# Patient Record
Sex: FEMALE | Race: WHITE | NOT HISPANIC OR LATINO | Employment: OTHER | URBAN - METROPOLITAN AREA
[De-identification: names, ages, dates, MRNs, and addresses within clinical notes are randomized per-mention and may not be internally consistent; named-entity substitution may affect disease eponyms.]

---

## 2018-07-12 ENCOUNTER — OFFICE VISIT (OUTPATIENT)
Dept: INTERNAL MEDICINE | Facility: CLINIC | Age: 78
End: 2018-07-12

## 2018-07-12 ENCOUNTER — RESULTS ENCOUNTER (OUTPATIENT)
Dept: INTERNAL MEDICINE | Facility: CLINIC | Age: 78
End: 2018-07-12

## 2018-07-12 VITALS
WEIGHT: 219 LBS | SYSTOLIC BLOOD PRESSURE: 124 MMHG | HEIGHT: 61 IN | BODY MASS INDEX: 41.35 KG/M2 | HEART RATE: 64 BPM | DIASTOLIC BLOOD PRESSURE: 72 MMHG

## 2018-07-12 DIAGNOSIS — F32.89 OTHER DEPRESSION: ICD-10-CM

## 2018-07-12 DIAGNOSIS — M25.50 ARTHRALGIA OF MULTIPLE JOINTS: ICD-10-CM

## 2018-07-12 DIAGNOSIS — M19.90 ARTHRITIS: ICD-10-CM

## 2018-07-12 DIAGNOSIS — R73.03 PRE-DIABETES: ICD-10-CM

## 2018-07-12 DIAGNOSIS — E78.2 MIXED HYPERLIPIDEMIA: ICD-10-CM

## 2018-07-12 DIAGNOSIS — F41.9 ANXIETY: ICD-10-CM

## 2018-07-12 DIAGNOSIS — F51.04 PSYCHOPHYSIOLOGIC INSOMNIA: ICD-10-CM

## 2018-07-12 DIAGNOSIS — Z12.11 SCREEN FOR COLON CANCER: ICD-10-CM

## 2018-07-12 DIAGNOSIS — I10 BENIGN ESSENTIAL HYPERTENSION: Primary | ICD-10-CM

## 2018-07-12 DIAGNOSIS — M54.40 CHRONIC LOW BACK PAIN WITH SCIATICA, SCIATICA LATERALITY UNSPECIFIED, UNSPECIFIED BACK PAIN LATERALITY: ICD-10-CM

## 2018-07-12 DIAGNOSIS — K21.00 ESOPHAGITIS, REFLUX: ICD-10-CM

## 2018-07-12 DIAGNOSIS — M17.0 PRIMARY OSTEOARTHRITIS OF BOTH KNEES: ICD-10-CM

## 2018-07-12 DIAGNOSIS — E53.8 VITAMIN B 12 DEFICIENCY: ICD-10-CM

## 2018-07-12 DIAGNOSIS — E55.9 VITAMIN D DEFICIENCY: ICD-10-CM

## 2018-07-12 DIAGNOSIS — M81.8 OTHER OSTEOPOROSIS WITHOUT CURRENT PATHOLOGICAL FRACTURE: ICD-10-CM

## 2018-07-12 DIAGNOSIS — G89.29 CHRONIC LOW BACK PAIN WITH SCIATICA, SCIATICA LATERALITY UNSPECIFIED, UNSPECIFIED BACK PAIN LATERALITY: ICD-10-CM

## 2018-07-12 LAB
25(OH)D3 SERPL-MCNC: 37.1 NG/ML
ALBUMIN SERPL-MCNC: 4.31 G/DL (ref 3.2–4.8)
ALBUMIN/GLOB SERPL: 1.9 G/DL (ref 1.5–2.5)
ALP SERPL-CCNC: 150 U/L (ref 25–100)
ALT SERPL W P-5'-P-CCNC: 13 U/L (ref 7–40)
ANION GAP SERPL CALCULATED.3IONS-SCNC: 8 MMOL/L (ref 3–11)
ARTICHOKE IGE QN: 66 MG/DL (ref 0–130)
AST SERPL-CCNC: 18 U/L (ref 0–33)
BILIRUB BLD-MCNC: NEGATIVE MG/DL
BILIRUB SERPL-MCNC: 0.6 MG/DL (ref 0.3–1.2)
BUN BLD-MCNC: 19 MG/DL (ref 9–23)
BUN/CREAT SERPL: 22.1 (ref 7–25)
CALCIUM SPEC-SCNC: 9.6 MG/DL (ref 8.7–10.4)
CHLORIDE SERPL-SCNC: 103 MMOL/L (ref 99–109)
CHOLEST SERPL-MCNC: 133 MG/DL (ref 0–200)
CLARITY, POC: CLEAR
CO2 SERPL-SCNC: 31 MMOL/L (ref 20–31)
COLOR UR: YELLOW
CREAT BLD-MCNC: 0.86 MG/DL (ref 0.6–1.3)
DEPRECATED RDW RBC AUTO: 47.8 FL (ref 37–54)
ERYTHROCYTE [DISTWIDTH] IN BLOOD BY AUTOMATED COUNT: 13.7 % (ref 11.3–14.5)
GFR SERPL CREATININE-BSD FRML MDRD: 64 ML/MIN/1.73
GLOBULIN UR ELPH-MCNC: 2.3 GM/DL
GLUCOSE BLD-MCNC: 115 MG/DL (ref 70–100)
GLUCOSE UR STRIP-MCNC: NEGATIVE MG/DL
HBA1C MFR BLD: 5.7 % (ref 4.8–5.6)
HCT VFR BLD AUTO: 41.6 % (ref 34.5–44)
HDLC SERPL-MCNC: 56 MG/DL (ref 40–60)
HGB BLD-MCNC: 13.1 G/DL (ref 11.5–15.5)
KETONES UR QL: NEGATIVE
LEUKOCYTE EST, POC: NEGATIVE
MCH RBC QN AUTO: 29.8 PG (ref 27–31)
MCHC RBC AUTO-ENTMCNC: 31.5 G/DL (ref 32–36)
MCV RBC AUTO: 94.5 FL (ref 80–99)
NITRITE UR-MCNC: NEGATIVE MG/ML
PH UR: 8 [PH] (ref 5–8)
PLATELET # BLD AUTO: 290 10*3/MM3 (ref 150–450)
PMV BLD AUTO: 11.3 FL (ref 6–12)
POTASSIUM BLD-SCNC: 4.6 MMOL/L (ref 3.5–5.5)
PROT SERPL-MCNC: 6.6 G/DL (ref 5.7–8.2)
PROT UR STRIP-MCNC: NEGATIVE MG/DL
RBC # BLD AUTO: 4.4 10*6/MM3 (ref 3.89–5.14)
RBC # UR STRIP: NEGATIVE /UL
SODIUM BLD-SCNC: 142 MMOL/L (ref 132–146)
SP GR UR: 1.01 (ref 1–1.03)
TRIGL SERPL-MCNC: 85 MG/DL (ref 0–150)
UROBILINOGEN UR QL: NORMAL
VIT B12 BLD-MCNC: 731 PG/ML (ref 211–911)
WBC NRBC COR # BLD: 10.97 10*3/MM3 (ref 3.5–10.8)

## 2018-07-12 PROCEDURE — 83036 HEMOGLOBIN GLYCOSYLATED A1C: CPT | Performed by: INTERNAL MEDICINE

## 2018-07-12 PROCEDURE — 99214 OFFICE O/P EST MOD 30 MIN: CPT | Performed by: INTERNAL MEDICINE

## 2018-07-12 PROCEDURE — 82306 VITAMIN D 25 HYDROXY: CPT | Performed by: INTERNAL MEDICINE

## 2018-07-12 PROCEDURE — 82607 VITAMIN B-12: CPT | Performed by: INTERNAL MEDICINE

## 2018-07-12 PROCEDURE — 80053 COMPREHEN METABOLIC PANEL: CPT | Performed by: INTERNAL MEDICINE

## 2018-07-12 PROCEDURE — 85027 COMPLETE CBC AUTOMATED: CPT | Performed by: INTERNAL MEDICINE

## 2018-07-12 PROCEDURE — 81003 URINALYSIS AUTO W/O SCOPE: CPT | Performed by: INTERNAL MEDICINE

## 2018-07-12 PROCEDURE — 80061 LIPID PANEL: CPT | Performed by: INTERNAL MEDICINE

## 2018-07-12 RX ORDER — QUINIDINE SULFATE 200 MG
TABLET ORAL
COMMUNITY

## 2018-07-12 RX ORDER — RANITIDINE 150 MG/1
TABLET ORAL 2 TIMES DAILY
COMMUNITY
Start: 2015-01-05 | End: 2018-07-23 | Stop reason: SDUPTHER

## 2018-07-12 RX ORDER — LISINOPRIL 20 MG/1
20 TABLET ORAL 2 TIMES DAILY
COMMUNITY
End: 2018-07-23 | Stop reason: SDUPTHER

## 2018-07-12 RX ORDER — ARIPIPRAZOLE 2 MG/1
2 TABLET ORAL EVERY MORNING
Qty: 30 TABLET | Refills: 5 | Status: SHIPPED | OUTPATIENT
Start: 2018-07-12 | End: 2018-07-13

## 2018-07-12 RX ORDER — ATORVASTATIN CALCIUM 20 MG/1
20 TABLET, FILM COATED ORAL NIGHTLY
COMMUNITY
End: 2018-10-22 | Stop reason: SDUPTHER

## 2018-07-12 RX ORDER — AMITRIPTYLINE HYDROCHLORIDE 25 MG/1
25 TABLET, FILM COATED ORAL NIGHTLY
Qty: 30 TABLET | Refills: 5 | Status: SHIPPED | OUTPATIENT
Start: 2018-07-12 | End: 2019-01-05 | Stop reason: SDUPTHER

## 2018-07-12 RX ORDER — CARVEDILOL 25 MG/1
TABLET ORAL 2 TIMES DAILY
COMMUNITY
Start: 2015-01-05 | End: 2018-08-15 | Stop reason: SDUPTHER

## 2018-07-12 NOTE — PROGRESS NOTES
Patient is a 78 y.o. female who is here to establish care for chronic conditions.  Chief Complaint   Patient presents with   • Establish Care     htn,hyperlipidemia         HPI:    Here to establish care and management of chronic medical issues.  Has been diagnosed with fibromyalgia.  Onset about 15 years.  Seen by rheumatology.  Worst in her mid back.  Has had 2 outpatient lumbar surgeries.  Poor sleep.    Also has HTN.  Onset years.  Occasional dizziness.  Occasional LE edema.  No CP.  Some DESAI.   History:    Patient Active Problem List   Diagnosis   • Anxiety   • Arthralgia of multiple joints   • Arthritis   • Benign essential hypertension   • Chronic back pain   • Depression   • Esophagitis, reflux   • Gait disturbance   • Mixed hyperlipidemia   • Muscle spasm   • Neuralgia   • Pre-diabetes   • Psychophysiologic insomnia   • Major depression, recurrent (CMS/HCC)   • Vitamin B 12 deficiency   • Vitamin D deficiency   • Osteoporosis   • Primary osteoarthritis of both knees       Past Medical History:   Diagnosis Date   • Colon polyp    • Osteoporosis    • Pain in thoracic spine    • Pneumonia    • UTI (urinary tract infection)        Past Surgical History:   Procedure Laterality Date   • BLADDER SURGERY  2000    bladder suspension   • CERVICAL LAMINECTOMY     • COLONOSCOPY     • COLOSTOMY  1979    temporary sigmoid   • HYSTERECTOMY         Current Outpatient Prescriptions on File Prior to Visit   Medication Sig   • amLODIPine (NORVASC) 5 MG tablet TAKE ONE TABLET BY MOUTH AT BEDTIME   • DULoxetine (CYMBALTA) 60 MG capsule Take 1 capsule by mouth daily.     No current facility-administered medications on file prior to visit.        Family History   Problem Relation Age of Onset   • Blindness Mother    • Other Mother         arteriosclerotic cardiovascular disease    • Diabetes Mother    • Osteoporosis Mother    • Stroke Mother    • Cancer Brother    • Lung cancer Sister    • Osteoporosis Sister    • Stroke Sister   "  • Stroke Other        Social History     Social History   • Marital status:      Spouse name: N/A   • Number of children: N/A   • Years of education: N/A     Occupational History   • Not on file.     Social History Main Topics   • Smoking status: Never Smoker   • Smokeless tobacco: Never Used   • Alcohol use No   • Drug use: No   • Sexual activity: Not on file     Other Topics Concern   • Not on file     Social History Narrative   • No narrative on file         Review of Systems   Constitutional: Positive for fatigue. Negative for chills, fever and unexpected weight change.   HENT: Negative for congestion, ear pain, hearing loss, rhinorrhea, sinus pressure, sore throat and trouble swallowing.    Eyes: Negative for discharge and itching.   Respiratory: Positive for shortness of breath. Negative for cough and chest tightness.    Cardiovascular: Negative for chest pain, palpitations and leg swelling.   Gastrointestinal: Negative for abdominal pain, blood in stool, constipation, diarrhea and vomiting.   Endocrine: Negative for polydipsia and polyuria.   Genitourinary: Positive for urgency. Negative for difficulty urinating, dysuria, enuresis, frequency and hematuria.   Musculoskeletal: Positive for arthralgias, back pain, gait problem and neck pain. Negative for joint swelling.   Skin: Negative for rash and wound.   Allergic/Immunologic: Negative for immunocompromised state.   Neurological: Positive for weakness and light-headedness. Negative for dizziness, syncope, numbness and headaches.   Hematological: Does not bruise/bleed easily.   Psychiatric/Behavioral: Positive for dysphoric mood and sleep disturbance. Negative for behavioral problems. The patient is nervous/anxious.        /72 (BP Location: Right arm, Patient Position: Sitting)   Pulse 64   Ht 154.9 cm (61\")   Wt 99.3 kg (219 lb)   BMI 41.38 kg/m²       Physical Exam   Constitutional: She is oriented to person, place, and time. She appears " well-developed and well-nourished.   HENT:   Head: Normocephalic and atraumatic.   Right Ear: External ear normal.   Left Ear: External ear normal.   Mouth/Throat: Oropharynx is clear and moist.   Eyes: Conjunctivae and EOM are normal.   Neck: Normal range of motion. Neck supple.   Cardiovascular: Normal rate, regular rhythm and normal heart sounds.    Pulmonary/Chest: Effort normal and breath sounds normal.   Abdominal: Soft. Bowel sounds are normal.   Musculoskeletal: She exhibits edema.   Using  cane   Lymphadenopathy:     She has no cervical adenopathy.   Neurological: She is alert and oriented to person, place, and time.   Skin: Skin is warm and dry.   Psychiatric: She has a normal mood and affect. Her behavior is normal. Thought content normal.       Procedure:      Discussion/Summary:    HTN-stable , consider change to lotrel  Hyperlipidemia/?DM-counseled on diet, labs today  Insomnia-add elavil  Djd/fibromyalgia-cont current tx and PT  GERD-cont zantac  Knee djd-check xray and ortho to see  High risk meds-labs today  Other-check cologuard, advised mammogram    Prior labs reviewed, along with notes  abilify is to costly and will rx remeron    Current Outpatient Prescriptions:   •  amLODIPine (NORVASC) 5 MG tablet, TAKE ONE TABLET BY MOUTH AT BEDTIME, Disp: 90 tablet, Rfl: 0  •  atorvastatin (LIPITOR) 20 MG tablet, Take 20 mg by mouth Every Night., Disp: , Rfl:   •  carvedilol (COREG) 25 MG tablet, Take  by mouth 2 (Two) Times a Day., Disp: , Rfl:   •  DULoxetine (CYMBALTA) 60 MG capsule, Take 1 capsule by mouth daily., Disp: 90 capsule, Rfl: 3  •  lisinopril (PRINIVIL,ZESTRIL) 20 MG tablet, Take 20 mg by mouth 2 (Two) Times a Day., Disp: , Rfl:   •  Multiple Vitamins-Minerals (VITAMIN D3 COMPLETE) tablet, Take  by mouth., Disp: , Rfl:   •  raNITIdine (ZANTAC) 150 MG tablet, Take  by mouth 2 (Two) Times a Day., Disp: , Rfl:   •  amitriptyline (ELAVIL) 25 MG tablet, Take 1 tablet by mouth Every Night., Disp: 30  tablet, Rfl: 5  •  mirtazapine (REMERON) 15 MG tablet, Take 1 tablet by mouth Every Night., Disp: 30 tablet, Rfl: 5        Danae was seen today for establish care.    Diagnoses and all orders for this visit:    Benign essential hypertension    Mixed hyperlipidemia  -     Comprehensive Metabolic Panel  -     Lipid Panel    Esophagitis, reflux    Vitamin B 12 deficiency  -     CBC (No Diff)  -     Vitamin B12    Vitamin D deficiency  -     Vitamin D 25 Hydroxy    Arthralgia of multiple joints    Chronic low back pain with sciatica, sciatica laterality unspecified, unspecified back pain laterality    Arthritis    Other osteoporosis without current pathological fracture    Anxiety    Other depression  -     amitriptyline (ELAVIL) 25 MG tablet; Take 1 tablet by mouth Every Night.  -     Discontinue: ARIPiprazole (ABILIFY) 2 MG tablet; Take 1 tablet by mouth Every Morning.  -     mirtazapine (REMERON) 15 MG tablet; Take 1 tablet by mouth Every Night.    Pre-diabetes  -     Hemoglobin A1c  -     POC Urinalysis Dipstick, Automated    Psychophysiologic insomnia  -     amitriptyline (ELAVIL) 25 MG tablet; Take 1 tablet by mouth Every Night.    Screen for colon cancer  -     Cologuard - Stool, Per Rectum; Future    Primary osteoarthritis of both knees  -     XR Knee 1 or 2 View Bilateral  -     Ambulatory Referral to Orthopedic Surgery

## 2018-07-13 RX ORDER — MIRTAZAPINE 15 MG/1
15 TABLET, FILM COATED ORAL NIGHTLY
Qty: 30 TABLET | Refills: 5 | Status: SHIPPED | OUTPATIENT
Start: 2018-07-13 | End: 2018-10-16

## 2018-07-23 RX ORDER — RANITIDINE 150 MG/1
TABLET ORAL
Qty: 180 TABLET | Refills: 0 | Status: SHIPPED | OUTPATIENT
Start: 2018-07-23 | End: 2018-12-07 | Stop reason: SDUPTHER

## 2018-07-23 RX ORDER — LISINOPRIL 20 MG/1
TABLET ORAL
Qty: 180 TABLET | Refills: 0 | Status: SHIPPED | OUTPATIENT
Start: 2018-07-23 | End: 2018-10-17 | Stop reason: SDUPTHER

## 2018-08-06 ENCOUNTER — OFFICE VISIT (OUTPATIENT)
Dept: ORTHOPEDIC SURGERY | Facility: CLINIC | Age: 78
End: 2018-08-06

## 2018-08-06 VITALS — BODY MASS INDEX: 41.35 KG/M2 | HEART RATE: 72 BPM | OXYGEN SATURATION: 98 % | HEIGHT: 61 IN | WEIGHT: 219 LBS

## 2018-08-06 DIAGNOSIS — M17.0 PRIMARY OSTEOARTHRITIS OF BOTH KNEES: Primary | ICD-10-CM

## 2018-08-06 PROCEDURE — 99203 OFFICE O/P NEW LOW 30 MIN: CPT | Performed by: ORTHOPAEDIC SURGERY

## 2018-08-06 PROCEDURE — 20610 DRAIN/INJ JOINT/BURSA W/O US: CPT | Performed by: ORTHOPAEDIC SURGERY

## 2018-08-06 RX ORDER — ROPIVACAINE HYDROCHLORIDE 5 MG/ML
4 INJECTION, SOLUTION EPIDURAL; INFILTRATION; PERINEURAL
Status: COMPLETED | OUTPATIENT
Start: 2018-08-06 | End: 2018-08-06

## 2018-08-06 RX ORDER — TRIAMCINOLONE ACETONIDE 40 MG/ML
40 INJECTION, SUSPENSION INTRA-ARTICULAR; INTRAMUSCULAR
Status: COMPLETED | OUTPATIENT
Start: 2018-08-06 | End: 2018-08-06

## 2018-08-06 RX ADMIN — TRIAMCINOLONE ACETONIDE 40 MG: 40 INJECTION, SUSPENSION INTRA-ARTICULAR; INTRAMUSCULAR at 16:09

## 2018-08-06 RX ADMIN — ROPIVACAINE HYDROCHLORIDE 4 ML: 5 INJECTION, SOLUTION EPIDURAL; INFILTRATION; PERINEURAL at 16:09

## 2018-08-06 NOTE — PROGRESS NOTES
[facility] Note    Subjective     Chief Complaint   Patient presents with   • Left Knee - Pain   • Right Knee - Pain        HPI    Danae Harmon is a 78 y.o. female.  She presents today for evaluation of bilateral knee pain.  Pain is severe in both knees, sharp in quality, associated with popping, grinding, stiffness and giving way.  Over time it is worsening.  Pain is been present for several years.  She did have Visco supplementation injections about 5-6 years ago, with no relief.  She is not interested in surgical intervention.      Patient Active Problem List   Diagnosis   • Anxiety   • Arthralgia of multiple joints   • Arthritis   • Benign essential hypertension   • Chronic back pain   • Depression   • Esophagitis, reflux   • Gait disturbance   • Mixed hyperlipidemia   • Muscle spasm   • Neuralgia   • Pre-diabetes   • Psychophysiologic insomnia   • Major depression, recurrent (CMS/HCC)   • Vitamin B 12 deficiency   • Vitamin D deficiency   • Osteoporosis   • Primary osteoarthritis of both knees     Past Medical History:   Diagnosis Date   • Colon polyp    • Osteoporosis    • Pain in thoracic spine    • Pneumonia    • UTI (urinary tract infection)       Past Surgical History:   Procedure Laterality Date   • BLADDER SURGERY  2000    bladder suspension   • CERVICAL LAMINECTOMY     • COLONOSCOPY     • COLOSTOMY  1979    temporary sigmoid   • HYSTERECTOMY        Family History   Problem Relation Age of Onset   • Blindness Mother    • Other Mother         arteriosclerotic cardiovascular disease    • Diabetes Mother    • Osteoporosis Mother    • Stroke Mother    • Cancer Brother    • Lung cancer Sister    • Osteoporosis Sister    • Stroke Sister    • Stroke Other      Social History     Social History   • Marital status:      Spouse name: N/A   • Number of children: N/A   • Years of education: N/A     Occupational History   • Not on file.     Social History Main Topics   •  Smoking status: Never Smoker   • Smokeless tobacco: Never Used   • Alcohol use No   • Drug use: No   • Sexual activity: Not on file     Other Topics Concern   • Not on file     Social History Narrative   • No narrative on file      Current Outpatient Prescriptions on File Prior to Visit   Medication Sig Dispense Refill   • amitriptyline (ELAVIL) 25 MG tablet Take 1 tablet by mouth Every Night. 30 tablet 5   • amLODIPine (NORVASC) 5 MG tablet TAKE ONE TABLET BY MOUTH AT BEDTIME 90 tablet 0   • atorvastatin (LIPITOR) 20 MG tablet Take 20 mg by mouth Every Night.     • carvedilol (COREG) 25 MG tablet Take  by mouth 2 (Two) Times a Day.     • DULoxetine (CYMBALTA) 60 MG capsule Take 1 capsule by mouth daily. 90 capsule 3   • lisinopril (PRINIVIL,ZESTRIL) 20 MG tablet TAKE 1 TABLET BY MOUTH TWICE DAILY 180 tablet 0   • Multiple Vitamins-Minerals (VITAMIN D3 COMPLETE) tablet Take  by mouth.     • raNITIdine (ZANTAC) 150 MG tablet TAKE 1 TABLET BY MOUTH TWICE DAILY 180 tablet 0   • mirtazapine (REMERON) 15 MG tablet Take 1 tablet by mouth Every Night. 30 tablet 5     No current facility-administered medications on file prior to visit.       Allergies   Allergen Reactions   • Lexapro [Escitalopram Oxalate] Nausea Only        Review of Systems   Constitutional: Negative.  Negative for activity change, appetite change, chills, diaphoresis, fatigue, fever and unexpected weight change.   HENT: Negative.  Negative for congestion, dental problem, drooling, ear discharge, ear pain, facial swelling, hearing loss, mouth sores, nosebleeds, postnasal drip, rhinorrhea, sinus pressure, sneezing, sore throat, tinnitus, trouble swallowing and voice change.    Eyes: Negative.  Negative for photophobia, pain, discharge, redness, itching and visual disturbance.   Respiratory: Negative.  Negative for apnea, cough, choking, chest tightness, shortness of breath, wheezing and stridor.    Cardiovascular: Negative.  Negative for chest pain,  "palpitations and leg swelling.   Gastrointestinal: Negative.  Negative for abdominal distention, abdominal pain, anal bleeding, blood in stool, constipation, diarrhea, nausea, rectal pain and vomiting.   Endocrine: Negative.  Negative for cold intolerance, heat intolerance, polydipsia, polyphagia and polyuria.   Genitourinary: Negative.  Negative for decreased urine volume, difficulty urinating, dysuria, enuresis, flank pain, frequency, genital sores, hematuria and urgency.   Musculoskeletal: Positive for arthralgias and gait problem. Negative for back pain, joint swelling, myalgias, neck pain and neck stiffness.   Skin: Negative.  Negative for color change, pallor, rash and wound.   Allergic/Immunologic: Negative.  Negative for environmental allergies, food allergies and immunocompromised state.   Neurological: Negative for dizziness, tremors, seizures, syncope, facial asymmetry, speech difficulty, weakness, light-headedness, numbness and headaches.   Hematological: Negative.  Negative for adenopathy. Does not bruise/bleed easily.   Psychiatric/Behavioral: Positive for sleep disturbance. Negative for agitation, behavioral problems, confusion, decreased concentration, dysphoric mood, hallucinations, self-injury and suicidal ideas. The patient is not nervous/anxious and is not hyperactive.         Objective      Physical Exam  Pulse 72   Ht 154.9 cm (60.98\")   Wt 99.3 kg (219 lb)   SpO2 98%   BMI 41.40 kg/m²     Body mass index is 41.4 kg/m².    General:   Mental Status:  Alert   Appearance: Cooperative, in no acute distress   Build and Nutrition: Obese female   Orientation: Alert and oriented to person, place and time   Posture: Normal   Gait: Limping with a cane    Integument:   Right knee: no skin lesions, no rash, no ecchymosis   Left knee: no skin lesions, no rash, no ecchymosis    Neurologic:   Sensation:    Right foot: intact to light touch on the dorsal and plantar aspect    Left foot: intact to light " touch on the dorsal and plantar aspect   Motor:  Right lower extremity: 5/5 quadriceps, hamstrings, ankle dorsiflexors, and ankle plantar flexors  Left lower extremity: 5/5 quadriceps, hamstrings, ankle dorsiflexors, and ankle plantar flexors  Vascular:   Right lower extremity: 2+ dorsalis pedis pulse, prompt capillary refill   Left lower extremity: 2+ dorsalis pedis pulse, prompt capillary refill    Lower Extremities:   Right Knee:    Tenderness:  Lateral joint line tenderness    Effusion:  None    Swelling:  None    Crepitus:  Positive    Atrophy:  None    Range of motion:  Extension: 0°       Flexion: 100°  Instability:  No varus laxity, no valgus laxity, negative anterior drawer  Deformities:  Valgus   Left Knee:    Tenderness:  Medial joint line tenderness    Effusion:  None    Swelling:  None    Crepitus: Positive    Atrophy:  None    Range of motion:  Extension: 0°       Flexion: 100°  Instability:  No varus laxity, no valgus laxity, negative anterior drawer  Deformities:  Varus      Imaging/Studies      Imaging Results (last 24 hours)     Procedure Component Value Units Date/Time    XR Knee 4+ View Bilateral [[id number]] Resulted:  08/06/18 1536     Updated:  08/06/18 1537    Narrative:       Right Knee Radiographs  Indication: right knee pain  Views: Standing AP's and skiers of both knees, with lateral and sunrise   views of the right knee    Comparison: no prior studies available    Findings:   Arthritic changes are seen, with bone-on-bone contact lateral compartment,   with patellofemoral degeneration.    Left Knee Radiographs  Indication: left knee pain  Views: Standing AP's and skiers of both knees, with lateral and sunrise   views of the left knee    Comparison: no prior studies available    Findings:   Advanced arthritic changes are seen, with bone-on-bone contact medial   compartment, with slight opening of the lateral compartment, with   patellofemoral degeneration and tricompartmental osteophytes.           Assessment and Plan     Danae was seen today for pain and pain.    Diagnoses and all orders for this visit:    Primary osteoarthritis of both knees  -     XR Knee 4+ View Bilateral  -     Large Joint Arthrocentesis  -     Large Joint Arthrocentesis        I reviewed my findings with patient today.  Both her knees demonstrate advanced arthritic changes, and she is a candidate for knee replacement surgery should she be able to get her BMI less than 40.  However, she is not interested in surgical intervention.  She would like to have injections for her knees today, and these were provided.  I will see her back in 3 months to ensure improvement, but sooner for any problems.    Of note, she had 100% relief just a few minutes following the injections.    Return in about 3 months (around 11/6/2018).      Medical Decision Making  Management Options : prescription/IM medicine  Data/Risk: radiology tests and independent visualization of imaging, lab tests, or EMG/NCV      Neel Gresham MD  08/06/18  4:59 PM

## 2018-08-06 NOTE — PROGRESS NOTES
Procedure   Large Joint Arthrocentesis  Date/Time: 8/6/2018 4:09 PM  Consent given by: patient  Site marked: site marked  Timeout: Immediately prior to procedure a time out was called to verify the correct patient, procedure, equipment, support staff and site/side marked as required   Supporting Documentation  Indications: pain   Procedure Details  Location: knee - R knee  Preparation: Patient was prepped and draped in the usual sterile fashion  Needle size: 22 G  Approach: anterolateral  Medications administered: 40 mg triamcinolone acetonide 40 MG/ML; 4 mL ropivacaine 0.5 %  Patient tolerance: patient tolerated the procedure well with no immediate complications    Large Joint Arthrocentesis  Date/Time: 8/6/2018 4:09 PM  Consent given by: patient  Site marked: site marked  Timeout: Immediately prior to procedure a time out was called to verify the correct patient, procedure, equipment, support staff and site/side marked as required   Supporting Documentation  Indications: pain   Procedure Details  Location: knee - L knee  Preparation: Patient was prepped and draped in the usual sterile fashion  Needle size: 22 G  Approach: anterolateral  Medications administered: 40 mg triamcinolone acetonide 40 MG/ML; 4 mL ropivacaine 0.5 %  Patient tolerance: patient tolerated the procedure well with no immediate complications

## 2018-08-15 RX ORDER — DULOXETIN HYDROCHLORIDE 60 MG/1
60 CAPSULE, DELAYED RELEASE ORAL DAILY
Qty: 30 CAPSULE | Refills: 0 | Status: SHIPPED | OUTPATIENT
Start: 2018-08-15 | End: 2018-09-09 | Stop reason: SDUPTHER

## 2018-08-15 RX ORDER — CARVEDILOL 25 MG/1
TABLET ORAL
Qty: 60 TABLET | Refills: 5 | Status: SHIPPED | OUTPATIENT
Start: 2018-08-15 | End: 2019-02-05 | Stop reason: SDUPTHER

## 2018-09-10 RX ORDER — DULOXETIN HYDROCHLORIDE 60 MG/1
60 CAPSULE, DELAYED RELEASE ORAL DAILY
Qty: 30 CAPSULE | Refills: 3 | Status: SHIPPED | OUTPATIENT
Start: 2018-09-10 | End: 2019-02-05 | Stop reason: SDUPTHER

## 2018-09-24 RX ORDER — AMLODIPINE BESYLATE 5 MG/1
TABLET ORAL
Qty: 90 TABLET | Refills: 0 | Status: SHIPPED | OUTPATIENT
Start: 2018-09-24 | End: 2018-12-26 | Stop reason: SDUPTHER

## 2018-10-16 ENCOUNTER — OFFICE VISIT (OUTPATIENT)
Dept: INTERNAL MEDICINE | Facility: CLINIC | Age: 78
End: 2018-10-16

## 2018-10-16 VITALS — SYSTOLIC BLOOD PRESSURE: 126 MMHG | HEIGHT: 61 IN | DIASTOLIC BLOOD PRESSURE: 78 MMHG | HEART RATE: 68 BPM

## 2018-10-16 DIAGNOSIS — N39.0 URINARY TRACT INFECTION WITH HEMATURIA, SITE UNSPECIFIED: ICD-10-CM

## 2018-10-16 DIAGNOSIS — F51.04 PSYCHOPHYSIOLOGIC INSOMNIA: ICD-10-CM

## 2018-10-16 DIAGNOSIS — M25.50 ARTHRALGIA OF MULTIPLE JOINTS: ICD-10-CM

## 2018-10-16 DIAGNOSIS — G89.29 CHRONIC LOW BACK PAIN WITH SCIATICA, SCIATICA LATERALITY UNSPECIFIED, UNSPECIFIED BACK PAIN LATERALITY: ICD-10-CM

## 2018-10-16 DIAGNOSIS — M81.8 OTHER OSTEOPOROSIS WITHOUT CURRENT PATHOLOGICAL FRACTURE: ICD-10-CM

## 2018-10-16 DIAGNOSIS — M62.838 MUSCLE SPASM: ICD-10-CM

## 2018-10-16 DIAGNOSIS — F41.9 ANXIETY: ICD-10-CM

## 2018-10-16 DIAGNOSIS — E53.8 VITAMIN B 12 DEFICIENCY: ICD-10-CM

## 2018-10-16 DIAGNOSIS — M19.90 ARTHRITIS: ICD-10-CM

## 2018-10-16 DIAGNOSIS — M54.40 CHRONIC LOW BACK PAIN WITH SCIATICA, SCIATICA LATERALITY UNSPECIFIED, UNSPECIFIED BACK PAIN LATERALITY: ICD-10-CM

## 2018-10-16 DIAGNOSIS — I10 BENIGN ESSENTIAL HYPERTENSION: Primary | ICD-10-CM

## 2018-10-16 DIAGNOSIS — F33.1 MODERATE EPISODE OF RECURRENT MAJOR DEPRESSIVE DISORDER (HCC): ICD-10-CM

## 2018-10-16 DIAGNOSIS — R31.9 URINARY TRACT INFECTION WITH HEMATURIA, SITE UNSPECIFIED: ICD-10-CM

## 2018-10-16 DIAGNOSIS — M17.0 PRIMARY OSTEOARTHRITIS OF BOTH KNEES: ICD-10-CM

## 2018-10-16 DIAGNOSIS — E78.2 MIXED HYPERLIPIDEMIA: ICD-10-CM

## 2018-10-16 DIAGNOSIS — F32.89 OTHER DEPRESSION: ICD-10-CM

## 2018-10-16 DIAGNOSIS — E55.9 VITAMIN D DEFICIENCY: ICD-10-CM

## 2018-10-16 DIAGNOSIS — R73.03 PRE-DIABETES: ICD-10-CM

## 2018-10-16 DIAGNOSIS — K21.00 ESOPHAGITIS, REFLUX: ICD-10-CM

## 2018-10-16 LAB
ALBUMIN SERPL-MCNC: 4.43 G/DL (ref 3.2–4.8)
ALBUMIN/GLOB SERPL: 2.1 G/DL (ref 1.5–2.5)
ALP SERPL-CCNC: 188 U/L (ref 25–100)
ALT SERPL W P-5'-P-CCNC: 21 U/L (ref 7–40)
ANION GAP SERPL CALCULATED.3IONS-SCNC: 9 MMOL/L (ref 3–11)
AST SERPL-CCNC: 23 U/L (ref 0–33)
BASOPHILS # BLD AUTO: 0.04 10*3/MM3 (ref 0–0.2)
BASOPHILS NFR BLD AUTO: 0.3 % (ref 0–1)
BILIRUB BLD-MCNC: ABNORMAL MG/DL
BILIRUB SERPL-MCNC: 0.6 MG/DL (ref 0.3–1.2)
BUN BLD-MCNC: 15 MG/DL (ref 9–23)
BUN/CREAT SERPL: 18.8 (ref 7–25)
CALCIUM SPEC-SCNC: 9.8 MG/DL (ref 8.7–10.4)
CHLORIDE SERPL-SCNC: 103 MMOL/L (ref 99–109)
CLARITY, POC: CLEAR
CO2 SERPL-SCNC: 28 MMOL/L (ref 20–31)
COLOR UR: YELLOW
CREAT BLD-MCNC: 0.8 MG/DL (ref 0.6–1.3)
DEPRECATED RDW RBC AUTO: 48.2 FL (ref 37–54)
EOSINOPHIL # BLD AUTO: 0.19 10*3/MM3 (ref 0–0.3)
EOSINOPHIL NFR BLD AUTO: 1.4 % (ref 0–3)
ERYTHROCYTE [DISTWIDTH] IN BLOOD BY AUTOMATED COUNT: 14.2 % (ref 11.3–14.5)
GFR SERPL CREATININE-BSD FRML MDRD: 70 ML/MIN/1.73
GLOBULIN UR ELPH-MCNC: 2.1 GM/DL
GLUCOSE BLD-MCNC: 89 MG/DL (ref 70–100)
GLUCOSE UR STRIP-MCNC: NEGATIVE MG/DL
HBA1C MFR BLD: 5.8 % (ref 4.8–5.6)
HCT VFR BLD AUTO: 43.4 % (ref 34.5–44)
HGB BLD-MCNC: 13.8 G/DL (ref 11.5–15.5)
IMM GRANULOCYTES # BLD: 0.04 10*3/MM3 (ref 0–0.03)
IMM GRANULOCYTES NFR BLD: 0.3 % (ref 0–0.6)
KETONES UR QL: ABNORMAL
LEUKOCYTE EST, POC: ABNORMAL
LYMPHOCYTES # BLD AUTO: 3.73 10*3/MM3 (ref 0.6–4.8)
LYMPHOCYTES NFR BLD AUTO: 28.4 % (ref 24–44)
MCH RBC QN AUTO: 29.2 PG (ref 27–31)
MCHC RBC AUTO-ENTMCNC: 31.8 G/DL (ref 32–36)
MCV RBC AUTO: 91.8 FL (ref 80–99)
MONOCYTES # BLD AUTO: 1.05 10*3/MM3 (ref 0–1)
MONOCYTES NFR BLD AUTO: 8 % (ref 0–12)
NEUTROPHILS # BLD AUTO: 8.12 10*3/MM3 (ref 1.5–8.3)
NEUTROPHILS NFR BLD AUTO: 61.9 % (ref 41–71)
NITRITE UR-MCNC: NEGATIVE MG/ML
PH UR: 7 [PH] (ref 5–8)
PLATELET # BLD AUTO: 353 10*3/MM3 (ref 150–450)
PMV BLD AUTO: 11.2 FL (ref 6–12)
POTASSIUM BLD-SCNC: 4.9 MMOL/L (ref 3.5–5.5)
PROT SERPL-MCNC: 6.5 G/DL (ref 5.7–8.2)
PROT UR STRIP-MCNC: NEGATIVE MG/DL
RBC # BLD AUTO: 4.73 10*6/MM3 (ref 3.89–5.14)
RBC # UR STRIP: ABNORMAL /UL
SODIUM BLD-SCNC: 140 MMOL/L (ref 132–146)
SP GR UR: 1.01 (ref 1–1.03)
UROBILINOGEN UR QL: NORMAL
WBC NRBC COR # BLD: 13.13 10*3/MM3 (ref 3.5–10.8)

## 2018-10-16 PROCEDURE — 83036 HEMOGLOBIN GLYCOSYLATED A1C: CPT | Performed by: INTERNAL MEDICINE

## 2018-10-16 PROCEDURE — 81003 URINALYSIS AUTO W/O SCOPE: CPT | Performed by: INTERNAL MEDICINE

## 2018-10-16 PROCEDURE — 80053 COMPREHEN METABOLIC PANEL: CPT | Performed by: INTERNAL MEDICINE

## 2018-10-16 PROCEDURE — 87086 URINE CULTURE/COLONY COUNT: CPT | Performed by: INTERNAL MEDICINE

## 2018-10-16 PROCEDURE — 99214 OFFICE O/P EST MOD 30 MIN: CPT | Performed by: INTERNAL MEDICINE

## 2018-10-16 PROCEDURE — 85025 COMPLETE CBC W/AUTO DIFF WBC: CPT | Performed by: INTERNAL MEDICINE

## 2018-10-16 NOTE — PROGRESS NOTES
Patient is a 78 y.o. female who is here for a follow up of chronic conditions.  Chief Complaint   Patient presents with   • Hypertension   • Hyperlipidemia         HPI:    Here for f/u.  Still hurts all over.  Sleeping better with elavil.  Could not tolerate the remeron.  Does not feel good.  Breathing well.  No nausea or emesis.  Allergies are bothersome.  Does not check fsbs.  Occasional polyuria and dipsia.  Occasional incontinence.      History:    Patient Active Problem List   Diagnosis   • Anxiety   • Arthralgia of multiple joints   • Arthritis   • Benign essential hypertension   • Chronic back pain   • Depression   • Esophagitis, reflux   • Gait disturbance   • Mixed hyperlipidemia   • Muscle spasm   • Neuralgia   • Pre-diabetes   • Psychophysiologic insomnia   • Major depression, recurrent (CMS/HCC)   • Vitamin B 12 deficiency   • Vitamin D deficiency   • Osteoporosis   • Primary osteoarthritis of both knees       Past Medical History:   Diagnosis Date   • Colon polyp    • Osteoporosis    • Pain in thoracic spine    • Pneumonia    • UTI (urinary tract infection)        Past Surgical History:   Procedure Laterality Date   • BLADDER SURGERY  2000    bladder suspension   • CERVICAL LAMINECTOMY     • COLONOSCOPY     • COLOSTOMY  1979    temporary sigmoid   • HYSTERECTOMY         Current Outpatient Prescriptions on File Prior to Visit   Medication Sig   • amitriptyline (ELAVIL) 25 MG tablet Take 1 tablet by mouth Every Night.   • amLODIPine (NORVASC) 5 MG tablet TAKE 1 TABLET BY MOUTH DAILY   • atorvastatin (LIPITOR) 20 MG tablet Take 20 mg by mouth Every Night.   • carvedilol (COREG) 25 MG tablet TAKE 1 TABLET BY MOUTH TWICE DAILY   • DULoxetine (CYMBALTA) 60 MG capsule TAKE 1 CAPSULE BY MOUTH DAILY   • lisinopril (PRINIVIL,ZESTRIL) 20 MG tablet TAKE 1 TABLET BY MOUTH TWICE DAILY   • mirtazapine (REMERON) 15 MG tablet Take 1 tablet by mouth Every Night.   • Multiple Vitamins-Minerals (VITAMIN D3 COMPLETE) tablet  Take  by mouth.   • raNITIdine (ZANTAC) 150 MG tablet TAKE 1 TABLET BY MOUTH TWICE DAILY     No current facility-administered medications on file prior to visit.        Family History   Problem Relation Age of Onset   • Blindness Mother    • Other Mother         arteriosclerotic cardiovascular disease    • Diabetes Mother    • Osteoporosis Mother    • Stroke Mother    • Cancer Brother    • Lung cancer Sister    • Osteoporosis Sister    • Stroke Sister    • Stroke Other        Social History     Social History   • Marital status:      Spouse name: N/A   • Number of children: N/A   • Years of education: N/A     Occupational History   • Not on file.     Social History Main Topics   • Smoking status: Never Smoker   • Smokeless tobacco: Never Used   • Alcohol use No   • Drug use: No   • Sexual activity: Not on file     Other Topics Concern   • Not on file     Social History Narrative   • No narrative on file         Review of Systems   Constitutional: Positive for fatigue. Negative for chills, fever and unexpected weight change.   HENT: Negative for congestion, ear pain, hearing loss, rhinorrhea, sinus pressure, sore throat and trouble swallowing.    Eyes: Negative for discharge and itching.   Respiratory: Positive for shortness of breath. Negative for cough and chest tightness.    Cardiovascular: Negative for chest pain, palpitations and leg swelling.   Gastrointestinal: Negative for abdominal pain, blood in stool, constipation, diarrhea and vomiting.   Endocrine: Negative for polydipsia and polyuria.   Genitourinary: Positive for urgency. Negative for difficulty urinating, dysuria, enuresis, frequency and hematuria.   Musculoskeletal: Positive for arthralgias, back pain, gait problem and neck pain. Negative for joint swelling.   Skin: Negative for rash and wound.   Allergic/Immunologic: Negative for immunocompromised state.   Neurological: Negative for dizziness, syncope, numbness and headaches.   Hematological:  "Does not bruise/bleed easily.   Psychiatric/Behavioral: Positive for dysphoric mood. Negative for behavioral problems.       Ht 154.9 cm (60.98\")       Physical Exam   Constitutional: She is oriented to person, place, and time. She appears well-developed and well-nourished.   HENT:   Head: Normocephalic and atraumatic.   Right Ear: External ear normal.   Left Ear: External ear normal.   Mouth/Throat: Oropharynx is clear and moist.   Eyes: Conjunctivae and EOM are normal.   Neck: Normal range of motion. Neck supple.   Cardiovascular: Normal rate, regular rhythm and normal heart sounds.    Pulmonary/Chest: Effort normal and breath sounds normal.   Abdominal: Soft. Bowel sounds are normal.   Musculoskeletal: She exhibits edema.   Using  cane   Lymphadenopathy:     She has no cervical adenopathy.   Neurological: She is alert and oriented to person, place, and time.   Skin: Skin is warm and dry.   Psychiatric: She has a normal mood and affect. Her behavior is normal. Thought content normal.       Procedure:      Discussion/Summary:    HTN-stable   Hyperlipidemia/?DM-counseled on diet, labs today  Insomnia-add elavil  Djd/fibromyalgia-cont current tx and PT  GERD-cont zantac  Knee djd-check xray and ortho to see  High risk meds-labs today  Other-extensively counseled on need for colonoscopy given recent cologuard positive testing but she is refusing and understands the risk of undetected colon neoplasm     Prior labs reviewed, along with notes  Labs noted and dw patient    Current Outpatient Prescriptions:   •  amitriptyline (ELAVIL) 25 MG tablet, Take 1 tablet by mouth Every Night., Disp: 30 tablet, Rfl: 5  •  amLODIPine (NORVASC) 5 MG tablet, TAKE 1 TABLET BY MOUTH DAILY, Disp: 90 tablet, Rfl: 0  •  atorvastatin (LIPITOR) 20 MG tablet, Take 20 mg by mouth Every Night., Disp: , Rfl:   •  carvedilol (COREG) 25 MG tablet, TAKE 1 TABLET BY MOUTH TWICE DAILY, Disp: 60 tablet, Rfl: 5  •  DULoxetine (CYMBALTA) 60 MG capsule, " TAKE 1 CAPSULE BY MOUTH DAILY, Disp: 30 capsule, Rfl: 3  •  lisinopril (PRINIVIL,ZESTRIL) 20 MG tablet, TAKE 1 TABLET BY MOUTH TWICE DAILY, Disp: 180 tablet, Rfl: 0  •  mirtazapine (REMERON) 15 MG tablet, Take 1 tablet by mouth Every Night., Disp: 30 tablet, Rfl: 5  •  Multiple Vitamins-Minerals (VITAMIN D3 COMPLETE) tablet, Take  by mouth., Disp: , Rfl:   •  raNITIdine (ZANTAC) 150 MG tablet, TAKE 1 TABLET BY MOUTH TWICE DAILY, Disp: 180 tablet, Rfl: 0        There are no diagnoses linked to this encounter.

## 2018-10-18 LAB — BACTERIA SPEC AEROBE CULT: NORMAL

## 2018-10-18 RX ORDER — LISINOPRIL 20 MG/1
TABLET ORAL
Qty: 180 TABLET | Refills: 0 | Status: SHIPPED | OUTPATIENT
Start: 2018-10-18 | End: 2018-12-07 | Stop reason: SDUPTHER

## 2018-10-23 RX ORDER — ATORVASTATIN CALCIUM 20 MG/1
TABLET, FILM COATED ORAL
Qty: 90 TABLET | Refills: 0 | Status: SHIPPED | OUTPATIENT
Start: 2018-10-23 | End: 2018-12-07 | Stop reason: SDUPTHER

## 2018-12-07 RX ORDER — ATORVASTATIN CALCIUM 20 MG/1
TABLET, FILM COATED ORAL
Qty: 90 TABLET | Refills: 0 | Status: SHIPPED | OUTPATIENT
Start: 2018-12-07 | End: 2019-05-04 | Stop reason: SDUPTHER

## 2018-12-07 RX ORDER — RANITIDINE 150 MG/1
TABLET ORAL
Qty: 180 TABLET | Refills: 0 | Status: SHIPPED | OUTPATIENT
Start: 2018-12-07 | End: 2019-04-20 | Stop reason: SDUPTHER

## 2018-12-07 RX ORDER — LISINOPRIL 20 MG/1
TABLET ORAL
Qty: 180 TABLET | Refills: 0 | Status: SHIPPED | OUTPATIENT
Start: 2018-12-07 | End: 2019-02-18

## 2018-12-26 RX ORDER — AMLODIPINE BESYLATE 5 MG/1
TABLET ORAL
Qty: 90 TABLET | Refills: 0 | Status: SHIPPED | OUTPATIENT
Start: 2018-12-26 | End: 2019-03-23 | Stop reason: SDUPTHER

## 2019-01-05 DIAGNOSIS — F51.04 PSYCHOPHYSIOLOGIC INSOMNIA: ICD-10-CM

## 2019-01-05 DIAGNOSIS — F32.89 OTHER DEPRESSION: ICD-10-CM

## 2019-01-07 RX ORDER — AMITRIPTYLINE HYDROCHLORIDE 25 MG/1
25 TABLET, FILM COATED ORAL NIGHTLY
Qty: 30 TABLET | Refills: 0 | Status: SHIPPED | OUTPATIENT
Start: 2019-01-07 | End: 2019-02-05 | Stop reason: SDUPTHER

## 2019-02-05 DIAGNOSIS — F32.89 OTHER DEPRESSION: ICD-10-CM

## 2019-02-05 DIAGNOSIS — F51.04 PSYCHOPHYSIOLOGIC INSOMNIA: ICD-10-CM

## 2019-02-05 RX ORDER — CARVEDILOL 25 MG/1
TABLET ORAL
Qty: 60 TABLET | Refills: 2 | Status: SHIPPED | OUTPATIENT
Start: 2019-02-05 | End: 2019-04-02 | Stop reason: SDUPTHER

## 2019-02-05 RX ORDER — DULOXETIN HYDROCHLORIDE 60 MG/1
60 CAPSULE, DELAYED RELEASE ORAL DAILY
Qty: 30 CAPSULE | Refills: 0 | Status: SHIPPED | OUTPATIENT
Start: 2019-02-05 | End: 2019-03-03 | Stop reason: SDUPTHER

## 2019-02-05 RX ORDER — AMITRIPTYLINE HYDROCHLORIDE 25 MG/1
25 TABLET, FILM COATED ORAL NIGHTLY
Qty: 30 TABLET | Refills: 0 | Status: SHIPPED | OUTPATIENT
Start: 2019-02-05 | End: 2019-03-03 | Stop reason: SDUPTHER

## 2019-02-18 ENCOUNTER — OFFICE VISIT (OUTPATIENT)
Dept: INTERNAL MEDICINE | Facility: CLINIC | Age: 79
End: 2019-02-18

## 2019-02-18 VITALS
DIASTOLIC BLOOD PRESSURE: 84 MMHG | HEART RATE: 72 BPM | WEIGHT: 230 LBS | BODY MASS INDEX: 43.43 KG/M2 | HEIGHT: 61 IN | SYSTOLIC BLOOD PRESSURE: 120 MMHG

## 2019-02-18 DIAGNOSIS — M25.50 ARTHRALGIA OF MULTIPLE JOINTS: ICD-10-CM

## 2019-02-18 DIAGNOSIS — M17.0 PRIMARY OSTEOARTHRITIS OF BOTH KNEES: ICD-10-CM

## 2019-02-18 DIAGNOSIS — F41.9 ANXIETY: ICD-10-CM

## 2019-02-18 DIAGNOSIS — M81.8 OTHER OSTEOPOROSIS WITHOUT CURRENT PATHOLOGICAL FRACTURE: ICD-10-CM

## 2019-02-18 DIAGNOSIS — E78.2 MIXED HYPERLIPIDEMIA: Primary | ICD-10-CM

## 2019-02-18 DIAGNOSIS — R26.9 GAIT DISTURBANCE: ICD-10-CM

## 2019-02-18 DIAGNOSIS — M54.40 CHRONIC LOW BACK PAIN WITH SCIATICA, SCIATICA LATERALITY UNSPECIFIED, UNSPECIFIED BACK PAIN LATERALITY: ICD-10-CM

## 2019-02-18 DIAGNOSIS — R73.03 PRE-DIABETES: ICD-10-CM

## 2019-02-18 DIAGNOSIS — K21.00 ESOPHAGITIS, REFLUX: ICD-10-CM

## 2019-02-18 DIAGNOSIS — M19.90 ARTHRITIS: ICD-10-CM

## 2019-02-18 DIAGNOSIS — F51.04 PSYCHOPHYSIOLOGIC INSOMNIA: ICD-10-CM

## 2019-02-18 DIAGNOSIS — F33.1 MODERATE EPISODE OF RECURRENT MAJOR DEPRESSIVE DISORDER (HCC): ICD-10-CM

## 2019-02-18 DIAGNOSIS — F32.89 OTHER DEPRESSION: ICD-10-CM

## 2019-02-18 DIAGNOSIS — I10 BENIGN ESSENTIAL HYPERTENSION: ICD-10-CM

## 2019-02-18 DIAGNOSIS — G89.29 CHRONIC LOW BACK PAIN WITH SCIATICA, SCIATICA LATERALITY UNSPECIFIED, UNSPECIFIED BACK PAIN LATERALITY: ICD-10-CM

## 2019-02-18 DIAGNOSIS — E53.8 VITAMIN B 12 DEFICIENCY: ICD-10-CM

## 2019-02-18 DIAGNOSIS — M79.2 NEURALGIA: ICD-10-CM

## 2019-02-18 DIAGNOSIS — E55.9 VITAMIN D DEFICIENCY: ICD-10-CM

## 2019-02-18 LAB
25(OH)D3 SERPL-MCNC: 33.1 NG/ML
ALBUMIN SERPL-MCNC: 4.12 G/DL (ref 3.2–4.8)
ALBUMIN/GLOB SERPL: 1.7 G/DL (ref 1.5–2.5)
ALP SERPL-CCNC: 167 U/L (ref 25–100)
ALT SERPL W P-5'-P-CCNC: 12 U/L (ref 7–40)
ANION GAP SERPL CALCULATED.3IONS-SCNC: 8 MMOL/L (ref 3–11)
AST SERPL-CCNC: 20 U/L (ref 0–33)
BILIRUB BLD-MCNC: NEGATIVE MG/DL
BILIRUB SERPL-MCNC: 0.7 MG/DL (ref 0.3–1.2)
BUN BLD-MCNC: 18 MG/DL (ref 9–23)
BUN/CREAT SERPL: 21.2 (ref 7–25)
CALCIUM SPEC-SCNC: 9.4 MG/DL (ref 8.7–10.4)
CHLORIDE SERPL-SCNC: 105 MMOL/L (ref 99–109)
CLARITY, POC: CLEAR
CO2 SERPL-SCNC: 28 MMOL/L (ref 20–31)
COLOR UR: YELLOW
CREAT BLD-MCNC: 0.85 MG/DL (ref 0.6–1.3)
DEPRECATED RDW RBC AUTO: 48.9 FL (ref 37–54)
ERYTHROCYTE [DISTWIDTH] IN BLOOD BY AUTOMATED COUNT: 14.3 % (ref 11.3–14.5)
GFR SERPL CREATININE-BSD FRML MDRD: 65 ML/MIN/1.73
GLOBULIN UR ELPH-MCNC: 2.4 GM/DL
GLUCOSE BLD-MCNC: 106 MG/DL (ref 70–100)
GLUCOSE UR STRIP-MCNC: NEGATIVE MG/DL
HBA1C MFR BLD: 5.4 % (ref 4.8–5.6)
HCT VFR BLD AUTO: 43.7 % (ref 34.5–44)
HGB BLD-MCNC: 13.7 G/DL (ref 11.5–15.5)
KETONES UR QL: NEGATIVE
LEUKOCYTE EST, POC: NEGATIVE
MCH RBC QN AUTO: 28.8 PG (ref 27–31)
MCHC RBC AUTO-ENTMCNC: 31.4 G/DL (ref 32–36)
MCV RBC AUTO: 92 FL (ref 80–99)
NITRITE UR-MCNC: NEGATIVE MG/ML
PH UR: 7 [PH] (ref 5–8)
PLATELET # BLD AUTO: 325 10*3/MM3 (ref 150–450)
PMV BLD AUTO: 11.5 FL (ref 6–12)
POTASSIUM BLD-SCNC: 4.4 MMOL/L (ref 3.5–5.5)
PROT SERPL-MCNC: 6.5 G/DL (ref 5.7–8.2)
PROT UR STRIP-MCNC: NEGATIVE MG/DL
RBC # BLD AUTO: 4.75 10*6/MM3 (ref 3.89–5.14)
RBC # UR STRIP: NEGATIVE /UL
SODIUM BLD-SCNC: 141 MMOL/L (ref 132–146)
SP GR UR: 1.01 (ref 1–1.03)
TSH SERPL DL<=0.05 MIU/L-ACNC: 1.85 MIU/ML (ref 0.35–5.35)
UROBILINOGEN UR QL: NORMAL
VIT B12 BLD-MCNC: 800 PG/ML (ref 211–911)
WBC NRBC COR # BLD: 10.22 10*3/MM3 (ref 3.5–10.8)

## 2019-02-18 PROCEDURE — 84443 ASSAY THYROID STIM HORMONE: CPT | Performed by: INTERNAL MEDICINE

## 2019-02-18 PROCEDURE — 85027 COMPLETE CBC AUTOMATED: CPT | Performed by: INTERNAL MEDICINE

## 2019-02-18 PROCEDURE — 80053 COMPREHEN METABOLIC PANEL: CPT | Performed by: INTERNAL MEDICINE

## 2019-02-18 PROCEDURE — 82607 VITAMIN B-12: CPT | Performed by: INTERNAL MEDICINE

## 2019-02-18 PROCEDURE — 99214 OFFICE O/P EST MOD 30 MIN: CPT | Performed by: INTERNAL MEDICINE

## 2019-02-18 PROCEDURE — 81003 URINALYSIS AUTO W/O SCOPE: CPT | Performed by: INTERNAL MEDICINE

## 2019-02-18 PROCEDURE — 83036 HEMOGLOBIN GLYCOSYLATED A1C: CPT | Performed by: INTERNAL MEDICINE

## 2019-02-18 PROCEDURE — 82306 VITAMIN D 25 HYDROXY: CPT | Performed by: INTERNAL MEDICINE

## 2019-02-18 RX ORDER — LISINOPRIL AND HYDROCHLOROTHIAZIDE 20; 12.5 MG/1; MG/1
1 TABLET ORAL EVERY MORNING
Qty: 90 TABLET | Refills: 3 | Status: SHIPPED | OUTPATIENT
Start: 2019-02-18

## 2019-02-18 NOTE — PROGRESS NOTES
Patient is a 78 y.o. female who is here for a follow up of chronic conditions.  Chief Complaint   Patient presents with   • Hyperlipidemia   • Hypertension         HPI:    Here for f/u.  Still refusing colonoscopy.  Feels weaker.  Walking is worst.  Some DESAI.  Has little energy.  Sleeping well.  Has some nightmares.  No cough.  Allergies are bothersome.  No abdominal pains.  Appetite is ok.    History:    Patient Active Problem List   Diagnosis   • Anxiety   • Arthralgia of multiple joints   • Arthritis   • Benign essential hypertension   • Chronic back pain   • Depression   • Esophagitis, reflux   • Gait instability   • Mixed hyperlipidemia   • Muscle spasm   • Neuralgia   • Pre-diabetes   • Psychophysiologic insomnia   • Major depression, recurrent (CMS/HCC)   • Vitamin B 12 deficiency   • Vitamin D deficiency   • Osteoporosis   • Primary osteoarthritis of both knees       Past Medical History:   Diagnosis Date   • Colon polyp    • Osteoporosis    • Pain in thoracic spine    • Pneumonia    • UTI (urinary tract infection)        Past Surgical History:   Procedure Laterality Date   • BLADDER SURGERY  2000    bladder suspension   • CERVICAL LAMINECTOMY     • COLONOSCOPY     • COLOSTOMY  1979    temporary sigmoid   • HYSTERECTOMY         Current Outpatient Medications on File Prior to Visit   Medication Sig   • amitriptyline (ELAVIL) 25 MG tablet TAKE 1 TABLET BY MOUTH EVERY NIGHT   • amLODIPine (NORVASC) 5 MG tablet TAKE 1 TABLET BY MOUTH DAILY   • atorvastatin (LIPITOR) 20 MG tablet TAKE 1 TABLET BY MOUTH EVERY NIGHT AT BEDTIME   • carvedilol (COREG) 25 MG tablet TAKE 1 TABLET BY MOUTH TWICE DAILY   • DULoxetine (CYMBALTA) 60 MG capsule TAKE 1 CAPSULE BY MOUTH DAILY   • Multiple Vitamins-Minerals (VITAMIN D3 COMPLETE) tablet Take  by mouth.   • raNITIdine (ZANTAC) 150 MG tablet TAKE 1 TABLET BY MOUTH TWICE DAILY   • [DISCONTINUED] lisinopril (PRINIVIL,ZESTRIL) 20 MG tablet TAKE 1 TABLET BY MOUTH TWICE DAILY     No  current facility-administered medications on file prior to visit.        Family History   Problem Relation Age of Onset   • Blindness Mother    • Other Mother         arteriosclerotic cardiovascular disease    • Diabetes Mother    • Osteoporosis Mother    • Stroke Mother    • Cancer Brother    • Lung cancer Sister    • Osteoporosis Sister    • Stroke Sister    • Stroke Other        Social History     Socioeconomic History   • Marital status:      Spouse name: Not on file   • Number of children: Not on file   • Years of education: Not on file   • Highest education level: Not on file   Social Needs   • Financial resource strain: Not on file   • Food insecurity - worry: Not on file   • Food insecurity - inability: Not on file   • Transportation needs - medical: Not on file   • Transportation needs - non-medical: Not on file   Occupational History   • Not on file   Tobacco Use   • Smoking status: Never Smoker   • Smokeless tobacco: Never Used   Substance and Sexual Activity   • Alcohol use: No   • Drug use: No   • Sexual activity: Not on file   Other Topics Concern   • Not on file   Social History Narrative   • Not on file         Review of Systems   Constitutional: Positive for fatigue. Negative for chills, fever and unexpected weight change.   HENT: Negative for congestion, ear pain, hearing loss, rhinorrhea, sinus pressure, sore throat and trouble swallowing.    Eyes: Negative for discharge and itching.   Respiratory: Positive for shortness of breath. Negative for cough and chest tightness.    Cardiovascular: Negative for chest pain, palpitations and leg swelling.   Gastrointestinal: Negative for abdominal pain, blood in stool, constipation, diarrhea and vomiting.   Endocrine: Negative for polydipsia and polyuria.   Genitourinary: Positive for urgency. Negative for difficulty urinating, dysuria, enuresis, frequency and hematuria.   Musculoskeletal: Positive for arthralgias, back pain, gait problem and neck  "pain. Negative for joint swelling.   Skin: Negative for rash and wound.   Allergic/Immunologic: Negative for immunocompromised state.   Neurological: Positive for weakness. Negative for dizziness, syncope, numbness and headaches.   Hematological: Does not bruise/bleed easily.   Psychiatric/Behavioral: Positive for dysphoric mood. Negative for behavioral problems.       /84   Pulse 72   Ht 154.9 cm (60.98\")   Wt 104 kg (230 lb)   BMI 43.48 kg/m²       Physical Exam   Constitutional: She is oriented to person, place, and time. She appears well-developed and well-nourished.   HENT:   Head: Normocephalic and atraumatic.   Right Ear: External ear normal.   Left Ear: External ear normal.   Mouth/Throat: Oropharynx is clear and moist.   Eyes: Conjunctivae and EOM are normal.   Neck: Normal range of motion. Neck supple.   Cardiovascular: Normal rate, regular rhythm and normal heart sounds.   Pulmonary/Chest: Effort normal and breath sounds normal.   Abdominal: Soft. Bowel sounds are normal.   Musculoskeletal: She exhibits edema.   Using  cane   Lymphadenopathy:     She has no cervical adenopathy.   Neurological: She is alert and oriented to person, place, and time.   Skin: Skin is warm and dry.   Psychiatric: She has a normal mood and affect. Her behavior is normal. Thought content normal.       Procedure:      Discussion/Summary:    HTN-change to lisinopril/hctz  Hyperlipidemia/?DM-counseled on diet, labs today  Insomnia-cont elavil  Djd/fibromyalgia-cont current tx and PT  GERD-cont zantac  Knee djd-check xray and ortho to see  High risk meds-labs today  Other-extensively counseled on need for colonoscopy given recent cologuard positive testing but she is refusing and understands the risk of undetected colon neoplasm     Prior labs reviewed, along with notes  Labs noted and dw patient        Current Outpatient Medications:   •  amitriptyline (ELAVIL) 25 MG tablet, TAKE 1 TABLET BY MOUTH EVERY NIGHT, Disp: 30 " tablet, Rfl: 0  •  amLODIPine (NORVASC) 5 MG tablet, TAKE 1 TABLET BY MOUTH DAILY, Disp: 90 tablet, Rfl: 0  •  atorvastatin (LIPITOR) 20 MG tablet, TAKE 1 TABLET BY MOUTH EVERY NIGHT AT BEDTIME, Disp: 90 tablet, Rfl: 0  •  carvedilol (COREG) 25 MG tablet, TAKE 1 TABLET BY MOUTH TWICE DAILY, Disp: 60 tablet, Rfl: 2  •  DULoxetine (CYMBALTA) 60 MG capsule, TAKE 1 CAPSULE BY MOUTH DAILY, Disp: 30 capsule, Rfl: 0  •  Multiple Vitamins-Minerals (VITAMIN D3 COMPLETE) tablet, Take  by mouth., Disp: , Rfl:   •  raNITIdine (ZANTAC) 150 MG tablet, TAKE 1 TABLET BY MOUTH TWICE DAILY, Disp: 180 tablet, Rfl: 0  •  lisinopril-hydrochlorothiazide (PRINZIDE,ZESTORETIC) 20-12.5 MG per tablet, Take 1 tablet by mouth Every Morning., Disp: 90 tablet, Rfl: 3        Danae was seen today for hyperlipidemia and hypertension.    Diagnoses and all orders for this visit:    Mixed hyperlipidemia  -     Comprehensive Metabolic Panel  -     TSH    Benign essential hypertension  -     CBC (No Diff)  -     lisinopril-hydrochlorothiazide (PRINZIDE,ZESTORETIC) 20-12.5 MG per tablet; Take 1 tablet by mouth Every Morning.  -     POC Urinalysis Dipstick, Automated    Vitamin B 12 deficiency  -     Vitamin B12    Vitamin D deficiency  -     Vitamin D 25 Hydroxy    Esophagitis, reflux    Neuralgia    Chronic low back pain with sciatica, sciatica laterality unspecified, unspecified back pain laterality    Arthralgia of multiple joints    Primary osteoarthritis of both knees    Other osteoporosis without current pathological fracture    Arthritis    Psychophysiologic insomnia    Pre-diabetes  -     Hemoglobin A1c    Moderate episode of recurrent major depressive disorder (CMS/HCC)    Gait disturbance    Other depression    Anxiety

## 2019-02-25 ENCOUNTER — TELEPHONE (OUTPATIENT)
Dept: INTERNAL MEDICINE | Facility: CLINIC | Age: 79
End: 2019-02-25

## 2019-02-25 NOTE — TELEPHONE ENCOUNTER
Pt left me a VM stating that Dr. Barkley left her a script for a motor scooter but it can't come from her, it has to come from the doctors office.    Please advise,      Thank you

## 2019-02-28 ENCOUNTER — TELEPHONE (OUTPATIENT)
Dept: INTERNAL MEDICINE | Facility: CLINIC | Age: 79
End: 2019-02-28

## 2019-03-03 DIAGNOSIS — F51.04 PSYCHOPHYSIOLOGIC INSOMNIA: ICD-10-CM

## 2019-03-03 DIAGNOSIS — F32.89 OTHER DEPRESSION: ICD-10-CM

## 2019-03-04 RX ORDER — DULOXETIN HYDROCHLORIDE 60 MG/1
60 CAPSULE, DELAYED RELEASE ORAL DAILY
Qty: 30 CAPSULE | Refills: 5 | Status: SHIPPED | OUTPATIENT
Start: 2019-03-04 | End: 2019-03-26

## 2019-03-04 RX ORDER — AMITRIPTYLINE HYDROCHLORIDE 25 MG/1
25 TABLET, FILM COATED ORAL NIGHTLY
Qty: 30 TABLET | Refills: 3 | Status: SHIPPED | OUTPATIENT
Start: 2019-03-04 | End: 2019-06-27

## 2019-03-25 RX ORDER — AMLODIPINE BESYLATE 5 MG/1
TABLET ORAL
Qty: 90 TABLET | Refills: 0 | Status: SHIPPED | OUTPATIENT
Start: 2019-03-25 | End: 2019-06-20 | Stop reason: SDUPTHER

## 2019-03-26 ENCOUNTER — OFFICE VISIT (OUTPATIENT)
Dept: INTERNAL MEDICINE | Facility: CLINIC | Age: 79
End: 2019-03-26

## 2019-03-26 VITALS
WEIGHT: 232 LBS | SYSTOLIC BLOOD PRESSURE: 134 MMHG | BODY MASS INDEX: 43.8 KG/M2 | HEART RATE: 72 BPM | HEIGHT: 61 IN | DIASTOLIC BLOOD PRESSURE: 84 MMHG

## 2019-03-26 DIAGNOSIS — M25.50 ARTHRALGIA OF MULTIPLE JOINTS: ICD-10-CM

## 2019-03-26 DIAGNOSIS — M79.2 NEURALGIA: ICD-10-CM

## 2019-03-26 DIAGNOSIS — R73.03 PRE-DIABETES: ICD-10-CM

## 2019-03-26 DIAGNOSIS — F33.1 MODERATE EPISODE OF RECURRENT MAJOR DEPRESSIVE DISORDER (HCC): ICD-10-CM

## 2019-03-26 DIAGNOSIS — R26.81 GAIT INSTABILITY: ICD-10-CM

## 2019-03-26 DIAGNOSIS — M62.838 MUSCLE SPASM: ICD-10-CM

## 2019-03-26 DIAGNOSIS — K21.00 ESOPHAGITIS, REFLUX: ICD-10-CM

## 2019-03-26 DIAGNOSIS — E53.8 VITAMIN B 12 DEFICIENCY: ICD-10-CM

## 2019-03-26 DIAGNOSIS — M19.90 ARTHRITIS: ICD-10-CM

## 2019-03-26 DIAGNOSIS — M17.0 PRIMARY OSTEOARTHRITIS OF BOTH KNEES: ICD-10-CM

## 2019-03-26 DIAGNOSIS — E78.2 MIXED HYPERLIPIDEMIA: Primary | ICD-10-CM

## 2019-03-26 DIAGNOSIS — F32.89 OTHER DEPRESSION: ICD-10-CM

## 2019-03-26 DIAGNOSIS — E55.9 VITAMIN D DEFICIENCY: ICD-10-CM

## 2019-03-26 DIAGNOSIS — F51.04 PSYCHOPHYSIOLOGIC INSOMNIA: ICD-10-CM

## 2019-03-26 DIAGNOSIS — F41.9 ANXIETY: ICD-10-CM

## 2019-03-26 DIAGNOSIS — M81.8 OTHER OSTEOPOROSIS WITHOUT CURRENT PATHOLOGICAL FRACTURE: ICD-10-CM

## 2019-03-26 DIAGNOSIS — M54.40 CHRONIC LOW BACK PAIN WITH SCIATICA, SCIATICA LATERALITY UNSPECIFIED, UNSPECIFIED BACK PAIN LATERALITY: ICD-10-CM

## 2019-03-26 DIAGNOSIS — G89.29 CHRONIC LOW BACK PAIN WITH SCIATICA, SCIATICA LATERALITY UNSPECIFIED, UNSPECIFIED BACK PAIN LATERALITY: ICD-10-CM

## 2019-03-26 DIAGNOSIS — I10 BENIGN ESSENTIAL HYPERTENSION: ICD-10-CM

## 2019-03-26 PROCEDURE — 99214 OFFICE O/P EST MOD 30 MIN: CPT | Performed by: INTERNAL MEDICINE

## 2019-03-26 RX ORDER — VENLAFAXINE HYDROCHLORIDE 75 MG/1
75 CAPSULE, EXTENDED RELEASE ORAL EVERY MORNING
Qty: 30 CAPSULE | Refills: 2 | Status: SHIPPED | OUTPATIENT
Start: 2019-03-26 | End: 2019-03-29

## 2019-03-26 NOTE — PROGRESS NOTES
Patient is a 78 y.o. female who is here for a follow up of chronic conditions.  Chief Complaint   Patient presents with   • Hyperlipidemia   • Hypertension         HPI:    Here for f/u.  BP has been better.  No increase in urinary frequency with change to zestoretic.  No dizziness or lightheadedness.  No HAs.  Sleep is still an issue.  Nightmares are an issue.  No abdominal pains.  Still refusing colonoscopy.     History:    Patient Active Problem List   Diagnosis   • Anxiety   • Arthralgia of multiple joints   • Arthritis   • Benign essential hypertension   • Chronic back pain   • Depression   • Esophagitis, reflux   • Gait instability   • Mixed hyperlipidemia   • Muscle spasm   • Neuralgia   • Pre-diabetes   • Psychophysiologic insomnia   • Major depression, recurrent (CMS/HCC)   • Vitamin B 12 deficiency   • Vitamin D deficiency   • Osteoporosis   • Primary osteoarthritis of both knees       Past Medical History:   Diagnosis Date   • Colon polyp    • Osteoporosis    • Pain in thoracic spine    • Pneumonia    • UTI (urinary tract infection)        Past Surgical History:   Procedure Laterality Date   • BLADDER SURGERY  2000    bladder suspension   • CERVICAL LAMINECTOMY     • COLONOSCOPY     • COLOSTOMY  1979    temporary sigmoid   • HYSTERECTOMY         Current Outpatient Medications on File Prior to Visit   Medication Sig   • amitriptyline (ELAVIL) 25 MG tablet TAKE 1 TABLET BY MOUTH EVERY NIGHT   • amLODIPine (NORVASC) 5 MG tablet TAKE 1 TABLET BY MOUTH DAILY   • atorvastatin (LIPITOR) 20 MG tablet TAKE 1 TABLET BY MOUTH EVERY NIGHT AT BEDTIME   • carvedilol (COREG) 25 MG tablet TAKE 1 TABLET BY MOUTH TWICE DAILY   • lisinopril-hydrochlorothiazide (PRINZIDE,ZESTORETIC) 20-12.5 MG per tablet Take 1 tablet by mouth Every Morning.   • Multiple Vitamins-Minerals (VITAMIN D3 COMPLETE) tablet Take  by mouth.   • raNITIdine (ZANTAC) 150 MG tablet TAKE 1 TABLET BY MOUTH TWICE DAILY   • [DISCONTINUED] DULoxetine  (CYMBALTA) 60 MG capsule TAKE 1 CAPSULE BY MOUTH DAILY     No current facility-administered medications on file prior to visit.        Family History   Problem Relation Age of Onset   • Blindness Mother    • Other Mother         arteriosclerotic cardiovascular disease    • Diabetes Mother    • Osteoporosis Mother    • Stroke Mother    • Cancer Brother    • Lung cancer Sister    • Osteoporosis Sister    • Stroke Sister    • Stroke Other        Social History     Socioeconomic History   • Marital status:      Spouse name: Not on file   • Number of children: Not on file   • Years of education: Not on file   • Highest education level: Not on file   Tobacco Use   • Smoking status: Never Smoker   • Smokeless tobacco: Never Used   Substance and Sexual Activity   • Alcohol use: No   • Drug use: No         Review of Systems   Constitutional: Positive for fatigue. Negative for chills, fever and unexpected weight change.   HENT: Negative for congestion, ear pain, hearing loss, rhinorrhea, sinus pressure, sore throat and trouble swallowing.    Eyes: Negative for discharge and itching.   Respiratory: Positive for shortness of breath. Negative for cough and chest tightness.    Cardiovascular: Negative for chest pain, palpitations and leg swelling.   Gastrointestinal: Negative for abdominal pain, blood in stool, constipation, diarrhea and vomiting.   Endocrine: Negative for polydipsia and polyuria.   Genitourinary: Positive for urgency. Negative for difficulty urinating, dysuria, enuresis, frequency and hematuria.   Musculoskeletal: Positive for arthralgias, back pain, gait problem and neck pain. Negative for joint swelling.   Skin: Negative for rash and wound.   Allergic/Immunologic: Negative for immunocompromised state.   Neurological: Positive for weakness. Negative for dizziness, syncope, numbness and headaches.   Hematological: Does not bruise/bleed easily.   Psychiatric/Behavioral: Positive for dysphoric mood and  "sleep disturbance. Negative for behavioral problems.       /84   Pulse 72   Ht 154.9 cm (60.98\")   Wt 105 kg (232 lb)   BMI 43.86 kg/m²       Physical Exam   Constitutional: She is oriented to person, place, and time. She appears well-developed and well-nourished.   HENT:   Head: Normocephalic and atraumatic.   Right Ear: External ear normal.   Left Ear: External ear normal.   Mouth/Throat: Oropharynx is clear and moist.   Eyes: Conjunctivae and EOM are normal.   Neck: Normal range of motion. Neck supple.   Cardiovascular: Normal rate, regular rhythm and normal heart sounds.   Pulmonary/Chest: Effort normal and breath sounds normal.   Abdominal: Soft. Bowel sounds are normal.   Musculoskeletal: She exhibits edema.   Using  cane   Lymphadenopathy:     She has no cervical adenopathy.   Neurological: She is alert and oriented to person, place, and time.   Skin: Skin is warm and dry.   Psychiatric: She has a normal mood and affect. Her behavior is normal. Thought content normal.       Procedure:      Discussion/Summary:    HTN-cont current tx  Hyperlipidemia/?DM-counseled on diet, labs noted  Insomnia-cont elavil  Depression-wean to Effexor XR  Djd/fibromyalgia-cont current tx and PT  GERD-cont zantac  Knee djd-check xray and ortho to see  High risk meds-labs noted  Other-extensively counseled on need for colonoscopy given recent cologuard positive testing but she is refusing and understands the risk of undetected colon neoplasm     Prior labs reviewed, along with notes  Labs noted and dw patient        Current Outpatient Medications:   •  amitriptyline (ELAVIL) 25 MG tablet, TAKE 1 TABLET BY MOUTH EVERY NIGHT, Disp: 30 tablet, Rfl: 3  •  amLODIPine (NORVASC) 5 MG tablet, TAKE 1 TABLET BY MOUTH DAILY, Disp: 90 tablet, Rfl: 0  •  atorvastatin (LIPITOR) 20 MG tablet, TAKE 1 TABLET BY MOUTH EVERY NIGHT AT BEDTIME, Disp: 90 tablet, Rfl: 0  •  carvedilol (COREG) 25 MG tablet, TAKE 1 TABLET BY MOUTH TWICE DAILY, " Disp: 60 tablet, Rfl: 2  •  lisinopril-hydrochlorothiazide (PRINZIDE,ZESTORETIC) 20-12.5 MG per tablet, Take 1 tablet by mouth Every Morning., Disp: 90 tablet, Rfl: 3  •  Multiple Vitamins-Minerals (VITAMIN D3 COMPLETE) tablet, Take  by mouth., Disp: , Rfl:   •  raNITIdine (ZANTAC) 150 MG tablet, TAKE 1 TABLET BY MOUTH TWICE DAILY, Disp: 180 tablet, Rfl: 0  •  venlafaxine XR (EFFEXOR-XR) 75 MG 24 hr capsule, Take 1 capsule by mouth Every Morning., Disp: 30 capsule, Rfl: 2        Danae was seen today for hyperlipidemia and hypertension.    Diagnoses and all orders for this visit:    Mixed hyperlipidemia    Benign essential hypertension    Vitamin D deficiency    Vitamin B 12 deficiency    Esophagitis, reflux    Neuralgia    Chronic low back pain with sciatica, sciatica laterality unspecified, unspecified back pain laterality    Arthralgia of multiple joints    Primary osteoarthritis of both knees    Other osteoporosis without current pathological fracture    Muscle spasm    Arthritis    Psychophysiologic insomnia    Pre-diabetes    Moderate episode of recurrent major depressive disorder (CMS/HCC)    Gait instability    Other depression  -     venlafaxine XR (EFFEXOR-XR) 75 MG 24 hr capsule; Take 1 capsule by mouth Every Morning.    Anxiety

## 2019-03-28 ENCOUNTER — TELEPHONE (OUTPATIENT)
Dept: INTERNAL MEDICINE | Facility: CLINIC | Age: 79
End: 2019-03-28

## 2019-03-29 ENCOUNTER — TELEPHONE (OUTPATIENT)
Dept: INTERNAL MEDICINE | Facility: CLINIC | Age: 79
End: 2019-03-29

## 2019-03-29 RX ORDER — BUPROPION HYDROCHLORIDE 150 MG/1
150 TABLET ORAL DAILY
Qty: 30 TABLET | Refills: 2 | Status: SHIPPED | OUTPATIENT
Start: 2019-03-29 | End: 2019-06-05 | Stop reason: SDUPTHER

## 2019-04-02 RX ORDER — CARVEDILOL 25 MG/1
TABLET ORAL
Qty: 180 TABLET | Refills: 1 | Status: SHIPPED | OUTPATIENT
Start: 2019-04-02 | End: 2019-06-27

## 2019-04-08 ENCOUNTER — TELEPHONE (OUTPATIENT)
Dept: INTERNAL MEDICINE | Facility: CLINIC | Age: 79
End: 2019-04-08

## 2019-04-09 ENCOUNTER — TELEPHONE (OUTPATIENT)
Dept: INTERNAL MEDICINE | Facility: CLINIC | Age: 79
End: 2019-04-09

## 2019-04-09 NOTE — TELEPHONE ENCOUNTER
Per dr grayson only 1 wellbutrin 150 and can restart the cymbalta    ----- Message from Giuseppe Grayson MD sent at 4/8/2019  5:43 PM EDT -----  Would only do 1 of the wellbutrin  ----- Message -----  From: Jaci Ramon MA  Sent: 4/8/2019   3:39 PM  To: Giuseppe Grayson MD    So she is wanting to do both wellbutrin and cymbalta? She didn't realize how much cymbalta was helping until she was weaned off to start the wellbutrin. She is also wanting to take 2 of 150 wellbutrin

## 2019-04-12 ENCOUNTER — TELEPHONE (OUTPATIENT)
Dept: INTERNAL MEDICINE | Facility: CLINIC | Age: 79
End: 2019-04-12

## 2019-04-12 NOTE — TELEPHONE ENCOUNTER
----- Message from Taylor Sanchez sent at 4/8/2019  3:39 PM EDT -----  Regarding: [facility]  Pt does not want to use [facility] for the scooter anymore. They weren't giving her any options.     Now wants to use [email or web address] [place].  (I): [phone]

## 2019-04-22 RX ORDER — RANITIDINE 150 MG/1
TABLET ORAL
Qty: 180 TABLET | Refills: 0 | Status: SHIPPED | OUTPATIENT
Start: 2019-04-22 | End: 2019-05-20 | Stop reason: SDUPTHER

## 2019-05-06 RX ORDER — ATORVASTATIN CALCIUM 20 MG/1
TABLET, FILM COATED ORAL
Qty: 90 TABLET | Refills: 0 | Status: SHIPPED | OUTPATIENT
Start: 2019-05-06 | End: 2019-07-17 | Stop reason: SDUPTHER

## 2019-05-20 RX ORDER — RANITIDINE 150 MG/1
TABLET ORAL
Qty: 180 TABLET | Refills: 0 | Status: SHIPPED | OUTPATIENT
Start: 2019-05-20 | End: 2019-06-18 | Stop reason: SDUPTHER

## 2019-06-05 RX ORDER — BUPROPION HYDROCHLORIDE 150 MG/1
TABLET ORAL
Qty: 30 TABLET | Refills: 0 | Status: SHIPPED | OUTPATIENT
Start: 2019-06-05 | End: 2019-07-10 | Stop reason: SDUPTHER

## 2019-06-18 RX ORDER — RANITIDINE 150 MG/1
TABLET ORAL
Qty: 180 TABLET | Refills: 0 | Status: SHIPPED | OUTPATIENT
Start: 2019-06-18 | End: 2019-07-18 | Stop reason: SDUPTHER

## 2019-06-20 RX ORDER — AMLODIPINE BESYLATE 5 MG/1
TABLET ORAL
Qty: 90 TABLET | Refills: 0 | Status: SHIPPED | OUTPATIENT
Start: 2019-06-20

## 2019-06-27 ENCOUNTER — OFFICE VISIT (OUTPATIENT)
Dept: INTERNAL MEDICINE | Facility: CLINIC | Age: 79
End: 2019-06-27

## 2019-06-27 VITALS
SYSTOLIC BLOOD PRESSURE: 130 MMHG | DIASTOLIC BLOOD PRESSURE: 80 MMHG | BODY MASS INDEX: 44.37 KG/M2 | WEIGHT: 235 LBS | HEIGHT: 61 IN | HEART RATE: 72 BPM

## 2019-06-27 DIAGNOSIS — M54.40 CHRONIC LOW BACK PAIN WITH SCIATICA, SCIATICA LATERALITY UNSPECIFIED, UNSPECIFIED BACK PAIN LATERALITY: ICD-10-CM

## 2019-06-27 DIAGNOSIS — F33.1 MODERATE EPISODE OF RECURRENT MAJOR DEPRESSIVE DISORDER (HCC): ICD-10-CM

## 2019-06-27 DIAGNOSIS — M81.8 OTHER OSTEOPOROSIS WITHOUT CURRENT PATHOLOGICAL FRACTURE: ICD-10-CM

## 2019-06-27 DIAGNOSIS — E55.9 VITAMIN D DEFICIENCY: ICD-10-CM

## 2019-06-27 DIAGNOSIS — F41.9 ANXIETY: ICD-10-CM

## 2019-06-27 DIAGNOSIS — M19.90 ARTHRITIS: ICD-10-CM

## 2019-06-27 DIAGNOSIS — F32.89 OTHER DEPRESSION: ICD-10-CM

## 2019-06-27 DIAGNOSIS — E53.8 VITAMIN B 12 DEFICIENCY: ICD-10-CM

## 2019-06-27 DIAGNOSIS — E78.2 MIXED HYPERLIPIDEMIA: Primary | ICD-10-CM

## 2019-06-27 DIAGNOSIS — R26.81 GAIT INSTABILITY: ICD-10-CM

## 2019-06-27 DIAGNOSIS — M17.0 PRIMARY OSTEOARTHRITIS OF BOTH KNEES: ICD-10-CM

## 2019-06-27 DIAGNOSIS — I10 BENIGN ESSENTIAL HYPERTENSION: ICD-10-CM

## 2019-06-27 DIAGNOSIS — K21.00 ESOPHAGITIS, REFLUX: ICD-10-CM

## 2019-06-27 DIAGNOSIS — G89.29 CHRONIC LOW BACK PAIN WITH SCIATICA, SCIATICA LATERALITY UNSPECIFIED, UNSPECIFIED BACK PAIN LATERALITY: ICD-10-CM

## 2019-06-27 PROCEDURE — 99214 OFFICE O/P EST MOD 30 MIN: CPT | Performed by: INTERNAL MEDICINE

## 2019-06-27 RX ORDER — NEBIVOLOL 5 MG/1
5 TABLET ORAL DAILY
Qty: 28 TABLET | Refills: 0 | COMMUNITY
Start: 2019-06-27 | End: 2019-06-27 | Stop reason: SDUPTHER

## 2019-06-27 RX ORDER — NEBIVOLOL 10 MG/1
10 TABLET ORAL DAILY
Qty: 28 TABLET | Refills: 0 | COMMUNITY
Start: 2019-06-27

## 2019-06-27 RX ORDER — DULOXETIN HYDROCHLORIDE 60 MG/1
CAPSULE, DELAYED RELEASE ORAL
Refills: 2 | COMMUNITY
Start: 2019-06-05

## 2019-06-27 NOTE — PROGRESS NOTES
Patient is a 79 y.o. female who is here for a follow up of chronic conditions.  Chief Complaint   Patient presents with   • Hyperlipidemia   • Hypertension         HPI:    Here for f/u.  Feels more sob than usual.  Has associated cough.  No fever or chills.  No hemoptysis.  Very tired.  No associated CP.  More DESAI.  Feels weak.  Takes 2-3 naps at night.  Sleeps well at night.  Some depression involved.     History:     Patient Active Problem List   Diagnosis   • Anxiety   • Arthralgia of multiple joints   • Arthritis   • Benign essential hypertension   • Chronic back pain   • Depression   • Esophagitis, reflux   • Gait instability   • Mixed hyperlipidemia   • Muscle spasm   • Neuralgia   • Pre-diabetes   • Psychophysiologic insomnia   • Major depression, recurrent (CMS/HCC)   • Vitamin B 12 deficiency   • Vitamin D deficiency   • Osteoporosis   • Primary osteoarthritis of both knees       Past Medical History:   Diagnosis Date   • Colon polyp    • Osteoporosis    • Pain in thoracic spine    • Pneumonia    • UTI (urinary tract infection)        Past Surgical History:   Procedure Laterality Date   • BLADDER SURGERY  2000    bladder suspension   • CERVICAL LAMINECTOMY     • COLONOSCOPY     • COLOSTOMY  1979    temporary sigmoid   • HYSTERECTOMY         Current Outpatient Medications on File Prior to Visit   Medication Sig   • amLODIPine (NORVASC) 5 MG tablet TAKE 1 TABLET BY MOUTH DAILY   • atorvastatin (LIPITOR) 20 MG tablet TAKE 1 TABLET BY MOUTH EVERY NIGHT AT BEDTIME   • buPROPion XL (WELLBUTRIN XL) 150 MG 24 hr tablet TAKE 1 TABLET BY MOUTH EVERY DAY   • DULoxetine (CYMBALTA) 60 MG capsule TK 1 C PO D   • lisinopril-hydrochlorothiazide (PRINZIDE,ZESTORETIC) 20-12.5 MG per tablet Take 1 tablet by mouth Every Morning.   • Multiple Vitamins-Minerals (VITAMIN D3 COMPLETE) tablet Take  by mouth.   • raNITIdine (ZANTAC) 150 MG tablet TAKE 1 TABLET BY MOUTH TWICE DAILY   • [DISCONTINUED] carvedilol (COREG) 25 MG tablet  TAKE 1 TABLET BY MOUTH TWICE DAILY   • [DISCONTINUED] amitriptyline (ELAVIL) 25 MG tablet TAKE 1 TABLET BY MOUTH EVERY NIGHT     No current facility-administered medications on file prior to visit.        Family History   Problem Relation Age of Onset   • Blindness Mother    • Other Mother         arteriosclerotic cardiovascular disease    • Diabetes Mother    • Osteoporosis Mother    • Stroke Mother    • Cancer Brother    • Lung cancer Sister    • Osteoporosis Sister    • Stroke Sister    • Stroke Other        Social History     Socioeconomic History   • Marital status:      Spouse name: Not on file   • Number of children: Not on file   • Years of education: Not on file   • Highest education level: Not on file   Tobacco Use   • Smoking status: Never Smoker   • Smokeless tobacco: Never Used   Substance and Sexual Activity   • Alcohol use: No   • Drug use: No         Review of Systems   Constitutional: Positive for fatigue. Negative for chills, fever and unexpected weight change.   HENT: Negative for congestion, ear pain, hearing loss, rhinorrhea, sinus pressure, sore throat and trouble swallowing.    Eyes: Negative for discharge and itching.   Respiratory: Positive for shortness of breath. Negative for cough and chest tightness.    Cardiovascular: Negative for chest pain, palpitations and leg swelling.   Gastrointestinal: Negative for abdominal pain, blood in stool, constipation, diarrhea and vomiting.   Endocrine: Negative for polydipsia and polyuria.   Genitourinary: Positive for urgency. Negative for difficulty urinating, dysuria, enuresis, frequency and hematuria.   Musculoskeletal: Positive for arthralgias, back pain, gait problem and neck pain. Negative for joint swelling.   Skin: Negative for rash and wound.   Allergic/Immunologic: Negative for immunocompromised state.   Neurological: Positive for weakness. Negative for dizziness, syncope, numbness and headaches.   Hematological: Does not bruise/bleed  "easily.   Psychiatric/Behavioral: Positive for dysphoric mood and sleep disturbance. Negative for behavioral problems.       /80   Pulse 72   Ht 154.9 cm (60.98\")   Wt 107 kg (235 lb)   BMI 44.43 kg/m²       Physical Exam   Constitutional: She is oriented to person, place, and time. She appears well-developed and well-nourished.   HENT:   Head: Normocephalic and atraumatic.   Right Ear: External ear normal.   Left Ear: External ear normal.   Mouth/Throat: Oropharynx is clear and moist.   Eyes: Conjunctivae and EOM are normal.   Neck: Normal range of motion. Neck supple.   Cardiovascular: Normal rate, regular rhythm and normal heart sounds.   Pulmonary/Chest: Effort normal and breath sounds normal.   Abdominal: Soft. Bowel sounds are normal.   Musculoskeletal: She exhibits edema.   Using  cane   Lymphadenopathy:     She has no cervical adenopathy.   Neurological: She is alert and oriented to person, place, and time.   Skin: Skin is warm and dry.   Psychiatric: She has a normal mood and affect. Her behavior is normal. Thought content normal.       Procedure:      Discussion/Summary:    HTN-change to bystolic to see if it will help with fatigue  Hyperlipidemia/?DM-counseled on diet, labs noted  Insomnia-cont melatonin  Depression-cont current tx  Djd/fibromyalgia-cont current tx and PT  GERD-cont zantac, stable  Knee djd-stable  High risk meds-labs noted  Fatigue-refusing cardiac w/u, could also be ANGELIC issues  Other-extensively counseled on need for colonoscopy given recent cologuard positive testing but she is refusing and understands the risk of undetected colon neoplasm     Prior labs reviewed, along with notes  Labs noted and dw patient        Current Outpatient Medications:   •  amLODIPine (NORVASC) 5 MG tablet, TAKE 1 TABLET BY MOUTH DAILY, Disp: 90 tablet, Rfl: 0  •  atorvastatin (LIPITOR) 20 MG tablet, TAKE 1 TABLET BY MOUTH EVERY NIGHT AT BEDTIME, Disp: 90 tablet, Rfl: 0  •  buPROPion XL (WELLBUTRIN " XL) 150 MG 24 hr tablet, TAKE 1 TABLET BY MOUTH EVERY DAY, Disp: 30 tablet, Rfl: 0  •  DULoxetine (CYMBALTA) 60 MG capsule, TK 1 C PO D, Disp: , Rfl: 2  •  lisinopril-hydrochlorothiazide (PRINZIDE,ZESTORETIC) 20-12.5 MG per tablet, Take 1 tablet by mouth Every Morning., Disp: 90 tablet, Rfl: 3  •  Multiple Vitamins-Minerals (VITAMIN D3 COMPLETE) tablet, Take  by mouth., Disp: , Rfl:   •  raNITIdine (ZANTAC) 150 MG tablet, TAKE 1 TABLET BY MOUTH TWICE DAILY, Disp: 180 tablet, Rfl: 0  •  nebivolol (BYSTOLIC) 10 MG tablet, Take 1 tablet by mouth Daily., Disp: 28 tablet, Rfl: 0        Danae was seen today for hyperlipidemia and hypertension.    Diagnoses and all orders for this visit:    Mixed hyperlipidemia    Benign essential hypertension  -     Discontinue: nebivolol (BYSTOLIC) 5 MG tablet; Take 1 tablet by mouth Daily.  -     nebivolol (BYSTOLIC) 10 MG tablet; Take 1 tablet by mouth Daily.    Vitamin D deficiency    Vitamin B 12 deficiency    Esophagitis, reflux    Chronic low back pain with sciatica, sciatica laterality unspecified, unspecified back pain laterality    Primary osteoarthritis of both knees    Other osteoporosis without current pathological fracture    Arthritis    Moderate episode of recurrent major depressive disorder (CMS/HCC)    Gait instability    Other depression    Anxiety

## 2019-07-03 RX ORDER — DULOXETIN HYDROCHLORIDE 60 MG/1
60 CAPSULE, DELAYED RELEASE ORAL DAILY
Qty: 90 CAPSULE | Refills: 2 | Status: SHIPPED | OUTPATIENT
Start: 2019-07-03 | End: 2020-03-11

## 2019-07-10 RX ORDER — BUPROPION HYDROCHLORIDE 150 MG/1
TABLET ORAL
Qty: 30 TABLET | Refills: 0 | Status: SHIPPED | OUTPATIENT
Start: 2019-07-10 | End: 2019-08-09 | Stop reason: SDUPTHER

## 2019-07-17 RX ORDER — ATORVASTATIN CALCIUM 20 MG/1
TABLET, FILM COATED ORAL
Qty: 90 TABLET | Refills: 0 | Status: SHIPPED | OUTPATIENT
Start: 2019-07-17 | End: 2019-10-20 | Stop reason: SDUPTHER

## 2019-07-18 RX ORDER — RANITIDINE 150 MG/1
TABLET ORAL
Qty: 180 TABLET | Refills: 0 | Status: SHIPPED | OUTPATIENT
Start: 2019-07-18 | End: 2019-12-05 | Stop reason: SDUPTHER

## 2019-08-09 RX ORDER — BUPROPION HYDROCHLORIDE 150 MG/1
TABLET ORAL
Qty: 30 TABLET | Refills: 0 | Status: SHIPPED | OUTPATIENT
Start: 2019-08-09 | End: 2019-09-04 | Stop reason: SDUPTHER

## 2019-09-04 RX ORDER — BUPROPION HYDROCHLORIDE 150 MG/1
TABLET ORAL
Qty: 30 TABLET | Refills: 5 | Status: SHIPPED | OUTPATIENT
Start: 2019-09-04 | End: 2020-03-11

## 2019-09-30 RX ORDER — CARVEDILOL 25 MG/1
TABLET ORAL
Qty: 180 TABLET | Refills: 0 | Status: SHIPPED | OUTPATIENT
Start: 2019-09-30 | End: 2019-12-08 | Stop reason: SDUPTHER

## 2019-10-17 ENCOUNTER — LAB (OUTPATIENT)
Dept: LAB | Facility: HOSPITAL | Age: 79
End: 2019-10-17

## 2019-10-17 ENCOUNTER — HOSPITAL ENCOUNTER (OUTPATIENT)
Dept: GENERAL RADIOLOGY | Facility: HOSPITAL | Age: 79
Discharge: HOME | End: 2019-10-17
Admitting: NURSE PRACTITIONER

## 2019-10-17 ENCOUNTER — TRANSCRIBE ORDERS (OUTPATIENT)
Dept: GENERAL RADIOLOGY | Facility: HOSPITAL | Age: 79
End: 2019-10-17

## 2019-10-17 ENCOUNTER — TRANSCRIBE ORDERS (OUTPATIENT)
Dept: LAB | Facility: HOSPITAL | Age: 79
End: 2019-10-17

## 2019-10-17 DIAGNOSIS — M54.9 BACK PAIN, UNSPECIFIED BACK LOCATION, UNSPECIFIED BACK PAIN LATERALITY, UNSPECIFIED CHRONICITY: Primary | ICD-10-CM

## 2019-10-17 DIAGNOSIS — M25.562 PAIN IN BOTH KNEES, UNSPECIFIED CHRONICITY: ICD-10-CM

## 2019-10-17 DIAGNOSIS — M54.9 BACK PAIN, UNSPECIFIED BACK LOCATION, UNSPECIFIED BACK PAIN LATERALITY, UNSPECIFIED CHRONICITY: ICD-10-CM

## 2019-10-17 DIAGNOSIS — E78.5 HYPERLIPIDEMIA, UNSPECIFIED HYPERLIPIDEMIA TYPE: ICD-10-CM

## 2019-10-17 DIAGNOSIS — M79.7 MUSCLE PAIN, FIBROMYALGIA: ICD-10-CM

## 2019-10-17 DIAGNOSIS — I10 HYPERTENSION, UNSPECIFIED TYPE: ICD-10-CM

## 2019-10-17 DIAGNOSIS — R53.83 FATIGUE, UNSPECIFIED TYPE: Primary | ICD-10-CM

## 2019-10-17 DIAGNOSIS — M25.561 PAIN IN BOTH KNEES, UNSPECIFIED CHRONICITY: ICD-10-CM

## 2019-10-17 DIAGNOSIS — R53.83 FATIGUE, UNSPECIFIED TYPE: ICD-10-CM

## 2019-10-17 LAB
ALBUMIN SERPL-MCNC: 3.9 G/DL (ref 3.5–5.2)
ALBUMIN/GLOB SERPL: 1.1 G/DL
ALP SERPL-CCNC: 157 U/L (ref 39–117)
ALT SERPL W P-5'-P-CCNC: 12 U/L (ref 1–33)
ANION GAP SERPL CALCULATED.3IONS-SCNC: 8.4 MMOL/L (ref 5–15)
AST SERPL-CCNC: 16 U/L (ref 1–32)
BACTERIA UR QL AUTO: ABNORMAL /HPF
BASOPHILS # BLD AUTO: 0.06 10*3/MM3 (ref 0–0.2)
BASOPHILS NFR BLD AUTO: 0.6 % (ref 0–1.5)
BILIRUB SERPL-MCNC: 0.5 MG/DL (ref 0.2–1.2)
BILIRUB UR QL STRIP: NEGATIVE
BUN BLD-MCNC: 16 MG/DL (ref 8–23)
BUN/CREAT SERPL: 18.8 (ref 7–25)
CALCIUM SPEC-SCNC: 9.6 MG/DL (ref 8.6–10.5)
CHLORIDE SERPL-SCNC: 100 MMOL/L (ref 98–107)
CHOLEST SERPL-MCNC: 140 MG/DL (ref 0–200)
CHROMATIN AB SERPL-ACNC: <10 IU/ML (ref 0–14)
CLARITY UR: ABNORMAL
CO2 SERPL-SCNC: 30.6 MMOL/L (ref 22–29)
COLOR UR: YELLOW
CREAT BLD-MCNC: 0.85 MG/DL (ref 0.57–1)
CRP SERPL-MCNC: 1.07 MG/DL (ref 0–0.5)
DEPRECATED RDW RBC AUTO: 41.8 FL (ref 37–54)
EOSINOPHIL # BLD AUTO: 0.08 10*3/MM3 (ref 0–0.4)
EOSINOPHIL NFR BLD AUTO: 0.8 % (ref 0.3–6.2)
ERYTHROCYTE [DISTWIDTH] IN BLOOD BY AUTOMATED COUNT: 12.9 % (ref 12.3–15.4)
ERYTHROCYTE [SEDIMENTATION RATE] IN BLOOD: 22 MM/HR (ref 0–30)
GFR SERPL CREATININE-BSD FRML MDRD: 65 ML/MIN/1.73
GLOBULIN UR ELPH-MCNC: 3.4 GM/DL
GLUCOSE BLD-MCNC: 98 MG/DL (ref 65–99)
GLUCOSE UR STRIP-MCNC: NEGATIVE MG/DL
HBA1C MFR BLD: 5.98 % (ref 4.8–5.6)
HCT VFR BLD AUTO: 41.5 % (ref 34–46.6)
HDLC SERPL-MCNC: 57 MG/DL (ref 40–60)
HGB BLD-MCNC: 14 G/DL (ref 12–15.9)
HGB UR QL STRIP.AUTO: NEGATIVE
HYALINE CASTS UR QL AUTO: ABNORMAL /LPF
IMM GRANULOCYTES # BLD AUTO: 0.05 10*3/MM3 (ref 0–0.05)
IMM GRANULOCYTES NFR BLD AUTO: 0.5 % (ref 0–0.5)
KETONES UR QL STRIP: NEGATIVE
LDLC SERPL CALC-MCNC: 59 MG/DL (ref 0–100)
LDLC/HDLC SERPL: 1.04 {RATIO}
LEUKOCYTE ESTERASE UR QL STRIP.AUTO: ABNORMAL
LYMPHOCYTES # BLD AUTO: 2.98 10*3/MM3 (ref 0.7–3.1)
LYMPHOCYTES NFR BLD AUTO: 28.3 % (ref 19.6–45.3)
MCH RBC QN AUTO: 30.1 PG (ref 26.6–33)
MCHC RBC AUTO-ENTMCNC: 33.7 G/DL (ref 31.5–35.7)
MCV RBC AUTO: 89.2 FL (ref 79–97)
MONOCYTES # BLD AUTO: 0.88 10*3/MM3 (ref 0.1–0.9)
MONOCYTES NFR BLD AUTO: 8.4 % (ref 5–12)
NEUTROPHILS # BLD AUTO: 6.48 10*3/MM3 (ref 1.7–7)
NEUTROPHILS NFR BLD AUTO: 61.4 % (ref 42.7–76)
NITRITE UR QL STRIP: NEGATIVE
NRBC BLD AUTO-RTO: 0 /100 WBC (ref 0–0.2)
PH UR STRIP.AUTO: 6.5 [PH] (ref 5–8)
PLATELET # BLD AUTO: 266 10*3/MM3 (ref 140–450)
PMV BLD AUTO: 11.7 FL (ref 6–12)
POTASSIUM BLD-SCNC: 4.2 MMOL/L (ref 3.5–5.2)
PROT SERPL-MCNC: 7.3 G/DL (ref 6–8.5)
PROT UR QL STRIP: NEGATIVE
RBC # BLD AUTO: 4.65 10*6/MM3 (ref 3.77–5.28)
RBC # UR: ABNORMAL /HPF
REF LAB TEST METHOD: ABNORMAL
SODIUM BLD-SCNC: 139 MMOL/L (ref 136–145)
SP GR UR STRIP: 1.02 (ref 1–1.03)
SQUAMOUS #/AREA URNS HPF: ABNORMAL /HPF
T3 SERPL-MCNC: 131 NG/DL (ref 80–200)
T4 SERPL-MCNC: 8.55 MCG/DL (ref 4.5–11.7)
TRIGL SERPL-MCNC: 120 MG/DL (ref 0–150)
TSH SERPL DL<=0.05 MIU/L-ACNC: 2.36 UIU/ML (ref 0.27–4.2)
UROBILINOGEN UR QL STRIP: ABNORMAL
VLDLC SERPL-MCNC: 24 MG/DL (ref 5–40)
WBC NRBC COR # BLD: 10.53 10*3/MM3 (ref 3.4–10.8)
WBC UR QL AUTO: ABNORMAL /HPF

## 2019-10-17 PROCEDURE — 36415 COLL VENOUS BLD VENIPUNCTURE: CPT

## 2019-10-17 PROCEDURE — 86038 ANTINUCLEAR ANTIBODIES: CPT

## 2019-10-17 PROCEDURE — 84443 ASSAY THYROID STIM HORMONE: CPT

## 2019-10-17 PROCEDURE — 80061 LIPID PANEL: CPT

## 2019-10-17 PROCEDURE — 83036 HEMOGLOBIN GLYCOSYLATED A1C: CPT

## 2019-10-17 PROCEDURE — 84480 ASSAY TRIIODOTHYRONINE (T3): CPT

## 2019-10-17 PROCEDURE — 85025 COMPLETE CBC W/AUTO DIFF WBC: CPT

## 2019-10-17 PROCEDURE — 73562 X-RAY EXAM OF KNEE 3: CPT

## 2019-10-17 PROCEDURE — 81001 URINALYSIS AUTO W/SCOPE: CPT

## 2019-10-17 PROCEDURE — 72100 X-RAY EXAM L-S SPINE 2/3 VWS: CPT

## 2019-10-17 PROCEDURE — 80053 COMPREHEN METABOLIC PANEL: CPT

## 2019-10-17 PROCEDURE — 85652 RBC SED RATE AUTOMATED: CPT

## 2019-10-17 PROCEDURE — 86431 RHEUMATOID FACTOR QUANT: CPT

## 2019-10-17 PROCEDURE — 84436 ASSAY OF TOTAL THYROXINE: CPT

## 2019-10-17 PROCEDURE — 86140 C-REACTIVE PROTEIN: CPT

## 2019-10-17 PROCEDURE — 82306 VITAMIN D 25 HYDROXY: CPT

## 2019-10-18 LAB
25(OH)D3 SERPL-MCNC: 28.8 NG/ML (ref 30–100)
ANA SER QL: NEGATIVE

## 2019-10-21 RX ORDER — ATORVASTATIN CALCIUM 20 MG/1
TABLET, FILM COATED ORAL
Qty: 90 TABLET | Refills: 0 | Status: SHIPPED | OUTPATIENT
Start: 2019-10-21 | End: 2020-01-08

## 2019-11-04 ENCOUNTER — APPOINTMENT (OUTPATIENT)
Dept: CT IMAGING | Facility: HOSPITAL | Age: 79
End: 2019-11-04

## 2019-11-04 ENCOUNTER — HOSPITAL ENCOUNTER (EMERGENCY)
Facility: HOSPITAL | Age: 79
Discharge: HOME | End: 2019-11-04
Attending: EMERGENCY MEDICINE | Admitting: EMERGENCY MEDICINE

## 2019-11-04 ENCOUNTER — APPOINTMENT (OUTPATIENT)
Dept: GENERAL RADIOLOGY | Facility: HOSPITAL | Age: 79
End: 2019-11-04

## 2019-11-04 VITALS
SYSTOLIC BLOOD PRESSURE: 169 MMHG | OXYGEN SATURATION: 96 % | TEMPERATURE: 99.2 F | HEIGHT: 61 IN | WEIGHT: 239 LBS | RESPIRATION RATE: 18 BRPM | HEART RATE: 69 BPM | DIASTOLIC BLOOD PRESSURE: 95 MMHG | BODY MASS INDEX: 45.12 KG/M2

## 2019-11-04 DIAGNOSIS — N30.00 ACUTE CYSTITIS WITHOUT HEMATURIA: ICD-10-CM

## 2019-11-04 DIAGNOSIS — R42 DIZZINESS: Primary | ICD-10-CM

## 2019-11-04 LAB
ALBUMIN SERPL-MCNC: 3.5 G/DL (ref 3.5–5.2)
ALBUMIN/GLOB SERPL: 1.2 G/DL
ALP SERPL-CCNC: 141 U/L (ref 39–117)
ALT SERPL W P-5'-P-CCNC: 20 U/L (ref 1–33)
ANION GAP SERPL CALCULATED.3IONS-SCNC: 10.8 MMOL/L (ref 5–15)
AST SERPL-CCNC: 21 U/L (ref 1–32)
BACTERIA UR QL AUTO: ABNORMAL /HPF
BASOPHILS # BLD AUTO: 0.04 10*3/MM3 (ref 0–0.2)
BASOPHILS NFR BLD AUTO: 0.5 % (ref 0–1.5)
BILIRUB SERPL-MCNC: 0.5 MG/DL (ref 0.2–1.2)
BILIRUB UR QL STRIP: ABNORMAL
BUN BLD-MCNC: 17 MG/DL (ref 8–23)
BUN/CREAT SERPL: 21.3 (ref 7–25)
CALCIUM SPEC-SCNC: 9.2 MG/DL (ref 8.6–10.5)
CHLORIDE SERPL-SCNC: 104 MMOL/L (ref 98–107)
CLARITY UR: CLEAR
CO2 SERPL-SCNC: 24.2 MMOL/L (ref 22–29)
COLOR UR: ABNORMAL
CREAT BLD-MCNC: 0.8 MG/DL (ref 0.57–1)
DEPRECATED RDW RBC AUTO: 47.9 FL (ref 37–54)
EOSINOPHIL # BLD AUTO: 0.1 10*3/MM3 (ref 0–0.4)
EOSINOPHIL NFR BLD AUTO: 1.4 % (ref 0.3–6.2)
ERYTHROCYTE [DISTWIDTH] IN BLOOD BY AUTOMATED COUNT: 14.1 % (ref 12.3–15.4)
GFR SERPL CREATININE-BSD FRML MDRD: 70 ML/MIN/1.73
GLOBULIN UR ELPH-MCNC: 2.9 GM/DL
GLUCOSE BLD-MCNC: 121 MG/DL (ref 65–99)
GLUCOSE UR STRIP-MCNC: NEGATIVE MG/DL
HCT VFR BLD AUTO: 40.8 % (ref 34–46.6)
HGB BLD-MCNC: 13 G/DL (ref 12–15.9)
HGB UR QL STRIP.AUTO: NEGATIVE
HOLD SPECIMEN: NORMAL
HOLD SPECIMEN: NORMAL
HYALINE CASTS UR QL AUTO: ABNORMAL /LPF
IMM GRANULOCYTES # BLD AUTO: 0.02 10*3/MM3 (ref 0–0.05)
IMM GRANULOCYTES NFR BLD AUTO: 0.3 % (ref 0–0.5)
KETONES UR QL STRIP: NEGATIVE
LEUKOCYTE ESTERASE UR QL STRIP.AUTO: ABNORMAL
LYMPHOCYTES # BLD AUTO: 2.69 10*3/MM3 (ref 0.7–3.1)
LYMPHOCYTES NFR BLD AUTO: 36.4 % (ref 19.6–45.3)
MAGNESIUM SERPL-MCNC: 2 MG/DL (ref 1.6–2.4)
MCH RBC QN AUTO: 29.3 PG (ref 26.6–33)
MCHC RBC AUTO-ENTMCNC: 31.9 G/DL (ref 31.5–35.7)
MCV RBC AUTO: 92.1 FL (ref 79–97)
MONOCYTES # BLD AUTO: 0.69 10*3/MM3 (ref 0.1–0.9)
MONOCYTES NFR BLD AUTO: 9.3 % (ref 5–12)
NEUTROPHILS # BLD AUTO: 3.85 10*3/MM3 (ref 1.7–7)
NEUTROPHILS NFR BLD AUTO: 52.1 % (ref 42.7–76)
NITRITE UR QL STRIP: POSITIVE
NRBC BLD AUTO-RTO: 0 /100 WBC (ref 0–0.2)
PH UR STRIP.AUTO: 6.5 [PH] (ref 5–8)
PLATELET # BLD AUTO: 244 10*3/MM3 (ref 140–450)
PMV BLD AUTO: 11.2 FL (ref 6–12)
POTASSIUM BLD-SCNC: 4 MMOL/L (ref 3.5–5.2)
PROT SERPL-MCNC: 6.4 G/DL (ref 6–8.5)
PROT UR QL STRIP: NEGATIVE
RBC # BLD AUTO: 4.43 10*6/MM3 (ref 3.77–5.28)
RBC # UR: ABNORMAL /HPF
REF LAB TEST METHOD: ABNORMAL
SODIUM BLD-SCNC: 139 MMOL/L (ref 136–145)
SP GR UR STRIP: 1.02 (ref 1–1.03)
SQUAMOUS #/AREA URNS HPF: ABNORMAL /HPF
TROPONIN T SERPL-MCNC: <0.01 NG/ML (ref 0–0.03)
UROBILINOGEN UR QL STRIP: ABNORMAL
WBC NRBC COR # BLD: 7.39 10*3/MM3 (ref 3.4–10.8)
WBC UR QL AUTO: ABNORMAL /HPF
WHOLE BLOOD HOLD SPECIMEN: NORMAL
WHOLE BLOOD HOLD SPECIMEN: NORMAL

## 2019-11-04 PROCEDURE — 85025 COMPLETE CBC W/AUTO DIFF WBC: CPT | Performed by: EMERGENCY MEDICINE

## 2019-11-04 PROCEDURE — 80053 COMPREHEN METABOLIC PANEL: CPT | Performed by: EMERGENCY MEDICINE

## 2019-11-04 PROCEDURE — 71045 X-RAY EXAM CHEST 1 VIEW: CPT

## 2019-11-04 PROCEDURE — 99284 EMERGENCY DEPT VISIT MOD MDM: CPT

## 2019-11-04 PROCEDURE — 83735 ASSAY OF MAGNESIUM: CPT | Performed by: EMERGENCY MEDICINE

## 2019-11-04 PROCEDURE — 93005 ELECTROCARDIOGRAM TRACING: CPT | Performed by: EMERGENCY MEDICINE

## 2019-11-04 PROCEDURE — 81001 URINALYSIS AUTO W/SCOPE: CPT | Performed by: PHYSICIAN ASSISTANT

## 2019-11-04 PROCEDURE — 84484 ASSAY OF TROPONIN QUANT: CPT | Performed by: EMERGENCY MEDICINE

## 2019-11-04 PROCEDURE — 70450 CT HEAD/BRAIN W/O DYE: CPT

## 2019-11-04 RX ORDER — CEFUROXIME AXETIL 500 MG/1
500 TABLET ORAL 2 TIMES DAILY
Qty: 14 TABLET | Refills: 0 | Status: SHIPPED | OUTPATIENT
Start: 2019-11-04 | End: 2019-11-11

## 2019-11-04 RX ORDER — SODIUM CHLORIDE 0.9 % (FLUSH) 0.9 %
10 SYRINGE (ML) INJECTION AS NEEDED
Status: DISCONTINUED | OUTPATIENT
Start: 2019-11-04 | End: 2019-11-04 | Stop reason: HOSPADM

## 2019-11-04 NOTE — ED PROVIDER NOTES
Subjective   79-year-old female presents as dizziness, she states that she has felt foggy lately, she said for the past several weeks she has had episodes of feeling like she cannot find her words.  She states that the fogginess comes periodically and only last a few minutes.  The last episode of feeling foggy was this morning, she states she had some difficulty with her thoughts and finding words, she feels normal at this time.  She has seen her primary care physician about this and he has a scheduled MRI of the head as an outpatient.        History provided by:  Patient   used: No        Review of Systems   Neurological: Positive for dizziness.   Psychiatric/Behavioral: Positive for confusion.   All other systems reviewed and are negative.      Past Medical History:   Diagnosis Date   • Colon polyp    • Osteoporosis    • Pain in thoracic spine    • Pneumonia    • UTI (urinary tract infection)        Allergies   Allergen Reactions   • Lexapro [Escitalopram Oxalate] Nausea Only       Past Surgical History:   Procedure Laterality Date   • BLADDER SURGERY  2000    bladder suspension   • CERVICAL LAMINECTOMY     • COLONOSCOPY     • COLOSTOMY  1979    temporary sigmoid   • HYSTERECTOMY         Family History   Problem Relation Age of Onset   • Blindness Mother    • Other Mother         arteriosclerotic cardiovascular disease    • Diabetes Mother    • Osteoporosis Mother    • Stroke Mother    • Cancer Brother    • Lung cancer Sister    • Osteoporosis Sister    • Stroke Sister    • Stroke Other        Social History     Socioeconomic History   • Marital status:      Spouse name: Not on file   • Number of children: Not on file   • Years of education: Not on file   • Highest education level: Not on file   Tobacco Use   • Smoking status: Never Smoker   • Smokeless tobacco: Never Used   Substance and Sexual Activity   • Alcohol use: No   • Drug use: No           Objective   Physical Exam    Constitutional: She is oriented to person, place, and time. She appears well-developed and well-nourished.   HENT:   Head: Normocephalic and atraumatic.   Eyes: EOM are normal.   Neck: Normal range of motion. Neck supple.   Cardiovascular: Normal rate and regular rhythm.   Pulmonary/Chest: Effort normal and breath sounds normal.   Abdominal: Soft. Bowel sounds are normal.   Musculoskeletal: Normal range of motion.   Neurological: She is alert and oriented to person, place, and time. She has normal reflexes. No cranial nerve deficit or sensory deficit. Coordination normal.   Skin: Skin is warm and dry.   Psychiatric: She has a normal mood and affect.   Nursing note and vitals reviewed.      Procedures           ED Course  ED Course as of Nov 04 1455   Mon Nov 04, 2019   1258 EKG interpreted by me.  Sinus rhythm.  Rate of 83.  Nonspecific Q wave.  No ST segment or T wave abnormalities.  Abnormal EKG  [CG]      ED Course User Index  [CG] Efra Arango B, DO                  MDM  Number of Diagnoses or Management Options  Acute cystitis without hematuria: new and requires workup  Dizziness: new and requires workup     Amount and/or Complexity of Data Reviewed  Clinical lab tests: reviewed  Tests in the radiology section of CPT®: reviewed    Risk of Complications, Morbidity, and/or Mortality  Presenting problems: low  Diagnostic procedures: low  Management options: low    Patient Progress  Patient progress: stable      Final diagnoses:   Dizziness   Acute cystitis without hematuria              Jose Garcia Jr., LITZY  11/04/19 1837

## 2019-11-04 NOTE — ED NOTES
Patient states she has been dealing with a UTI (no diagnosis) for 2-3 weeks now. Has not had antibiotics. Has been taking AZO over the counter with the last dose being yesterday.  Jose MCGHEE notified.     Ade Ortiz RN  11/04/19 3865       Ade Ortiz RN  11/04/19 7977

## 2019-11-18 ENCOUNTER — LAB (OUTPATIENT)
Dept: LAB | Facility: HOSPITAL | Age: 79
End: 2019-11-18

## 2019-11-18 ENCOUNTER — TRANSCRIBE ORDERS (OUTPATIENT)
Dept: LAB | Facility: HOSPITAL | Age: 79
End: 2019-11-18

## 2019-11-18 DIAGNOSIS — N30.90 BLADDER INFECTION: Primary | ICD-10-CM

## 2019-11-18 LAB
BACTERIA UR QL AUTO: ABNORMAL /HPF
BILIRUB UR QL STRIP: NEGATIVE
CLARITY UR: ABNORMAL
COD CRY URNS QL: ABNORMAL /HPF
COLOR UR: ABNORMAL
GLUCOSE UR STRIP-MCNC: NEGATIVE MG/DL
HGB UR QL STRIP.AUTO: NEGATIVE
HYALINE CASTS UR QL AUTO: ABNORMAL /LPF
KETONES UR QL STRIP: ABNORMAL
LEUKOCYTE ESTERASE UR QL STRIP.AUTO: NEGATIVE
NITRITE UR QL STRIP: POSITIVE
PH UR STRIP.AUTO: <=5 [PH] (ref 5–8)
PROT UR QL STRIP: ABNORMAL
RBC # UR: ABNORMAL /HPF
REF LAB TEST METHOD: ABNORMAL
SP GR UR STRIP: >=1.03 (ref 1–1.03)
SQUAMOUS #/AREA URNS HPF: ABNORMAL /HPF
UROBILINOGEN UR QL STRIP: ABNORMAL
WBC UR QL AUTO: ABNORMAL /HPF

## 2019-11-18 PROCEDURE — 81001 URINALYSIS AUTO W/SCOPE: CPT

## 2019-12-05 RX ORDER — RANITIDINE 150 MG/1
TABLET ORAL
Qty: 180 TABLET | Refills: 0 | Status: SHIPPED | OUTPATIENT
Start: 2019-12-05 | End: 2020-04-03

## 2019-12-09 RX ORDER — CARVEDILOL 25 MG/1
TABLET ORAL
Qty: 180 TABLET | Refills: 0 | Status: SHIPPED | OUTPATIENT
Start: 2019-12-09 | End: 2020-03-11

## 2019-12-25 ENCOUNTER — HOSPITAL ENCOUNTER (EMERGENCY)
Facility: HOSPITAL | Age: 79
Discharge: HOME | End: 2019-12-25
Attending: STUDENT IN AN ORGANIZED HEALTH CARE EDUCATION/TRAINING PROGRAM | Admitting: STUDENT IN AN ORGANIZED HEALTH CARE EDUCATION/TRAINING PROGRAM

## 2019-12-25 ENCOUNTER — APPOINTMENT (OUTPATIENT)
Dept: GENERAL RADIOLOGY | Facility: HOSPITAL | Age: 79
End: 2019-12-25

## 2019-12-25 VITALS
SYSTOLIC BLOOD PRESSURE: 147 MMHG | TEMPERATURE: 98.2 F | WEIGHT: 230 LBS | HEART RATE: 72 BPM | HEIGHT: 61 IN | DIASTOLIC BLOOD PRESSURE: 104 MMHG | RESPIRATION RATE: 20 BRPM | OXYGEN SATURATION: 96 % | BODY MASS INDEX: 43.43 KG/M2

## 2019-12-25 DIAGNOSIS — R30.0 DYSURIA: ICD-10-CM

## 2019-12-25 DIAGNOSIS — S40.012A CONTUSION OF LEFT SHOULDER, INITIAL ENCOUNTER: ICD-10-CM

## 2019-12-25 DIAGNOSIS — S39.012A LUMBOSACRAL STRAIN, INITIAL ENCOUNTER: ICD-10-CM
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
## 2019-12-25 DIAGNOSIS — S70.02XA CONTUSION OF LEFT HIP, INITIAL ENCOUNTER: ICD-10-CM

## 2019-12-25 LAB
BILIRUB UR QL STRIP: NEGATIVE
CLARITY UR: CLEAR
COLOR UR: YELLOW
GLUCOSE UR STRIP-MCNC: NEGATIVE MG/DL
HGB UR QL STRIP.AUTO: NEGATIVE
KETONES UR QL STRIP: NEGATIVE
LEUKOCYTE ESTERASE UR QL STRIP.AUTO: NEGATIVE
NITRITE UR QL STRIP: NEGATIVE
PH UR STRIP.AUTO: 6.5 [PH] (ref 5–8)
PROT UR QL STRIP: NEGATIVE
SP GR UR STRIP: 1.02 (ref 1–1.03)
UROBILINOGEN UR QL STRIP: NORMAL

## 2019-12-25 PROCEDURE — 73030 X-RAY EXAM OF SHOULDER: CPT

## 2019-12-25 PROCEDURE — 72100 X-RAY EXAM L-S SPINE 2/3 VWS: CPT

## 2019-12-25 PROCEDURE — 99284 EMERGENCY DEPT VISIT MOD MDM: CPT

## 2019-12-25 PROCEDURE — 73502 X-RAY EXAM HIP UNI 2-3 VIEWS: CPT

## 2019-12-25 PROCEDURE — 81003 URINALYSIS AUTO W/O SCOPE: CPT | Performed by: PHYSICIAN ASSISTANT

## 2019-12-25 PROCEDURE — P9612 CATHETERIZE FOR URINE SPEC: HCPCS

## 2019-12-25 RX ORDER — HYDROCODONE BITARTRATE AND ACETAMINOPHEN 5; 325 MG/1; MG/1
1 TABLET ORAL ONCE
Status: COMPLETED | OUTPATIENT
Start: 2019-12-25 | End: 2019-12-25

## 2019-12-25 RX ADMIN — HYDROCODONE BITARTRATE AND ACETAMINOPHEN 1 TABLET: 5; 325 TABLET ORAL at 13:26

## 2019-12-25 NOTE — ED PROVIDER NOTES
Subjective   Family states this patient does not ambulate at baseline and the only time she gets out of bed is to transfer to bedside commode or chair.  She has had a few urinary tract infections since November most recently was seen on 12/18 and urine was relatively normal with no isolated bacteria on culture although she was still treated with Omnicef.  She states she continues to have dysuria, frequency and urgency.  Today patient was reportedly trying to self transfer from bed to bedside commode and fell over a small footstool, that was upholstered, injuring her left hip, low back and left shoulder.  No LOC, no head injury.  No neck or upper back pain.  No abdominal pain.          Review of Systems   Genitourinary: Positive for dysuria, frequency and urgency.   Musculoskeletal:        Left shoulder pain, left hip pain, low back pain       Past Medical History:   Diagnosis Date   • Colon polyp    • Osteoporosis    • Pain in thoracic spine    • Pneumonia    • UTI (urinary tract infection)        Allergies   Allergen Reactions   • Lexapro [Escitalopram Oxalate] Nausea Only       Past Surgical History:   Procedure Laterality Date   • BLADDER SURGERY  2000    bladder suspension   • CERVICAL LAMINECTOMY     • COLONOSCOPY     • COLOSTOMY  1979    temporary sigmoid   • HYSTERECTOMY         Family History   Problem Relation Age of Onset   • Blindness Mother    • Other Mother         arteriosclerotic cardiovascular disease    • Diabetes Mother    • Osteoporosis Mother    • Stroke Mother    • Cancer Brother    • Lung cancer Sister    • Osteoporosis Sister    • Stroke Sister    • Stroke Other        Social History     Socioeconomic History   • Marital status:      Spouse name: Not on file   • Number of children: Not on file   • Years of education: Not on file   • Highest education level: Not on file   Tobacco Use   • Smoking status: Never Smoker   • Smokeless tobacco: Never Used   Substance and Sexual Activity   •  Alcohol use: No   • Drug use: No   • Sexual activity: Defer           Objective   Physical Exam   Constitutional: She appears well-developed and well-nourished.   HENT:   Head: Normocephalic and atraumatic.   Eyes: EOM are normal.   Neck: Normal range of motion. Neck supple.   No cervical vertebral tenderness   Cardiovascular: Normal rate and regular rhythm.   Pulmonary/Chest: Effort normal.   Abdominal: Soft. There is no tenderness.   Musculoskeletal: Normal range of motion.        Left shoulder: She exhibits tenderness. She exhibits normal range of motion.        Left hip: She exhibits tenderness.        Lumbar back: She exhibits tenderness and pain. She exhibits no deformity.   Neurological: She is alert.   Skin: Skin is warm and dry.   Psychiatric: She has a normal mood and affect. Her behavior is normal.   Nursing note and vitals reviewed.      Procedures           ED Course      3:03 PM  Imaging normal per radiologist.  Patient continues to complain of dysuria.  Request in and out cath and urinalysis.  Family catches me in the hallway to state that this patient has dementia and has been declining at home in terms of unable to ambulate and they are unable to move and care for her.  They have discussed possible nursing home.  Family is consulted social work and left a message given the holidays had not heard back yet.  They stated they understood she would need a 3-day stay in the hospital to qualify for placement and given findings here unlikely she would meet admission criteria.  Will check urinalysis and likely disposition to home with antibiotics and follow-up with social work and PCP.                                         MDM    Final diagnoses:   Fall, initial encounter   Contusion of left hip, initial encounter   Lumbosacral strain, initial encounter   Contusion of left shoulder, initial encounter   Dysuria            Mikel Montiel PA-C  12/25/19 6947

## 2020-01-08 DIAGNOSIS — F32.89 OTHER DEPRESSION: ICD-10-CM

## 2020-01-08 RX ORDER — MIRTAZAPINE 15 MG/1
15 TABLET, FILM COATED ORAL NIGHTLY
Qty: 30 TABLET | Refills: 5 | Status: SHIPPED | OUTPATIENT
Start: 2020-01-08 | End: 2021-02-04

## 2020-01-08 RX ORDER — ATORVASTATIN CALCIUM 20 MG/1
TABLET, FILM COATED ORAL
Qty: 90 TABLET | Refills: 0 | Status: SHIPPED | OUTPATIENT
Start: 2020-01-08 | End: 2020-04-10

## 2020-03-03 RX ORDER — HYDROCODONE BITARTRATE AND ACETAMINOPHEN 5; 325 MG/1; MG/1
1 TABLET ORAL EVERY 8 HOURS PRN
Refills: 0 | COMMUNITY
Start: 2019-11-25 | End: 2020-03-03 | Stop reason: SDUPTHER

## 2020-03-03 RX ORDER — HYDROCODONE BITARTRATE AND ACETAMINOPHEN 5; 325 MG/1; MG/1
1 TABLET ORAL EVERY 8 HOURS PRN
Qty: 90 TABLET | Refills: 0 | Status: SHIPPED | OUTPATIENT
Start: 2020-03-03 | End: 2020-05-12 | Stop reason: SDUPTHER

## 2020-03-09 ENCOUNTER — NURSING HOME (OUTPATIENT)
Dept: FAMILY MEDICINE CLINIC | Facility: CLINIC | Age: 80
End: 2020-03-09

## 2020-03-09 VITALS
WEIGHT: 214 LBS | HEART RATE: 80 BPM | SYSTOLIC BLOOD PRESSURE: 126 MMHG | BODY MASS INDEX: 40.43 KG/M2 | RESPIRATION RATE: 20 BRPM | TEMPERATURE: 97 F | DIASTOLIC BLOOD PRESSURE: 72 MMHG

## 2020-03-09 DIAGNOSIS — N89.8 VAGINAL ITCHING: ICD-10-CM

## 2020-03-09 DIAGNOSIS — Z78.9 IMPAIRED MOBILITY AND ADLS: ICD-10-CM

## 2020-03-09 DIAGNOSIS — M19.90 ARTHRITIS: ICD-10-CM

## 2020-03-09 DIAGNOSIS — M25.50 ARTHRALGIA OF MULTIPLE JOINTS: ICD-10-CM

## 2020-03-09 DIAGNOSIS — Z74.09 IMPAIRED MOBILITY AND ADLS: ICD-10-CM

## 2020-03-09 DIAGNOSIS — N39.0 URINARY TRACT INFECTION WITHOUT HEMATURIA, SITE UNSPECIFIED: ICD-10-CM

## 2020-03-09 DIAGNOSIS — R30.0 DYSURIA: ICD-10-CM

## 2020-03-09 DIAGNOSIS — M17.0 PRIMARY OSTEOARTHRITIS OF BOTH KNEES: ICD-10-CM

## 2020-03-09 PROCEDURE — 99309 SBSQ NF CARE MODERATE MDM 30: CPT | Performed by: NURSE PRACTITIONER

## 2020-03-09 NOTE — PROGRESS NOTES
Nursing Home Follow Up Note      Layo Arango DO []   WILLIAM Alfaro [x]  [address] [place] [address]  Phone: [phone]  Fax: [phone] Dorcas Duncan MD []    Dc Fernandez DO []   [address] [place] [address]  Phone: [phone]  Fax: [phone]     PATIENT NAME: Danae Harmon                                                                          YOB: 1940           DATE OF SERVICE: 03/09/2020  FACILITY:  [][facility]   [] [facility]   [] [facility]   [x] [facility]   [] Other ______________________________________________________________________      CHIEF COMPLAINT:  Follow up UTI. Patient continues to complain of dysuria    Increased knee pain      HISTORY OF PRESENT ILLNESS:   Patient was started on Cipro, on 3/6, for dysuria and abnormal UA. Cipro completes tomorrow. Today, she states she continues to have burning  with urination, and also itching at times.      She also has complaints of increased bilateral knee pain, worse in the left, and would like to have her pain medication scheduled, to control it better. She took it on a schedule at home.    PAST MEDICAL & SURGICAL HISTORY:   Past Medical History:   Diagnosis Date   • Colon polyp    • Osteoporosis    • Pain in thoracic spine    • Pneumonia    • UTI (urinary tract infection)       Past Surgical History:   Procedure Laterality Date   • BLADDER SURGERY  2000    bladder suspension   • CERVICAL LAMINECTOMY     • COLONOSCOPY     • COLOSTOMY  1979    temporary sigmoid   • HYSTERECTOMY           MEDICATIONS:  I have reviewed and reconciled the patients medication list in the patients chart at the skilled nursing facility today.      ALLERGIES:    Allergies   Allergen Reactions   • Lexapro [Escitalopram Oxalate] Nausea Only         SOCIAL HISTORY:    Social History     Socioeconomic History   • Marital status:      Spouse name: Not on file   • Number of children: Not on file   • Years of  education: Not on file   • Highest education level: Not on file   Tobacco Use   • Smoking status: Never Smoker   • Smokeless tobacco: Never Used   Substance and Sexual Activity   • Alcohol use: No   • Drug use: No   • Sexual activity: Defer       FAMILY HISTORY:    Family History   Problem Relation Age of Onset   • Blindness Mother    • Other Mother         arteriosclerotic cardiovascular disease    • Diabetes Mother    • Osteoporosis Mother    • Stroke Mother    • Cancer Brother    • Lung cancer Sister    • Osteoporosis Sister    • Stroke Sister    • Stroke Other        REVIEW OF SYSTEMS:    Review of Systems   Constitutional: Negative.    HENT: Negative.    Eyes: Negative.    Respiratory: Negative.    Cardiovascular: Negative.    Gastrointestinal: Negative.    Endocrine: Negative.    Genitourinary: Positive for dysuria and urgency.        Itching   Musculoskeletal: Positive for arthralgias (bilateral knee pain, worse in left knee). Negative for joint swelling.   Skin: Negative.    Allergic/Immunologic: Negative.    Neurological: Negative.    Hematological: Negative.    Psychiatric/Behavioral: Negative.          PHYSICAL EXAMINATION:   VITAL SIGNS:   Vitals:    03/09/20 1010   BP: 126/72   Pulse: 80   Resp: 20   Temp: 97 °F (36.1 °C)   Weight: 97.1 kg (214 lb)       Physical Exam   Constitutional: She is oriented to person, place, and time. She appears well-developed.   HENT:   Head: Normocephalic.   Eyes: Conjunctivae are normal.   Neck: Normal range of motion.   Cardiovascular: Normal rate and regular rhythm.   Pulmonary/Chest: Effort normal and breath sounds normal.   Musculoskeletal: She exhibits no edema.        Right knee: She exhibits decreased range of motion. Tenderness found. Patellar tendon tenderness noted.        Left knee: She exhibits decreased range of motion. Tenderness found. Patellar tendon tenderness noted.   Neurological: She is alert and oriented to person, place, and time.   Skin: Skin is  warm and dry.   Nursing note and vitals reviewed.      RECORDS REVIEW:   I have reviewed and interpreted the following lab test results  obtained at the time of the visit today.     Advance Care Planning   ACP discussion was held with the patient during this visit. Patient has an advance directive in EMR which is still valid.     ASSESSMENT     Diagnoses and all orders for this visit:    Urinary tract infection without hematuria, site unspecified    Dysuria    Vaginal itching    Arthralgia of multiple joints    Arthritis    Primary osteoarthritis of both knees    Impaired mobility and ADLs          PLAN  Urinary tract infection/dysuria/vaginal itching  -Complete Cipro as ordered.  -Recheck UA, with culture if indicated, will follow up with results and plan care accordingly   -Encourage fluids   -Give Diflucan 150 mg now and in 3 days, for itching.     Osteoarthritis bilateral knees  -Ordered scheduled hydrocodone 5/325 mg BID and hydrocodone BID PRN   -Apply Diclofenac gel 1% to bilateral knees, 4 g,  4 times a day    Staff to continue supportive care for all ADLs.     [x]  Discussed Patient in detail with nursing/staff, addressed all needs today.     [x]  Plan of Care Reviewed   []  PT/OT Reviewed   [x]  Order Changes  []  Discharge Plans Reviewed  [x]  Advance Directive on file with Nursing Home.   [x]  POA on file with Nursing Home.   [x]  Code Status listed: []  Full Code   []  DNR              WILLIAM Delgado.

## 2020-03-11 RX ORDER — DULOXETIN HYDROCHLORIDE 60 MG/1
60 CAPSULE, DELAYED RELEASE ORAL DAILY
Qty: 90 CAPSULE | Refills: 2 | Status: SHIPPED | OUTPATIENT
Start: 2020-03-11

## 2020-03-11 RX ORDER — BUPROPION HYDROCHLORIDE 150 MG/1
TABLET ORAL
Qty: 30 TABLET | Refills: 5 | Status: SHIPPED | OUTPATIENT
Start: 2020-03-11 | End: 2020-08-12

## 2020-03-11 RX ORDER — CARVEDILOL 25 MG/1
TABLET ORAL
Qty: 180 TABLET | Refills: 0 | Status: SHIPPED | OUTPATIENT
Start: 2020-03-11

## 2020-03-16 ENCOUNTER — NURSING HOME (OUTPATIENT)
Dept: FAMILY MEDICINE CLINIC | Facility: CLINIC | Age: 80
End: 2020-03-16

## 2020-03-16 VITALS
SYSTOLIC BLOOD PRESSURE: 106 MMHG | OXYGEN SATURATION: 98 % | BODY MASS INDEX: 40.62 KG/M2 | RESPIRATION RATE: 20 BRPM | TEMPERATURE: 96.4 F | HEART RATE: 52 BPM | DIASTOLIC BLOOD PRESSURE: 62 MMHG | WEIGHT: 215 LBS

## 2020-03-16 DIAGNOSIS — K59.00 CONSTIPATION, UNSPECIFIED CONSTIPATION TYPE: ICD-10-CM

## 2020-03-16 DIAGNOSIS — E11.9 TYPE 2 DIABETES MELLITUS WITHOUT COMPLICATION, WITHOUT LONG-TERM CURRENT USE OF INSULIN (HCC): ICD-10-CM

## 2020-03-16 DIAGNOSIS — R30.0 DYSURIA: ICD-10-CM

## 2020-03-16 DIAGNOSIS — N89.8 VAGINAL ITCHING: Primary | ICD-10-CM

## 2020-03-16 PROCEDURE — 99309 SBSQ NF CARE MODERATE MDM 30: CPT | Performed by: NURSE PRACTITIONER

## 2020-03-16 NOTE — PROGRESS NOTES
Nursing Home Follow Up Note      Layo Arango DO []   WILLIAM Alfaro [x]  [address] [place] [address]  Phone: [phone]  Fax: [phone] Dorcas Duncan MD []    Dc Fernandez DO []   [address] [place] [address]  Phone: [phone]  Fax: [phone]     PATIENT NAME: Danae Harmon                                                                          YOB: 1940           DATE OF SERVICE: 03/16/2020  FACILITY:  [][facility]   [] [facility]   [] [facility]   [x] [facility]   [] Other ______________________________________________________________________      CHIEF COMPLAINT:  Follow up UTI. Patient continues to complain of dysuria      HISTORY OF PRESENT ILLNESS:   Patient was started on Cipro, on 3/6, for dysuria and abnormal UA. Cipro completed 3/11. Today, she reports that she continues to have burning with urination, and also itching at times.     She also complains of constipation, last bowel movement was 2 days ago, but it was very small.  Denies N/V/D, and Abdominal pain.      PAST MEDICAL & SURGICAL HISTORY:   Past Medical History:   Diagnosis Date   • Colon polyp    • Osteoporosis    • Pain in thoracic spine    • Pneumonia    • UTI (urinary tract infection)       Past Surgical History:   Procedure Laterality Date   • BLADDER SURGERY  2000    bladder suspension   • CERVICAL LAMINECTOMY     • COLONOSCOPY     • COLOSTOMY  1979    temporary sigmoid   • HYSTERECTOMY           MEDICATIONS:  I have reviewed and reconciled the patients medication list in the patients chart at the skilled nursing facility today.      ALLERGIES:    Allergies   Allergen Reactions   • Lexapro [Escitalopram Oxalate] Nausea Only         SOCIAL HISTORY:    Social History     Socioeconomic History   • Marital status:      Spouse name: Not on file   • Number of children: Not on file   • Years of education: Not on file   • Highest education level: Not on file   Tobacco Use   •  Smoking status: Never Smoker   • Smokeless tobacco: Never Used   Substance and Sexual Activity   • Alcohol use: No   • Drug use: No   • Sexual activity: Defer       FAMILY HISTORY:    Family History   Problem Relation Age of Onset   • Blindness Mother    • Other Mother         arteriosclerotic cardiovascular disease    • Diabetes Mother    • Osteoporosis Mother    • Stroke Mother    • Cancer Brother    • Lung cancer Sister    • Osteoporosis Sister    • Stroke Sister    • Stroke Other        REVIEW OF SYSTEMS:    Review of Systems   Constitutional: Negative.    HENT: Negative.    Eyes: Negative.    Respiratory: Negative.    Cardiovascular: Negative.    Gastrointestinal: Positive for constipation. Negative for abdominal distention, abdominal pain, anal bleeding, nausea, vomiting, GERD and indigestion.   Endocrine: Negative.    Genitourinary: Positive for dysuria and urgency.        Itching   Musculoskeletal: Positive for arthralgias. Negative for joint swelling.   Skin: Negative.    Allergic/Immunologic: Negative.    Neurological: Negative.    Hematological: Negative.    Psychiatric/Behavioral: Negative.          PHYSICAL EXAMINATION:   VITAL SIGNS:   Vitals:    03/16/20 1126   BP: 106/62   Pulse: 52   Resp: 20   Temp: 96.4 °F (35.8 °C)   SpO2: 98%   Weight: 97.5 kg (215 lb)       Physical Exam   Constitutional: She is oriented to person, place, and time. She appears well-developed.   HENT:   Head: Normocephalic.   Eyes: Conjunctivae are normal.   Neck: Normal range of motion.   Cardiovascular: Normal rate and regular rhythm.   Pulmonary/Chest: Effort normal and breath sounds normal.   Abdominal: Soft. Bowel sounds are normal.   Musculoskeletal: She exhibits no edema.        Right knee: She exhibits decreased range of motion. Tenderness found. Patellar tendon tenderness noted.        Left knee: She exhibits decreased range of motion. Tenderness found. Patellar tendon tenderness noted.   Neurological: She is alert and  oriented to person, place, and time.   Skin: Skin is warm and dry.   Psychiatric: She has a normal mood and affect. Her behavior is normal.   Nursing note and vitals reviewed.      RECORDS REVIEW:   I have reviewed and interpreted the following lab test results  obtained at the time of the visit today.     Advance Care Planning   ACP discussion was held with the patient during this visit. Patient has an advance directive in EMR which is still valid.     ASSESSMENT     Diagnoses and all orders for this visit:    Vaginal itching    Dysuria    Type 2 diabetes mellitus without complication, without long-term current use of insulin (CMS/[facility])    Constipation, unspecified constipation type          PLAN  Dysuria/vaginal itching  -Encourage fluids   -Give Diflucan 100 mg daily for 5 days. Will c/t monitor,  if symptoms have not improved will discuss post menopausal dryness treatment, with patient.     DM  -DM controlled at this time, with no reports of hypo/hyperglyemia.     Constipation   -Follow bowel regimen protocol for constipation.     Staff to continue supportive care for all ADLs.     Patient and staff was encouraged to keep me informed of any acute changes, lack of improvement, or any new concerning symptoms.    [x]  Discussed Patient in detail with nursing/staff, addressed all needs today.     [x]  Plan of Care Reviewed   []  PT/OT Reviewed   [x]  Order Changes  []  Discharge Plans Reviewed  [x]  Advance Directive on file with Nursing Home.   [x]  POA on file with Nursing Home.   [x]  Code Status listed: []  Full Code   []  DNR       “I confirm accuracy of unchanged data/findings which have been carried forward from previous visit, as well as I have updated appropriately those that have changed.”                 WILLIAM Delgado.

## 2020-03-17 PROBLEM — E11.9 TYPE 2 DIABETES MELLITUS WITHOUT COMPLICATION, WITHOUT LONG-TERM CURRENT USE OF INSULIN: Status: ACTIVE | Noted: 2020-03-17

## 2020-03-24 ENCOUNTER — NURSING HOME (OUTPATIENT)
Dept: FAMILY MEDICINE CLINIC | Facility: CLINIC | Age: 80
End: 2020-03-24

## 2020-03-24 VITALS
HEART RATE: 86 BPM | TEMPERATURE: 98.7 F | RESPIRATION RATE: 24 BRPM | BODY MASS INDEX: 40.62 KG/M2 | OXYGEN SATURATION: 98 % | SYSTOLIC BLOOD PRESSURE: 118 MMHG | DIASTOLIC BLOOD PRESSURE: 62 MMHG | WEIGHT: 215 LBS

## 2020-03-24 DIAGNOSIS — Z74.09 IMPAIRED MOBILITY AND ADLS: ICD-10-CM

## 2020-03-24 DIAGNOSIS — N32.81 OAB (OVERACTIVE BLADDER): ICD-10-CM

## 2020-03-24 DIAGNOSIS — N94.9 VAGINAL BURNING: Primary | ICD-10-CM

## 2020-03-24 DIAGNOSIS — R35.0 URINARY FREQUENCY: ICD-10-CM

## 2020-03-24 DIAGNOSIS — Z78.9 IMPAIRED MOBILITY AND ADLS: ICD-10-CM

## 2020-03-24 DIAGNOSIS — E11.9 TYPE 2 DIABETES MELLITUS WITHOUT COMPLICATION, WITHOUT LONG-TERM CURRENT USE OF INSULIN (HCC): ICD-10-CM

## 2020-03-24 DIAGNOSIS — R30.0 DYSURIA: ICD-10-CM

## 2020-03-24 DIAGNOSIS — N95.1 VAGINAL DRYNESS, MENOPAUSAL: ICD-10-CM

## 2020-03-24 PROCEDURE — 99309 SBSQ NF CARE MODERATE MDM 30: CPT | Performed by: NURSE PRACTITIONER

## 2020-03-25 PROBLEM — Z78.9 IMPAIRED MOBILITY AND ADLS: Status: ACTIVE | Noted: 2020-03-25

## 2020-03-25 PROBLEM — Z74.09 IMPAIRED MOBILITY AND ADLS: Status: ACTIVE | Noted: 2020-03-25

## 2020-03-25 PROBLEM — N32.81 OAB (OVERACTIVE BLADDER): Status: ACTIVE | Noted: 2020-03-25

## 2020-03-25 PROBLEM — N95.1 VAGINAL DRYNESS, MENOPAUSAL: Status: ACTIVE | Noted: 2020-03-25

## 2020-03-26 ENCOUNTER — NURSING HOME (OUTPATIENT)
Dept: INTERNAL MEDICINE | Facility: CLINIC | Age: 80
End: 2020-03-26

## 2020-03-26 VITALS
TEMPERATURE: 98.7 F | OXYGEN SATURATION: 98 % | BODY MASS INDEX: 40.81 KG/M2 | HEART RATE: 86 BPM | DIASTOLIC BLOOD PRESSURE: 62 MMHG | RESPIRATION RATE: 24 BRPM | SYSTOLIC BLOOD PRESSURE: 118 MMHG | WEIGHT: 216 LBS

## 2020-03-26 DIAGNOSIS — E55.9 VITAMIN D DEFICIENCY: ICD-10-CM

## 2020-03-26 DIAGNOSIS — G89.29 CHRONIC LOW BACK PAIN WITH SCIATICA, SCIATICA LATERALITY UNSPECIFIED, UNSPECIFIED BACK PAIN LATERALITY: ICD-10-CM

## 2020-03-26 DIAGNOSIS — M54.40 CHRONIC LOW BACK PAIN WITH SCIATICA, SCIATICA LATERALITY UNSPECIFIED, UNSPECIFIED BACK PAIN LATERALITY: ICD-10-CM

## 2020-03-26 DIAGNOSIS — E78.2 MIXED HYPERLIPIDEMIA: ICD-10-CM

## 2020-03-26 DIAGNOSIS — M81.8 OTHER OSTEOPOROSIS WITHOUT CURRENT PATHOLOGICAL FRACTURE: ICD-10-CM

## 2020-03-26 DIAGNOSIS — F02.80 LATE ONSET ALZHEIMER'S DISEASE WITHOUT BEHAVIORAL DISTURBANCE (HCC): Primary | ICD-10-CM

## 2020-03-26 DIAGNOSIS — I10 BENIGN ESSENTIAL HYPERTENSION: ICD-10-CM

## 2020-03-26 DIAGNOSIS — F33.1 MODERATE EPISODE OF RECURRENT MAJOR DEPRESSIVE DISORDER (HCC): ICD-10-CM

## 2020-03-26 DIAGNOSIS — E11.9 TYPE 2 DIABETES MELLITUS WITHOUT COMPLICATION, WITHOUT LONG-TERM CURRENT USE OF INSULIN (HCC): ICD-10-CM

## 2020-03-26 DIAGNOSIS — E53.8 VITAMIN B 12 DEFICIENCY: ICD-10-CM

## 2020-03-26 DIAGNOSIS — G30.1 LATE ONSET ALZHEIMER'S DISEASE WITHOUT BEHAVIORAL DISTURBANCE (HCC): Primary | ICD-10-CM

## 2020-03-26 PROCEDURE — 99306 1ST NF CARE HIGH MDM 50: CPT | Performed by: INTERNAL MEDICINE

## 2020-03-26 NOTE — PROGRESS NOTES
Nursing Home Follow Up Note      Layo Arango DO []   WILLIAM Alfaro [x]  [address] [place] [address]  Phone: [phone]  Fax: [phone] Dorcas Duncan MD []    Dc Fernandez DO []   [address] [place] [address]  Phone: [phone]  Fax: [phone]     PATIENT NAME: Danae Harmon                                                                          YOB: 1940           DATE OF SERVICE: 03/24/2020  FACILITY:  [][place]   [] [place]   [] [facility]   [x] [facility]   [] Other ______________________________________________________________________      CHIEF COMPLAINT:  Follow up dysuria and vaginal dryness      HISTORY OF PRESENT ILLNESS:   Per patient, she continues to have vaginal burning and dryness. She also has urinary frequency but this is nothing new. For several months now, she has to urinate every couple hours and has to get up several times during the night.   She also complains of constipation, last bowel movement was 2 days ago, but it was very small.  Denies N/V/D, and Abdominal pain.      PAST MEDICAL & SURGICAL HISTORY:   Past Medical History:   Diagnosis Date   • Colon polyp    • Osteoporosis    • Pain in thoracic spine    • Pneumonia    • UTI (urinary tract infection)       Past Surgical History:   Procedure Laterality Date   • BLADDER SURGERY  2000    bladder suspension   • CERVICAL LAMINECTOMY     • COLONOSCOPY     • COLOSTOMY  1979    temporary sigmoid   • HYSTERECTOMY           MEDICATIONS:  I have reviewed and reconciled the patients medication list in the patients chart at the skilled nursing facility today.      ALLERGIES:    Allergies   Allergen Reactions   • Lexapro [Escitalopram Oxalate] Nausea Only         SOCIAL HISTORY:    Social History     Socioeconomic History   • Marital status:      Spouse name: Not on file   • Number of children: Not on file   • Years of education: Not on file   • Highest education level: Not  on file   Tobacco Use   • Smoking status: Never Smoker   • Smokeless tobacco: Never Used   Substance and Sexual Activity   • Alcohol use: No   • Drug use: No   • Sexual activity: Defer       FAMILY HISTORY:    Family History   Problem Relation Age of Onset   • Blindness Mother    • Other Mother         arteriosclerotic cardiovascular disease    • Diabetes Mother    • Osteoporosis Mother    • Stroke Mother    • Cancer Brother    • Lung cancer Sister    • Osteoporosis Sister    • Stroke Sister    • Stroke Other        REVIEW OF SYSTEMS:    Review of Systems   HENT: Negative.    Eyes: Negative.    Respiratory: Negative.    Cardiovascular: Negative.    Gastrointestinal: Negative for abdominal distention, abdominal pain, anal bleeding, constipation, nausea, vomiting, GERD and indigestion.   Endocrine: Negative.    Genitourinary: Positive for dysuria, frequency and urgency.        Vaginal burning and dryness   Musculoskeletal: Positive for arthralgias.   Skin: Negative.    Allergic/Immunologic: Negative.    Neurological: Positive for weakness (LE).   Hematological: Negative.    Psychiatric/Behavioral: Negative.          PHYSICAL EXAMINATION:   VITAL SIGNS:   Vitals:    03/24/20 1447   BP: 118/62   Pulse: 86   Resp: 24   Temp: 98.7 °F (37.1 °C)   SpO2: 98%   Weight: 97.5 kg (215 lb)       Physical Exam   Constitutional: She is oriented to person, place, and time. She appears well-developed.   HENT:   Head: Normocephalic.   Eyes: Conjunctivae are normal.   Neck: Normal range of motion.   Cardiovascular: Normal rate and regular rhythm.   Pulmonary/Chest: Effort normal and breath sounds normal.   Abdominal: Soft. Bowel sounds are normal.   Musculoskeletal: She exhibits no edema.        Right knee: She exhibits decreased range of motion. Tenderness found. Patellar tendon tenderness noted.        Left knee: She exhibits decreased range of motion. Tenderness found. Patellar tendon tenderness noted.   Neurological: She is alert  and oriented to person, place, and time.   Skin: Skin is warm and dry.   Psychiatric: She has a normal mood and affect. Her behavior is normal.   Nursing note and vitals reviewed.      RECORDS REVIEW:   I have reviewed and interpreted the following lab test results  obtained at the time of the visit today.     Advance Care Planning   ACP discussion was held with the patient during this visit. Patient has an advance directive in EMR which is still valid.     ASSESSMENT     Diagnoses and all orders for this visit:    Vaginal burning    Dysuria    Vaginal dryness, menopausal    Urinary frequency    OAB (overactive bladder)    Type 2 diabetes mellitus without complication, without long-term current use of insulin (CMS/[facility])    Impaired mobility and ADLs          PLAN  Dysuria/vaginal burning and dryness  -Apply Estrace vaginal cream 0.01%, 2 gr q day x 2 weeks then decrease to 1 gm, 3x per week.     Urinary frequency/OAB  -Start Detrol 1 mg bid.     DM  -DM controlled at this time, with no reports of hypo/hyperglyemia.     Will continue to monitor symptoms.    Staff to continue supportive care for all ADLs.     Patient and staff was encouraged to keep me informed of any acute changes, lack of improvement, or any new concerning symptoms.    [x]  Discussed Patient in detail with nursing/staff, addressed all needs today.     [x]  Plan of Care Reviewed   []  PT/OT Reviewed   [x]  Order Changes  []  Discharge Plans Reviewed  [x]  Advance Directive on file with Nursing Home.   [x]  POA on file with Nursing Home.   [x]  Code Status listed: [x]  Full Code   []  DNR       “I confirm accuracy of unchanged data/findings which have been carried forward from previous visit, as well as I have updated appropriately those that have changed.”                   Mita Joyce, APRN.

## 2020-03-27 ENCOUNTER — NURSING HOME (OUTPATIENT)
Dept: FAMILY MEDICINE CLINIC | Facility: CLINIC | Age: 80
End: 2020-03-27

## 2020-03-27 VITALS
BODY MASS INDEX: 40.81 KG/M2 | WEIGHT: 216 LBS | TEMPERATURE: 97.4 F | DIASTOLIC BLOOD PRESSURE: 66 MMHG | OXYGEN SATURATION: 98 % | SYSTOLIC BLOOD PRESSURE: 134 MMHG | HEART RATE: 68 BPM | RESPIRATION RATE: 16 BRPM

## 2020-03-27 DIAGNOSIS — N32.81 OAB (OVERACTIVE BLADDER): ICD-10-CM

## 2020-03-27 DIAGNOSIS — Z74.09 IMPAIRED MOBILITY AND ADLS: ICD-10-CM

## 2020-03-27 DIAGNOSIS — R41.0 CONFUSION: Primary | ICD-10-CM

## 2020-03-27 DIAGNOSIS — Z78.9 IMPAIRED MOBILITY AND ADLS: ICD-10-CM

## 2020-03-27 DIAGNOSIS — F03.90 DEMENTIA WITHOUT BEHAVIORAL DISTURBANCE, UNSPECIFIED DEMENTIA TYPE: ICD-10-CM

## 2020-03-27 PROCEDURE — 99308 SBSQ NF CARE LOW MDM 20: CPT | Performed by: NURSE PRACTITIONER

## 2020-03-27 NOTE — PROGRESS NOTES
Nursing Home Progress Note        Layo Arango DO ?  Mita Joyce, APRN ?  [address] [facility], [place] [address]  Phone: [phone]  Fax: [phone] Dorcas Duncan MD ?  Dc Fernandze DO [x]   [address] [place] [address]  Phone: [phone]  Fax: [phone]     PATIENT NAME: Danae Harmon                                                                          YOB: 1940           DATE OF SERVICE: 03/26/2020  FACILITY:  [] [place]   [] [place]   [] [facility]   [x] [facility]  []  [facility]  [] Other ______________________________________________________________________     CHIEF COMPLAINT:  Debility      HISTORY OF PRESENT ILLNESS:   Patient is a 79-year-old white female with a history of dementia, osteoarthritis of bilateral knees, chronic back pain with cervical laminectomy, GERD, and fibromyalgia who was transferred from home to the [facility] for inability to manage ADLs and evidence of progressively worsening dementia.  Patient is able to handle fairly good conversation during her exam today although details are difficult for her to recall.  Patient shares that her pain has been stable.  She is interested in taking a caffeine pill every morning although it is not recommended in the nursing facility.    PAST MEDICAL & SURGICAL HISTORY:   Past Medical History:   Diagnosis Date   • Colon polyp    • Osteoporosis    • Pain in thoracic spine    • Pneumonia    • UTI (urinary tract infection)       Past Surgical History:   Procedure Laterality Date   • BLADDER SURGERY  2000    bladder suspension   • CERVICAL LAMINECTOMY     • COLONOSCOPY     • COLOSTOMY  1979    temporary sigmoid   • HYSTERECTOMY           MEDICATIONS:  I have reviewed and reconciled the patients medication list in the patients chart at the skilled nursing facility today.      ALLERGIES:  Allergies   Allergen Reactions   • Lexapro [Escitalopram Oxalate] Nausea Only          SOCIAL HISTORY:  Social History     Socioeconomic History   • Marital status:      Spouse name: Not on file   • Number of children: Not on file   • Years of education: Not on file   • Highest education level: Not on file   Tobacco Use   • Smoking status: Never Smoker   • Smokeless tobacco: Never Used   Substance and Sexual Activity   • Alcohol use: No   • Drug use: No   • Sexual activity: Defer       FAMILY HISTORY:  Family History   Problem Relation Age of Onset   • Blindness Mother    • Other Mother         arteriosclerotic cardiovascular disease    • Diabetes Mother    • Osteoporosis Mother    • Stroke Mother    • Cancer Brother    • Lung cancer Sister    • Osteoporosis Sister    • Stroke Sister    • Stroke Other        REVIEW OF SYSTEMS:  Review of Systems   Constitutional: Negative for chills, fatigue and fever.   HENT: Negative for congestion, ear pain, rhinorrhea, sinus pressure and sore throat.    Eyes: Negative for visual disturbance.   Respiratory: Negative for cough, chest tightness, shortness of breath and wheezing.    Cardiovascular: Negative for chest pain, palpitations and leg swelling.   Gastrointestinal: Negative for abdominal pain, blood in stool, constipation, diarrhea, nausea and vomiting.   Endocrine: Negative for polydipsia and polyuria.   Genitourinary: Negative for dysuria and hematuria.   Musculoskeletal: Negative for arthralgias and back pain.   Skin: Negative for rash.   Neurological: Negative for dizziness, light-headedness, numbness and headaches.   Psychiatric/Behavioral: Negative for dysphoric mood and sleep disturbance. The patient is not nervous/anxious.           PHYSICAL EXAMINATION:     VITAL SIGNS:  /62   Pulse 86   Temp 98.7 °F (37.1 °C)   Resp 24   Wt 98 kg (216 lb)   SpO2 98%   BMI 40.81 kg/m²     Physical Exam   Constitutional: She is oriented to person, place, and time. She appears well-nourished.   Morbidly obese   HENT:   Head: Normocephalic and  atraumatic.   Mouth/Throat: Oropharynx is clear and moist. No oropharyngeal exudate.   Eyes: Pupils are equal, round, and reactive to light. Conjunctivae and EOM are normal. No scleral icterus.   Neck: Normal range of motion. Neck supple. No thyromegaly present.   Cardiovascular: Normal rate, regular rhythm and normal heart sounds. Exam reveals no gallop and no friction rub.   No murmur heard.  Pulmonary/Chest: Effort normal and breath sounds normal. No respiratory distress. She has no wheezes.   Abdominal: Soft. Bowel sounds are normal. She exhibits no distension. There is no tenderness.   Musculoskeletal: Normal range of motion.   Lymphadenopathy:     She has no cervical adenopathy.   Neurological: She is alert and oriented to person, place, and time.   Skin: Skin is warm and dry. No rash noted.   Psychiatric: She has a normal mood and affect. Her behavior is normal.   Nursing note and vitals reviewed.      RECORDS REVIEW:       ASSESSMENT     Diagnoses and all orders for this visit:    Late onset Alzheimer's disease without behavioral disturbance (CMS/[facility])    Moderate episode of recurrent major depressive disorder (CMS/[facility])    Mixed hyperlipidemia    Benign essential hypertension    Vitamin D deficiency    Vitamin B 12 deficiency    Chronic low back pain with sciatica, sciatica laterality unspecified, unspecified back pain laterality    Other osteoporosis without current pathological fracture    Type 2 diabetes mellitus without complication, without long-term current use of insulin (CMS/[facility])    Body mass index (BMI) 40.0-44.9, adult (CMS/[facility])        PLAN    79-year-old white female admitted to the [facility] for long-term care and management.    Alzheimer's disease without behavioral disturbances  -   Patient has been stable on current medication regimen.    -Continue supportive care in the nursing facility for ADLs.    Major depressive disorder  -Stable on current medications.    Mixed  hyperlipidemia  -Continue statin.    Essential hypertension  -Controlled on current medications.    Vitamin D and vitamin B12 deficiencies  -Stable on supplements.    Osteoporosis  -Stable on current medications.  No recent fractures.    Type 2 diabetes mellitus  -Monitor glucose levels in nursing facility  -Glucose has been stable with current medication regimen.            [x]  Discussed Patient in detail with nursing/staff, addressed all needs today.     [x]  Plan of Care Reviewed   []  PT/OT Reviewed   []  Order Changes  []  Discharge Plans Reviewed  [x]  Advance Directive on file with Nursing Home.   [x]  POA on file with Nursing Home.    [x]  Code Status listed and reviewed.          Dc Fernandez DO.  3/27/2020

## 2020-04-02 ENCOUNTER — NURSING HOME (OUTPATIENT)
Dept: FAMILY MEDICINE CLINIC | Facility: CLINIC | Age: 80
End: 2020-04-02

## 2020-04-02 VITALS
DIASTOLIC BLOOD PRESSURE: 86 MMHG | WEIGHT: 216 LBS | RESPIRATION RATE: 20 BRPM | HEART RATE: 78 BPM | OXYGEN SATURATION: 96 % | SYSTOLIC BLOOD PRESSURE: 128 MMHG | BODY MASS INDEX: 40.81 KG/M2 | TEMPERATURE: 96.8 F

## 2020-04-02 DIAGNOSIS — Z74.09 IMPAIRED MOBILITY AND ADLS: ICD-10-CM

## 2020-04-02 DIAGNOSIS — E11.9 TYPE 2 DIABETES MELLITUS WITHOUT COMPLICATION, WITHOUT LONG-TERM CURRENT USE OF INSULIN (HCC): ICD-10-CM
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
## 2020-04-02 DIAGNOSIS — R26.81 GAIT INSTABILITY: ICD-10-CM

## 2020-04-02 DIAGNOSIS — N32.81 OAB (OVERACTIVE BLADDER): ICD-10-CM

## 2020-04-02 DIAGNOSIS — Z78.9 IMPAIRED MOBILITY AND ADLS: ICD-10-CM

## 2020-04-02 DIAGNOSIS — I10 BENIGN ESSENTIAL HYPERTENSION: ICD-10-CM

## 2020-04-02 PROCEDURE — 99309 SBSQ NF CARE MODERATE MDM 30: CPT | Performed by: NURSE PRACTITIONER

## 2020-04-03 NOTE — PROGRESS NOTES
Nursing Home Follow Up Note      Layo Arango DO []   WILLIAM Alfaro [x]  [address] [place] [address]  Phone: [phone]  Fax: [phone] Dorcas Duncan MD []    Dc Fernandez DO []   [address] [place] [address]  Phone: [phone]  Fax: [phone]     PATIENT NAME: Danae Harmon                                                                          YOB: 1940           DATE OF SERVICE: 04/2/2020  [place]ACILITY:  [][place]   [] [place]   [] [facility]   [x] [facility]   [] Other ______________________________________________________________________      CHIEF COMPLAINT:  Fall    Follow up OAB      HISTORY OF PRESENT ILLNESS:     Patient reports that she tried to transfer herself from bed to wheelchair, to take herself to the toilet, because she hates bothering staff so often.She did not sustain any injuries.  She feels the OAB medication worked the past couple days, but is not working anymore. She continues to have to go to the restroom every couple hours and and has to get up several times during the night as well.       PAST MEDICAL & SURGICAL HISTORY:   Past Medical History:   Diagnosis Date   • Colon polyp    • Osteoporosis    • Pain in thoracic spine    • Pneumonia    • UTI (urinary tract infection)       Past Surgical History:   Procedure Laterality Date   • BLADDER SURGERY  2000    bladder suspension   • CERVICAL LAMINECTOMY     • COLONOSCOPY     • COLOSTOMY  1979    temporary sigmoid   • HYSTERECTOMY           MEDICATIONS:  I have reviewed and reconciled the patients medication list in the patients chart at the skilled nursing facility today.      ALLERGIES:    Allergies   Allergen Reactions   • Lexapro [Escitalopram Oxalate] Nausea Only         SOCIAL HISTORY:    Social History     Socioeconomic History   • Marital status:      Spouse name: Not on file   • Number of children: Not on file   • Years of education: Not on file   • Highest  education level: Not on file   Tobacco Use   • Smoking status: Never Smoker   • Smokeless tobacco: Never Used   Substance and Sexual Activity   • Alcohol use: No   • Drug use: No   • Sexual activity: Defer       FAMILY HISTORY:    Family History   Problem Relation Age of Onset   • Blindness Mother    • Other Mother         arteriosclerotic cardiovascular disease    • Diabetes Mother    • Osteoporosis Mother    • Stroke Mother    • Cancer Brother    • Lung cancer Sister    • Osteoporosis Sister    • Stroke Sister    • Stroke Other        REVIEW OF SYSTEMS:    Review of Systems   HENT: Negative.    Eyes: Negative.    Respiratory: Negative.    Cardiovascular: Negative.    Gastrointestinal: Negative for abdominal distention, abdominal pain, anal bleeding, constipation, nausea, vomiting, GERD and indigestion.   Endocrine: Negative.    Genitourinary: Positive for frequency and urgency.   Musculoskeletal: Positive for arthralgias.   Skin: Negative.    Allergic/Immunologic: Negative.    Neurological: Positive for weakness (LE).   Hematological: Negative.    Psychiatric/Behavioral: Negative.          PHYSICAL EXAMINATION:   VITAL SIGNS:   Vitals:    04/02/20 1006   BP: 128/86   Pulse: 78   Resp: 20   Temp: 96.8 °F (36 °C)   SpO2: 96%   Weight: 98 kg (216 lb)       Physical Exam   Constitutional: She is oriented to person, place, and time. She appears well-developed.   HENT:   Head: Normocephalic.   Eyes: Conjunctivae are normal.   Neck: Normal range of motion.   Cardiovascular: Normal rate and regular rhythm.   Pulmonary/Chest: Effort normal and breath sounds normal.   Abdominal: Soft. Bowel sounds are normal.   Musculoskeletal: She exhibits no edema.        Right knee: She exhibits decreased range of motion. Tenderness found. Patellar tendon tenderness noted.        Left knee: She exhibits decreased range of motion. Tenderness found. Patellar tendon tenderness noted.   Neurological: She is alert and oriented to person,  place, and time.   Skin: Skin is warm and dry.   Psychiatric: She has a normal mood and affect. Her behavior is normal.   Nursing note and vitals reviewed.      RECORDS REVIEW:   I have reviewed and interpreted the following lab test results  obtained at the time of the visit today.     Advance Care Planning   ACP discussion was held with the patient during this visit. Patient has an advance directive in EMR which is still valid.     ASSESSMENT     Diagnoses and all orders for this visit:    Fall, initial encounter    Gait instability    OAB (overactive bladder)    Type 2 diabetes mellitus without complication, without long-term current use of insulin (CMS/[facility])    Benign essential hypertension    Impaired mobility and ADLs          PLAN    Fall/Gait instability  - No injuries reported or noted. She does have chair and bed alarm and was re-educated to call for staff, for any transfers.     Urinary frequency/OAB  -Increase Detrol to 2 mg bid. Will c/t monitor and changed medication if needed.    Will continue to monitor symptoms.    Staff to continue supportive care for all ADLs.     Patient and staff was encouraged to keep me informed of any acute changes, lack of improvement, or any new concerning symptoms.    [x]  Discussed Patient in detail with nursing/staff, addressed all needs today.     [x]  Plan of Care Reviewed   []  PT/OT Reviewed   [x]  Order Changes  []  Discharge Plans Reviewed  [x]  Advance Directive on file with Nursing Home.   [x]  POA on file with Nursing Home.   [x]  Code Status listed: [x]  Full Code   []  DNR       “I confirm accuracy of unchanged data/findings which have been carried forward from previous visit, as well as I have updated appropriately those that have changed.”                Mita Joyce, WILLIAM.

## 2020-04-10 RX ORDER — ATORVASTATIN CALCIUM 20 MG/1
TABLET, FILM COATED ORAL
Qty: 90 TABLET | Refills: 0 | Status: SHIPPED | OUTPATIENT
Start: 2020-04-10

## 2020-04-17 ENCOUNTER — NURSING HOME (OUTPATIENT)
Dept: FAMILY MEDICINE CLINIC | Facility: CLINIC | Age: 80
End: 2020-04-17

## 2020-04-17 VITALS
RESPIRATION RATE: 24 BRPM | TEMPERATURE: 98.2 F | HEART RATE: 74 BPM | BODY MASS INDEX: 40.25 KG/M2 | DIASTOLIC BLOOD PRESSURE: 92 MMHG | SYSTOLIC BLOOD PRESSURE: 168 MMHG | WEIGHT: 213 LBS

## 2020-04-17 DIAGNOSIS — Z78.9 IMPAIRED MOBILITY AND ADLS: ICD-10-CM

## 2020-04-17 DIAGNOSIS — Z74.09 IMPAIRED MOBILITY AND ADLS: ICD-10-CM

## 2020-04-17 DIAGNOSIS — F32.89 OTHER DEPRESSION: ICD-10-CM

## 2020-04-17 DIAGNOSIS — N32.81 OAB (OVERACTIVE BLADDER): ICD-10-CM

## 2020-04-17 PROCEDURE — 99309 SBSQ NF CARE MODERATE MDM 30: CPT | Performed by: NURSE PRACTITIONER

## 2020-04-20 NOTE — PROGRESS NOTES
Nursing Home Follow Up Note      Layo Arango DO []   WILLIAM Alfaro [x]  [address] [place] [address]  Phone: [phone]  Fax: [phone] Dorcas Duncan MD []    Dc Fernandez DO []   [address] [place] [address]  Phone: [phone]  Fax: [phone]     PATIENT NAME: Danae Harmon                                                                          YOB: 1940           DATE OF SERVICE: 04/17/2020  FACILITY:  [][facility]   [] [facility]   [] [facility]   [] [facility]   [] Other ______________________________________________________________________      CHIEF COMPLAINT:  Depression    F/u OAB      HISTORY OF PRESENT ILLNESS:   Patient reports feeling more depressed the past couple days, due to being in the nursing home, instead of home with her family. She is also very sad that her family can not come see her.     She reports her OAB is better but still not controlled. She is still getting up a couple times a night, to use the restroom.     PAST MEDICAL & SURGICAL HISTORY:   Past Medical History:   Diagnosis Date   • Colon polyp    • Osteoporosis    • Pain in thoracic spine    • Pneumonia    • UTI (urinary tract infection)       Past Surgical History:   Procedure Laterality Date   • BLADDER SURGERY  2000    bladder suspension   • CERVICAL LAMINECTOMY     • COLONOSCOPY     • COLOSTOMY  1979    temporary sigmoid   • HYSTERECTOMY           MEDICATIONS:  I have reviewed and reconciled the patients medication list in the patients chart at the skilled nursing facility today.      ALLERGIES:    Allergies   Allergen Reactions   • Lexapro [Escitalopram Oxalate] Nausea Only         SOCIAL HISTORY:    Social History     Socioeconomic History   • Marital status:      Spouse name: Not on file   • Number of children: Not on file   • Years of education: Not on file   • Highest education level: Not on file   Tobacco Use   • Smoking status: Never Smoker   •  Smokeless tobacco: Never Used   Substance and Sexual Activity   • Alcohol use: No   • Drug use: No   • Sexual activity: Defer       FAMILY HISTORY:    Family History   Problem Relation Age of Onset   • Blindness Mother    • Other Mother         arteriosclerotic cardiovascular disease    • Diabetes Mother    • Osteoporosis Mother    • Stroke Mother    • Cancer Brother    • Lung cancer Sister    • Osteoporosis Sister    • Stroke Sister    • Stroke Other        REVIEW OF SYSTEMS:    Review of Systems   Constitutional: Negative for activity change, appetite change, chills, diaphoresis, fatigue, fever, unexpected weight gain and unexpected weight loss.   HENT: Negative for congestion, ear pain, mouth sores, nosebleeds, postnasal drip, rhinorrhea, sinus pressure, sneezing, sore throat, swollen glands and trouble swallowing.    Eyes: Negative for blurred vision, pain, discharge, redness, itching and visual disturbance.   Respiratory: Negative for apnea, cough, choking, chest tightness, shortness of breath, wheezing and stridor.    Cardiovascular: Negative for chest pain, palpitations and leg swelling.   Gastrointestinal: Negative for abdominal distention, abdominal pain, blood in stool, constipation, diarrhea, nausea, vomiting, GERD and indigestion.   Endocrine: Negative for polydipsia and polyuria.   Genitourinary: Positive for frequency and urgency. Negative for decreased urine volume, difficulty urinating, dysuria, flank pain, hematuria and urinary incontinence.   Musculoskeletal: Positive for arthralgias. Negative for back pain, gait problem, joint swelling and myalgias.   Skin: Negative for color change, dry skin, rash and skin lesions.   Allergic/Immunologic: Negative for environmental allergies.   Neurological: Positive for memory problem. Negative for dizziness, seizures, speech difficulty, weakness and confusion.   Psychiatric/Behavioral: Positive for depressed mood and stress. Negative for behavioral problems,  dysphoric mood, hallucinations, self-injury, sleep disturbance and suicidal ideas. The patient is not nervous/anxious.          PHYSICAL EXAMINATION:   VITAL SIGNS:   Vitals:    04/17/20 1213   BP: 168/92   Pulse: 74   Resp: 24   Temp: 98.2 °F (36.8 °C)   Weight: 96.6 kg (213 lb)       Physical Exam   Constitutional: She appears well-developed and well-nourished.   HENT:   Head: Normocephalic.   Right Ear: External ear normal.   Left Ear: External ear normal.   Nose: Nose normal.   Eyes: Conjunctivae are normal.   Neck: Normal range of motion.   Cardiovascular: Normal rate, regular rhythm, normal heart sounds and intact distal pulses.   Pulmonary/Chest: Effort normal and breath sounds normal. No respiratory distress. She has no wheezes. She has no rales.   Abdominal: Soft. Bowel sounds are normal. She exhibits no distension and no mass. There is no tenderness. No hernia.   Musculoskeletal: She exhibits no edema.   NROM all major joints   Neurological: She is alert.   Skin: Skin is warm and dry. No rash noted.   Psychiatric: She exhibits a depressed mood. She exhibits abnormal recent memory.   Tearful during visit   Nursing note and vitals reviewed.      RECORDS REVIEW:   I have reviewed and interpreted the following lab test results  obtained at the time of the visit today.     ASSESSMENT     Diagnoses and all orders for this visit:    Other depression    OAB (overactive bladder)    Impaired mobility and ADLs        PLAN  Depression  -Increase Wellbutrin to 300 mg XL. Will monitor.     OAB  -Continue Detrol as directed. Start Mybetriq 25 mg daily.    Patient and staff encouraged to keep me informed of any acute changes, lack of improvement, or any new concerning symptoms.      [x]  Discussed Patient in detail with nursing/staff, addressed all needs today.     [x]  Plan of Care Reviewed   [x]  PT/OT Reviewed   [x]  Order Changes  []  Discharge Plans Reviewed  [x]  Advance Directive on file with Nursing Home.   [x]   POA on file with Nursing Home.   [x]  Code Status listed: [x]  Full Code   []  DNR              WILLIAM Delgado.

## 2020-04-23 ENCOUNTER — NURSING HOME (OUTPATIENT)
Dept: INTERNAL MEDICINE | Facility: CLINIC | Age: 80
End: 2020-04-23

## 2020-04-23 VITALS
DIASTOLIC BLOOD PRESSURE: 92 MMHG | BODY MASS INDEX: 40.25 KG/M2 | TEMPERATURE: 97.8 F | WEIGHT: 213 LBS | HEART RATE: 88 BPM | RESPIRATION RATE: 16 BRPM | OXYGEN SATURATION: 94 % | SYSTOLIC BLOOD PRESSURE: 142 MMHG

## 2020-04-23 DIAGNOSIS — E78.2 MIXED HYPERLIPIDEMIA: ICD-10-CM

## 2020-04-23 DIAGNOSIS — I10 BENIGN ESSENTIAL HYPERTENSION: ICD-10-CM

## 2020-04-23 DIAGNOSIS — E55.9 VITAMIN D DEFICIENCY: ICD-10-CM

## 2020-04-23 DIAGNOSIS — R26.9 GAIT DISTURBANCE: ICD-10-CM

## 2020-04-23 DIAGNOSIS — M54.40 CHRONIC LOW BACK PAIN WITH SCIATICA, SCIATICA LATERALITY UNSPECIFIED, UNSPECIFIED BACK PAIN LATERALITY: ICD-10-CM

## 2020-04-23 DIAGNOSIS — G89.29 CHRONIC LOW BACK PAIN WITH SCIATICA, SCIATICA LATERALITY UNSPECIFIED, UNSPECIFIED BACK PAIN LATERALITY: ICD-10-CM

## 2020-04-23 DIAGNOSIS — F02.80 LATE ONSET ALZHEIMER'S DISEASE WITHOUT BEHAVIORAL DISTURBANCE (HCC): Primary | ICD-10-CM

## 2020-04-23 DIAGNOSIS — E11.9 TYPE 2 DIABETES MELLITUS WITHOUT COMPLICATION, WITHOUT LONG-TERM CURRENT USE OF INSULIN (HCC): ICD-10-CM

## 2020-04-23 DIAGNOSIS — F33.1 MODERATE EPISODE OF RECURRENT MAJOR DEPRESSIVE DISORDER (HCC): ICD-10-CM

## 2020-04-23 DIAGNOSIS — G30.1 LATE ONSET ALZHEIMER'S DISEASE WITHOUT BEHAVIORAL DISTURBANCE (HCC): Primary | ICD-10-CM

## 2020-04-23 DIAGNOSIS — M81.8 OTHER OSTEOPOROSIS WITHOUT CURRENT PATHOLOGICAL FRACTURE: ICD-10-CM

## 2020-04-23 DIAGNOSIS — F32.89 OTHER DEPRESSION: ICD-10-CM

## 2020-04-23 DIAGNOSIS — E53.8 VITAMIN B 12 DEFICIENCY: ICD-10-CM

## 2020-04-23 PROCEDURE — 99308 SBSQ NF CARE LOW MDM 20: CPT | Performed by: INTERNAL MEDICINE

## 2020-04-23 NOTE — PROGRESS NOTES
Nursing Home Progress Note        Layo Arango DO ?  Mita Joyce, APRN ?  [address] [facility], [place] [address]  Phone: [phone]  Fax: [phone] Dorcas Duncan MD ?  Dc Fernandez DO [x]   [address] [place] [address]  Phone: [phone]  Fax: [phone]     PATIENT NAME: Danae Harmon                                                                          YOB: 1940           DATE OF SERVICE: 04/23/2020   FACILITY:  [] [facility]   [] [facility]   [] [facility]   [x] [facility]  []  [facility]  [] Other ______________________________________________________________________     CHIEF COMPLAINT:  Debility      HISTORY OF PRESENT ILLNESS:   Patient was resting comfortably but upon questioning, she shared that she continues to have urinary frequency with need to go to the bathroom 4-5 times daily.  She also states that when she needs to go, there is not much time for her to be able to hold her urine.  She was started on Myrbetriq last week.  She has also been treated with Diflucan for vaginal irritation recently.  Diflucan does seem to help with symptoms when she has them.    PAST MEDICAL & SURGICAL HISTORY:   Past Medical History:   Diagnosis Date   • Colon polyp    • Osteoporosis    • Pain in thoracic spine    • Pneumonia    • UTI (urinary tract infection)       Past Surgical History:   Procedure Laterality Date   • BLADDER SURGERY  2000    bladder suspension   • CERVICAL LAMINECTOMY     • COLONOSCOPY     • COLOSTOMY  1979    temporary sigmoid   • HYSTERECTOMY           MEDICATIONS:  I have reviewed and reconciled the patients medication list in the patients chart at the skilled nursing facility today.      ALLERGIES:  Allergies   Allergen Reactions   • Lexapro [Escitalopram Oxalate] Nausea Only         SOCIAL HISTORY:  Social History     Socioeconomic History   • Marital status:      Spouse name: Not on file   • Number of children: Not on file    • Years of education: Not on file   • Highest education level: Not on file   Tobacco Use   • Smoking status: Never Smoker   • Smokeless tobacco: Never Used   Substance and Sexual Activity   • Alcohol use: No   • Drug use: No   • Sexual activity: Defer       FAMILY HISTORY:  Family History   Problem Relation Age of Onset   • Blindness Mother    • Other Mother         arteriosclerotic cardiovascular disease    • Diabetes Mother    • Osteoporosis Mother    • Stroke Mother    • Cancer Brother    • Lung cancer Sister    • Osteoporosis Sister    • Stroke Sister    • Stroke Other        REVIEW OF SYSTEMS:  Review of Systems   Constitutional: Negative for chills, fatigue and fever.   HENT: Negative for congestion, ear pain, rhinorrhea, sinus pressure and sore throat.    Eyes: Negative for visual disturbance.   Respiratory: Negative for cough, chest tightness, shortness of breath and wheezing.    Cardiovascular: Negative for chest pain, palpitations and leg swelling.   Gastrointestinal: Negative for abdominal pain, blood in stool, constipation, diarrhea, nausea and vomiting.   Endocrine: Negative for polydipsia and polyuria.   Genitourinary: Positive for urgency. Negative for dysuria and hematuria.   Musculoskeletal: Negative for arthralgias and back pain.   Skin: Negative for rash.   Neurological: Negative for dizziness, light-headedness, numbness and headaches.   Psychiatric/Behavioral: Negative for dysphoric mood and sleep disturbance. The patient is not nervous/anxious.           PHYSICAL EXAMINATION:     VITAL SIGNS:  /92   Pulse 88   Temp 97.8 °F (36.6 °C)   Resp 16   Wt 96.6 kg (213 lb)   SpO2 94%   BMI 40.25 kg/m²     Physical Exam   Constitutional: She is oriented to person, place, and time. She appears well-developed and well-nourished.   HENT:   Head: Normocephalic and atraumatic.   Eyes: Conjunctivae are normal.   Neck: Normal range of motion. Neck supple.   Pulmonary/Chest: Effort normal.    Musculoskeletal: Normal range of motion.   Neurological: She is alert and oriented to person, place, and time.   Skin: No rash noted.   Psychiatric: She has a normal mood and affect. Her behavior is normal.   Nursing note and vitals reviewed.  Limited exam due to telemedicine.    RECORDS REVIEW:       ASSESSMENT     Diagnoses and all orders for this visit:    Late onset Alzheimer's disease without behavioral disturbance (CMS/[facility])    Moderate episode of recurrent major depressive disorder (CMS/[facility])    Mixed hyperlipidemia    Benign essential hypertension    Vitamin D deficiency    Vitamin B 12 deficiency    Chronic low back pain with sciatica, sciatica laterality unspecified, unspecified back pain laterality    Other osteoporosis without current pathological fracture    Type 2 diabetes mellitus without complication, without long-term current use of insulin (CMS/[facility])    Other depression    Body mass index (BMI) 40.0-44.9, adult (CMS/[facility])    Gait disturbance        PLAN    79-year-old white female admitted to the [facility] for long-term care and management.    Bladder spasms/urinary frequency  -Patient has been started on Myrbetriq 25 mg daily last week.  Should her symptoms persist, consider increasing Myrbetriq to 50 mg daily.  It is too early to increase the dose this week.  Symptoms should be monitored over the next week.  -Continue Diflucan when needed for vaginal yeast infections.    Alzheimer's disease without behavioral disturbances  -   Patient has been stable on current medication regimen.    -Continue supportive care in the nursing facility for ADLs.    Major depressive disorder  -Stable on current medications.    Mixed hyperlipidemia  -Continue statin.    Essential hypertension  -Controlled on current medications.    Vitamin D and vitamin B12 deficiencies  -Stable on supplements.    Osteoporosis  -Stable on current medications.  No recent fractures.    Type 2 diabetes mellitus  -Monitor  glucose levels in nursing facility  -Glucose has been stable with current medication regimen.      Patient and or POA have given consent and permission for telemedicine visits per nursing home.   15 minutes was spent via telemedicine with the patient and nurse at bedside for adjustment of orders and review of labs.        [x]  Discussed Patient in detail with nursing/staff, addressed all needs today.     [x]  Plan of Care Reviewed   []  PT/OT Reviewed   []  Order Changes  []  Discharge Plans Reviewed  [x]  Advance Directive on file with Nursing Home.   [x]  POA on file with Nursing Home.    [x]  Code Status listed and reviewed.          Dc Fernandez DO.  4/23/2020

## 2020-04-28 ENCOUNTER — NURSING HOME (OUTPATIENT)
Dept: FAMILY MEDICINE CLINIC | Facility: CLINIC | Age: 80
End: 2020-04-28

## 2020-04-28 VITALS
WEIGHT: 213 LBS | BODY MASS INDEX: 40.25 KG/M2 | TEMPERATURE: 98.5 F | HEART RATE: 82 BPM | DIASTOLIC BLOOD PRESSURE: 70 MMHG | SYSTOLIC BLOOD PRESSURE: 140 MMHG | RESPIRATION RATE: 18 BRPM
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
## 2020-04-28 DIAGNOSIS — R26.81 GAIT INSTABILITY: ICD-10-CM

## 2020-04-28 DIAGNOSIS — Z74.09 IMPAIRED MOBILITY AND ADLS: ICD-10-CM

## 2020-04-28 DIAGNOSIS — Z78.9 IMPAIRED MOBILITY AND ADLS: ICD-10-CM

## 2020-04-28 PROCEDURE — 99309 SBSQ NF CARE MODERATE MDM 30: CPT | Performed by: NURSE PRACTITIONER

## 2020-04-29 NOTE — PROGRESS NOTES
Nursing Home Follow Up Note      Layo Arango DO []   WILLIAM Alfaro [x]  [address] [place] [address]  Phone: [phone]  Fax: [phone] Dorcas Duncan MD []    Dc Fernandez DO []   [address] [place] [address]  Phone: [phone]  Fax: [phone]     PATIENT NAME: Danae Harmon                                                                          YOB: 1940           DATE OF SERVICE: 04/28/2020  FACILITY:  [][facility]   [] [facility]   [] [facility]   [x] [facility]   [] Other ______________________________________________________________________      CHIEF COMPLAINT:  F/u fall      HISTORY OF PRESENT ILLNESS:   Patient had a fall during the night, attempting to take herself to the restroom without assistance. She did not have and noted or reported injuries, at the time of the fall, no does she at this time.     PAST MEDICAL & SURGICAL HISTORY:   Past Medical History:   Diagnosis Date   • Colon polyp    • Osteoporosis    • Pain in thoracic spine    • Pneumonia    • UTI (urinary tract infection)       Past Surgical History:   Procedure Laterality Date   • BLADDER SURGERY  2000    bladder suspension   • CERVICAL LAMINECTOMY     • COLONOSCOPY     • COLOSTOMY  1979    temporary sigmoid   • HYSTERECTOMY           MEDICATIONS:  I have reviewed and reconciled the patients medication list in the patients chart at the skilled nursing facility today.      ALLERGIES:    Allergies   Allergen Reactions   • Lexapro [Escitalopram Oxalate] Nausea Only         SOCIAL HISTORY:    Social History     Socioeconomic History   • Marital status:      Spouse name: Not on file   • Number of children: Not on file   • Years of education: Not on file   • Highest education level: Not on file   Tobacco Use   • Smoking status: Never Smoker   • Smokeless tobacco: Never Used   Substance and Sexual Activity   • Alcohol use: No   • Drug use: No   • Sexual activity: Defer        FAMILY HISTORY:    Family History   Problem Relation Age of Onset   • Blindness Mother    • Other Mother         arteriosclerotic cardiovascular disease    • Diabetes Mother    • Osteoporosis Mother    • Stroke Mother    • Cancer Brother    • Lung cancer Sister    • Osteoporosis Sister    • Stroke Sister    • Stroke Other        REVIEW OF SYSTEMS:    Review of Systems   Constitutional: Negative for activity change, appetite change, chills, diaphoresis, fatigue, fever, unexpected weight gain and unexpected weight loss.   HENT: Negative for congestion, ear pain, mouth sores, nosebleeds, postnasal drip, rhinorrhea, sinus pressure, sneezing, sore throat, swollen glands and trouble swallowing.    Eyes: Negative for blurred vision, pain, discharge, redness, itching and visual disturbance.   Respiratory: Negative for apnea, cough, choking, chest tightness, shortness of breath, wheezing and stridor.    Cardiovascular: Negative for chest pain, palpitations and leg swelling.   Gastrointestinal: Negative for abdominal distention, abdominal pain, blood in stool, constipation, diarrhea, nausea, vomiting, GERD and indigestion.   Endocrine: Negative for polydipsia and polyuria.   Genitourinary: Positive for frequency (improved). Negative for decreased urine volume, difficulty urinating, dysuria, flank pain, hematuria, urgency and urinary incontinence.   Musculoskeletal: Positive for arthralgias, back pain and neck pain. Negative for gait problem, joint swelling and myalgias.   Skin: Negative for color change, dry skin, rash and skin lesions.   Allergic/Immunologic: Negative for environmental allergies.   Neurological: Positive for weakness and memory problem. Negative for dizziness, seizures, speech difficulty and confusion.   Psychiatric/Behavioral: Positive for depressed mood and stress. Negative for behavioral problems, dysphoric mood, hallucinations, self-injury, sleep disturbance and suicidal ideas. The patient is not  nervous/anxious.          PHYSICAL EXAMINATION:   VITAL SIGNS:   Vitals:    04/28/20 1009   BP: 140/70   Pulse: 82   Resp: 18   Temp: 98.5 °F (36.9 °C)   Weight: 96.6 kg (213 lb)       Physical Exam   Constitutional: She appears well-developed and well-nourished.   HENT:   Head: Normocephalic.   Right Ear: External ear normal.   Left Ear: External ear normal.   Nose: Nose normal.   Eyes: Conjunctivae are normal.   Neck: Normal range of motion.   Cardiovascular: Normal rate, regular rhythm, normal heart sounds and intact distal pulses.   Pulmonary/Chest: Effort normal and breath sounds normal. No respiratory distress. She has no wheezes. She has no rales.   Abdominal: Soft. Bowel sounds are normal. She exhibits no distension and no mass. There is no tenderness. No hernia.   Musculoskeletal: She exhibits no edema.        Cervical back: She exhibits decreased range of motion.        Thoracic back: She exhibits decreased range of motion and pain.   Weakness BLE   Neurological: She is alert.   Skin: Skin is warm and dry. No rash noted.   Psychiatric: She has a normal mood and affect. Her speech is normal and behavior is normal. She exhibits abnormal recent memory.   Nursing note and vitals reviewed.      RECORDS REVIEW:   I have reviewed and interpreted the following lab test results  obtained at the time of the visit today.     ASSESSMENT     Diagnoses and all orders for this visit:    Fall, initial encounter    Gait instability    Impaired mobility and ADLs        PLAN  Fall  -No reported or noted injuries at time of fall or currently. She was re-educated about calling for help, anytime she needs to go to the restroom, she can not go unassisted. She has a bed alarm. Monitor closely.    Staff to continue supportive care for all ADLs.     Patient and staff encouraged to keep me informed of any acute changes, lack of improvement, or any new concerning symptoms.      [x]  Discussed Patient in detail with nursing/staff,  addressed all needs today.     [x]  Plan of Care Reviewed   [x]  PT/OT Reviewed   [x]  Order Changes  []  Discharge Plans Reviewed  [x]  Advance Directive on file with Nursing Home.   [x]  POA on file with Nursing Home.   [x]  Code Status listed: [x]  Full Code   []  DNR     “I confirm accuracy of unchanged data/findings which have been carried forward from previous visit, as well as I have updated appropriately those that have changed.”               Mita Joyce, APRN.

## 2020-05-11 ENCOUNTER — NURSING HOME (OUTPATIENT)
Dept: FAMILY MEDICINE CLINIC | Facility: CLINIC | Age: 80
End: 2020-05-11

## 2020-05-11 VITALS
OXYGEN SATURATION: 95 % | BODY MASS INDEX: 39.87 KG/M2 | SYSTOLIC BLOOD PRESSURE: 130 MMHG | TEMPERATURE: 97.5 F | DIASTOLIC BLOOD PRESSURE: 80 MMHG | WEIGHT: 211 LBS | RESPIRATION RATE: 18 BRPM | HEART RATE: 73 BPM
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
## 2020-05-11 DIAGNOSIS — Z78.9 IMPAIRED MOBILITY AND ADLS: ICD-10-CM

## 2020-05-11 DIAGNOSIS — R26.81 GAIT INSTABILITY: ICD-10-CM

## 2020-05-11 DIAGNOSIS — Z74.09 IMPAIRED MOBILITY AND ADLS: ICD-10-CM

## 2020-05-11 PROCEDURE — 99309 SBSQ NF CARE MODERATE MDM 30: CPT | Performed by: NURSE PRACTITIONER

## 2020-05-12 DIAGNOSIS — G89.29 CHRONIC LOW BACK PAIN WITH SCIATICA, SCIATICA LATERALITY UNSPECIFIED, UNSPECIFIED BACK PAIN LATERALITY: Primary | ICD-10-CM

## 2020-05-12 DIAGNOSIS — M54.40 CHRONIC LOW BACK PAIN WITH SCIATICA, SCIATICA LATERALITY UNSPECIFIED, UNSPECIFIED BACK PAIN LATERALITY: Primary | ICD-10-CM

## 2020-05-12 RX ORDER — HYDROCODONE BITARTRATE AND ACETAMINOPHEN 5; 325 MG/1; MG/1
1 TABLET ORAL EVERY 12 HOURS PRN
Qty: 60 TABLET | Refills: 0 | Status: SHIPPED | OUTPATIENT
Start: 2020-05-12 | End: 2020-06-26

## 2020-05-12 RX ORDER — HYDROCODONE BITARTRATE AND ACETAMINOPHEN 5; 325 MG/1; MG/1
1 TABLET ORAL 2 TIMES DAILY
Qty: 60 TABLET | Refills: 0 | Status: SHIPPED | OUTPATIENT
Start: 2020-05-12 | End: 2020-06-26 | Stop reason: SDUPTHER

## 2020-05-12 NOTE — PROGRESS NOTES
Nursing Home Follow Up Note      Layo Arango DO []   WILLIAM Alfaro [x]  [address] [place] [address]  Phone: [phone]  Fax: [phone] Dorcas Duncan MD []    Dc Fernandez DO []   [address] [place] [address]  Phone: [phone]  Fax: [phone]     PATIENT NAME: Danae Harmon                                                                          YOB: 1940           DATE OF SERVICE: 05/11/2020  FACILITY:  [][place]   [] [place] [facility]   [] [facility]   [x] [facility]   [] Other ______________________________________________________________________      CHIEF COMPLAINT:  F/u fall      HISTORY OF PRESENT ILLNESS:   Per patient, she was trying to reach for something on her table, and slid out of her wheelchair, on to the floor. She did not have any noted or reported injuries, at the time of the fall, nor does she at this time.     PAST MEDICAL & SURGICAL HISTORY:   Past Medical History:   Diagnosis Date   • Colon polyp    • Osteoporosis    • Pain in thoracic spine    • Pneumonia    • UTI (urinary tract infection)       Past Surgical History:   Procedure Laterality Date   • BLADDER SURGERY  2000    bladder suspension   • CERVICAL LAMINECTOMY     • COLONOSCOPY     • COLOSTOMY  1979    temporary sigmoid   • HYSTERECTOMY           MEDICATIONS:  I have reviewed and reconciled the patients medication list in the patients chart at the skilled nursing facility today.      ALLERGIES:    Allergies   Allergen Reactions   • Lexapro [Escitalopram Oxalate] Nausea Only         SOCIAL HISTORY:    Social History     Socioeconomic History   • Marital status:      Spouse name: Not on file   • Number of children: Not on file   • Years of education: Not on file   • Highest education level: Not on file   Tobacco Use   • Smoking status: Never Smoker   • Smokeless tobacco: Never Used   Substance and Sexual Activity   • Alcohol use: No   • Drug use: No   • Sexual activity:  Defer       FAMILY HISTORY:    Family History   Problem Relation Age of Onset   • Blindness Mother    • Other Mother         arteriosclerotic cardiovascular disease    • Diabetes Mother    • Osteoporosis Mother    • Stroke Mother    • Cancer Brother    • Lung cancer Sister    • Osteoporosis Sister    • Stroke Sister    • Stroke Other        REVIEW OF SYSTEMS:    Review of Systems   Constitutional: Negative for activity change, appetite change, chills, diaphoresis, fatigue, fever, unexpected weight gain and unexpected weight loss.   HENT: Negative for congestion, ear pain, mouth sores, nosebleeds, postnasal drip, rhinorrhea, sinus pressure, sneezing, sore throat, swollen glands and trouble swallowing.    Eyes: Negative for blurred vision, pain, discharge, redness, itching and visual disturbance.   Respiratory: Negative for apnea, cough, choking, chest tightness, shortness of breath, wheezing and stridor.    Cardiovascular: Negative for chest pain, palpitations and leg swelling.   Gastrointestinal: Negative for abdominal distention, abdominal pain, blood in stool, constipation, diarrhea, nausea, vomiting, GERD and indigestion.   Endocrine: Negative for polydipsia and polyuria.   Genitourinary: Positive for frequency (improved). Negative for decreased urine volume, difficulty urinating, dysuria, flank pain, hematuria, urgency and urinary incontinence.   Musculoskeletal: Positive for arthralgias, back pain and neck pain. Negative for gait problem, joint swelling and myalgias.   Skin: Negative for color change, dry skin, rash and skin lesions.   Allergic/Immunologic: Negative for environmental allergies.   Neurological: Positive for weakness and memory problem. Negative for dizziness, seizures, speech difficulty and confusion.   Psychiatric/Behavioral: Positive for depressed mood and stress. Negative for behavioral problems, dysphoric mood, hallucinations, self-injury, sleep disturbance and suicidal ideas. The patient is  not nervous/anxious.          PHYSICAL EXAMINATION:   VITAL SIGNS:   Vitals:    05/11/20 0918   BP: 130/80   Pulse: 73   Resp: 18   Temp: 97.5 °F (36.4 °C)   SpO2: 95%   Weight: 95.7 kg (211 lb)       Physical Exam   Constitutional: She appears well-developed and well-nourished.   HENT:   Head: Normocephalic.   Right Ear: External ear normal.   Left Ear: External ear normal.   Nose: Nose normal.   Eyes: Conjunctivae are normal.   Neck: Normal range of motion.   Cardiovascular: Normal rate, regular rhythm, normal heart sounds and intact distal pulses.   Pulmonary/Chest: Effort normal and breath sounds normal. No respiratory distress. She has no wheezes. She has no rales.   Abdominal: Soft. Bowel sounds are normal. She exhibits no distension and no mass. There is no tenderness. No hernia.   Musculoskeletal: She exhibits no edema.        Cervical back: She exhibits decreased range of motion.        Thoracic back: She exhibits decreased range of motion and pain.   Weakness BLE   Neurological: She is alert.   Skin: Skin is warm and dry. No rash noted.   Psychiatric: She has a normal mood and affect. Her speech is normal and behavior is normal. She exhibits abnormal recent memory.   Nursing note and vitals reviewed.      RECORDS REVIEW:   I have reviewed and interpreted the following lab test results  obtained at the time of the visit today.     ASSESSMENT     Diagnoses and all orders for this visit:    Fall, initial encounter    Gait instability    Impaired mobility and ADLs        PLAN  Fall  -No reported or noted injuries at time of fall or currently. She was re-educated about calling for help, anytime she needs assistance to get something in her room. She has a bed and chair alarm. Nursing to continue to monitor closely.    Staff to continue supportive care for all ADLs.     Patient and staff encouraged to keep me informed of any acute changes, lack of improvement, or any new concerning symptoms.      [x]  Discussed  Patient in detail with nursing/staff, addressed all needs today.     [x]  Plan of Care Reviewed   [x]  PT/OT Reviewed   [x]  Order Changes  []  Discharge Plans Reviewed  [x]  Advance Directive on file with Nursing Home.   [x]  POA on file with Nursing Home.   [x]  Code Status listed: [x]  Full Code   []  DNR     “I confirm accuracy of unchanged data/findings which have been carried forward from previous visit, as well as I have updated appropriately those that have changed.”               Mita Joyce, APRN.

## 2020-05-13 ENCOUNTER — NURSING HOME (OUTPATIENT)
Dept: FAMILY MEDICINE CLINIC | Facility: CLINIC | Age: 80
End: 2020-05-13

## 2020-05-13 VITALS
RESPIRATION RATE: 20 BRPM | TEMPERATURE: 97.8 F | DIASTOLIC BLOOD PRESSURE: 78 MMHG | SYSTOLIC BLOOD PRESSURE: 126 MMHG | BODY MASS INDEX: 39.87 KG/M2 | OXYGEN SATURATION: 95 % | HEART RATE: 78 BPM | WEIGHT: 211 LBS

## 2020-05-13 DIAGNOSIS — Z74.09 IMPAIRED MOBILITY AND ADLS: ICD-10-CM

## 2020-05-13 DIAGNOSIS — R41.0 INTERMITTENT CONFUSION: ICD-10-CM

## 2020-05-13 DIAGNOSIS — R26.81 GAIT INSTABILITY: ICD-10-CM

## 2020-05-13 DIAGNOSIS — Z78.9 IMPAIRED MOBILITY AND ADLS: ICD-10-CM

## 2020-05-13 DIAGNOSIS — F03.90 DEMENTIA WITHOUT BEHAVIORAL DISTURBANCE, UNSPECIFIED DEMENTIA TYPE: ICD-10-CM

## 2020-05-13 PROCEDURE — 99309 SBSQ NF CARE MODERATE MDM 30: CPT | Performed by: NURSE PRACTITIONER

## 2020-05-14 NOTE — PROGRESS NOTES
Nursing Home Follow Up Note      Layo Arango DO []   WILLIAM Alfaro [x]  [address] [place] [address]  Phone: [phone]  Fax: [phone] Dorcas Duncan MD []    Dc Fernandez DO []   [address] [place] [address]  Phone: [phone]  Fax: [phone]     PATIENT NAME: Danae Harmon                                                                          YOB: 1940           DATE OF SERVICE: 05/13/2020  FACILITY:  [][place]   [] [place]   [] [facility]   [x] [facility]   [] Other ______________________________________________________________________      CHIEF COMPLAINT:  Intermittent confusion      HISTORY OF PRESENT ILLNESS:    Per nursing, patient is having intermittent confusion. Some days she is fine, some days she is confused. She in not confused during visit today. A UA was normal over the weekend.     PAST MEDICAL & SURGICAL HISTORY:   Past Medical History:   Diagnosis Date   • Colon polyp    • Osteoporosis    • Pain in thoracic spine    • Pneumonia    • UTI (urinary tract infection)       Past Surgical History:   Procedure Laterality Date   • BLADDER SURGERY  2000    bladder suspension   • CERVICAL LAMINECTOMY     • COLONOSCOPY     • COLOSTOMY  1979    temporary sigmoid   • HYSTERECTOMY           MEDICATIONS:  I have reviewed and reconciled the patients medication list in the patients chart at the skilled nursing facility today.      ALLERGIES:    Allergies   Allergen Reactions   • Lexapro [Escitalopram Oxalate] Nausea Only         SOCIAL HISTORY:    Social History     Socioeconomic History   • Marital status:      Spouse name: Not on file   • Number of children: Not on file   • Years of education: Not on file   • Highest education level: Not on file   Tobacco Use   • Smoking status: Never Smoker   • Smokeless tobacco: Never Used   Substance and Sexual Activity   • Alcohol use: No   • Drug use: No   • Sexual activity: Defer       FAMILY  HISTORY:    Family History   Problem Relation Age of Onset   • Blindness Mother    • Other Mother         arteriosclerotic cardiovascular disease    • Diabetes Mother    • Osteoporosis Mother    • Stroke Mother    • Cancer Brother    • Lung cancer Sister    • Osteoporosis Sister    • Stroke Sister    • Stroke Other        REVIEW OF SYSTEMS:    Review of Systems   Reason unable to perform ROS: ROS per nursing and patient.   Constitutional: Negative for activity change, appetite change, chills, diaphoresis, fatigue, fever, unexpected weight gain and unexpected weight loss.   HENT: Negative for congestion, ear pain, mouth sores, nosebleeds, postnasal drip, rhinorrhea, sinus pressure, sneezing, sore throat, swollen glands and trouble swallowing.    Eyes: Negative for blurred vision, pain, discharge, redness, itching and visual disturbance.   Respiratory: Negative for apnea, cough, choking, chest tightness, shortness of breath, wheezing and stridor.    Cardiovascular: Negative for chest pain, palpitations and leg swelling.   Gastrointestinal: Negative for abdominal distention, abdominal pain, blood in stool, constipation, diarrhea, nausea, vomiting, GERD and indigestion.   Endocrine: Negative for polydipsia and polyuria.   Genitourinary: Negative for decreased urine volume, difficulty urinating, dysuria, flank pain, frequency, hematuria, urgency and urinary incontinence.   Musculoskeletal: Positive for arthralgias, back pain and neck pain. Negative for gait problem, joint swelling and myalgias.   Skin: Negative for color change, dry skin, rash and skin lesions.   Allergic/Immunologic: Negative for environmental allergies.   Neurological: Positive for weakness, memory problem and confusion. Negative for dizziness, seizures and speech difficulty.   Psychiatric/Behavioral: Positive for depressed mood and stress. Negative for behavioral problems, dysphoric mood, hallucinations, self-injury, sleep disturbance and suicidal  ideas. The patient is not nervous/anxious.          PHYSICAL EXAMINATION:   VITAL SIGNS:   Vitals:    05/13/20 1437   BP: 126/78   Pulse: 78   Resp: 20   Temp: 97.8 °F (36.6 °C)   SpO2: 95%   Weight: 95.7 kg (211 lb)       Physical Exam   Constitutional: She appears well-developed and well-nourished.   HENT:   Head: Normocephalic.   Right Ear: External ear normal.   Left Ear: External ear normal.   Nose: Nose normal.   Eyes: Conjunctivae are normal.   Neck: Normal range of motion.   Cardiovascular: Normal rate, regular rhythm, normal heart sounds and intact distal pulses.   Pulmonary/Chest: Effort normal and breath sounds normal. No respiratory distress. She has no wheezes. She has no rales.   Abdominal: Soft. Bowel sounds are normal. She exhibits no distension and no mass. There is no tenderness. No hernia.   Musculoskeletal: She exhibits no edema.        Cervical back: She exhibits decreased range of motion.        Thoracic back: She exhibits decreased range of motion and pain.   Weakness BLE   Neurological: She is alert.   Skin: Skin is warm and dry. No rash noted.   Psychiatric: She has a normal mood and affect. Her speech is normal and behavior is normal. She exhibits abnormal recent memory.   Nursing note and vitals reviewed.      RECORDS REVIEW:   I have reviewed and interpreted the following lab test results  obtained at the time of the visit today.     ASSESSMENT     Diagnoses and all orders for this visit:    Intermittent confusion    Dementia without behavioral disturbance, unspecified dementia type (CMS/HCC)    Impaired mobility and ADLs    Gait instability        PLAN    Intermittent confusion/Dementia  -Patient with intermittent confusion. UA normal on 5/9. Check CBC and BMP. Will follow up with results. Family reports that patient had intermittent confusion at home, just as she is now, and that is why they had to place her in the LTC facility. Will continue to monitor behaviors. Appetite and weight  stable.     Staff to continue supportive care for all ADLs.     Patient and staff encouraged to keep me informed of any acute changes, lack of improvement, or any new concerning symptoms.      [x]  Discussed Patient in detail with nursing/staff, addressed all needs today.     [x]  Plan of Care Reviewed   [x]  PT/OT Reviewed   [x]  Order Changes  []  Discharge Plans Reviewed  [x]  Advance Directive on file with Nursing Home.   [x]  POA on file with Nursing Home.   [x]  Code Status listed: [x]  Full Code   []  DNR     “I confirm accuracy of unchanged data/findings which have been carried forward from previous visit, as well as I have updated appropriately those that have changed.”                 Mita Joyce, APRN.

## 2020-05-18 ENCOUNTER — NURSING HOME (OUTPATIENT)
Dept: FAMILY MEDICINE CLINIC | Facility: CLINIC | Age: 80
End: 2020-05-18

## 2020-05-18 VITALS
DIASTOLIC BLOOD PRESSURE: 73 MMHG | WEIGHT: 211 LBS | OXYGEN SATURATION: 96 % | RESPIRATION RATE: 18 BRPM | SYSTOLIC BLOOD PRESSURE: 119 MMHG | HEART RATE: 82 BPM | BODY MASS INDEX: 39.87 KG/M2 | TEMPERATURE: 97.6 F

## 2020-05-18 DIAGNOSIS — R26.81 GAIT INSTABILITY: ICD-10-CM

## 2020-05-18 DIAGNOSIS — F03.90 DEMENTIA WITHOUT BEHAVIORAL DISTURBANCE, UNSPECIFIED DEMENTIA TYPE: ICD-10-CM

## 2020-05-18 DIAGNOSIS — R29.6 MULTIPLE FALLS: Primary | ICD-10-CM

## 2020-05-18 DIAGNOSIS — Z74.09 IMPAIRED MOBILITY AND ADLS: ICD-10-CM

## 2020-05-18 DIAGNOSIS — Z78.9 IMPAIRED MOBILITY AND ADLS: ICD-10-CM

## 2020-05-18 PROCEDURE — 99309 SBSQ NF CARE MODERATE MDM 30: CPT | Performed by: NURSE PRACTITIONER

## 2020-05-18 NOTE — PROGRESS NOTES
Nursing Home Follow Up Note      Layo Arango DO []   WILLIAM Alfaro [x]  [address] [place] [address]  Phone: [phone]  Fax: [phone] Dorcas Duncan MD []    Dc Fernandez DO []   [address] [place] [address]  Phone: [phone]  Fax: [phone]     PATIENT NAME: Danae Harmon                                                                          YOB: 1940           DATE OF SERVICE: 05/18/2020  FACILITY:  [][facility]   [] [place]   [] [facility]   [x] [facility]   [] Other ______________________________________________________________________      CHIEF COMPLAINT:  Follow up fall      HISTORY OF PRESENT ILLNESS:    Per nursing, patient had a fall over the weekend. She was sitting in her wheelchair, reaching for something on her bed, and slid out of her wheelchair. She had not reported or noted injuries at the time, and [place] not today either.     PAST MEDICAL & SURGICAL HISTORY:   Past Medical History:   Diagnosis Date   • Colon polyp    • Osteoporosis    • Pain in thoracic spine    • Pneumonia    • UTI (urinary tract infection)       Past Surgical History:   Procedure Laterality Date   • BLADDER SURGERY  2000    bladder suspension   • CERVICAL LAMINECTOMY     • COLONOSCOPY     • COLOSTOMY  1979    temporary sigmoid   • HYSTERECTOMY           MEDICATIONS:  I have reviewed and reconciled the patients medication list in the patients chart at the skilled nursing facility today.      ALLERGIES:    Allergies   Allergen Reactions   • Lexapro [Escitalopram Oxalate] Nausea Only         SOCIAL HISTORY:    Social History     Socioeconomic History   • Marital status:      Spouse name: Not on file   • Number of children: Not on file   • Years of education: Not on file   • Highest education level: Not on file   Tobacco Use   • Smoking status: Never Smoker   • Smokeless tobacco: Never Used   Substance and Sexual Activity   • Alcohol use: No   • Drug use:  No   • Sexual activity: Defer       FAMILY HISTORY:    Family History   Problem Relation Age of Onset   • Blindness Mother    • Other Mother         arteriosclerotic cardiovascular disease    • Diabetes Mother    • Osteoporosis Mother    • Stroke Mother    • Cancer Brother    • Lung cancer Sister    • Osteoporosis Sister    • Stroke Sister    • Stroke Other        REVIEW OF SYSTEMS:    Review of Systems   Reason unable to perform ROS: ROS per nursing and patient.   Constitutional: Negative for activity change, appetite change, chills, diaphoresis, fatigue, fever, unexpected weight gain and unexpected weight loss.   HENT: Negative for congestion, ear pain, mouth sores, nosebleeds, postnasal drip, rhinorrhea, sinus pressure, sneezing, sore throat, swollen glands and trouble swallowing.    Eyes: Negative for blurred vision, pain, discharge, redness, itching and visual disturbance.   Respiratory: Negative for apnea, cough, choking, chest tightness, shortness of breath, wheezing and stridor.    Cardiovascular: Negative for chest pain, palpitations and leg swelling.   Gastrointestinal: Negative for abdominal distention, abdominal pain, blood in stool, constipation, diarrhea, nausea, vomiting, GERD and indigestion.   Endocrine: Negative for polydipsia and polyuria.   Genitourinary: Negative for decreased urine volume, difficulty urinating, dysuria, flank pain, frequency, hematuria, urgency and urinary incontinence.   Musculoskeletal: Positive for arthralgias, back pain and neck pain. Negative for gait problem, joint swelling and myalgias.   Skin: Negative for color change, dry skin, rash and skin lesions.   Allergic/Immunologic: Negative for environmental allergies.   Neurological: Positive for weakness, memory problem and confusion. Negative for dizziness, seizures and speech difficulty.   Psychiatric/Behavioral: Positive for depressed mood and stress. Negative for behavioral problems, dysphoric mood, hallucinations,  self-injury, sleep disturbance and suicidal ideas. The patient is not nervous/anxious.          PHYSICAL EXAMINATION:   VITAL SIGNS:   Vitals:    05/18/20 1110   BP: 119/73   Pulse: 82   Resp: 18   Temp: 97.6 °F (36.4 °C)   SpO2: 96%   Weight: 95.7 kg (211 lb)       Physical Exam   Constitutional: She appears well-developed and well-nourished.   HENT:   Head: Normocephalic.   Right Ear: External ear normal.   Left Ear: External ear normal.   Nose: Nose normal.   Eyes: Conjunctivae are normal.   Neck: Normal range of motion.   Cardiovascular: Normal rate, regular rhythm, normal heart sounds and intact distal pulses.   Pulmonary/Chest: Effort normal and breath sounds normal. No respiratory distress. She has no wheezes. She has no rales.   Abdominal: Soft. Bowel sounds are normal. She exhibits no distension and no mass. There is no tenderness. No hernia.   Musculoskeletal: She exhibits no edema.        Cervical back: She exhibits decreased range of motion.        Thoracic back: She exhibits decreased range of motion and pain.   Weakness BLE   Neurological: She is alert.   Skin: Skin is warm and dry. No rash noted.   Psychiatric: She has a normal mood and affect. Her speech is normal and behavior is normal. She exhibits abnormal recent memory.   Nursing note and vitals reviewed.      RECORDS REVIEW:   I have reviewed and interpreted the following lab test results  obtained at the time of the visit today.     ASSESSMENT     Diagnoses and all orders for this visit:    Multiple falls    Dementia without behavioral disturbance, unspecified dementia type (CMS/HCC)    Gait instability    Impaired mobility and ADLs        PLAN    Fall/Dementia/impaired mobility  -Due to her dementia, patient continues to forget about her impaired mobility, and has had multiple falls. She does have a chair and bed alarm, but due to the fact that she just slips out of her chair, the staff can not get to her in time.  Will continue to monitor  behaviors. Appetite and weight stable.     Staff to continue supportive care for all ADLs.     Patient and staff encouraged to keep me informed of any acute changes, lack of improvement, or any new concerning symptoms.      [x]  Discussed Patient in detail with nursing/staff, addressed all needs today.     [x]  Plan of Care Reviewed   [x]  PT/OT Reviewed   [x]  Order Changes  []  Discharge Plans Reviewed  [x]  Advance Directive on file with Nursing Home.   [x]  POA on file with Nursing Home.   [x]  Code Status listed: [x]  Full Code   []  DNR     “I confirm accuracy of unchanged data/findings which have been carried forward from previous visit, as well as I have updated appropriately those that have changed.”                   Mita Joyce, APRN.

## 2020-05-22 ENCOUNTER — NURSING HOME (OUTPATIENT)
Dept: FAMILY MEDICINE CLINIC | Facility: CLINIC | Age: 80
End: 2020-05-22

## 2020-05-22 VITALS
OXYGEN SATURATION: 94 % | SYSTOLIC BLOOD PRESSURE: 130 MMHG | RESPIRATION RATE: 20 BRPM | TEMPERATURE: 97.2 F | DIASTOLIC BLOOD PRESSURE: 78 MMHG | HEART RATE: 80 BPM
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
## 2020-05-22 DIAGNOSIS — F33.1 MODERATE EPISODE OF RECURRENT MAJOR DEPRESSIVE DISORDER (HCC): ICD-10-CM

## 2020-05-22 DIAGNOSIS — R26.81 GAIT INSTABILITY: ICD-10-CM

## 2020-05-22 DIAGNOSIS — Z78.9 IMPAIRED MOBILITY AND ADLS: ICD-10-CM

## 2020-05-22 DIAGNOSIS — F03.90 DEMENTIA WITHOUT BEHAVIORAL DISTURBANCE, UNSPECIFIED DEMENTIA TYPE: ICD-10-CM

## 2020-05-22 DIAGNOSIS — Z74.09 IMPAIRED MOBILITY AND ADLS: ICD-10-CM

## 2020-05-22 PROCEDURE — 99309 SBSQ NF CARE MODERATE MDM 30: CPT | Performed by: NURSE PRACTITIONER

## 2020-05-25 NOTE — PROGRESS NOTES
Nursing Home Follow Up Note      Layo Arango DO []   WILLIAM Alfaro [x]  [address] [place] [address]  Phone: [phone]  Fax: [phone] Dorcas Duncan MD []    Dc Fernandez DO []   [address] [place] [address]  Phone: [phone]  Fax: [phone]     PATIENT NAME: Danae Harmon                                                                          YOB: 1940           DATE OF SERVICE: 05/22/2020  FACILITY:  [][facility] [facility] [place]   [] [facility]   [] [facility]   [x] [facility]   [] Other ______________________________________________________________________      CHIEF COMPLAINT:      Worsening depression    Follow up fall        HISTORY OF PRESENT ILLNESS:      Nursing reports that patient has worsening depression the past couple days. She has been crying more, not wanting to be in the facility, and very sad that her family can not come visit her. She is tearful during visit today.     Per nursing, patient slid out of her wheelchair again. She has no reported or noted injuries.       PAST MEDICAL & SURGICAL HISTORY:   Past Medical History:   Diagnosis Date   • Colon polyp    • Osteoporosis    • Pain in thoracic spine    • Pneumonia    • UTI (urinary tract infection)       Past Surgical History:   Procedure Laterality Date   • BLADDER SURGERY  2000    bladder suspension   • CERVICAL LAMINECTOMY     • COLONOSCOPY     • COLOSTOMY  1979    temporary sigmoid   • HYSTERECTOMY           MEDICATIONS:  I have reviewed and reconciled the patients medication list in the patients chart at the skilled nursing facility today.      ALLERGIES:    Allergies   Allergen Reactions   • Lexapro [Escitalopram Oxalate] Nausea Only         SOCIAL HISTORY:    Social History     Socioeconomic History   • Marital status:      Spouse name: Not on file   • Number of children: Not on file   • Years of education: Not on file   • Highest education level: Not on file   Tobacco Use   •  Smoking status: Never Smoker   • Smokeless tobacco: Never Used   Substance and Sexual Activity   • Alcohol use: No   • Drug use: No   • Sexual activity: Defer       FAMILY HISTORY:    Family History   Problem Relation Age of Onset   • Blindness Mother    • Other Mother         arteriosclerotic cardiovascular disease    • Diabetes Mother    • Osteoporosis Mother    • Stroke Mother    • Cancer Brother    • Lung cancer Sister    • Osteoporosis Sister    • Stroke Sister    • Stroke Other        REVIEW OF SYSTEMS:    Review of Systems   Reason unable to perform ROS: ROS per nursing and patient.   Constitutional: Negative for activity change, appetite change, chills, diaphoresis, fatigue, fever, unexpected weight gain and unexpected weight loss.   HENT: Negative for congestion, ear pain, mouth sores, nosebleeds, postnasal drip, rhinorrhea, sinus pressure, sneezing, sore throat, swollen glands and trouble swallowing.    Eyes: Negative for blurred vision, pain, discharge, redness, itching and visual disturbance.   Respiratory: Negative for apnea, cough, choking, chest tightness, shortness of breath, wheezing and stridor.    Cardiovascular: Negative for chest pain, palpitations and leg swelling.   Gastrointestinal: Negative for abdominal distention, abdominal pain, blood in stool, constipation, diarrhea, nausea, vomiting, GERD and indigestion.   Endocrine: Negative for polydipsia and polyuria.   Genitourinary: Negative for decreased urine volume, difficulty urinating, dysuria, flank pain, frequency, hematuria, urgency and urinary incontinence.   Musculoskeletal: Positive for arthralgias, back pain, gait problem and neck pain. Negative for joint swelling and myalgias.   Skin: Negative for color change, dry skin, rash and skin lesions.   Allergic/Immunologic: Negative for environmental allergies.   Neurological: Positive for weakness, memory problem and confusion. Negative for dizziness, seizures and speech difficulty.    Psychiatric/Behavioral: Positive for depressed mood and stress. Negative for behavioral problems, dysphoric mood, hallucinations, self-injury, sleep disturbance and suicidal ideas (Patient reports that due to her [employer] beliefs , she would never consider suicide). The patient is not nervous/anxious.          PHYSICAL EXAMINATION:   VITAL SIGNS:   Vitals:    05/22/20 1329   BP: 130/78   Pulse: 80   Resp: 20   Temp: 97.2 °F (36.2 °C)   SpO2: 94%       Physical Exam   Constitutional: She appears well-developed and well-nourished.   HENT:   Head: Normocephalic.   Right Ear: External ear normal.   Left Ear: External ear normal.   Nose: Nose normal.   Eyes: Conjunctivae are normal.   Neck: Normal range of motion.   Cardiovascular: Normal rate, regular rhythm, normal heart sounds and intact distal pulses.   Pulmonary/Chest: Effort normal and breath sounds normal. No respiratory distress. She has no wheezes. She has no rales.   Abdominal: Soft. Bowel sounds are normal. She exhibits no distension and no mass. There is no tenderness. No hernia.   Musculoskeletal: She exhibits no edema.        Cervical back: She exhibits decreased range of motion.        Thoracic back: She exhibits decreased range of motion and pain.   Weakness BLE   Neurological: She is alert.   Skin: Skin is warm and dry. No rash noted.   Psychiatric: Her speech is normal and behavior is normal. She exhibits a depressed mood. She exhibits abnormal recent memory.   tearful   Nursing note and vitals reviewed.      RECORDS REVIEW:   I have reviewed and interpreted the following lab test results  obtained at the time of the visit today.     ASSESSMENT     Diagnoses and all orders for this visit:    Moderate episode of recurrent major depressive disorder (CMS/HCC)    Dementia without behavioral disturbance, unspecified dementia type (CMS/HCC)    Fall, initial encounter    Gait instability    Impaired mobility and  ADLs        PLAN    Depression/dementia  -Decrease Wellbutrin XL to 150 mg, start Abilify 2 mg daily, x 1 week, then increase to 5 mg, for better treatment of depression. Appetite and weight stable.     Fall/Dementia/impaired mobility  -Patient slid out of her chair again, but no reported or noted injuries. Due to her dementia, patient continues to forget about her impaired mobility, and has had multiple falls. She does have a chair and bed alarm, but due to the fact that she just slips out of her chair, the staff can not get to her in time.  Will continue to monitor behaviors. Appetite and weight stable.     Staff to continue supportive care for all ADLs.     Patient and staff encouraged to keep me informed of any acute changes, lack of improvement, or any new concerning symptoms.      [x]  Discussed Patient in detail with nursing/staff, addressed all needs today.     [x]  Plan of Care Reviewed   [x]  PT/OT Reviewed   [x]  Order Changes  []  Discharge Plans Reviewed  [x]  Advance Directive on file with Nursing Home.   [x]  POA on file with Nursing Home.   [x]  Code Status listed: [x]  Full Code   []  DNR       “I confirm accuracy of unchanged data/findings which have been carried forward from previous visit, as well as I have updated appropriately those that have changed.”            Mita Joyce, APRN.

## 2020-05-26 ENCOUNTER — NURSING HOME (OUTPATIENT)
Dept: FAMILY MEDICINE CLINIC | Facility: CLINIC | Age: 80
End: 2020-05-26

## 2020-05-26 VITALS
RESPIRATION RATE: 18 BRPM | DIASTOLIC BLOOD PRESSURE: 78 MMHG | BODY MASS INDEX: 39.87 KG/M2 | SYSTOLIC BLOOD PRESSURE: 116 MMHG | OXYGEN SATURATION: 98 % | WEIGHT: 211 LBS | TEMPERATURE: 97 F | HEART RATE: 68 BPM

## 2020-05-26 DIAGNOSIS — Z78.9 IMPAIRED MOBILITY AND ADLS: ICD-10-CM

## 2020-05-26 DIAGNOSIS — R29.6 RECURRENT FALLS: Primary | ICD-10-CM

## 2020-05-26 DIAGNOSIS — Z74.09 IMPAIRED MOBILITY AND ADLS: ICD-10-CM

## 2020-05-26 DIAGNOSIS — F03.90 DEMENTIA WITHOUT BEHAVIORAL DISTURBANCE, UNSPECIFIED DEMENTIA TYPE: ICD-10-CM

## 2020-05-26 DIAGNOSIS — R26.81 GAIT INSTABILITY: ICD-10-CM

## 2020-05-26 PROCEDURE — 99309 SBSQ NF CARE MODERATE MDM 30: CPT | Performed by: NURSE PRACTITIONER

## 2020-05-27 NOTE — PROGRESS NOTES
Nursing Home Follow Up Note      Layo Arango DO []   WILLIAM Alfaro [x]  [address] [place] [address]  Phone: [phone]  Fax: [phone] Dorcas Duncan MD []    Dc Fernandez DO []   [address] [place] [address]  Phone: [phone]  Fax: [phone]     PATIENT NAME: Danae Harmon                                                                          YOB: 1940           DATE OF SERVICE: 05/26/2020  FACILITY:  [][facility]   [] [facility]   [] [facility]   [x] [facility]   [] Other ______________________________________________________________________      CHIEF COMPLAINT:    Follow up fall    HISTORY OF PRESENT ILLNESS:      Per nursing, patient slid out of her wheelchair again, Friday night. She continues to attempt to reach for things, while sitting in her wheelchair, and slides down off of the edge. She had no reported or noted injuries and does not today.       PAST MEDICAL & SURGICAL HISTORY:   Past Medical History:   Diagnosis Date   • Colon polyp    • Osteoporosis    • Pain in thoracic spine    • Pneumonia    • UTI (urinary tract infection)       Past Surgical History:   Procedure Laterality Date   • BLADDER SURGERY  2000    bladder suspension   • CERVICAL LAMINECTOMY     • COLONOSCOPY     • COLOSTOMY  1979    temporary sigmoid   • HYSTERECTOMY           MEDICATIONS:  I have reviewed and reconciled the patients medication list in the patients chart at the skilled nursing facility today.      ALLERGIES:    Allergies   Allergen Reactions   • Lexapro [Escitalopram Oxalate] Nausea Only         SOCIAL HISTORY:    Social History     Socioeconomic History   • Marital status:      Spouse name: Not on file   • Number of children: Not on file   • Years of education: Not on file   • Highest education level: Not on file   Tobacco Use   • Smoking status: Never Smoker   • Smokeless tobacco: Never Used   Substance and Sexual Activity   • Alcohol use: No    • Drug use: No   • Sexual activity: Defer       FAMILY HISTORY:    Family History   Problem Relation Age of Onset   • Blindness Mother    • Other Mother         arteriosclerotic cardiovascular disease    • Diabetes Mother    • Osteoporosis Mother    • Stroke Mother    • Cancer Brother    • Lung cancer Sister    • Osteoporosis Sister    • Stroke Sister    • Stroke Other        REVIEW OF SYSTEMS:    Review of Systems   Reason unable to perform ROS: ROS per nursing and patient.   Constitutional: Negative for activity change, appetite change, chills, diaphoresis, fatigue, fever, unexpected weight gain and unexpected weight loss.   HENT: Negative for congestion, ear pain, mouth sores, nosebleeds, postnasal drip, rhinorrhea, sinus pressure, sneezing, sore throat, swollen glands and trouble swallowing.    Eyes: Negative for blurred vision, pain, discharge, redness, itching and visual disturbance.   Respiratory: Negative for apnea, cough, choking, chest tightness, shortness of breath, wheezing and stridor.    Cardiovascular: Negative for chest pain, palpitations and leg swelling.   Gastrointestinal: Negative for abdominal distention, abdominal pain, blood in stool, constipation, diarrhea, nausea, vomiting, GERD and indigestion.   Endocrine: Negative for polydipsia and polyuria.   Genitourinary: Negative for decreased urine volume, difficulty urinating, dysuria, flank pain, frequency, hematuria, urgency and urinary incontinence.   Musculoskeletal: Positive for arthralgias, back pain, gait problem and neck pain. Negative for joint swelling and myalgias.   Skin: Negative for color change, dry skin, rash and skin lesions.   Allergic/Immunologic: Negative for environmental allergies.   Neurological: Positive for weakness, memory problem and confusion. Negative for dizziness, seizures and speech difficulty.   Psychiatric/Behavioral: Positive for depressed mood (has not appeared depressed past couple of days) and stress.  Negative for behavioral problems, dysphoric mood, hallucinations, self-injury, sleep disturbance and suicidal ideas. The patient is not nervous/anxious.          PHYSICAL EXAMINATION:   VITAL SIGNS:   Vitals:    05/26/20 1020   BP: 116/78   Pulse: 68   Resp: 18   Temp: 97 °F (36.1 °C)   SpO2: 98%   Weight: 95.7 kg (211 lb)       Physical Exam   Constitutional: She appears well-developed and well-nourished.   HENT:   Head: Normocephalic.   Right Ear: External ear normal.   Left Ear: External ear normal.   Nose: Nose normal.   Eyes: Conjunctivae are normal.   Neck: Normal range of motion.   Cardiovascular: Normal rate, regular rhythm, normal heart sounds and intact distal pulses.   Pulmonary/Chest: Effort normal and breath sounds normal. No respiratory distress. She has no wheezes. She has no rales.   Abdominal: Soft. Bowel sounds are normal. She exhibits no distension and no mass. There is no tenderness. No hernia.   Musculoskeletal: She exhibits no edema.        Cervical back: She exhibits decreased range of motion.        Thoracic back: She exhibits decreased range of motion and pain.   Weakness BLE   Neurological: She is alert.   Skin: Skin is warm and dry. No rash noted.   Psychiatric: She has a normal mood and affect. Her speech is normal and behavior is normal. She exhibits abnormal recent memory.   No depressed mood today   Nursing note and vitals reviewed.      RECORDS REVIEW:   I have reviewed and interpreted the following lab test results  obtained at the time of the visit today.     ASSESSMENT     Diagnoses and all orders for this visit:    Recurrent falls    Dementia without behavioral disturbance, unspecified dementia type (CMS/HCC)    Gait instability    Impaired mobility and ADLs        PLAN    Fall/Dementia/impaired mobility  -Patient slid out of her chair again, but no reported or noted injuries. Due to her dementia, patient continues to forget about her impaired mobility, and has had multiple falls.  She does have a chair and bed alarm, but due to the fact that she just slips out of her chair, the staff can not get to her in time.  Will continue to monitor behaviors. Appetite and weight stable.     Staff to continue supportive care for all ADLs.     Patient and staff encouraged to keep me informed of any acute changes, lack of improvement, or any new concerning symptoms.      [x]  Discussed Patient in detail with nursing/staff, addressed all needs today.     [x]  Plan of Care Reviewed   [x]  PT/OT Reviewed   [x]  Order Changes  []  Discharge Plans Reviewed  [x]  Advance Directive on file with Nursing Home.   [x]  POA on file with Nursing Home.   [x]  Code Status listed: [x]  Full Code   []  DNR     “I confirm accuracy of unchanged data/findings which have been carried forward from previous visit, as well as I have updated appropriately those that have changed.”              Mita Joyce, APRN.

## 2020-05-28 ENCOUNTER — NURSING HOME (OUTPATIENT)
Dept: INTERNAL MEDICINE | Facility: CLINIC | Age: 80
End: 2020-05-28

## 2020-05-28 VITALS
TEMPERATURE: 98.1 F | OXYGEN SATURATION: 98 % | BODY MASS INDEX: 39.87 KG/M2 | WEIGHT: 211 LBS | SYSTOLIC BLOOD PRESSURE: 118 MMHG | HEART RATE: 66 BPM | DIASTOLIC BLOOD PRESSURE: 65 MMHG | RESPIRATION RATE: 16 BRPM

## 2020-05-28 DIAGNOSIS — E53.8 VITAMIN B 12 DEFICIENCY: ICD-10-CM

## 2020-05-28 DIAGNOSIS — E11.9 TYPE 2 DIABETES MELLITUS WITHOUT COMPLICATION, WITHOUT LONG-TERM CURRENT USE OF INSULIN (HCC): ICD-10-CM

## 2020-05-28 DIAGNOSIS — F33.1 MODERATE EPISODE OF RECURRENT MAJOR DEPRESSIVE DISORDER (HCC): ICD-10-CM

## 2020-05-28 DIAGNOSIS — E78.2 MIXED HYPERLIPIDEMIA: ICD-10-CM

## 2020-05-28 DIAGNOSIS — M81.8 OTHER OSTEOPOROSIS WITHOUT CURRENT PATHOLOGICAL FRACTURE: ICD-10-CM

## 2020-05-28 DIAGNOSIS — G30.1 LATE ONSET ALZHEIMER'S DISEASE WITHOUT BEHAVIORAL DISTURBANCE (HCC): Primary | ICD-10-CM

## 2020-05-28 DIAGNOSIS — G89.29 CHRONIC LOW BACK PAIN WITH SCIATICA, SCIATICA LATERALITY UNSPECIFIED, UNSPECIFIED BACK PAIN LATERALITY: ICD-10-CM

## 2020-05-28 DIAGNOSIS — M54.40 CHRONIC LOW BACK PAIN WITH SCIATICA, SCIATICA LATERALITY UNSPECIFIED, UNSPECIFIED BACK PAIN LATERALITY: ICD-10-CM

## 2020-05-28 DIAGNOSIS — E55.9 VITAMIN D DEFICIENCY: ICD-10-CM

## 2020-05-28 DIAGNOSIS — F02.80 LATE ONSET ALZHEIMER'S DISEASE WITHOUT BEHAVIORAL DISTURBANCE (HCC): Primary | ICD-10-CM

## 2020-05-28 DIAGNOSIS — I10 BENIGN ESSENTIAL HYPERTENSION: ICD-10-CM

## 2020-05-28 PROCEDURE — 99308 SBSQ NF CARE LOW MDM 20: CPT | Performed by: INTERNAL MEDICINE

## 2020-06-01 NOTE — PROGRESS NOTES
Nursing Home Progress Note        Layo Arango DO ?  Mita Joyce, APRN ?  [address] [facility], [place] [address]  Phone: [phone]  Fax: [phone] Dorcas Duncan MD ?  Dc Fernandez DO [x]   [address] [place] [address]  Phone: [phone]  Fax: [phone]     PATIENT NAME: Danae Harmon                                                                          YOB: 1940           DATE OF SERVICE: 05/28/2020   FACILITY:  [] [facility]   [] [facility]   [] [facility]   [x] [facility]  []  [facility]  [] Other ______________________________________________________________________     CHIEF COMPLAINT:  Debility      HISTORY OF PRESENT ILLNESS:   Patient was resting comfortably on exam.  Sleepy but able to let me know she was happy with her current care.  It is concerning as the patient has had numerous falls over the last few weeks.  This appears to be due to episodes of confusion and dementia worsening.      PAST MEDICAL & SURGICAL HISTORY:   Past Medical History:   Diagnosis Date   • Colon polyp    • Osteoporosis    • Pain in thoracic spine    • Pneumonia    • UTI (urinary tract infection)       Past Surgical History:   Procedure Laterality Date   • BLADDER SURGERY  2000    bladder suspension   • CERVICAL LAMINECTOMY     • COLONOSCOPY     • COLOSTOMY  1979    temporary sigmoid   • HYSTERECTOMY           MEDICATIONS:  I have reviewed and reconciled the patients medication list in the patients chart at the skilled nursing facility today.      ALLERGIES:  Allergies   Allergen Reactions   • Lexapro [Escitalopram Oxalate] Nausea Only         SOCIAL HISTORY:  Social History     Socioeconomic History   • Marital status:      Spouse name: Not on file   • Number of children: Not on file   • Years of education: Not on file   • Highest education level: Not on file   Tobacco Use   • Smoking status: Never Smoker   • Smokeless tobacco: Never Used   Substance and  Sexual Activity   • Alcohol use: No   • Drug use: No   • Sexual activity: Defer       FAMILY HISTORY:  Family History   Problem Relation Age of Onset   • Blindness Mother    • Other Mother         arteriosclerotic cardiovascular disease    • Diabetes Mother    • Osteoporosis Mother    • Stroke Mother    • Cancer Brother    • Lung cancer Sister    • Osteoporosis Sister    • Stroke Sister    • Stroke Other        REVIEW OF SYSTEMS:  Review of Systems   Constitutional: Negative for chills, fatigue and fever.   HENT: Negative for congestion, ear pain, rhinorrhea, sinus pressure and sore throat.    Eyes: Negative for visual disturbance.   Respiratory: Negative for cough, chest tightness, shortness of breath and wheezing.    Cardiovascular: Negative for chest pain, palpitations and leg swelling.   Gastrointestinal: Negative for abdominal pain, blood in stool, constipation, diarrhea, nausea and vomiting.   Endocrine: Negative for polydipsia and polyuria.   Genitourinary: Negative for dysuria, hematuria and urgency.   Musculoskeletal: Negative for arthralgias and back pain.   Skin: Negative for rash.   Neurological: Negative for dizziness, light-headedness, numbness and headaches.   Psychiatric/Behavioral: Negative for dysphoric mood and sleep disturbance. The patient is not nervous/anxious.           PHYSICAL EXAMINATION:     VITAL SIGNS:  /65   Pulse 66   Temp 98.1 °F (36.7 °C)   Resp 16   Wt 95.7 kg (211 lb)   SpO2 98%   BMI 39.87 kg/m²     Physical Exam   Constitutional: She is oriented to person, place, and time. She appears well-developed and well-nourished.   Morbidly obese elderly female resting comfortably   HENT:   Head: Normocephalic and atraumatic.   Mouth/Throat: Oropharynx is clear and moist. No oropharyngeal exudate.   Eyes: Pupils are equal, round, and reactive to light. Conjunctivae and EOM are normal. No scleral icterus.   Neck: Normal range of motion. Neck supple. No thyromegaly present.    Cardiovascular: Normal rate, regular rhythm and normal heart sounds. Exam reveals no gallop and no friction rub.   No murmur heard.  Pulmonary/Chest: Effort normal and breath sounds normal. No respiratory distress. She has no wheezes.   Abdominal: Soft. Bowel sounds are normal. She exhibits no distension. There is no tenderness.   Musculoskeletal: Normal range of motion.   Lymphadenopathy:     She has no cervical adenopathy.   Neurological: She is alert and oriented to person, place, and time.   Skin: Skin is warm and dry. No rash noted.   Psychiatric: She has a normal mood and affect. Her behavior is normal.   Nursing note and vitals reviewed.  .    RECORDS REVIEW:       ASSESSMENT     Diagnoses and all orders for this visit:    Late onset Alzheimer's disease without behavioral disturbance (CMS/[facility])    Moderate episode of recurrent major depressive disorder (CMS/[facility])    Chronic low back pain with sciatica, sciatica laterality unspecified, unspecified back pain laterality    Mixed hyperlipidemia    Benign essential hypertension    Vitamin D deficiency    Vitamin B 12 deficiency    Other osteoporosis without current pathological fracture    Type 2 diabetes mellitus without complication, without long-term current use of insulin (CMS/[facility])    Body mass index (BMI) 40.0-44.9, adult (CMS/[facility])        PLAN    79-year-old white female admitted to the [facility] for long-term care and management.    Alzheimer's disease without behavioral disturbances  -   Patient has been stable on current medication regimen.    -Continue supportive care in the nursing facility for ADLs.  - cont  Fall precautions     Bladder spasms/urinary frequency  -Cont Myrbetriq  -Continue Diflucan when needed for vaginal yeast infections.    Major depressive disorder  -Stable on current medications.    Mixed hyperlipidemia  -Continue statin.    Essential hypertension  -Controlled on current medications.    Vitamin D and vitamin B12  deficiencies  -Stable on supplements.    Osteoporosis  -Stable on current medications.  No recent fractures.    Type 2 diabetes mellitus  -Monitor glucose levels in nursing facility  -Glucose has been stable with current medication regimen.      [x]  Discussed Patient in detail with nursing/staff, addressed all needs today.     [x]  Plan of Care Reviewed   []  PT/OT Reviewed   []  Order Changes  []  Discharge Plans Reviewed  [x]  Advance Directive on file with Nursing Home.   [x]  POA on file with Nursing Home.    [x]  Code Status listed and reviewed.          Dc Fernandez DO.  6/1/2020

## 2020-06-11 ENCOUNTER — NURSING HOME (OUTPATIENT)
Dept: FAMILY MEDICINE CLINIC | Facility: CLINIC | Age: 80
End: 2020-06-11

## 2020-06-11 VITALS
SYSTOLIC BLOOD PRESSURE: 110 MMHG | WEIGHT: 205 LBS | OXYGEN SATURATION: 93 % | HEART RATE: 82 BPM | RESPIRATION RATE: 20 BRPM | TEMPERATURE: 97.5 F | BODY MASS INDEX: 38.73 KG/M2 | DIASTOLIC BLOOD PRESSURE: 58 MMHG

## 2020-06-11 DIAGNOSIS — R26.81 GAIT INSTABILITY: ICD-10-CM

## 2020-06-11 DIAGNOSIS — Z74.09 IMPAIRED MOBILITY AND ADLS: ICD-10-CM

## 2020-06-11 DIAGNOSIS — Z78.9 IMPAIRED MOBILITY AND ADLS: ICD-10-CM

## 2020-06-11 DIAGNOSIS — F03.911 AGITATION DUE TO DEMENTIA (HCC): Primary | ICD-10-CM

## 2020-06-11 DIAGNOSIS — F03.91 DEMENTIA WITH BEHAVIORAL DISTURBANCE, UNSPECIFIED DEMENTIA TYPE: ICD-10-CM

## 2020-06-11 PROBLEM — F03.918 DEMENTIA WITH BEHAVIORAL DISTURBANCE: Status: ACTIVE | Noted: 2020-05-13

## 2020-06-11 PROCEDURE — 99309 SBSQ NF CARE MODERATE MDM 30: CPT | Performed by: NURSE PRACTITIONER

## 2020-06-12 NOTE — PROGRESS NOTES
Nursing Home Follow Up Note      Layo Arango DO []   WILLIAM Alfaro [x]  [address] [place] [address]  Phone: [phone]  Fax: [phone] Dorcas Duncan MD []    Dc Fernandez DO []   [address] [place] [address]  Phone: [phone]  Fax: [phone]     PATIENT NAME: Danae Harmon                                                                          YOB: 1940           DATE OF SERVICE: 06/11/2020  FACILITY:  [][facility]   [] [place]   [] [facility]   [x] [facility]   [] Other ______________________________________________________________________      CHIEF COMPLAINT:    Increased behaviors    HISTORY OF PRESENT ILLNESS:      Per nursing, patient's behaviors have been worsening, about 3-4 pm every evening, and yesterday,her behaviors were really bad. She became so agitated and aggressive, she had to be given Haldol. She was given 2.5 mg, and was still having behaviors 2 hours later, so was given another 2.5 mg. She eventually calmed down, in the middle of the night, and has been sleeping this morning.       PAST MEDICAL & SURGICAL HISTORY:   Past Medical History:   Diagnosis Date   • Colon polyp    • Osteoporosis    • Pain in thoracic spine    • Pneumonia    • UTI (urinary tract infection)       Past Surgical History:   Procedure Laterality Date   • BLADDER SURGERY  2000    bladder suspension   • CERVICAL LAMINECTOMY     • COLONOSCOPY     • COLOSTOMY  1979    temporary sigmoid   • HYSTERECTOMY           MEDICATIONS:  I have reviewed and reconciled the patients medication list in the patients chart at the skilled nursing facility today.      ALLERGIES:    Allergies   Allergen Reactions   • Lexapro [Escitalopram Oxalate] Nausea Only         SOCIAL HISTORY:    Social History     Socioeconomic History   • Marital status:      Spouse name: Not on file   • Number of children: Not on file   • Years of education: Not on file   • Highest education  level: Not on file   Tobacco Use   • Smoking status: Never Smoker   • Smokeless tobacco: Never Used   Substance and Sexual Activity   • Alcohol use: No   • Drug use: No   • Sexual activity: Defer       FAMILY HISTORY:    Family History   Problem Relation Age of Onset   • Blindness Mother    • Other Mother         arteriosclerotic cardiovascular disease    • Diabetes Mother    • Osteoporosis Mother    • Stroke Mother    • Cancer Brother    • Lung cancer Sister    • Osteoporosis Sister    • Stroke Sister    • Stroke Other        REVIEW OF SYSTEMS:    Review of Systems   Reason unable to perform ROS: ROS per nursing and patient.   Constitutional: Negative for activity change, appetite change, chills, diaphoresis, fatigue, fever, unexpected weight gain and unexpected weight loss.   HENT: Negative for congestion, ear pain, mouth sores, nosebleeds, postnasal drip, rhinorrhea, sinus pressure, sneezing, sore throat, swollen glands and trouble swallowing.    Eyes: Negative for blurred vision, pain, discharge, redness, itching and visual disturbance.   Respiratory: Negative for apnea, cough, choking, chest tightness, shortness of breath, wheezing and stridor.    Cardiovascular: Negative for chest pain, palpitations and leg swelling.   Gastrointestinal: Negative for abdominal distention, abdominal pain, blood in stool, constipation, diarrhea, nausea, vomiting, GERD and indigestion.   Endocrine: Negative for polydipsia and polyuria.   Genitourinary: Negative for decreased urine volume, difficulty urinating, dysuria, flank pain, frequency, hematuria, urgency and urinary incontinence.   Musculoskeletal: Positive for arthralgias, back pain, gait problem and neck pain. Negative for joint swelling and myalgias.   Skin: Negative for color change, dry skin, rash and skin lesions.   Allergic/Immunologic: Negative for environmental allergies.   Neurological: Positive for weakness, memory problem and confusion. Negative for dizziness,  seizures and speech difficulty.   Psychiatric/Behavioral: Positive for agitation and behavioral problems. Negative for dysphoric mood, hallucinations, self-injury, sleep disturbance, suicidal ideas, depressed mood and stress. The patient is nervous/anxious.          PHYSICAL EXAMINATION:   VITAL SIGNS:   Vitals:    06/11/20 0920   BP: 110/58   Pulse: 82   Resp: 20   Temp: 97.5 °F (36.4 °C)   SpO2: 93%   Weight: 93 kg (205 lb)       Physical Exam   Constitutional: Vital signs are normal. She appears well-developed and well-nourished. She is sleeping.   HENT:   Head: Normocephalic.   Right Ear: External ear normal.   Left Ear: External ear normal.   Nose: Nose normal.   Eyes: Conjunctivae are normal.   Neck: Normal range of motion.   Cardiovascular: Normal rate, regular rhythm, normal heart sounds and intact distal pulses.   Pulmonary/Chest: Effort normal and breath sounds normal. No respiratory distress. She has no wheezes. She has no rales.   Abdominal: Soft. Bowel sounds are normal. She exhibits no distension and no mass. There is no tenderness. No hernia.   Musculoskeletal: She exhibits no edema.        Cervical back: She exhibits decreased range of motion.        Thoracic back: She exhibits decreased range of motion and pain.   Weakness BLE   Skin: Skin is warm and dry. No rash noted.   Psychiatric:   Patient resting during visit today   Nursing note and vitals reviewed.      RECORDS REVIEW:   I have reviewed and interpreted the following lab test results  obtained at the time of the visit today.     ASSESSMENT     Diagnoses and all orders for this visit:    Agitation due to dementia (CMS/HCC)    Dementia with behavioral disturbance, unspecified dementia type (CMS/HCC)    Impaired mobility and ADLs    Gait instability        PLAN    Agitation/Dementia  -Patient with increased agitation every evening. Give Buspar 5 mg at 6526-9866 every day. Give Haldol 5 mg IM q day prn agitation. Patient has had multiple UA's  checked over the past couple months, that have all been normal, and her agitation is only in the evening, not all the time, so will not recheck urine.  Will c/t monitor. Her appetite and weight are stable.     Staff to continue supportive care for all ADLs.     Patient and staff encouraged to keep me informed of any acute changes, lack of improvement, or any new concerning symptoms.      [x]  Discussed Patient in detail with nursing/staff, addressed all needs today.     [x]  Plan of Care Reviewed   [x]  PT/OT Reviewed   [x]  Order Changes  []  Discharge Plans Reviewed  [x]  Advance Directive on file with Nursing Home.   [x]  POA on file with Nursing Home.   [x]  Code Status listed: [x]  Full Code   []  DNR     “I confirm accuracy of unchanged data/findings which have been carried forward from previous visit, as well as I have updated appropriately those that have changed.”                Mita Joyce, APRN.

## 2020-06-15 ENCOUNTER — NURSING HOME (OUTPATIENT)
Dept: FAMILY MEDICINE CLINIC | Facility: CLINIC | Age: 80
End: 2020-06-15

## 2020-06-15 VITALS
RESPIRATION RATE: 18 BRPM | TEMPERATURE: 96.4 F | SYSTOLIC BLOOD PRESSURE: 124 MMHG | OXYGEN SATURATION: 94 % | DIASTOLIC BLOOD PRESSURE: 74 MMHG | HEART RATE: 81 BPM

## 2020-06-15 DIAGNOSIS — Z74.09 IMPAIRED MOBILITY AND ADLS: ICD-10-CM

## 2020-06-15 DIAGNOSIS — F03.91 DEMENTIA WITH BEHAVIORAL DISTURBANCE, UNSPECIFIED DEMENTIA TYPE: ICD-10-CM

## 2020-06-15 DIAGNOSIS — Z78.9 IMPAIRED MOBILITY AND ADLS: ICD-10-CM

## 2020-06-15 DIAGNOSIS — F22 PARANOID BEHAVIOR (HCC): ICD-10-CM

## 2020-06-15 DIAGNOSIS — F22 DELUSIONS (HCC): ICD-10-CM

## 2020-06-15 PROCEDURE — 99309 SBSQ NF CARE MODERATE MDM 30: CPT | Performed by: NURSE PRACTITIONER

## 2020-06-16 NOTE — PROGRESS NOTES
Nursing Home Follow Up Note      Layo Arango DO []   WILLIAM Alfaro [x]  [address] [place] [address]  Phone: [phone]  Fax: [phone] Dorcas Duncan MD []    Dc Fernandez DO []   [address] [place] [address]  Phone: [phone]  Fax: [phone]     PATIENT NAME: Danae Harmon                                                                          YOB: 1940           DATE OF SERVICE: 06/15/2020  FACILITY:  [][facility]   [] [place]   [] [facility]   [x] [facility]   [] Other ______________________________________________________________________      CHIEF COMPLAINT:    Increased behaviors    HISTORY OF PRESENT ILLNESS:      Per nursing, patient's behaviors continue. Her behaviors are worse in the evening times and she is starting to become paranoid and delusional. Her family would like her medications adjusted.     PAST MEDICAL & SURGICAL HISTORY:   Past Medical History:   Diagnosis Date   • Colon polyp    • Osteoporosis    • Pain in thoracic spine    • Pneumonia    • UTI (urinary tract infection)       Past Surgical History:   Procedure Laterality Date   • BLADDER SURGERY  2000    bladder suspension   • CERVICAL LAMINECTOMY     • COLONOSCOPY     • COLOSTOMY  1979    temporary sigmoid   • HYSTERECTOMY           MEDICATIONS:  I have reviewed and reconciled the patients medication list in the patients chart at the skilled nursing facility today.      ALLERGIES:    Allergies   Allergen Reactions   • Lexapro [Escitalopram Oxalate] Nausea Only         SOCIAL HISTORY:    Social History     Socioeconomic History   • Marital status:      Spouse name: Not on file   • Number of children: Not on file   • Years of education: Not on file   • Highest education level: Not on file   Tobacco Use   • Smoking status: Never Smoker   • Smokeless tobacco: Never Used   Substance and Sexual Activity   • Alcohol use: No   • Drug use: No   • Sexual activity: Defer        FAMILY HISTORY:    Family History   Problem Relation Age of Onset   • Blindness Mother    • Other Mother         arteriosclerotic cardiovascular disease    • Diabetes Mother    • Osteoporosis Mother    • Stroke Mother    • Cancer Brother    • Lung cancer Sister    • Osteoporosis Sister    • Stroke Sister    • Stroke Other        REVIEW OF SYSTEMS:    Review of Systems   Reason unable to perform ROS: ROS per nursing and patient.   Constitutional: Negative for activity change, appetite change, chills, diaphoresis, fatigue, fever, unexpected weight gain and unexpected weight loss.   HENT: Negative for congestion, ear pain, mouth sores, nosebleeds, postnasal drip, rhinorrhea, sinus pressure, sneezing, sore throat, swollen glands and trouble swallowing.    Eyes: Negative for blurred vision, pain, discharge, redness, itching and visual disturbance.   Respiratory: Negative for apnea, cough, choking, chest tightness, shortness of breath, wheezing and stridor.    Cardiovascular: Negative for chest pain, palpitations and leg swelling.   Gastrointestinal: Negative for abdominal distention, abdominal pain, blood in stool, constipation, diarrhea, nausea, vomiting, GERD and indigestion.   Endocrine: Negative for polydipsia and polyuria.   Genitourinary: Negative for decreased urine volume, difficulty urinating, dysuria, flank pain, frequency, hematuria, urgency and urinary incontinence.   Musculoskeletal: Positive for arthralgias, back pain, gait problem and neck pain. Negative for joint swelling and myalgias.   Skin: Negative for color change, dry skin, rash and skin lesions.   Allergic/Immunologic: Negative for environmental allergies.   Neurological: Positive for weakness, memory problem and confusion. Negative for dizziness, seizures and speech difficulty.   Psychiatric/Behavioral: Positive for agitation and behavioral problems. Negative for dysphoric mood, hallucinations, self-injury, sleep disturbance, suicidal ideas,  depressed mood and stress. The patient is nervous/anxious.          PHYSICAL EXAMINATION:   VITAL SIGNS:   Vitals:    06/15/20 1306   BP: 124/74   Pulse: 81   Resp: 18   Temp: 96.4 °F (35.8 °C)   SpO2: 94%       Physical Exam   Constitutional: Vital signs are normal. She appears well-developed and well-nourished. She is sleeping.   HENT:   Head: Normocephalic.   Right Ear: External ear normal.   Left Ear: External ear normal.   Nose: Nose normal.   Eyes: Conjunctivae are normal.   Neck: Normal range of motion.   Cardiovascular: Normal rate, regular rhythm, normal heart sounds and intact distal pulses.   Pulmonary/Chest: Effort normal and breath sounds normal. No respiratory distress. She has no wheezes. She has no rales.   Abdominal: Soft. Bowel sounds are normal. She exhibits no distension and no mass. There is no tenderness. No hernia.   Musculoskeletal: She exhibits no edema.        Cervical back: She exhibits decreased range of motion.        Thoracic back: She exhibits decreased range of motion and pain.   Weakness BLE   Skin: Skin is warm and dry. No rash noted.   Psychiatric: Thought content is paranoid and delusional. Cognition and memory are impaired.   Confused conversation today   Nursing note and vitals reviewed.      RECORDS REVIEW:   I have reviewed and interpreted the following lab test results  obtained at the time of the visit today.     ASSESSMENT     Diagnoses and all orders for this visit:    Dementia with behavioral disturbance, unspecified dementia type (CMS/HCC)    Paranoid behavior (CMS/HCC)    Delusions (CMS/HCC)    Impaired mobility and ADLs        PLAN    Agitation/Dementia with paranoia and delusions  -Behaviors continue. She is having paranoid delusions. Abilify started 2 weeks ago, Buspar last week. Give Depakote Sprinkles 125 mg po bid, for better control of her behaviors.    Staff to continue supportive care for all ADLs.     Patient and staff encouraged to keep me informed of any  acute changes, lack of improvement, or any new concerning symptoms.      [x]  Discussed Patient in detail with nursing/staff, addressed all needs today.     [x]  Plan of Care Reviewed   [x]  PT/OT Reviewed   [x]  Order Changes  []  Discharge Plans Reviewed  [x]  Advance Directive on file with Nursing Home.   [x]  POA on file with Nursing Home.   [x]  Code Status listed: [x]  Full Code   []  DNR     “I confirm accuracy of unchanged data/findings which have been carried forward from previous visit, as well as I have updated appropriately those that have changed.”                Mita Joyce, APRN.

## 2020-06-25 ENCOUNTER — NURSING HOME (OUTPATIENT)
Dept: INTERNAL MEDICINE | Facility: CLINIC | Age: 80
End: 2020-06-25

## 2020-06-25 VITALS
DIASTOLIC BLOOD PRESSURE: 82 MMHG | WEIGHT: 205 LBS | BODY MASS INDEX: 38.73 KG/M2 | TEMPERATURE: 97.4 F | SYSTOLIC BLOOD PRESSURE: 124 MMHG | HEART RATE: 62 BPM | RESPIRATION RATE: 18 BRPM | OXYGEN SATURATION: 93 %

## 2020-06-25 DIAGNOSIS — G30.1 LATE ONSET ALZHEIMER'S DISEASE WITHOUT BEHAVIORAL DISTURBANCE (HCC): ICD-10-CM

## 2020-06-25 DIAGNOSIS — E11.9 TYPE 2 DIABETES MELLITUS WITHOUT COMPLICATION, WITHOUT LONG-TERM CURRENT USE OF INSULIN (HCC): ICD-10-CM

## 2020-06-25 DIAGNOSIS — E53.8 VITAMIN B 12 DEFICIENCY: ICD-10-CM

## 2020-06-25 DIAGNOSIS — E55.9 VITAMIN D DEFICIENCY: ICD-10-CM

## 2020-06-25 DIAGNOSIS — E78.2 MIXED HYPERLIPIDEMIA: ICD-10-CM

## 2020-06-25 DIAGNOSIS — F33.1 MODERATE EPISODE OF RECURRENT MAJOR DEPRESSIVE DISORDER (HCC): ICD-10-CM

## 2020-06-25 DIAGNOSIS — I10 BENIGN ESSENTIAL HYPERTENSION: ICD-10-CM

## 2020-06-25 DIAGNOSIS — F02.80 LATE ONSET ALZHEIMER'S DISEASE WITHOUT BEHAVIORAL DISTURBANCE (HCC): ICD-10-CM
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
## 2020-06-25 PROCEDURE — 99308 SBSQ NF CARE LOW MDM 20: CPT | Performed by: INTERNAL MEDICINE

## 2020-06-26 DIAGNOSIS — G89.29 CHRONIC LOW BACK PAIN WITH SCIATICA, SCIATICA LATERALITY UNSPECIFIED, UNSPECIFIED BACK PAIN LATERALITY: ICD-10-CM

## 2020-06-26 DIAGNOSIS — M54.40 CHRONIC LOW BACK PAIN WITH SCIATICA, SCIATICA LATERALITY UNSPECIFIED, UNSPECIFIED BACK PAIN LATERALITY: ICD-10-CM

## 2020-06-26 RX ORDER — HYDROCODONE BITARTRATE AND ACETAMINOPHEN 5; 325 MG/1; MG/1
TABLET ORAL
Qty: 120 TABLET | Refills: 0 | Status: SHIPPED | OUTPATIENT
Start: 2020-06-26 | End: 2020-08-04 | Stop reason: SDUPTHER

## 2020-06-26 RX ORDER — HYDROCODONE BITARTRATE AND ACETAMINOPHEN 5; 325 MG/1; MG/1
TABLET ORAL
Qty: 60 TABLET | Refills: 0 | Status: SHIPPED | OUTPATIENT
Start: 2020-06-26 | End: 2020-06-26 | Stop reason: SDUPTHER

## 2020-06-29 ENCOUNTER — NURSING HOME (OUTPATIENT)
Dept: FAMILY MEDICINE CLINIC | Facility: CLINIC | Age: 80
End: 2020-06-29

## 2020-06-29 VITALS
HEART RATE: 84 BPM | TEMPERATURE: 97.8 F | WEIGHT: 205 LBS | SYSTOLIC BLOOD PRESSURE: 132 MMHG | OXYGEN SATURATION: 96 % | RESPIRATION RATE: 20 BRPM | DIASTOLIC BLOOD PRESSURE: 76 MMHG | BODY MASS INDEX: 38.73 KG/M2

## 2020-06-29 DIAGNOSIS — R29.6 RECURRENT FALLS: ICD-10-CM

## 2020-06-29 DIAGNOSIS — F03.91 DEMENTIA WITH BEHAVIORAL DISTURBANCE, UNSPECIFIED DEMENTIA TYPE: ICD-10-CM

## 2020-06-29 DIAGNOSIS — Z78.9 IMPAIRED MOBILITY AND ADLS: ICD-10-CM

## 2020-06-29 DIAGNOSIS — F03.911 AGITATION DUE TO DEMENTIA (HCC): ICD-10-CM

## 2020-06-29 DIAGNOSIS — Z74.09 IMPAIRED MOBILITY AND ADLS: ICD-10-CM

## 2020-06-29 DIAGNOSIS — F41.9 ANXIETY: ICD-10-CM

## 2020-06-29 PROCEDURE — 99309 SBSQ NF CARE MODERATE MDM 30: CPT | Performed by: NURSE PRACTITIONER

## 2020-06-29 RX ORDER — LORAZEPAM 0.5 MG/1
TABLET ORAL
Qty: 60 TABLET | Refills: 0 | Status: SHIPPED | OUTPATIENT
Start: 2020-06-29

## 2020-06-29 NOTE — PROGRESS NOTES
Nursing Home Progress Note        Layo Arango DO ?  Mita Joyce, APRN ?  [address] [facility], [place] [address]  Phone: [phone]  Fax: [phone] Dorcas Duncan MD ?  Dc Fernandez DO [x]   [address] [place] [address]  Phone: [phone]  Fax: [phone]     PATIENT NAME: Danae Harmon                                                                          YOB: 1940           DATE OF SERVICE: 06/25/2020   FACILITY:  [] [place]   [] [place]   [] [facility]   [x] [facility]  []  [facility]  [] Other ______________________________________________________________________     CHIEF COMPLAINT:  Debility      HISTORY OF PRESENT ILLNESS:   Upon entering the patient's room, patient had just lost her balance and fell.  Apparently patient was going to her bed and trying to put on her pants as she fell, she fell on her bottom then more gently her head fell back.  She denied any significant pain and was actually trying to put her pants on even though she was laying on the floor.    Other than her fall today, mood and behaviors have been better.  She is eating and drinking well.  Resting comfortably at night.    PAST MEDICAL & SURGICAL HISTORY:   Past Medical History:   Diagnosis Date   • Colon polyp    • Osteoporosis    • Pain in thoracic spine    • Pneumonia    • UTI (urinary tract infection)       Past Surgical History:   Procedure Laterality Date   • BLADDER SURGERY  2000    bladder suspension   • CERVICAL LAMINECTOMY     • COLONOSCOPY     • COLOSTOMY  1979    temporary sigmoid   • HYSTERECTOMY           MEDICATIONS:  I have reviewed and reconciled the patients medication list in the patients chart at the skilled nursing facility today.      ALLERGIES:  Allergies   Allergen Reactions   • Lexapro [Escitalopram Oxalate] Nausea Only         SOCIAL HISTORY:  Social History     Socioeconomic History   • Marital status:      Spouse name: Not on file   •  Number of children: Not on file   • Years of education: Not on file   • Highest education level: Not on file   Tobacco Use   • Smoking status: Never Smoker   • Smokeless tobacco: Never Used   Substance and Sexual Activity   • Alcohol use: No   • Drug use: No   • Sexual activity: Defer       FAMILY HISTORY:  Family History   Problem Relation Age of Onset   • Blindness Mother    • Other Mother         arteriosclerotic cardiovascular disease    • Diabetes Mother    • Osteoporosis Mother    • Stroke Mother    • Cancer Brother    • Lung cancer Sister    • Osteoporosis Sister    • Stroke Sister    • Stroke Other        REVIEW OF SYSTEMS:  Review of Systems   Constitutional: Negative for chills, fatigue and fever.   HENT: Negative for congestion, ear pain, rhinorrhea, sinus pressure and sore throat.    Eyes: Negative for visual disturbance.   Respiratory: Negative for cough, chest tightness, shortness of breath and wheezing.    Cardiovascular: Negative for chest pain, palpitations and leg swelling.   Gastrointestinal: Negative for abdominal pain, blood in stool, constipation, diarrhea, nausea and vomiting.   Endocrine: Negative for polydipsia and polyuria.   Genitourinary: Negative for dysuria, hematuria and urgency.   Musculoskeletal: Negative for arthralgias and back pain.   Skin: Negative for rash.   Neurological: Negative for dizziness, light-headedness, numbness and headaches.   Psychiatric/Behavioral: Negative for dysphoric mood and sleep disturbance. The patient is not nervous/anxious.           PHYSICAL EXAMINATION:     VITAL SIGNS:  /82   Pulse 62   Temp 97.4 °F (36.3 °C)   Resp 18   Wt 93 kg (205 lb)   SpO2 93%   BMI 38.73 kg/m²     Physical Exam   Constitutional: She is oriented to person, place, and time. She appears well-developed and well-nourished.   Morbidly obese elderly female resting comfortably   HENT:   Head: Normocephalic and atraumatic.   Mouth/Throat: Oropharynx is clear and moist. No  oropharyngeal exudate.   Eyes: Pupils are equal, round, and reactive to light. Conjunctivae and EOM are normal. No scleral icterus.   Neck: Normal range of motion. Neck supple. No thyromegaly present.   Cardiovascular: Normal rate, regular rhythm and normal heart sounds. Exam reveals no gallop and no friction rub.   No murmur heard.  Pulmonary/Chest: Effort normal and breath sounds normal. No respiratory distress. She has no wheezes.   Abdominal: Soft. Bowel sounds are normal. She exhibits no distension. There is no tenderness.   Musculoskeletal: Normal range of motion.   Lymphadenopathy:     She has no cervical adenopathy.   Neurological: She is alert and oriented to person, place, and time.   Skin: Skin is warm and dry. No rash noted.   Psychiatric: She has a normal mood and affect. Her behavior is normal.   Nursing note and vitals reviewed.  .    RECORDS REVIEW:       ASSESSMENT     Diagnoses and all orders for this visit:    Fall, subsequent encounter    Late onset Alzheimer's disease without behavioral disturbance (CMS/HCC)    Moderate episode of recurrent major depressive disorder (CMS/[facility])    Mixed hyperlipidemia    Benign essential hypertension    Vitamin D deficiency    Vitamin B 12 deficiency    Type 2 diabetes mellitus without complication, without long-term current use of insulin (CMS/HCC)        PLAN    79-year-old white female admitted to the [facility] for long-term care and management.    Alzheimer's disease without behavioral disturbances  -   Patient has been stable on current medication regimen.    -Continue supportive care in the nursing facility for ADLs.    Fall backward  -   Patient was examined carefully.  No evidence of bruising or injury.  Nurses will monitor the patient per protocol for neuro checks.    Bladder spasms/urinary frequency  -Cont Myrbetriq  -Continue Diflucan when needed for vaginal yeast infections.    Major depressive disorder  -Stable on current  medications.    Mixed hyperlipidemia  -Continue statin.    Essential hypertension  -Controlled on current medications.    Vitamin D and vitamin B12 deficiencies  -Stable on supplements.    Osteoporosis  -Stable on current medications.  No recent fractures.    Type 2 diabetes mellitus  -Monitor glucose levels in nursing facility  -Glucose has been stable with current medication regimen.      [x]  Discussed Patient in detail with nursing/staff, addressed all needs today.     [x]  Plan of Care Reviewed   []  PT/OT Reviewed   []  Order Changes  []  Discharge Plans Reviewed  [x]  Advance Directive on file with Nursing Home.   [x]  POA on file with Nursing Home.    [x]  Code Status listed and reviewed.          Dc Fernandez DO.  6/29/2020

## 2020-06-30 NOTE — PROGRESS NOTES
Nursing Home Follow Up Note      Layo Arango DO []   WILLIAM Alfaro [x]  [address] [place] [address]  Phone: [phone]  Fax: [phone] Dorcas Duncan MD []    Dc Fernandez DO []   [address] [place] [address]  Phone: [phone]  Fax: [phone]     PATIENT NAME: Danae Harmon                                                                          YOB: 1940           DATE OF SERVICE: 06/29/2020  FACILITY:  [][place]   [] [place]   [] [facility]   [x] [facility]   [] Other ______________________________________________________________________      CHIEF COMPLAINT:    Follow up fall    HISTORY OF PRESENT ILLNESS:      Per nursing, patient had a non witnessed fall, on 6/28. Nurse went in and patient was sitting in the floor, leaning on a pillow, against her bedside table. Per patient, she did not fall, she just fall, just decided she wanted to sit in the floor, against the bed side table. She had no reported or noted injuries, and denies any injuries now. Per nursing, her anxious, restless behaviors continue, and family reports that this is how she was, prior to her admission there as well.    PAST MEDICAL & SURGICAL HISTORY:   Past Medical History:   Diagnosis Date   • Colon polyp    • Osteoporosis    • Pain in thoracic spine    • Pneumonia    • UTI (urinary tract infection)       Past Surgical History:   Procedure Laterality Date   • BLADDER SURGERY  2000    bladder suspension   • CERVICAL LAMINECTOMY     • COLONOSCOPY     • COLOSTOMY  1979    temporary sigmoid   • HYSTERECTOMY           MEDICATIONS:  I have reviewed and reconciled the patients medication list in the patients chart at the skilled nursing facility today.      ALLERGIES:    Allergies   Allergen Reactions   • Lexapro [Escitalopram Oxalate] Nausea Only         SOCIAL HISTORY:    Social History     Socioeconomic History   • Marital status:      Spouse name: Not on file   •  Number of children: Not on file   • Years of education: Not on file   • Highest education level: Not on file   Tobacco Use   • Smoking status: Never Smoker   • Smokeless tobacco: Never Used   Substance and Sexual Activity   • Alcohol use: No   • Drug use: No   • Sexual activity: Defer       FAMILY HISTORY:    Family History   Problem Relation Age of Onset   • Blindness Mother    • Other Mother         arteriosclerotic cardiovascular disease    • Diabetes Mother    • Osteoporosis Mother    • Stroke Mother    • Cancer Brother    • Lung cancer Sister    • Osteoporosis Sister    • Stroke Sister    • Stroke Other        REVIEW OF SYSTEMS:    Review of Systems   Reason unable to perform ROS: ROS per nursing and patient.   Constitutional: Negative for activity change, appetite change, chills, diaphoresis, fatigue, fever, unexpected weight gain and unexpected weight loss.   HENT: Negative for congestion, ear pain, mouth sores, nosebleeds, postnasal drip, rhinorrhea, sinus pressure, sneezing, sore throat, swollen glands and trouble swallowing.    Eyes: Negative for blurred vision, pain, discharge, redness, itching and visual disturbance.   Respiratory: Negative for apnea, cough, choking, chest tightness, shortness of breath, wheezing and stridor.    Cardiovascular: Negative for chest pain, palpitations and leg swelling.   Gastrointestinal: Negative for abdominal distention, abdominal pain, blood in stool, constipation, diarrhea, nausea, vomiting, GERD and indigestion.   Endocrine: Negative for polydipsia and polyuria.   Genitourinary: Negative for decreased urine volume, difficulty urinating, dysuria, flank pain, frequency, hematuria, urgency and urinary incontinence.   Musculoskeletal: Positive for arthralgias, back pain and gait problem. Negative for joint swelling, myalgias and neck pain.        Chronic   Skin: Negative for color change, dry skin, rash and skin lesions.   Allergic/Immunologic: Negative for environmental  allergies.   Neurological: Positive for weakness, memory problem and confusion. Negative for dizziness, seizures and speech difficulty.   Psychiatric/Behavioral: Positive for agitation and behavioral problems. Negative for dysphoric mood, hallucinations, self-injury, sleep disturbance, suicidal ideas, depressed mood and stress. The patient is nervous/anxious.          PHYSICAL EXAMINATION:   VITAL SIGNS:   Vitals:    06/29/20 1052   BP: 132/76   Pulse: 84   Resp: 20   Temp: 97.8 °F (36.6 °C)   SpO2: 96%   Weight: 93 kg (205 lb)       Physical Exam   Constitutional: Vital signs are normal. She appears well-developed and well-nourished. She is sleeping.   HENT:   Head: Normocephalic.   Right Ear: External ear normal.   Left Ear: External ear normal.   Nose: Nose normal.   Eyes: Conjunctivae are normal.   Neck: Normal range of motion.   Cardiovascular: Normal rate, regular rhythm, normal heart sounds and intact distal pulses.   Pulmonary/Chest: Effort normal and breath sounds normal. No respiratory distress. She has no wheezes. She has no rales.   Abdominal: Soft. Bowel sounds are normal. She exhibits no distension and no mass. There is no tenderness. No hernia.   Musculoskeletal: She exhibits no edema.        Thoracic back: She exhibits decreased range of motion and pain.   Weakness BLE   Skin: Skin is warm and dry. No rash noted.   Psychiatric: Thought content is paranoid and delusional. Cognition and memory are impaired.   Confused conversation today   Nursing note and vitals reviewed.      RECORDS REVIEW:   I have reviewed and interpreted the following lab test results  obtained at the time of the visit today.     ASSESSMENT     Diagnoses and all orders for this visit:    Recurrent falls    Agitation due to dementia (CMS/HCC)  -     LORazepam (Ativan) 0.5 MG tablet; Give 0.25 mg qhs scheduled, and daily prn anxiety    Dementia with behavioral disturbance, unspecified dementia type (CMS/HCC)    Anxiety  -      LORazepam (Ativan) 0.5 MG tablet; Give 0.25 mg qhs scheduled, and daily prn anxiety    Impaired mobility and ADLs        PLAN    Fall  -Patient had unwitnessed fall, on 6/28, and per patient, she did not fall, just sat in the floor. No reported or noted injuries. She has bed and chair alarm and her bed is as the lowest level to the ground, and mat is on sides of bed. Nursing to monitor frequently.    Agitation/Dementia with behaviors/anxiety  -Behaviors continue. Increase Abilify to 10 mg daily. Increase Depakote Sprinkles to 125 mg tid. Increase Buspar to 5 mg at 0800 and 1600. Give Lorazepam 0.25 mg qhs and daily prn.     Staff to continue supportive care for all ADLs.     Patient and staff encouraged to keep me informed of any acute changes, lack of improvement, or any new concerning symptoms.      [x]  Discussed Patient in detail with nursing/staff, addressed all needs today.     [x]  Plan of Care Reviewed   [x]  PT/OT Reviewed   [x]  Order Changes  []  Discharge Plans Reviewed  [x]  Advance Directive on file with Nursing Home.   [x]  POA on file with Nursing Home.   [x]  Code Status listed: [x]  Full Code   []  DNR       “I confirm accuracy of unchanged data/findings which have been carried forward from previous visit, as well as I have updated appropriately those that have changed.”                Mita Joyce, APRN.

## 2020-07-02 ENCOUNTER — NURSING HOME (OUTPATIENT)
Dept: FAMILY MEDICINE CLINIC | Facility: CLINIC | Age: 80
End: 2020-07-02

## 2020-07-02 VITALS
DIASTOLIC BLOOD PRESSURE: 66 MMHG | SYSTOLIC BLOOD PRESSURE: 126 MMHG | BODY MASS INDEX: 38.73 KG/M2 | TEMPERATURE: 97.3 F | RESPIRATION RATE: 20 BRPM | WEIGHT: 205 LBS | HEART RATE: 66 BPM

## 2020-07-02 DIAGNOSIS — Z78.9 IMPAIRED MOBILITY AND ADLS: ICD-10-CM

## 2020-07-02 DIAGNOSIS — R29.6 MULTIPLE FALLS: ICD-10-CM

## 2020-07-02 DIAGNOSIS — Z74.09 IMPAIRED MOBILITY AND ADLS: ICD-10-CM

## 2020-07-02 DIAGNOSIS — F41.9 ANXIETY: ICD-10-CM

## 2020-07-02 DIAGNOSIS — F03.91 DEMENTIA WITH BEHAVIORAL DISTURBANCE, UNSPECIFIED DEMENTIA TYPE: ICD-10-CM

## 2020-07-02 DIAGNOSIS — R29.6 RECURRENT FALLS: ICD-10-CM

## 2020-07-02 PROCEDURE — 99309 SBSQ NF CARE MODERATE MDM 30: CPT | Performed by: NURSE PRACTITIONER

## 2020-07-03 NOTE — PROGRESS NOTES
Nursing Home Follow Up Note      Layo Arango DO []   WILLIAM Alfaro [x]  [address] [place] [address]  Phone: [phone]  Fax: [phone] Dorcas Duncan MD []    Dc Fernandez DO []   [address] [place] [address]  Phone: [phone]  Fax: [phone]     PATIENT NAME: Danae Harmon                                                                          YOB: 1940           DATE OF SERVICE: 07/2/2020  FACILITY:  [][place]   [] [place]   [] [facility]   [x] [facility]   [] Other ______________________________________________________________________      CHIEF COMPLAINT:    Follow up fall    HISTORY OF PRESENT ILLNESS:      Per nursing, patient was very agitated during last evening and night, and was found in the floor, on her beside mat, 6 times. She continued to try to get out of the bed, and when getting out, would fall on the mats, placed at her bedside. She had no reported or noted injuries. Behaviors continue to worsen. Had nurse given her prn Haldol, which finally calmed her, and she is calm today. Had UA checked and results were negative.     PAST MEDICAL & SURGICAL HISTORY:   Past Medical History:   Diagnosis Date   • Colon polyp    • Osteoporosis    • Pain in thoracic spine    • Pneumonia    • UTI (urinary tract infection)       Past Surgical History:   Procedure Laterality Date   • BLADDER SURGERY  2000    bladder suspension   • CERVICAL LAMINECTOMY     • COLONOSCOPY     • COLOSTOMY  1979    temporary sigmoid   • HYSTERECTOMY           MEDICATIONS:  I have reviewed and reconciled the patients medication list in the patients chart at the skilled nursing facility today.      ALLERGIES:    Allergies   Allergen Reactions   • Lexapro [Escitalopram Oxalate] Nausea Only         SOCIAL HISTORY:    Social History     Socioeconomic History   • Marital status:      Spouse name: Not on file   • Number of children: Not on file   • Years of education:  Not on file   • Highest education level: Not on file   Tobacco Use   • Smoking status: Never Smoker   • Smokeless tobacco: Never Used   Substance and Sexual Activity   • Alcohol use: No   • Drug use: No   • Sexual activity: Defer       FAMILY HISTORY:    Family History   Problem Relation Age of Onset   • Blindness Mother    • Other Mother         arteriosclerotic cardiovascular disease    • Diabetes Mother    • Osteoporosis Mother    • Stroke Mother    • Cancer Brother    • Lung cancer Sister    • Osteoporosis Sister    • Stroke Sister    • Stroke Other        REVIEW OF SYSTEMS:    Review of Systems   Reason unable to perform ROS: ROS per nursing and patient.   Constitutional: Negative for activity change, appetite change, chills, diaphoresis, fatigue, fever, unexpected weight gain and unexpected weight loss.   HENT: Negative for congestion, ear pain, mouth sores, nosebleeds, postnasal drip, rhinorrhea, sinus pressure, sneezing, sore throat, swollen glands and trouble swallowing.    Eyes: Negative for blurred vision, pain, discharge, redness, itching and visual disturbance.   Respiratory: Negative for apnea, cough, choking, chest tightness, shortness of breath, wheezing and stridor.    Cardiovascular: Negative for chest pain, palpitations and leg swelling.   Gastrointestinal: Negative for abdominal distention, abdominal pain, blood in stool, constipation, diarrhea, nausea, vomiting, GERD and indigestion.   Endocrine: Negative for polydipsia and polyuria.   Genitourinary: Negative for decreased urine volume, difficulty urinating, dysuria, flank pain, frequency, hematuria, urgency and urinary incontinence.   Musculoskeletal: Positive for arthralgias, back pain and gait problem. Negative for joint swelling, myalgias and neck pain.        Chronic   Skin: Negative for color change, dry skin, rash and skin lesions.   Allergic/Immunologic: Negative for environmental allergies.   Neurological: Positive for weakness, memory  problem and confusion. Negative for dizziness, seizures and speech difficulty.   Psychiatric/Behavioral: Positive for agitation and behavioral problems. Negative for dysphoric mood, hallucinations, self-injury, sleep disturbance, suicidal ideas, depressed mood and stress. The patient is nervous/anxious.          PHYSICAL EXAMINATION:   VITAL SIGNS:   Vitals:    07/02/20 1350   BP: 126/66   Pulse: 66   Resp: 20   Temp: 97.3 °F (36.3 °C)   Weight: 93 kg (205 lb)       Physical Exam   Constitutional: Vital signs are normal. She appears well-developed and well-nourished. She appears lethargic. She is sleeping.   HENT:   Head: Normocephalic.   Right Ear: External ear normal.   Left Ear: External ear normal.   Nose: Nose normal.   Eyes: Conjunctivae are normal.   Neck: Normal range of motion.   Cardiovascular: Normal rate, regular rhythm, normal heart sounds and intact distal pulses.   Pulmonary/Chest: Effort normal and breath sounds normal. No respiratory distress. She has no wheezes. She has no rales.   Abdominal: Soft. Bowel sounds are normal. She exhibits no distension and no mass. There is no tenderness. No hernia.   Musculoskeletal: She exhibits no edema.        Thoracic back: She exhibits decreased range of motion and pain.   Weakness BLE   Neurological: She appears lethargic.   Confused conversation   Skin: Skin is warm and dry. No rash noted.   Psychiatric: Thought content is paranoid and delusional. Cognition and memory are impaired.   Confused conversation today   Nursing note and vitals reviewed.      RECORDS REVIEW:   I have reviewed and interpreted the following lab test results  obtained at the time of the visit today.     ASSESSMENT     Diagnoses and all orders for this visit:    Multiple falls    Recurrent falls    Anxiety    Impaired mobility and ADLs    Dementia with behavioral disturbance, unspecified dementia type (CMS/[facility])        PLAN    Falls  -Patient had multiple, 6, unwitnessed falls, to her  bedside mat, 7/1 and 7/2. No reported or noted injuries. She has bed and chair alarm and her bed is as the lowest level to the ground, and mat is on sides of bed. Nursing to monitor frequently.    Agitation/Dementia with behaviors/anxiety  -Behaviors continue. Stop Abilify, start Zyprexa 5 mg daily, then increase to 5 mg bid in 1 week. Contiue Depakote Sprinkles to 125 mg tid.Continue Buspar to 5 mg at 0800 and 1600. Give Lorazepam 0.25 mg qhs and daily prn. Will c/t monitor.     Staff to continue supportive care for all ADLs.     Patient and staff encouraged to keep me informed of any acute changes, lack of improvement, or any new concerning symptoms.      [x]  Discussed Patient in detail with nursing/staff, addressed all needs today.     [x]  Plan of Care Reviewed   [x]  PT/OT Reviewed   [x]  Order Changes  []  Discharge Plans Reviewed  [x]  Advance Directive on file with Nursing Home.   [x]  POA on file with Nursing Home.   [x]  Code Status listed: [x]  Full Code   []  DNR       “I confirm accuracy of unchanged data/findings which have been carried forward from previous visit, as well as I have updated appropriately those that have changed.”                  Mita Joyce, APRN.

## 2020-07-06 ENCOUNTER — NURSING HOME (OUTPATIENT)
Dept: FAMILY MEDICINE CLINIC | Facility: CLINIC | Age: 80
End: 2020-07-06

## 2020-07-06 VITALS
OXYGEN SATURATION: 95 % | HEART RATE: 84 BPM | BODY MASS INDEX: 38.73 KG/M2 | SYSTOLIC BLOOD PRESSURE: 120 MMHG | DIASTOLIC BLOOD PRESSURE: 76 MMHG | WEIGHT: 205 LBS | TEMPERATURE: 97.8 F | RESPIRATION RATE: 18 BRPM

## 2020-07-06 DIAGNOSIS — R29.6 RECURRENT FALLS: ICD-10-CM

## 2020-07-06 DIAGNOSIS — R45.1 RESTLESSNESS AND AGITATION: ICD-10-CM

## 2020-07-06 DIAGNOSIS — F03.91 DEMENTIA WITH BEHAVIORAL DISTURBANCE, UNSPECIFIED DEMENTIA TYPE: ICD-10-CM

## 2020-07-06 DIAGNOSIS — Z74.09 IMPAIRED MOBILITY AND ADLS: ICD-10-CM

## 2020-07-06 DIAGNOSIS — Z78.9 IMPAIRED MOBILITY AND ADLS: ICD-10-CM
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
## 2020-07-06 PROCEDURE — 99309 SBSQ NF CARE MODERATE MDM 30: CPT | Performed by: NURSE PRACTITIONER

## 2020-07-07 NOTE — PROGRESS NOTES
Nursing Home Follow Up Note      Layo Arango DO []   WILLIAM Alfaro [x]  [address] [place] [address]  Phone: [phone]  Fax: [phone] Dorcas Duncan MD []    Dc Fernandez DO []   [address] [place] [address]  Phone: [phone]  Fax: [phone]     PATIENT NAME: Danae Harmon                                                                          YOB: 1940           DATE OF SERVICE: 07/6/2020  FACILITY:  [][place]   [] [facility]   [] [facility]   [x] [facility]   [] Other ______________________________________________________________________      CHIEF COMPLAINT:    Follow up fall    HISTORY OF PRESENT ILLNESS:      Patient had fall last night, at 1945, with no injuries noted or reported. She attempted to get up out of bed, and fell on to her mat. Patient had Abilify stopped and Zyprexa started last Friday, 7/3, for worsening behaviors and continuing falls. Psych consulted on her Saturday, and recommended to stop Wellbutrin and Buspar, and to increase her prn Ativan to 1 mg tid prn. Per nursing, she has had better behaviors today. She is resting and pleasant during visit today.    PAST MEDICAL & SURGICAL HISTORY:   Past Medical History:   Diagnosis Date   • Colon polyp    • Osteoporosis    • Pain in thoracic spine    • Pneumonia    • UTI (urinary tract infection)       Past Surgical History:   Procedure Laterality Date   • BLADDER SURGERY  2000    bladder suspension   • CERVICAL LAMINECTOMY     • COLONOSCOPY     • COLOSTOMY  1979    temporary sigmoid   • HYSTERECTOMY           MEDICATIONS:  I have reviewed and reconciled the patients medication list in the patients chart at the skilled nursing facility today.      ALLERGIES:    Allergies   Allergen Reactions   • Lexapro [Escitalopram Oxalate] Nausea Only         SOCIAL HISTORY:    Social History     Socioeconomic History   • Marital status:      Spouse name: Not on file   • Number of  children: Not on file   • Years of education: Not on file   • Highest education level: Not on file   Tobacco Use   • Smoking status: Never Smoker   • Smokeless tobacco: Never Used   Substance and Sexual Activity   • Alcohol use: No   • Drug use: No   • Sexual activity: Defer       FAMILY HISTORY:    Family History   Problem Relation Age of Onset   • Blindness Mother    • Other Mother         arteriosclerotic cardiovascular disease    • Diabetes Mother    • Osteoporosis Mother    • Stroke Mother    • Cancer Brother    • Lung cancer Sister    • Osteoporosis Sister    • Stroke Sister    • Stroke Other        REVIEW OF SYSTEMS:    Review of Systems   Reason unable to perform ROS: ROS per nursing and patient.   Constitutional: Negative for activity change, appetite change, chills, diaphoresis, fatigue, fever, unexpected weight gain and unexpected weight loss.   HENT: Negative for congestion, ear pain, mouth sores, nosebleeds, postnasal drip, rhinorrhea, sinus pressure, sneezing, sore throat, swollen glands and trouble swallowing.    Eyes: Negative for blurred vision, pain, discharge, redness, itching and visual disturbance.   Respiratory: Negative for apnea, cough, choking, chest tightness, shortness of breath, wheezing and stridor.    Cardiovascular: Negative for chest pain, palpitations and leg swelling.   Gastrointestinal: Negative for abdominal distention, abdominal pain, blood in stool, constipation, diarrhea, nausea, vomiting, GERD and indigestion.   Endocrine: Negative for polydipsia and polyuria.   Genitourinary: Negative for decreased urine volume, difficulty urinating, dysuria, flank pain, frequency, hematuria, urgency and urinary incontinence.   Musculoskeletal: Positive for arthralgias, back pain and gait problem. Negative for joint swelling, myalgias and neck pain.        Chronic   Skin: Negative for color change, dry skin, rash and skin lesions.   Allergic/Immunologic: Negative for environmental allergies.    Neurological: Positive for weakness, memory problem and confusion. Negative for dizziness, seizures and speech difficulty.   Psychiatric/Behavioral: Positive for agitation and behavioral problems. Negative for dysphoric mood, hallucinations, self-injury, sleep disturbance, suicidal ideas, depressed mood and stress. The patient is nervous/anxious.         Behaviors have improved today         PHYSICAL EXAMINATION:   VITAL SIGNS:   Vitals:    07/06/20 1312   BP: 120/76   Pulse: 84   Resp: 18   Temp: 97.8 °F (36.6 °C)   SpO2: 95%   Weight: 93 kg (205 lb)       Physical Exam   Constitutional: Vital signs are normal. She appears well-developed and well-nourished. She appears lethargic. She is sleeping.   HENT:   Head: Normocephalic.   Right Ear: External ear normal.   Left Ear: External ear normal.   Nose: Nose normal.   Eyes: Conjunctivae are normal.   Neck: Normal range of motion.   Cardiovascular: Normal rate, regular rhythm, normal heart sounds and intact distal pulses.   Pulmonary/Chest: Effort normal and breath sounds normal. No respiratory distress. She has no wheezes. She has no rales.   Abdominal: Soft. Bowel sounds are normal. She exhibits no distension and no mass. There is no tenderness. No hernia.   Musculoskeletal: She exhibits no edema.        Thoracic back: She exhibits decreased range of motion and pain.   Weakness BLE   Neurological: She appears lethargic.   Confused conversation   Skin: Skin is warm and dry. No rash noted.   Psychiatric: Thought content is paranoid and delusional. Cognition and memory are impaired.   Confused conversation today   Nursing note and vitals reviewed.      RECORDS REVIEW:   I have reviewed and interpreted the following lab test results  obtained at the time of the visit today.     ASSESSMENT     Diagnoses and all orders for this visit:    Restlessness and agitation    Dementia with behavioral disturbance, unspecified dementia type (CMS/HCC)    Fall, initial  encounter    Recurrent falls    Impaired mobility and ADLs        PLAN    Fall/Agitation/Dementia with behaviors/anxiety  -Patient with another non injury fall, related to worsening dementia and agitation/restlessness. Medications adjusted, again, on Friday and Saturday, and her behaviors have been improved today. Will c/t monitor.     Staff to continue supportive care for all ADLs.     Patient and staff encouraged to keep me informed of any acute changes, lack of improvement, or any new concerning symptoms.      [x]  Discussed Patient in detail with nursing/staff, addressed all needs today.     [x]  Plan of Care Reviewed   [x]  PT/OT Reviewed   [x]  Order Changes  []  Discharge Plans Reviewed  [x]  Advance Directive on file with Nursing Home.   [x]  POA on file with Nursing Home.   [x]  Code Status listed: [x]  Full Code   []  DNR     “I confirm accuracy of unchanged data/findings which have been carried forward from previous visit, as well as I have updated appropriately those that have changed.”                    Mita Joyce, APRN.

## 2020-07-31 ENCOUNTER — NURSING HOME (OUTPATIENT)
Dept: FAMILY MEDICINE CLINIC | Facility: CLINIC | Age: 80
End: 2020-07-31

## 2020-07-31 VITALS
OXYGEN SATURATION: 93 % | TEMPERATURE: 97.9 F | RESPIRATION RATE: 18 BRPM | BODY MASS INDEX: 45.73 KG/M2 | SYSTOLIC BLOOD PRESSURE: 121 MMHG | WEIGHT: 242 LBS | HEART RATE: 74 BPM | DIASTOLIC BLOOD PRESSURE: 80 MMHG

## 2020-07-31 DIAGNOSIS — F03.918: ICD-10-CM

## 2020-07-31 DIAGNOSIS — Z74.09 IMPAIRED MOBILITY AND ADLS: ICD-10-CM

## 2020-07-31 DIAGNOSIS — Z78.9 IMPAIRED MOBILITY AND ADLS: ICD-10-CM

## 2020-07-31 DIAGNOSIS — Z09 HOSPITAL DISCHARGE FOLLOW-UP: ICD-10-CM

## 2020-07-31 DIAGNOSIS — R45.1 RESTLESSNESS AND AGITATION: ICD-10-CM

## 2020-07-31 DIAGNOSIS — F03.91 DEMENTIA WITH BEHAVIORAL DISTURBANCE, UNSPECIFIED DEMENTIA TYPE: ICD-10-CM

## 2020-07-31 PROCEDURE — 99309 SBSQ NF CARE MODERATE MDM 30: CPT | Performed by: NURSE PRACTITIONER

## 2020-08-01 NOTE — PROGRESS NOTES
Nursing Home Follow Up Note      Layo Arango DO []   WILLIAM Alfaro [x]  [address] [place] [address]  Phone: [phone]  Fax: [phone] Dorcas Duncan MD []    Dc Fernandez DO []   [address] [place] [address]  Phone: [phone]  Fax: [phone]     PATIENT NAME: Danae Harmon                                                                          YOB: 1940           DATE OF SERVICE: 07/31/2020  FACILITY:  [][facility]   [] [facility]   [] [facility]   [x] [facility]   [] Other ______________________________________________________________________      CHIEF COMPLAINT:    Follow up visit, following admission to [facility].    HISTORY OF PRESENT ILLNESS:   Patient readmitted to the facility yesterday, following an extended stay at [facility], behavioral health facility, for increased behaviors.  Per discharge summary patient was placed on Geodon IM injections every 6 hours and this eventually controlled her behaviors.  She was admitted back to facility yesterday but her behaviors continue, just as prior to her admission to [facility].  She will not stay and her bed, or in a chair wants to be on a mattress in the floor at all times, even to eat.  Nursing have placed several mattresses in the floor, around the room for patient to lie down in the floor and she moves around from mattress to mattress.  Nursing staff has to get in the floor and sit on the mattress with her, hold her up to help her ate her meals.  She is eating okay, and she will take her medicines, but will not comply with anything if she is not in the floor.      PAST MEDICAL & SURGICAL HISTORY:   Past Medical History:   Diagnosis Date   • Colon polyp    • Osteoporosis    • Pain in thoracic spine    • Pneumonia    • UTI (urinary tract infection)       Past Surgical History:   Procedure Laterality Date   • BLADDER SURGERY  2000     bladder suspension   • CERVICAL LAMINECTOMY     • COLONOSCOPY     • COLOSTOMY  1979    temporary sigmoid   • HYSTERECTOMY           MEDICATIONS:  I have reviewed and reconciled the patients medication list in the patients chart at the skilled nursing facility today.      ALLERGIES:    Allergies   Allergen Reactions   • Lexapro [Escitalopram Oxalate] Nausea Only         SOCIAL HISTORY:    Social History     Socioeconomic History   • Marital status:      Spouse name: Not on file   • Number of children: Not on file   • Years of education: Not on file   • Highest education level: Not on file   Tobacco Use   • Smoking status: Never Smoker   • Smokeless tobacco: Never Used   Substance and Sexual Activity   • Alcohol use: No   • Drug use: No   • Sexual activity: Defer       FAMILY HISTORY:    Family History   Problem Relation Age of Onset   • Blindness Mother    • Other Mother         arteriosclerotic cardiovascular disease    • Diabetes Mother    • Osteoporosis Mother    • Stroke Mother    • Cancer Brother    • Lung cancer Sister    • Osteoporosis Sister    • Stroke Sister    • Stroke Other        REVIEW OF SYSTEMS:    Review of Systems   Reason unable to perform ROS: ROS per nursing    Constitutional: Positive for activity change. Negative for appetite change, chills, diaphoresis, fatigue, fever, unexpected weight gain and unexpected weight loss.   HENT: Negative for congestion, mouth sores, nosebleeds, postnasal drip, rhinorrhea, sneezing, sore throat and trouble swallowing.    Eyes: Negative for pain, discharge, redness and itching.   Respiratory: Negative for apnea, cough, choking, shortness of breath, wheezing and stridor.    Cardiovascular: Negative for palpitations and leg swelling.   Gastrointestinal: Negative for abdominal distention, abdominal pain, blood in stool, constipation, diarrhea, nausea and vomiting.   Endocrine: Negative for polydipsia and polyuria.   Genitourinary: Positive for urinary  incontinence. Negative for decreased urine volume, difficulty urinating, frequency, hematuria and urgency.   Musculoskeletal: Positive for arthralgias, back pain and gait problem. Negative for joint swelling, myalgias and neck pain.        Chronic   Skin: Negative for color change, dry skin, rash and skin lesions.   Allergic/Immunologic: Negative for environmental allergies.   Neurological: Positive for speech difficulty, weakness, memory problem and confusion. Negative for seizures.   Psychiatric/Behavioral: Positive for agitation and behavioral problems. Negative for dysphoric mood, hallucinations, self-injury, sleep disturbance, suicidal ideas, depressed mood and stress. The patient is nervous/anxious.          PHYSICAL EXAMINATION:   VITAL SIGNS:   Vitals:    07/31/20 1458   BP: 121/80   Pulse: 74   Resp: 18   Temp: 97.9 °F (36.6 °C)   SpO2: 93%   Weight: 110 kg (242 lb)       Physical Exam   Constitutional: Vital signs are normal. She appears well-developed and well-nourished. She appears lethargic. She is sleeping.   HENT:   Head: Normocephalic.   Right Ear: External ear normal.   Left Ear: External ear normal.   Nose: Nose normal.   Eyes: Conjunctivae are normal.   Neck: Normal range of motion.   Cardiovascular: Normal rate, regular rhythm, normal heart sounds and intact distal pulses.   Pulmonary/Chest: Effort normal and breath sounds normal. No respiratory distress. She has no wheezes. She has no rales.   Abdominal: Soft. Bowel sounds are normal. She exhibits no distension and no mass. There is no tenderness. No hernia.   Musculoskeletal: She exhibits no edema.        Thoracic back: She exhibits decreased range of motion and pain.   Weakness BLE   Neurological: She appears lethargic.   Confused conversation   Skin: Skin is warm and dry. No rash noted.   Psychiatric: Her affect is inappropriate. Her speech is tangential. She is hyperactive. Cognition and memory are impaired.   Confused conversation     Nursing note and vitals reviewed.      RECORDS REVIEW:   I have reviewed and interpreted the following lab test results  obtained at the time of the visit today.     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  Her behaviors have not improved any, in fact are slightly worse.  Patient will not stay in her bed or in a chair, crawls around on the floor at all times.  There are mattresses spread across the floor for her to move about freely on which is working out at this time because she is in a private room.  The facility is currently working on getting her admitted to a mental health facility, long-term.  Nursing to continue medications as directed, and give Geodon IM as needed behaviors.  We will continue to monitor.    Staff to continue supportive care for all ADLs.     Nuraing encouraged to keep me informed of any acute changes, lack of improvement, or any new concerning symptoms.      [x]  Discussed Patient in detail with nursing/staff, addressed all needs today.     [x]  Plan of Care Reviewed   [x]  PT/OT Reviewed   [x]  Order Changes  []  Discharge Plans Reviewed  [x]  Advance Directive on file with Nursing Home.   [x]  POA on file with Nursing Home.   [x]  Code Status listed: [x]  Full Code   []  DNR       “I confirm accuracy of unchanged data/findings which have been carried forward from previous visit, as well as I have updated appropriately those that have changed.”                      Mita Joyce, APRN.

## 2020-08-03 ENCOUNTER — NURSING HOME (OUTPATIENT)
Dept: FAMILY MEDICINE CLINIC | Facility: CLINIC | Age: 80
End: 2020-08-03

## 2020-08-03 VITALS
SYSTOLIC BLOOD PRESSURE: 134 MMHG | DIASTOLIC BLOOD PRESSURE: 72 MMHG | HEART RATE: 70 BPM | WEIGHT: 242.4 LBS | OXYGEN SATURATION: 94 % | BODY MASS INDEX: 45.8 KG/M2 | TEMPERATURE: 97.6 F | RESPIRATION RATE: 18 BRPM

## 2020-08-03 DIAGNOSIS — Z78.9 IMPAIRED MOBILITY AND ADLS: ICD-10-CM

## 2020-08-03 DIAGNOSIS — R45.1 RESTLESSNESS: ICD-10-CM

## 2020-08-03 DIAGNOSIS — Z74.09 IMPAIRED MOBILITY AND ADLS: ICD-10-CM

## 2020-08-03 DIAGNOSIS — F03.91 DEMENTIA WITH BEHAVIORAL DISTURBANCE, UNSPECIFIED DEMENTIA TYPE: ICD-10-CM

## 2020-08-03 DIAGNOSIS — F29 PSYCHOSIS, UNSPECIFIED PSYCHOSIS TYPE (HCC): Primary | ICD-10-CM

## 2020-08-03 PROCEDURE — 99309 SBSQ NF CARE MODERATE MDM 30: CPT | Performed by: NURSE PRACTITIONER

## 2020-08-04 ENCOUNTER — TRANSCRIBE ORDERS (OUTPATIENT)
Dept: FAMILY MEDICINE CLINIC | Facility: CLINIC | Age: 80
End: 2020-08-04

## 2020-08-04 DIAGNOSIS — M54.40 CHRONIC LOW BACK PAIN WITH SCIATICA, SCIATICA LATERALITY UNSPECIFIED, UNSPECIFIED BACK PAIN LATERALITY: ICD-10-CM

## 2020-08-04 DIAGNOSIS — R25.9 PARKINSONIAN FEATURES: Primary | ICD-10-CM

## 2020-08-04 DIAGNOSIS — G89.29 CHRONIC LOW BACK PAIN WITH SCIATICA, SCIATICA LATERALITY UNSPECIFIED, UNSPECIFIED BACK PAIN LATERALITY: ICD-10-CM

## 2020-08-04 PROBLEM — R41.0 INTERMITTENT CONFUSION: Status: RESOLVED | Noted: 2020-05-13 | Resolved: 2020-08-04

## 2020-08-04 RX ORDER — HYDROCODONE BITARTRATE AND ACETAMINOPHEN 5; 325 MG/1; MG/1
TABLET ORAL
Qty: 120 TABLET | Refills: 0 | Status: SHIPPED | OUTPATIENT
Start: 2020-08-04 | End: 2020-09-25 | Stop reason: SDUPTHER

## 2020-08-04 NOTE — PROGRESS NOTES
Nursing Home Follow Up Note      Layo Arango DO []   WILLIAM Alfaro [x]  [address] [place] [address]  Phone: [phone]  Fax: [phone] Dorcas Duncan MD []    Dc Fernandez DO []   [address] [place] [address]  Phone: [phone]  Fax: [phone]     PATIENT NAME: Danae Harmon                                                                          YOB: 1940           DATE OF SERVICE: 08/3/2020  FACILITY:  [][place]   [] [place]   [] [facility]   [x] [facility]   [] Other ______________________________________________________________________      CHIEF COMPLAINT:    Continued behaviors    HISTORY OF PRESENT ILLNESS:   Per nursing, patient's behaviors continue.  She will not stay and her bed, or in a chair wants to be on a mattress in the floor at all times, even to eat.  Nursing have placed several mattresses in the floor, around the room for patient to lie down in the floor and she moves around from mattress to mattress.  Nursing staff have to get in the floor with her, and hold her up, to help her eat her meals.  She is eating okay, and she will take her medicines, but will not comply with anything if she is not in the floor.  Nursing was able to use a left and put her in a Sun chair today, and patient did tolerate this.  Per nursing, administration, unit coordinator, family and ombudsman, had care plan meeting today, to discuss patient's status and future planning care.  Family would like for patient to have a MRI, to determine her type of dementia, since it is a concern that patient has Lewy body dementia, given the rate of progression.  Nursing facility unable to find long-term mental health facility for patient and family unable to take her home.      PAST MEDICAL & SURGICAL HISTORY:   Past Medical History:   Diagnosis Date   • Colon polyp    • Osteoporosis    • Pain in thoracic spine    • Pneumonia    • UTI (urinary tract infection)        Past Surgical History:   Procedure Laterality Date   • BLADDER SURGERY  2000    bladder suspension   • CERVICAL LAMINECTOMY     • COLONOSCOPY     • COLOSTOMY  1979    temporary sigmoid   • HYSTERECTOMY           MEDICATIONS:  I have reviewed and reconciled the patients medication list in the patients chart at the skilled nursing facility today.      ALLERGIES:    Allergies   Allergen Reactions   • Lexapro [Escitalopram Oxalate] Nausea Only         SOCIAL HISTORY:    Social History     Socioeconomic History   • Marital status:      Spouse name: Not on file   • Number of children: Not on file   • Years of education: Not on file   • Highest education level: Not on file   Tobacco Use   • Smoking status: Never Smoker   • Smokeless tobacco: Never Used   Substance and Sexual Activity   • Alcohol use: No   • Drug use: No   • Sexual activity: Defer       FAMILY HISTORY:    Family History   Problem Relation Age of Onset   • Blindness Mother    • Other Mother         arteriosclerotic cardiovascular disease    • Diabetes Mother    • Osteoporosis Mother    • Stroke Mother    • Cancer Brother    • Lung cancer Sister    • Osteoporosis Sister    • Stroke Sister    • Stroke Other        REVIEW OF SYSTEMS:    Review of Systems   Reason unable to perform ROS: ROS per nursing    Constitutional: Positive for activity change. Negative for appetite change, chills, diaphoresis, fatigue, fever, unexpected weight gain and unexpected weight loss.   HENT: Negative for congestion, mouth sores, nosebleeds, postnasal drip, rhinorrhea, sneezing, sore throat and trouble swallowing.    Eyes: Negative for pain, discharge, redness and itching.   Respiratory: Negative for apnea, cough, choking, shortness of breath, wheezing and stridor.    Cardiovascular: Negative for palpitations and leg swelling.   Gastrointestinal: Negative for abdominal distention, abdominal pain, blood in stool, constipation, diarrhea, nausea and vomiting.   Endocrine:  Negative for polydipsia and polyuria.   Genitourinary: Positive for urinary incontinence. Negative for decreased urine volume, difficulty urinating, frequency, hematuria and urgency.   Musculoskeletal: Positive for arthralgias, back pain and gait problem. Negative for joint swelling, myalgias and neck pain.        Chronic   Skin: Negative for color change, dry skin, rash and skin lesions.   Allergic/Immunologic: Negative for environmental allergies.   Neurological: Positive for speech difficulty, weakness, memory problem and confusion. Negative for seizures.   Psychiatric/Behavioral: Positive for agitation and behavioral problems. Negative for dysphoric mood, hallucinations, self-injury, sleep disturbance, suicidal ideas, depressed mood and stress. The patient is nervous/anxious.          PHYSICAL EXAMINATION:   VITAL SIGNS:   Vitals:    08/03/20 1515   BP: 134/72   Pulse: 70   Resp: 18   Temp: 97.6 °F (36.4 °C)   SpO2: 94%   Weight: 110 kg (242 lb 6.4 oz)       Physical Exam   Constitutional: Vital signs are normal. She appears well-developed and well-nourished. She appears lethargic. She is sleeping.   HENT:   Head: Normocephalic.   Right Ear: External ear normal.   Left Ear: External ear normal.   Nose: Nose normal.   Eyes: Conjunctivae are normal.   Neck: Normal range of motion.   Cardiovascular: Normal rate, regular rhythm, normal heart sounds and intact distal pulses.   Pulmonary/Chest: Effort normal and breath sounds normal. No respiratory distress. She has no wheezes. She has no rales.   Abdominal: Soft. Bowel sounds are normal. She exhibits no distension and no mass. There is no tenderness. No hernia.   Musculoskeletal: She exhibits no edema.        Thoracic back: She exhibits decreased range of motion and pain.   Weakness BLE   Neurological: She appears lethargic.   Confused conversation   Skin: Skin is warm and dry. No rash noted.   Psychiatric: Her affect is inappropriate. Her speech is tangential. She  is hyperactive. Cognition and memory are impaired.   Confused conversation    Nursing note and vitals reviewed.      RECORDS REVIEW:   I have reviewed and interpreted the following lab test results  obtained at the time of the visit today.     ASSESSMENT     Diagnoses and all orders for this visit:    Psychosis, unspecified psychosis type (CMS/HCC)    Restlessness    Dementia with behavioral disturbance, unspecified dementia type (CMS/HCC)    Impaired mobility and ADLs        PLAN    Psychosis/dementia with behavior/restlessness   -Behaviors continue.  Patient will not stay in her bed or in a chair, crawls around on the floor at all times.  She did see it in a Sun chair for a while today.  There are mattresses spread across the floor for her to move about freely on which is working out at this time because she is in a private room.  Nursing facility unable to find placement in mental health facility, long-term.  Increase morning dose of Seroquel from 12.5 to 25 mg.  Continue 25 mg midday and 125 mg nightly.  Give Geodon IM as needed for behaviors.  We will continue to monitor.  Okay to order MRI, per family request for further evaluation dementia type.  Unsure patient will be able to cooperate enough for this.    Staff to continue supportive care for all ADLs.     Nuraing encouraged to keep me informed of any acute changes, lack of improvement, or any new concerning symptoms.      [x]  Discussed Patient in detail with nursing/staff, addressed all needs today.     [x]  Plan of Care Reviewed   [x]  PT/OT Reviewed   [x]  Order Changes  []  Discharge Plans Reviewed  [x]  Advance Directive on file with Nursing Home.   [x]  POA on file with Nursing Home.   [x]  Code Status listed: [x]  Full Code   []  DNR       “I confirm accuracy of unchanged data/findings which have been carried forward from previous visit, as well as I have updated appropriately those that have changed.”                        Mita Joyce,  APRN.

## 2020-08-10 ENCOUNTER — NURSING HOME (OUTPATIENT)
Dept: INTERNAL MEDICINE | Facility: CLINIC | Age: 80
End: 2020-08-10

## 2020-08-10 VITALS
SYSTOLIC BLOOD PRESSURE: 136 MMHG | DIASTOLIC BLOOD PRESSURE: 64 MMHG | RESPIRATION RATE: 18 BRPM | TEMPERATURE: 97.3 F | HEART RATE: 72 BPM | OXYGEN SATURATION: 96 % | WEIGHT: 190 LBS | BODY MASS INDEX: 35.9 KG/M2

## 2020-08-10 DIAGNOSIS — G30.1 LATE ONSET ALZHEIMER'S DISEASE WITH BEHAVIORAL DISTURBANCE (HCC): Primary | ICD-10-CM

## 2020-08-10 DIAGNOSIS — F33.1 MODERATE EPISODE OF RECURRENT MAJOR DEPRESSIVE DISORDER (HCC): ICD-10-CM

## 2020-08-10 DIAGNOSIS — E53.8 VITAMIN B 12 DEFICIENCY: ICD-10-CM

## 2020-08-10 DIAGNOSIS — E11.9 TYPE 2 DIABETES MELLITUS WITHOUT COMPLICATION, WITHOUT LONG-TERM CURRENT USE OF INSULIN (HCC): ICD-10-CM

## 2020-08-10 DIAGNOSIS — G89.29 CHRONIC LOW BACK PAIN WITH SCIATICA, SCIATICA LATERALITY UNSPECIFIED, UNSPECIFIED BACK PAIN LATERALITY: ICD-10-CM

## 2020-08-10 DIAGNOSIS — E78.2 MIXED HYPERLIPIDEMIA: ICD-10-CM

## 2020-08-10 DIAGNOSIS — I10 BENIGN ESSENTIAL HYPERTENSION: ICD-10-CM

## 2020-08-10 DIAGNOSIS — F02.818 LATE ONSET ALZHEIMER'S DISEASE WITH BEHAVIORAL DISTURBANCE (HCC): Primary | ICD-10-CM
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
## 2020-08-10 DIAGNOSIS — M54.40 CHRONIC LOW BACK PAIN WITH SCIATICA, SCIATICA LATERALITY UNSPECIFIED, UNSPECIFIED BACK PAIN LATERALITY: ICD-10-CM

## 2020-08-10 PROCEDURE — 99309 SBSQ NF CARE MODERATE MDM 30: CPT | Performed by: INTERNAL MEDICINE

## 2020-08-12 ENCOUNTER — NURSING HOME (OUTPATIENT)
Dept: FAMILY MEDICINE CLINIC | Facility: CLINIC | Age: 80
End: 2020-08-12

## 2020-08-12 VITALS
HEART RATE: 76 BPM | DIASTOLIC BLOOD PRESSURE: 74 MMHG | RESPIRATION RATE: 18 BRPM | TEMPERATURE: 97.8 F | BODY MASS INDEX: 35.9 KG/M2 | SYSTOLIC BLOOD PRESSURE: 138 MMHG | WEIGHT: 190 LBS

## 2020-08-12 DIAGNOSIS — Z91.89: ICD-10-CM

## 2020-08-12 DIAGNOSIS — Z78.9 IMPAIRED MOBILITY AND ADLS: ICD-10-CM

## 2020-08-12 DIAGNOSIS — S40.021A CONTUSION OF BOTH UPPER EXTREMITIES, INITIAL ENCOUNTER: ICD-10-CM

## 2020-08-12 DIAGNOSIS — F03.91 DEMENTIA WITH BEHAVIORAL DISTURBANCE, UNSPECIFIED DEMENTIA TYPE: ICD-10-CM

## 2020-08-12 DIAGNOSIS — Z79.82 ASPIRIN LONG-TERM USE: ICD-10-CM

## 2020-08-12 DIAGNOSIS — S40.022A CONTUSION OF BOTH UPPER EXTREMITIES, INITIAL ENCOUNTER: ICD-10-CM

## 2020-08-12 DIAGNOSIS — Z74.09 IMPAIRED MOBILITY AND ADLS: ICD-10-CM

## 2020-08-12 PROCEDURE — 99309 SBSQ NF CARE MODERATE MDM 30: CPT | Performed by: NURSE PRACTITIONER

## 2020-08-13 NOTE — PROGRESS NOTES
Nursing Home Follow Up Note      Layo Arango DO []   WILLIAM Alfaro [x]  [address] [place] [address]  Phone: [phone]  Fax: [phone] Dorcas Duncan MD []    Dc Fernandez DO []   [address] [place] [address]  Phone: [phone]  Fax: [phone]     PATIENT NAME: Danae Harmon                                                                          YOB: 1940           DATE OF SERVICE: 08/12/2020  FACILITY:  [][facility]   [] [place]   [] [facility]   [x] [facility]   [] Other ______________________________________________________________________      CHIEF COMPLAINT:  Bruising to bilateral upper extremities     HISTORY OF PRESENT ILLNESS:   Per nursing, bruising was noted to bilateral upper extremities yesterday.  Bruises noted during visit today, most of which are older bruises, at least 7 to 10 days old, some newer, within the past couple days.  All in a scattered pattern that feet with patient's behaviors of crawling around the floor last week, and the weeks prior, and being restless and anxious while in bed. Patient does have confused conversation but denies that anyone has harmed her or been too rough with her at any point.    PAST MEDICAL & SURGICAL HISTORY:   Past Medical History:   Diagnosis Date   • Colon polyp    • Osteoporosis    • Pain in thoracic spine    • Pneumonia    • UTI (urinary tract infection)       Past Surgical History:   Procedure Laterality Date   • BLADDER SURGERY  2000    bladder suspension   • CERVICAL LAMINECTOMY     • COLONOSCOPY     • COLOSTOMY  1979    temporary sigmoid   • HYSTERECTOMY           MEDICATIONS:  I have reviewed and reconciled the patients medication list in the patients chart at the skilled nursing facility today.      ALLERGIES:    Allergies   Allergen Reactions   • Lexapro [Escitalopram Oxalate] Nausea Only         SOCIAL HISTORY:    Social History     Socioeconomic History   • Marital status:       Spouse name: Not on file   • Number of children: Not on file   • Years of education: Not on file   • Highest education level: Not on file   Tobacco Use   • Smoking status: Never Smoker   • Smokeless tobacco: Never Used   Substance and Sexual Activity   • Alcohol use: No   • Drug use: No   • Sexual activity: Defer       FAMILY HISTORY:    Family History   Problem Relation Age of Onset   • Blindness Mother    • Other Mother         arteriosclerotic cardiovascular disease    • Diabetes Mother    • Osteoporosis Mother    • Stroke Mother    • Cancer Brother    • Lung cancer Sister    • Osteoporosis Sister    • Stroke Sister    • Stroke Other        REVIEW OF SYSTEMS:    Review of Systems   Reason unable to perform ROS: ROS per nursing and patient.   Constitutional: Positive for activity change. Negative for appetite change, chills, diaphoresis, fatigue, fever, unexpected weight gain and unexpected weight loss.   HENT: Negative for congestion, mouth sores, nosebleeds, postnasal drip, rhinorrhea, sneezing, sore throat and trouble swallowing.    Eyes: Negative for pain, discharge, redness and itching.   Respiratory: Negative for apnea, cough, choking, shortness of breath, wheezing and stridor.    Cardiovascular: Negative for palpitations and leg swelling.   Gastrointestinal: Negative for abdominal distention, abdominal pain, blood in stool, constipation, diarrhea, nausea and vomiting.   Endocrine: Negative for polydipsia and polyuria.   Genitourinary: Positive for urinary incontinence. Negative for decreased urine volume, difficulty urinating, frequency, hematuria and urgency.   Musculoskeletal: Positive for arthralgias and gait problem. Negative for back pain, joint swelling, myalgias and neck pain.        Chronic   Skin: Negative for color change, dry skin, rash and skin lesions.        Bruising to bilateral upper extremities   Allergic/Immunologic: Negative for environmental allergies.   Neurological: Positive for  speech difficulty, weakness, memory problem and confusion. Negative for seizures.   Hematological: Bruises/bleeds easily.   Psychiatric/Behavioral: Positive for behavioral problems and positive for hyperactivity. Negative for agitation, dysphoric mood, hallucinations, self-injury, sleep disturbance, suicidal ideas, depressed mood and stress. The patient is nervous/anxious.          PHYSICAL EXAMINATION:   VITAL SIGNS:   Vitals:    08/12/20 1245   BP: 138/74   Pulse: 76   Resp: 18   Temp: 97.8 °F (36.6 °C)   Weight: 86.2 kg (190 lb)       Physical Exam   Constitutional: Vital signs are normal. She appears well-developed and well-nourished. She is sleeping.   HENT:   Head: Normocephalic.   Right Ear: External ear normal.   Left Ear: External ear normal.   Nose: Nose normal.   Eyes: Conjunctivae are normal.   Neck: Normal range of motion.   Cardiovascular: Normal rate, regular rhythm, normal heart sounds and intact distal pulses.   Pulmonary/Chest: Effort normal and breath sounds normal. No respiratory distress. She has no wheezes. She has no rales.   Abdominal: Soft. Bowel sounds are normal. She exhibits no distension and no mass. There is no tenderness. No hernia.   Musculoskeletal: She exhibits no edema.   Weakness BLE   Neurological: She is alert. She is disoriented.   Confused conversation   Skin: Skin is warm and dry. Bruising (scattered about bilateral upper extremities ) noted. No rash noted.   Psychiatric: Her mood appears anxious. She is hyperactive. Cognition and memory are impaired.   Confused conversation    Nursing note and vitals reviewed.      RECORDS REVIEW:   I have reviewed and interpreted the following lab test results  obtained at the time of the visit today.     ASSESSMENT     Diagnoses and all orders for this visit:    Contusion of both upper extremities, initial encounter    Aspirin long-term use    Dementia with behavioral disturbance, unspecified dementia type (CMS/HCC)    At risk for injury  to self and others    Impaired mobility and ADLs        PLAN    Bruising BUE/aspirin use/dementia with behaviors/risk to self  -Patient with multiple, small, superficial bruises to bilateral upper extremities, dating from 1 to 2 days to 7 to 10 days, based on color.  These bruises are very small and consistent with minor bumps.  Harder blows to the skin, most often calls larger bruises, which patient does not have.  Patient is also a 80-year-old frail female, which makes her more prone to bruising as well.  Patient is also on aspirin and antidepressants, which increase risk of bruising.  Patient has crawled and flailed around in the floor, as well as be very restless in her bed, since returning to the facility, from [facility].  There is no evidence of abuse or other intentional injury to patient, and patient denies that any of the staff have harmed her in any way.  She is also very restless in the bed, frequently bumping her arms on the bed rails.  Her mental status has improved some in the past 5 to 6 days.  We will continue to monitor.    Staff to continue supportive care for all ADLs.     Nuraing encouraged to keep me informed of any acute changes, lack of improvement, or any new concerning symptoms.      [x]  Discussed Patient in detail with nursing/staff, addressed all needs today.     [x]  Plan of Care Reviewed   [x]  PT/OT Reviewed   [x]  Order Changes  []  Discharge Plans Reviewed  [x]  Advance Directive on file with Nursing Home.   [x]  POA on file with Nursing Home.   [x]  Code Status listed: [x]  Full Code   []  DNR       “I confirm accuracy of unchanged data/findings which have been carried forward from previous visit, as well as I have updated appropriately those that have changed.”                    Mita Joyce, APRN.

## 2020-08-14 NOTE — PROGRESS NOTES
Nursing Home Progress Note        Layo Arango DO ?  Mita Joyce, APRN ?  [address] [facility] [facility], [place] [address]  Phone: [phone]  Fax: [phone] Dorcas Duncan MD ?  Dc Fernandez DO [x]   [address] [place] [address]  Phone: [phone]  Fax: [phone]     PATIENT NAME: Danae Harmon                                                                          YOB: 1940           DATE OF SERVICE: 08/10/2020   FACILITY:  [] [facility]   [] [place]   [] [facility]   [x] [facility]  []  [facility]  [] Other ______________________________________________________________________     CHIEF COMPLAINT:  Dementia with behavioral disturbances      HISTORY OF PRESENT ILLNESS:   Patient recently returned from psychiatric hospital.  Apparently, patient was started on Geodon in addition to her Seroquel and other psychiatric medications.  Behaviors were slightly improved however she remains very confused and still at times tries to sleep on the floor.  Attempts have been made by staff to provide padding throughout the floor to prevent injuries.  On exam today, physical therapy expressed concerns about altered mental status, increased sedation, and increased confusion.  It was felt that some of her psychiatric medications were too strong for her.    PAST MEDICAL & SURGICAL HISTORY:   Past Medical History:   Diagnosis Date   • Colon polyp    • Osteoporosis    • Pain in thoracic spine    • Pneumonia    • UTI (urinary tract infection)       Past Surgical History:   Procedure Laterality Date   • BLADDER SURGERY  2000    bladder suspension   • CERVICAL LAMINECTOMY     • COLONOSCOPY     • COLOSTOMY  1979    temporary sigmoid   • HYSTERECTOMY           MEDICATIONS:  I have reviewed and reconciled the patients medication list in the patients chart at the skilled nursing facility today.      ALLERGIES:  Allergies   Allergen Reactions   • Lexapro [Escitalopram Oxalate] Nausea  Only         SOCIAL HISTORY:  Social History     Socioeconomic History   • Marital status:      Spouse name: Not on file   • Number of children: Not on file   • Years of education: Not on file   • Highest education level: Not on file   Tobacco Use   • Smoking status: Never Smoker   • Smokeless tobacco: Never Used   Substance and Sexual Activity   • Alcohol use: No   • Drug use: No   • Sexual activity: Defer       FAMILY HISTORY:  Family History   Problem Relation Age of Onset   • Blindness Mother    • Other Mother         arteriosclerotic cardiovascular disease    • Diabetes Mother    • Osteoporosis Mother    • Stroke Mother    • Cancer Brother    • Lung cancer Sister    • Osteoporosis Sister    • Stroke Sister    • Stroke Other        REVIEW OF SYSTEMS:  Review of Systems   Constitutional: Negative for chills, fatigue and fever.   HENT: Negative for congestion, ear pain, rhinorrhea, sinus pressure and sore throat.    Eyes: Negative for visual disturbance.   Respiratory: Negative for cough, chest tightness, shortness of breath and wheezing.    Cardiovascular: Negative for chest pain, palpitations and leg swelling.   Gastrointestinal: Negative for abdominal pain, blood in stool, constipation, diarrhea, nausea and vomiting.   Endocrine: Negative for polydipsia and polyuria.   Genitourinary: Negative for dysuria, hematuria and urgency.   Musculoskeletal: Negative for arthralgias and back pain.   Skin: Negative for rash.   Neurological: Negative for dizziness, light-headedness, numbness and headaches.   Psychiatric/Behavioral: Negative for dysphoric mood and sleep disturbance. The patient is not nervous/anxious.           PHYSICAL EXAMINATION:     VITAL SIGNS:  /64   Pulse 72   Temp 97.3 °F (36.3 °C)   Resp 18   Wt 86.2 kg (190 lb)   SpO2 96%   BMI 35.90 kg/m²     Physical Exam   Constitutional: She is oriented to person, place, and time. She appears well-developed and well-nourished.   Morbidly obese  elderly female resting comfortably   HENT:   Head: Normocephalic and atraumatic.   Mouth/Throat: Oropharynx is clear and moist. No oropharyngeal exudate.   Eyes: Pupils are equal, round, and reactive to light. Conjunctivae and EOM are normal. No scleral icterus.   Neck: Normal range of motion. Neck supple. No thyromegaly present.   Cardiovascular: Normal rate, regular rhythm and normal heart sounds. Exam reveals no gallop and no friction rub.   No murmur heard.  Pulmonary/Chest: Effort normal and breath sounds normal. No respiratory distress. She has no wheezes.   Abdominal: Soft. Bowel sounds are normal. She exhibits no distension. There is no tenderness.   Musculoskeletal: Normal range of motion.   Lymphadenopathy:     She has no cervical adenopathy.   Neurological: She is alert and oriented to person, place, and time.   Skin: Skin is warm and dry. No rash noted.   Psychiatric: She has a normal mood and affect. Her behavior is normal.   Nursing note and vitals reviewed.  .    RECORDS REVIEW:       ASSESSMENT     Diagnoses and all orders for this visit:    Late onset Alzheimer's disease with behavioral disturbance (CMS/HCC)    Moderate episode of recurrent major depressive disorder (CMS/HCC)    Fall, subsequent encounter    Mixed hyperlipidemia    Benign essential hypertension    Vitamin B 12 deficiency    Chronic low back pain with sciatica, sciatica laterality unspecified, unspecified back pain laterality    Type 2 diabetes mellitus without complication, without long-term current use of insulin (CMS/HCC)        PLAN    79-year-old white female admitted to the [facility] for long-term care and management.    Alzheimer's disease without behavioral disturbances  - Unfortunately her generalized confusion has worsened and she was started on Geodon in addition to Seroquel.  Given oversedation, Geodon was stopped and Seroquel was adjusted to 25 mg 3 times a day along with 125 mg at night.  Given concerns  of increased sedation, and decrease Seroquel nightly dose to 100 mg.    Altered mental status  -Check CBC, CMP, and urinalysis.    Bladder spasms/urinary frequency  -Cont Myrbetriq  -Continue Diflucan when needed for vaginal yeast infections.    Major depressive disorder  -Stable on current medications.    Mixed hyperlipidemia  -Continue statin.    Essential hypertension  -Controlled on current medications.    Vitamin D and vitamin B12 deficiencies  -Stable on supplements.    Osteoporosis  -Stable on current medications.  No recent fractures.    Type 2 diabetes mellitus  -Monitor glucose levels in nursing facility  -Glucose has been stable with current medication regimen.      [x]  Discussed Patient in detail with nursing/staff, addressed all needs today.     [x]  Plan of Care Reviewed   [x]  PT/OT Reviewed   [x]  Order Changes  []  Discharge Plans Reviewed  [x]  Advance Directive on file with Nursing Home.   [x]  POA on file with Nursing Home.    [x]  Code Status listed and reviewed.          Dc Fernandez, .  8/15/2020

## 2020-08-19 ENCOUNTER — HOSPITAL ENCOUNTER (OUTPATIENT)
Dept: MRI IMAGING | Facility: HOSPITAL | Age: 80
Discharge: HOME | End: 2020-08-19
Admitting: NURSE PRACTITIONER

## 2020-08-19 ENCOUNTER — NURSING HOME (OUTPATIENT)
Dept: FAMILY MEDICINE CLINIC | Facility: CLINIC | Age: 80
End: 2020-08-19

## 2020-08-19 VITALS
TEMPERATURE: 98 F | WEIGHT: 188 LBS | OXYGEN SATURATION: 95 % | SYSTOLIC BLOOD PRESSURE: 126 MMHG | RESPIRATION RATE: 18 BRPM | BODY MASS INDEX: 35.52 KG/M2 | DIASTOLIC BLOOD PRESSURE: 68 MMHG | HEART RATE: 68 BPM

## 2020-08-19 DIAGNOSIS — M25.472 LEFT ANKLE SWELLING: ICD-10-CM

## 2020-08-19 DIAGNOSIS — F01.518 VASCULAR DEMENTIA WITH BEHAVIOR DISTURBANCE (HCC): Primary | ICD-10-CM

## 2020-08-19 DIAGNOSIS — Z74.09 IMPAIRED MOBILITY AND ADLS: ICD-10-CM

## 2020-08-19 DIAGNOSIS — R25.9 PARKINSONIAN FEATURES: ICD-10-CM

## 2020-08-19 DIAGNOSIS — Z78.9 IMPAIRED MOBILITY AND ADLS: ICD-10-CM

## 2020-08-19 PROCEDURE — 70551 MRI BRAIN STEM W/O DYE: CPT

## 2020-08-19 PROCEDURE — 99309 SBSQ NF CARE MODERATE MDM 30: CPT | Performed by: NURSE PRACTITIONER

## 2020-08-21 PROBLEM — F01.518 VASCULAR DEMENTIA WITH BEHAVIOR DISTURBANCE: Status: ACTIVE | Noted: 2020-05-13

## 2020-08-21 NOTE — PROGRESS NOTES
Nursing Home Follow Up Note      Layo Arango DO []   WILLIAM Alfaro [x]  [address] [place] [address]  Phone: [phone]  Fax: [phone] Dorcas Duncan MD []    Dc Fernandez DO []   [address] [place] [address]  Phone: [phone]  Fax: [phone]     PATIENT NAME: Danae Harmon                                                                          YOB: 1940           DATE OF SERVICE: 08/19/2020  FACILITY:  [][place]   [] [place]   [] [facility]   [x] [facility]   [] Other ______________________________________________________________________      CHIEF COMPLAINT:  Follow up MRI results    Swollen, raised area to left lateral ankle    HISTORY OF PRESENT ILLNESS:   Patient had MRI yesterday, due to mental status changes, and it reported chronic small vessel ischemic changes, but no acute abnormality.     Per nursing, patient has a small swollen and raised area to her left lateral ankle that she has most likely obtained from crawling around in the floor or being restless in bed.  She does not complain of it being painful.    PAST MEDICAL & SURGICAL HISTORY:   Past Medical History:   Diagnosis Date   • Colon polyp    • Osteoporosis    • Pain in thoracic spine    • Pneumonia    • UTI (urinary tract infection)       Past Surgical History:   Procedure Laterality Date   • BLADDER SURGERY  2000    bladder suspension   • CERVICAL LAMINECTOMY     • COLONOSCOPY     • COLOSTOMY  1979    temporary sigmoid   • HYSTERECTOMY           MEDICATIONS:  I have reviewed and reconciled the patients medication list in the patients chart at the skilled nursing facility today.      ALLERGIES:    Allergies   Allergen Reactions   • Lexapro [Escitalopram Oxalate] Nausea Only         SOCIAL HISTORY:    Social History     Socioeconomic History   • Marital status:      Spouse name: Not on file   • Number of children: Not on file   • Years of education: Not on file   •  Highest education level: Not on file   Tobacco Use   • Smoking status: Never Smoker   • Smokeless tobacco: Never Used   Substance and Sexual Activity   • Alcohol use: No   • Drug use: No   • Sexual activity: Defer       FAMILY HISTORY:    Family History   Problem Relation Age of Onset   • Blindness Mother    • Other Mother         arteriosclerotic cardiovascular disease    • Diabetes Mother    • Osteoporosis Mother    • Stroke Mother    • Cancer Brother    • Lung cancer Sister    • Osteoporosis Sister    • Stroke Sister    • Stroke Other        REVIEW OF SYSTEMS:    Review of Systems   Reason unable to perform ROS: ROS per nursing and patient.   Constitutional: Positive for activity change. Negative for appetite change, chills, diaphoresis, fatigue, fever, unexpected weight gain and unexpected weight loss.   HENT: Negative for congestion, mouth sores, nosebleeds, postnasal drip, rhinorrhea, sneezing, sore throat and trouble swallowing.    Eyes: Negative for pain, discharge, redness and itching.   Respiratory: Negative for apnea, cough, choking, shortness of breath, wheezing and stridor.    Cardiovascular: Negative for palpitations and leg swelling.   Gastrointestinal: Negative for abdominal distention, abdominal pain, blood in stool, constipation, diarrhea, nausea and vomiting.   Endocrine: Negative for polydipsia and polyuria.   Genitourinary: Positive for urinary incontinence. Negative for decreased urine volume, difficulty urinating, frequency, hematuria and urgency.   Musculoskeletal: Positive for arthralgias and gait problem. Negative for back pain, joint swelling, myalgias and neck pain.        Swelling left ankle   Skin: Negative for color change, dry skin, rash and skin lesions.        Bruising to bilateral upper extremities   Allergic/Immunologic: Negative for environmental allergies.   Neurological: Positive for speech difficulty, weakness, memory problem and confusion. Negative for seizures.    Hematological: Bruises/bleeds easily.   Psychiatric/Behavioral: Positive for behavioral problems and positive for hyperactivity. Negative for agitation, dysphoric mood, hallucinations, self-injury, sleep disturbance, suicidal ideas, depressed mood and stress. The patient is nervous/anxious.          PHYSICAL EXAMINATION:   VITAL SIGNS:   Vitals:    08/19/20 0938   BP: 126/68   Pulse: 68   Resp: 18   Temp: 98 °F (36.7 °C)   SpO2: 95%   Weight: 85.3 kg (188 lb)       Physical Exam   Constitutional: Vital signs are normal. She appears well-developed and well-nourished. She is sleeping.   HENT:   Head: Normocephalic.   Right Ear: External ear normal.   Left Ear: External ear normal.   Nose: Nose normal.   Eyes: Conjunctivae are normal.   Neck: Normal range of motion.   Cardiovascular: Normal rate, regular rhythm, normal heart sounds and intact distal pulses.   Pulmonary/Chest: Effort normal and breath sounds normal. No respiratory distress. She has no wheezes. She has no rales.   Abdominal: Soft. Bowel sounds are normal. She exhibits no distension and no mass. There is no tenderness. No hernia.   Musculoskeletal: She exhibits no edema.   Weakness BLE        Neurological: She is alert. She is disoriented.   Confused conversation   Skin: Skin is warm and dry. Bruising (scattered about bilateral upper extremities ) noted. No rash noted.   Psychiatric: Her mood appears anxious. She is hyperactive. Cognition and memory are impaired.   Confused conversation    Nursing note and vitals reviewed.      RECORDS REVIEW:   I have reviewed and interpreted the following lab test results  obtained at the time of the visit today.     ASSESSMENT     Diagnoses and all orders for this visit:    Vascular dementia with behavior disturbance (CMS/HCC)    Left ankle swelling    Impaired mobility and ADLs        PLAN  Vascular dementia  -MRI brain, reports chronic small vessel ischemic changes, which indicates that her dementia is vascular  related.  She is on a statin and aspirin.  Her blood pressure is controlled with beta-blocker.  We will continue to monitor.  No need for other medications for her dementia, other than what she is on, as it is vascular related.    Left ankle swelling  -Patient flails around bed and also gets into the floor and crawls around therefore has multiple areas of bruising and swelling to several areas of her body.  Nursing to cover area to ankle with foam dressing for protection.  We will continue to monitor    Staff to continue supportive care for all ADLs.     Nuraing encouraged to keep me informed of any acute changes, lack of improvement, or any new concerning symptoms.      [x]  Discussed Patient in detail with nursing/staff, addressed all needs today.     [x]  Plan of Care Reviewed   [x]  PT/OT Reviewed   [x]  Order Changes  []  Discharge Plans Reviewed  [x]  Advance Directive on file with Nursing Home.   [x]  POA on file with Nursing Home.   [x]  Code Status listed: [x]  Full Code   []  DNR       “I confirm accuracy of unchanged data/findings which have been carried forward from previous visit, as well as I have updated appropriately those that have changed.”                        Mita Joyce, APRN.

## 2020-08-24 ENCOUNTER — NURSING HOME (OUTPATIENT)
Dept: FAMILY MEDICINE CLINIC | Facility: CLINIC | Age: 80
End: 2020-08-24

## 2020-08-24 VITALS
TEMPERATURE: 98 F | HEART RATE: 68 BPM | DIASTOLIC BLOOD PRESSURE: 68 MMHG | BODY MASS INDEX: 35.52 KG/M2 | RESPIRATION RATE: 18 BRPM | WEIGHT: 188 LBS | SYSTOLIC BLOOD PRESSURE: 126 MMHG

## 2020-08-24 DIAGNOSIS — F01.518 VASCULAR DEMENTIA WITH BEHAVIOR DISTURBANCE (HCC): ICD-10-CM

## 2020-08-24 DIAGNOSIS — Z74.09 IMPAIRED MOBILITY AND ADLS: ICD-10-CM

## 2020-08-24 DIAGNOSIS — Z78.9 IMPAIRED MOBILITY AND ADLS: ICD-10-CM

## 2020-08-24 DIAGNOSIS — Z71.89 ADVANCED CARE PLANNING/COUNSELING DISCUSSION: Primary | ICD-10-CM

## 2020-08-24 PROCEDURE — 99497 ADVNCD CARE PLAN 30 MIN: CPT | Performed by: NURSE PRACTITIONER

## 2020-08-25 NOTE — PROGRESS NOTES
Nursing Home Follow Up Note      Layo Arango DO []   WILLIAM Alfaro [x]  [address] [place] [address]  Phone: [phone]  Fax: [phone] Dorcas Duncan MD []    Dc Fernandez DO []   [address] [place] [address]  Phone: [phone]  Fax: [phone]     PATIENT NAME: Danae Harmon                                                                          YOB: 1940           DATE OF SERVICE: 08/24/2020  FACILITY:  [][place]   [] [place]   [] [facility]   [x] [facility]   [] Other ______________________________________________________________________      CHIEF COMPLAINT:  Advanced Care Planning visit via telephone, due to COVID face to face restrictions. Consent was given to do ACP meeting, over telephone.     HISTORY OF PRESENT ILLNESS:   Per nursing, patient's  would like to have a Care Plan meeting, via telephone, to discuss her MRI results and further POC.  He would like to know if patient is at point where they should pursue palliative care only, or if further work-up and treatment should be performed, based on her MRI results and progressive dementia.    PAST MEDICAL & SURGICAL HISTORY:   Past Medical History:   Diagnosis Date   • Colon polyp    • Osteoporosis    • Pain in thoracic spine    • Pneumonia    • UTI (urinary tract infection)       Past Surgical History:   Procedure Laterality Date   • BLADDER SURGERY  2000    bladder suspension   • CERVICAL LAMINECTOMY     • COLONOSCOPY     • COLOSTOMY  1979    temporary sigmoid   • HYSTERECTOMY           MEDICATIONS:  I have reviewed and reconciled the patients medication list in the patients chart at the skilled nursing facility today.      ALLERGIES:    Allergies   Allergen Reactions   • Lexapro [Escitalopram Oxalate] Nausea Only         SOCIAL HISTORY:    Social History     Socioeconomic History   • Marital status:      Spouse name: Not on file   • Number of children: Not on file   •  Years of education: Not on file   • Highest education level: Not on file   Tobacco Use   • Smoking status: Never Smoker   • Smokeless tobacco: Never Used   Substance and Sexual Activity   • Alcohol use: No   • Drug use: No   • Sexual activity: Defer       FAMILY HISTORY:    Family History   Problem Relation Age of Onset   • Blindness Mother    • Other Mother         arteriosclerotic cardiovascular disease    • Diabetes Mother    • Osteoporosis Mother    • Stroke Mother    • Cancer Brother    • Lung cancer Sister    • Osteoporosis Sister    • Stroke Sister    • Stroke Other        REVIEW OF SYSTEMS:    Review of Systems   Reason unable to perform ROS: ROS per nursing and patient.   Constitutional: Positive for activity change. Negative for appetite change, chills, diaphoresis, fatigue, fever, unexpected weight gain and unexpected weight loss.   HENT: Negative for congestion, mouth sores, nosebleeds, postnasal drip, rhinorrhea, sneezing, sore throat and trouble swallowing.    Eyes: Negative for pain, discharge, redness and itching.   Respiratory: Negative for apnea, cough, choking, shortness of breath, wheezing and stridor.    Cardiovascular: Negative for palpitations and leg swelling.   Gastrointestinal: Negative for abdominal distention, abdominal pain, blood in stool, constipation, diarrhea, nausea and vomiting.   Endocrine: Negative for polydipsia and polyuria.   Genitourinary: Positive for urinary incontinence. Negative for decreased urine volume, difficulty urinating, frequency, hematuria and urgency.   Musculoskeletal: Positive for arthralgias and gait problem. Negative for back pain, joint swelling, myalgias and neck pain.   Skin: Negative for color change, dry skin, rash and skin lesions.        Bruising to bilateral upper extremities   Allergic/Immunologic: Negative for environmental allergies.   Neurological: Positive for speech difficulty, weakness, memory problem and confusion. Negative for seizures.    Hematological: Bruises/bleeds easily.   Psychiatric/Behavioral: Positive for behavioral problems and positive for hyperactivity. Negative for agitation, dysphoric mood, hallucinations, self-injury, sleep disturbance, suicidal ideas, depressed mood and stress. The patient is nervous/anxious.          PHYSICAL EXAMINATION:   VITAL SIGNS:   Vitals:    08/24/20 0832   BP: 126/68   Pulse: 68   Resp: 18   Temp: 98 °F (36.7 °C)   Weight: 85.3 kg (188 lb)       Physical Exam   Constitutional: Vital signs are normal. She appears well-developed and well-nourished. She is sleeping.   HENT:   Head: Normocephalic.   Right Ear: External ear normal.   Left Ear: External ear normal.   Nose: Nose normal.   Eyes: Conjunctivae are normal.   Neck: Normal range of motion.   Cardiovascular: Normal rate, regular rhythm, normal heart sounds and intact distal pulses.   Pulmonary/Chest: Effort normal and breath sounds normal. No respiratory distress. She has no wheezes. She has no rales.   Abdominal: Soft. Bowel sounds are normal. She exhibits no distension and no mass. There is no tenderness. No hernia.   Musculoskeletal: She exhibits no edema.   Weakness BLE   Neurological: She is alert. She is disoriented.   Confused conversation   Skin: Skin is warm and dry. Bruising (scattered about bilateral upper extremities ) noted. No rash noted.   Psychiatric: Her mood appears anxious. She is hyperactive. Cognition and memory are impaired.   Confused conversation    Nursing note and vitals reviewed.      RECORDS REVIEW:   I have reviewed and interpreted the following lab test results  obtained at the time of the visit today.     ASSESSMENT     Diagnoses and all orders for this visit:    Advanced care planning/counseling discussion    Vascular dementia with behavior disturbance (CMS/HCC)    Impaired mobility and ADLs        PLAN  Advanced Care Planning/Vascular dementia  -Advanced care planning performed with  today via telephone, Due to  COVID visitor restrictions.  Consent was given over the telephone via .  Discussed with, they are of using vascular dementia.  Discussed with him that she is on aspirin, statin, and blood pressure medicine, that is keeping her blood pressure controlled.  Discussed with  that medications for Alzheimer's dementia are not effective for vascular dementia.  And children have had concerns about patient having Parkinson's.  Discussed with  that MRI did not report any findings and that patient has had no obvious symptoms such as tremors.  Unable to assess gait as she does not ambulate.  Did also advise  he could have her see a neurologist for further evaluation.   wanting to know if it was time for her to be made palliative care only.  Past  that patient did not appear to be at this stage yet.  He did want to know how quickly this would advance but educated her  that this is unknown and different with every patient.  He did report interest in palliative care only as his dementia continues to progress, and making her DNR.  He is going to discuss MRI findings with his children and see if they would like her to have any further work-up.  He is going to call back with this information.    Staff to continue supportive care for all ADLs.     Nuraing encouraged to keep me informed of any acute changes, lack of improvement, or any new concerning symptoms.    Advance Care Planning   ACP discussion was held with the patient during this visit. Patient has an advance directive in EMR which is still valid. Advance care plan meeting performed with  via telephone.  He was agreeable to this.  25 minutes was spent with advanced care planning.      [x]  Discussed Patient in detail with nursing/staff, addressed all needs today.     [x]  Plan of Care Reviewed   [x]  PT/OT Reviewed   [x]  Order Changes  []  Discharge Plans Reviewed  [x]  Advance Directive on file with Nursing Home.   [x]   POA on file with Nursing Home.   [x]  Code Status listed: []  Full Code   []  DNR       “I confirm accuracy of unchanged data/findings which have been carried forward from previous visit, as well as I have updated appropriately those that have changed.”                          Mita Joyce, APRN.

## 2020-09-09 ENCOUNTER — NURSING HOME (OUTPATIENT)
Dept: INTERNAL MEDICINE | Facility: CLINIC | Age: 80
End: 2020-09-09

## 2020-09-09 DIAGNOSIS — R26.81 GAIT INSTABILITY: ICD-10-CM

## 2020-09-09 DIAGNOSIS — G30.1 LATE ONSET ALZHEIMER'S DISEASE WITH BEHAVIORAL DISTURBANCE (HCC): Primary | ICD-10-CM

## 2020-09-09 DIAGNOSIS — I10 BENIGN ESSENTIAL HYPERTENSION: ICD-10-CM
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
## 2020-09-09 DIAGNOSIS — F02.818 LATE ONSET ALZHEIMER'S DISEASE WITH BEHAVIORAL DISTURBANCE (HCC): Primary | ICD-10-CM

## 2020-09-09 DIAGNOSIS — E78.2 MIXED HYPERLIPIDEMIA: ICD-10-CM

## 2020-09-09 DIAGNOSIS — E11.9 TYPE 2 DIABETES MELLITUS WITHOUT COMPLICATION, WITHOUT LONG-TERM CURRENT USE OF INSULIN (HCC): ICD-10-CM

## 2020-09-09 DIAGNOSIS — M62.838 MUSCLE SPASM: ICD-10-CM

## 2020-09-09 DIAGNOSIS — F33.1 MODERATE EPISODE OF RECURRENT MAJOR DEPRESSIVE DISORDER (HCC): ICD-10-CM

## 2020-09-09 PROCEDURE — 99309 SBSQ NF CARE MODERATE MDM 30: CPT | Performed by: INTERNAL MEDICINE

## 2020-09-09 NOTE — PROGRESS NOTES
Nursing Home Progress Note        Layo Arango DO ?  Mita Joyce, APRN ?  [address], [place] [address]  Phone: [phone]  Fax: [phone] Dorcas Duncan MD ?  Dc Fernandez DO [x]   [address] [place] [address]  Phone: [phone]  Fax: [phone]     PATIENT NAME: Danae Harmon                                                                          YOB: 1940           DATE OF SERVICE: 09/09/2020   FACILITY:  [] [facility]   [] [facility]   [] [facility]   [x] [facility]  []  [facility]  [] Other ______________________________________________________________________     CHIEF COMPLAINT:  Chronic medical management      HISTORY OF PRESENT ILLNESS:   Patient was resting comfortably with no new complaints or concerns.  Behaviors have been slightly better over the last few weeks.  She is not crawling on the floor as much and no falls or injuries have been reported.  Disability evaluation was requested by family who is now seeking legal guardianship given progression of the patient's dementia.    PAST MEDICAL & SURGICAL HISTORY:   Past Medical History:   Diagnosis Date   • Colon polyp    • Osteoporosis    • Pain in thoracic spine    • Pneumonia    • UTI (urinary tract infection)       Past Surgical History:   Procedure Laterality Date   • BLADDER SURGERY  2000    bladder suspension   • CERVICAL LAMINECTOMY     • COLONOSCOPY     • COLOSTOMY  1979    temporary sigmoid   • HYSTERECTOMY           MEDICATIONS:  I have reviewed and reconciled the patients medication list in the patients chart at the skilled nursing facility today.      ALLERGIES:  Allergies   Allergen Reactions   • Lexapro [Escitalopram Oxalate] Nausea Only         SOCIAL HISTORY:  Social History     Socioeconomic History   • Marital status:      Spouse name: Not on file   • Number of children: Not on file   • Years of education: Not on file   • Highest education level: Not on file    Tobacco Use   • Smoking status: Never Smoker   • Smokeless tobacco: Never Used   Substance and Sexual Activity   • Alcohol use: No   • Drug use: No   • Sexual activity: Defer       FAMILY HISTORY:  Family History   Problem Relation Age of Onset   • Blindness Mother    • Other Mother         arteriosclerotic cardiovascular disease    • Diabetes Mother    • Osteoporosis Mother    • Stroke Mother    • Cancer Brother    • Lung cancer Sister    • Osteoporosis Sister    • Stroke Sister    • Stroke Other        REVIEW OF SYSTEMS:  Review of Systems   Constitutional: Negative for chills, fatigue and fever.   HENT: Negative for congestion, ear pain, rhinorrhea, sinus pressure and sore throat.    Eyes: Negative for visual disturbance.   Respiratory: Negative for cough, chest tightness, shortness of breath and wheezing.    Cardiovascular: Negative for chest pain, palpitations and leg swelling.   Gastrointestinal: Negative for abdominal pain, blood in stool, constipation, diarrhea, nausea and vomiting.   Endocrine: Negative for polydipsia and polyuria.   Genitourinary: Negative for dysuria, hematuria and urgency.   Musculoskeletal: Negative for arthralgias and back pain.   Skin: Negative for rash.   Neurological: Negative for dizziness, light-headedness, numbness and headaches.   Psychiatric/Behavioral: Negative for dysphoric mood and sleep disturbance. The patient is not nervous/anxious.           PHYSICAL EXAMINATION:     VITAL SIGNS:  /72   Pulse 80   Temp 97.1 °F (36.2 °C)   Resp 18   Wt 83 kg (183 lb)   BMI 34.58 kg/m²     Physical Exam   Constitutional: She is oriented to person, place, and time. She appears well-developed and well-nourished.   Morbidly obese elderly female resting comfortably   HENT:   Head: Normocephalic and atraumatic.   Mouth/Throat: Oropharynx is clear and moist. No oropharyngeal exudate.   Eyes: Pupils are equal, round, and reactive to light. Conjunctivae and EOM are normal. No scleral  icterus.   Neck: Normal range of motion. Neck supple. No thyromegaly present.   Cardiovascular: Normal rate, regular rhythm and normal heart sounds. Exam reveals no gallop and no friction rub.   No murmur heard.  Pulmonary/Chest: Effort normal and breath sounds normal. No respiratory distress. She has no wheezes.   Abdominal: Soft. Bowel sounds are normal. She exhibits no distension. There is no tenderness.   Musculoskeletal: Normal range of motion.   Lymphadenopathy:     She has no cervical adenopathy.   Neurological: She is alert and oriented to person, place, and time.   Skin: Skin is warm and dry. No rash noted.   Psychiatric: She has a normal mood and affect.   Pleasantly confused on exam today   Nursing note and vitals reviewed.  .    RECORDS REVIEW:       ASSESSMENT     Diagnoses and all orders for this visit:    Late onset Alzheimer's disease with behavioral disturbance (CMS/HCC)    Moderate episode of recurrent major depressive disorder (CMS/[facility])    Fall, subsequent encounter    Mixed hyperlipidemia    Benign essential hypertension    Type 2 diabetes mellitus without complication, without long-term current use of insulin (CMS/[facility])    Gait instability    Muscle spasm    Body mass index (BMI) 40.0-44.9, adult (CMS/HCC)        PLAN    79-year-old white female admitted to the [facility] for long-term care and management.    Alzheimer's disease with behavioral disturbances  - Currently mood and behaviors appear to be stable.  No changes to medications needed at this time.  Unfortunately her dementia does need for quite confused and unaware of time and place.  She is unable to make any medical or legal decisions on her own.  I am in agreement that the patient should have a legal guardian to help make appropriate medical and legal decisions.  Paperwork was completed today.      Bladder spasms/urinary frequency  -Cont Myrbetriq  -Continue Diflucan when needed for vaginal yeast infections.    Major  depressive disorder  -Stable on current medications.    Mixed hyperlipidemia  -Continue statin.    Essential hypertension  -Controlled on current medications.    Vitamin D and vitamin B12 deficiencies  -Stable on supplements.    Osteoporosis  -Stable on current medications.  No recent fractures.    Type 2 diabetes mellitus  -Monitor glucose levels in nursing facility  -Glucose has been stable with current medication regimen.      [x]  Discussed Patient in detail with nursing/staff, addressed all needs today.     [x]  Plan of Care Reviewed   [x]  PT/OT Reviewed   [x]  Order Changes  []  Discharge Plans Reviewed  [x]  Advance Directive on file with Nursing Home.   [x]  POA on file with Nursing Home.    [x]  Code Status listed and reviewed.          Dc Fernandez, DO.  9/10/2020

## 2020-09-10 VITALS
WEIGHT: 183 LBS | RESPIRATION RATE: 18 BRPM | BODY MASS INDEX: 34.58 KG/M2 | SYSTOLIC BLOOD PRESSURE: 120 MMHG | TEMPERATURE: 97.1 F | HEART RATE: 80 BPM | DIASTOLIC BLOOD PRESSURE: 72 MMHG

## 2020-09-11 ENCOUNTER — NURSING HOME (OUTPATIENT)
Dept: FAMILY MEDICINE CLINIC | Facility: CLINIC | Age: 80
End: 2020-09-11

## 2020-09-11 DIAGNOSIS — Z78.9 IMPAIRED MOBILITY AND ADLS: ICD-10-CM

## 2020-09-11 DIAGNOSIS — Z74.09 IMPAIRED MOBILITY AND ADLS: ICD-10-CM

## 2020-09-11 DIAGNOSIS — R63.0 DECREASED APPETITE: Primary | ICD-10-CM

## 2020-09-11 DIAGNOSIS — F01.518 VASCULAR DEMENTIA WITH BEHAVIOR DISTURBANCE (HCC): ICD-10-CM

## 2020-09-11 PROCEDURE — 99309 SBSQ NF CARE MODERATE MDM 30: CPT | Performed by: NURSE PRACTITIONER

## 2020-09-14 VITALS
TEMPERATURE: 98 F | RESPIRATION RATE: 18 BRPM | HEART RATE: 72 BPM | WEIGHT: 183 LBS | OXYGEN SATURATION: 96 % | BODY MASS INDEX: 34.58 KG/M2 | DIASTOLIC BLOOD PRESSURE: 76 MMHG | SYSTOLIC BLOOD PRESSURE: 132 MMHG

## 2020-09-14 NOTE — PROGRESS NOTES
Nursing Home Follow Up Note      Layo Arango DO []   WILLIAM Alfaro [x]  [address] [place] [address]  Phone: [phone]  Fax: [phone] Dorcas Duncan MD []    Dc Fernandez DO []   [address] [place] [address]  Phone: [phone]  Fax: [phone]     PATIENT NAME: Danae Harmon                                                                          YOB: 1940           DATE OF SERVICE: 09/11/2020  FACILITY:  [][facility]   [] [facility]   [] [facility]   [x] [facility]   [] Other ______________________________________________________________________      CHIEF COMPLAINT:    Decreased p.o. intake    HISTORY OF PRESENT ILLNESS:   Per nursing, patient has had decreased appetite the past couple weeks but no weight loss.  Dietitian recommends to give her an appetite stimulant before she does have weight loss.  Patient laying in bed pleasantly confused, without complaints.    PAST MEDICAL & SURGICAL HISTORY:   Past Medical History:   Diagnosis Date   • Colon polyp    • Osteoporosis    • Pain in thoracic spine    • Pneumonia    • UTI (urinary tract infection)       Past Surgical History:   Procedure Laterality Date   • BLADDER SURGERY  2000    bladder suspension   • CERVICAL LAMINECTOMY     • COLONOSCOPY     • COLOSTOMY  1979    temporary sigmoid   • HYSTERECTOMY           MEDICATIONS:  I have reviewed and reconciled the patients medication list in the patients chart at the skilled nursing facility today.      ALLERGIES:    Allergies   Allergen Reactions   • Lexapro [Escitalopram Oxalate] Nausea Only         SOCIAL HISTORY:    Social History     Socioeconomic History   • Marital status:      Spouse name: Not on file   • Number of children: Not on file   • Years of education: Not on file   • Highest education level: Not on file   Tobacco Use   • Smoking status: Never Smoker   • Smokeless tobacco: Never Used   Substance and Sexual Activity   • Alcohol use:  No   • Drug use: No   • Sexual activity: Defer       FAMILY HISTORY:    Family History   Problem Relation Age of Onset   • Blindness Mother    • Other Mother         arteriosclerotic cardiovascular disease    • Diabetes Mother    • Osteoporosis Mother    • Stroke Mother    • Cancer Brother    • Lung cancer Sister    • Osteoporosis Sister    • Stroke Sister    • Stroke Other        REVIEW OF SYSTEMS:    Review of Systems   Reason unable to perform ROS: ROS per nursing and patient.   Constitutional: Positive for activity change and appetite change. Negative for chills, diaphoresis, fatigue, fever, unexpected weight gain and unexpected weight loss.   HENT: Negative for congestion, mouth sores, nosebleeds, postnasal drip, rhinorrhea, sneezing, sore throat and trouble swallowing.    Eyes: Negative for pain, discharge, redness and itching.   Respiratory: Negative for apnea, cough, choking, shortness of breath, wheezing and stridor.    Cardiovascular: Negative for palpitations and leg swelling.   Gastrointestinal: Negative for abdominal distention, abdominal pain, blood in stool, constipation, diarrhea, nausea and vomiting.   Endocrine: Negative for polydipsia and polyuria.   Genitourinary: Positive for urinary incontinence. Negative for decreased urine volume, difficulty urinating, frequency, hematuria and urgency.   Musculoskeletal: Positive for arthralgias and gait problem. Negative for back pain, joint swelling, myalgias and neck pain.   Skin: Negative for color change, dry skin, rash and skin lesions.        Bruising to bilateral upper extremities   Allergic/Immunologic: Negative for environmental allergies.   Neurological: Positive for speech difficulty, weakness, memory problem and confusion. Negative for seizures.   Hematological: Bruises/bleeds easily.   Psychiatric/Behavioral: Positive for behavioral problems and positive for hyperactivity. Negative for agitation, dysphoric mood, hallucinations, self-injury,  sleep disturbance, suicidal ideas, depressed mood and stress. The patient is nervous/anxious.          PHYSICAL EXAMINATION:   VITAL SIGNS:   Vitals:    09/14/20 1004   BP: 132/76   Pulse: 72   Resp: 18   Temp: 98 °F (36.7 °C)   SpO2: 96%   Weight: 83 kg (183 lb)       Physical Exam   Constitutional: Vital signs are normal. She appears well-developed and well-nourished. She is sleeping.   HENT:   Head: Normocephalic.   Right Ear: External ear normal.   Left Ear: External ear normal.   Nose: Nose normal.   Eyes: Conjunctivae are normal.   Neck: Normal range of motion.   Cardiovascular: Normal rate, regular rhythm and normal heart sounds.   Pulmonary/Chest: Effort normal and breath sounds normal. No respiratory distress. She has no wheezes. She has no rales.   Abdominal: Soft. Bowel sounds are normal. She exhibits no distension and no mass. There is no abdominal tenderness. No hernia.   Musculoskeletal:      Comments: Weakness BLE   Neurological: She is disoriented.   Confused conversation   Skin: Skin is warm and dry. Bruising (scattered about bilateral upper extremities ) noted. No rash noted.   Psychiatric: Her mood appears anxious. She is hyperactive. Cognition and memory are impaired.   Confused conversation    Nursing note and vitals reviewed.      RECORDS REVIEW:   I have reviewed and interpreted the following lab test results  obtained at the time of the visit today.     ASSESSMENT     Diagnoses and all orders for this visit:    Decreased appetite    Vascular dementia with behavior disturbance (CMS/HCC)    Impaired mobility and ADLs        PLAN    Decreased appetite/dementia  -Patient with decreased appetite the past several weeks, but no weight loss. Start Remeron 7.5 mg qhs, to help with appetite. Continues to be confused, but no acute behavior changes. Will c/t monitor and increase if needed.     Nursing encouraged to keep me informed of any acute changes, lack of improvement, or any new concerning  symptoms.      [x]  Discussed Patient in detail with nursing/staff, addressed all needs today.     [x]  Plan of Care Reviewed   [x]  PT/OT Reviewed   [x]  Order Changes  []  Discharge Plans Reviewed  [x]  Advance Directive on file with Nursing Home.   [x]  POA on file with Nursing Home.   [x]  Code Status listed: []  Full Code   []  DNR       “I confirm accuracy of unchanged data/findings which have been carried forward from previous visit, as well as I have updated appropriately those that have changed.”                          Mita Joyce, APRN.

## 2020-09-24 ENCOUNTER — NURSING HOME (OUTPATIENT)
Dept: FAMILY MEDICINE CLINIC | Facility: CLINIC | Age: 80
End: 2020-09-24

## 2020-09-24 VITALS
RESPIRATION RATE: 18 BRPM | TEMPERATURE: 98 F | HEART RATE: 68 BPM | WEIGHT: 180 LBS | BODY MASS INDEX: 34.01 KG/M2 | DIASTOLIC BLOOD PRESSURE: 72 MMHG | OXYGEN SATURATION: 97 % | SYSTOLIC BLOOD PRESSURE: 126 MMHG

## 2020-09-24 DIAGNOSIS — R63.4 WEIGHT LOSS: ICD-10-CM

## 2020-09-24 DIAGNOSIS — E66.9 OBESITY (BMI 30.0-34.9): ICD-10-CM

## 2020-09-24 DIAGNOSIS — Z74.09 IMPAIRED MOBILITY AND ADLS: ICD-10-CM

## 2020-09-24 DIAGNOSIS — Z78.9 IMPAIRED MOBILITY AND ADLS: ICD-10-CM

## 2020-09-24 DIAGNOSIS — R63.0 DECREASED APPETITE: ICD-10-CM

## 2020-09-24 DIAGNOSIS — F01.518 VASCULAR DEMENTIA WITH BEHAVIOR DISTURBANCE (HCC): ICD-10-CM

## 2020-09-24 PROCEDURE — 99309 SBSQ NF CARE MODERATE MDM 30: CPT | Performed by: NURSE PRACTITIONER

## 2020-09-25 DIAGNOSIS — M54.40 CHRONIC LOW BACK PAIN WITH SCIATICA, SCIATICA LATERALITY UNSPECIFIED, UNSPECIFIED BACK PAIN LATERALITY: ICD-10-CM

## 2020-09-25 DIAGNOSIS — G89.29 CHRONIC LOW BACK PAIN WITH SCIATICA, SCIATICA LATERALITY UNSPECIFIED, UNSPECIFIED BACK PAIN LATERALITY: ICD-10-CM

## 2020-09-25 RX ORDER — HYDROCODONE BITARTRATE AND ACETAMINOPHEN 5; 325 MG/1; MG/1
TABLET ORAL
Qty: 120 TABLET | Refills: 0 | Status: SHIPPED | OUTPATIENT
Start: 2020-09-25 | End: 2020-11-23 | Stop reason: SDUPTHER

## 2020-09-26 PROBLEM — E66.9 OBESITY (BMI 30.0-34.9): Status: ACTIVE | Noted: 2020-09-26

## 2020-09-26 NOTE — PROGRESS NOTES
Nursing Home Follow Up Note      Layo Arango DO []   WILLIAM Alfaro [x]  [address] [place] [address]  Phone: [phone]  Fax: [phone] Dorcas Duncan MD []    Dc Fernandez DO []   [address] [place] [address]  Phone: [phone]  Fax: [phone]     PATIENT NAME: Danae Harmon                                                                          YOB: 1940           DATE OF SERVICE: 09/24/2020  FACILITY:  [][facility]   [] [place]   [] [facility]   [x] [facility]   [] Other ______________________________________________________________________      CHIEF COMPLAINT:    Follow-up appetite and behaviors      HISTORY OF PRESENT ILLNESS:     Per nursing patient ate about the same.  She eats 25 -100% of every meal most of the time occasionally not eating well for 1 of the meals but not very often.  Her other behaviors have improved greatly.  She is no longer restless and wanting to get in the floor on her mats as she did in the past.  Have confused conversation.  She is pleasant during visit today with no signs and symptoms of distress.    PAST MEDICAL & SURGICAL HISTORY:   Past Medical History:   Diagnosis Date   • Colon polyp    • Osteoporosis    • Pain in thoracic spine    • Pneumonia    • UTI (urinary tract infection)       Past Surgical History:   Procedure Laterality Date   • BLADDER SURGERY  2000    bladder suspension   • CERVICAL LAMINECTOMY     • COLONOSCOPY     • COLOSTOMY  1979    temporary sigmoid   • HYSTERECTOMY           MEDICATIONS:  I have reviewed and reconciled the patients medication list in the patients chart at the skilled nursing facility today.      ALLERGIES:    Allergies   Allergen Reactions   • Lexapro [Escitalopram Oxalate] Nausea Only         SOCIAL HISTORY:    Social History     Socioeconomic History   • Marital status:      Spouse name: Not on file   • Number of children: Not on file   • Years of education: Not on  file   • Highest education level: Not on file   Tobacco Use   • Smoking status: Never Smoker   • Smokeless tobacco: Never Used   Substance and Sexual Activity   • Alcohol use: No   • Drug use: No   • Sexual activity: Defer       FAMILY HISTORY:    Family History   Problem Relation Age of Onset   • Blindness Mother    • Other Mother         arteriosclerotic cardiovascular disease    • Diabetes Mother    • Osteoporosis Mother    • Stroke Mother    • Cancer Brother    • Lung cancer Sister    • Osteoporosis Sister    • Stroke Sister    • Stroke Other        REVIEW OF SYSTEMS:    Review of Systems   Reason unable to perform ROS: ROS per nursing and patient.   Constitutional: Positive for activity change, appetite change and unexpected weight loss. Negative for chills, diaphoresis, fatigue, fever and unexpected weight gain.   HENT: Negative for congestion, mouth sores, nosebleeds, postnasal drip, rhinorrhea, sneezing, sore throat and trouble swallowing.    Eyes: Negative for pain, discharge, redness and itching.   Respiratory: Negative for apnea, cough, choking, shortness of breath, wheezing and stridor.    Cardiovascular: Negative for palpitations and leg swelling.   Gastrointestinal: Negative for abdominal distention, abdominal pain, blood in stool, constipation, diarrhea, nausea and vomiting.   Endocrine: Negative for polydipsia and polyuria.   Genitourinary: Positive for urinary incontinence. Negative for decreased urine volume, difficulty urinating, frequency, hematuria and urgency.   Musculoskeletal: Positive for arthralgias and gait problem. Negative for back pain, joint swelling, myalgias and neck pain.   Skin: Negative for color change, dry skin, rash and skin lesions.   Allergic/Immunologic: Negative for environmental allergies.   Neurological: Positive for speech difficulty, weakness, memory problem and confusion. Negative for seizures.   Hematological: Bruises/bleeds easily.   Psychiatric/Behavioral: Positive  for behavioral problems and positive for hyperactivity. Negative for agitation, dysphoric mood, hallucinations, self-injury, sleep disturbance, suicidal ideas, depressed mood and stress. The patient is nervous/anxious.          PHYSICAL EXAMINATION:   VITAL SIGNS:   Vitals:    09/24/20 1420   BP: 126/72   Pulse: 68   Resp: 18   Temp: 98 °F (36.7 °C)   SpO2: 97%   Weight: 81.6 kg (180 lb)       Physical Exam   Constitutional: Vital signs are normal. She appears well-developed and well-nourished. She is sleeping.   HENT:   Head: Normocephalic.   Right Ear: External ear normal.   Left Ear: External ear normal.   Nose: Nose normal.   Eyes: Conjunctivae are normal.   Neck: Normal range of motion.   Cardiovascular: Normal rate, regular rhythm and normal heart sounds.   Pulmonary/Chest: Effort normal and breath sounds normal. No respiratory distress. She has no wheezes. She has no rales.   Abdominal: Soft. Bowel sounds are normal. She exhibits no distension and no mass. There is no abdominal tenderness. No hernia.   Musculoskeletal:      Comments: Weakness BLE   Neurological: She is disoriented.   Confused conversation   Skin: Skin is warm and dry. Turgor is normal. No rash noted.   Psychiatric: Her speech is tangential. She is slowed. Cognition and memory are impaired. She has a flat affect.   Confused conversation    Nursing note and vitals reviewed.      RECORDS REVIEW:   I have reviewed and interpreted the following lab test results  obtained at the time of the visit today.     ASSESSMENT     Diagnoses and all orders for this visit:    Vascular dementia with behavior disturbance (CMS/HCC)    Decreased appetite    Weight loss    Obesity (BMI 30.0-34.9)    Impaired mobility and ADLs        PLAN    Decreased appetite/dementia  -Patient with decreased appetite the past several weeks, but she is still eating well with meals 25 to 100% just not eating as many snacks as in the past.  She has lost 3 pounds but there is no  concern at this time as she is okay to lose some weight given her obesity.  Continue Remeron 7.5 mg qhs, to help with appetite.  We will increase if weight loss continues.  Continues to be confused, but no acute behavior changes. Will c/t monitor and increase if needed.     Nursing encouraged to keep me informed of any acute changes, lack of improvement, or any new concerning symptoms.    Nursing to continue supportive care for all ADLs.      [x]  Discussed Patient in detail with nursing/staff, addressed all needs today.     [x]  Plan of Care Reviewed   [x]  PT/OT Reviewed   [x]  Order Changes  []  Discharge Plans Reviewed  [x]  Advance Directive on file with Nursing Home.   [x]  POA on file with Nursing Home.   [x]  Code Status listed: []  Full Code   []  DNR       “I confirm accuracy of unchanged data/findings which have been carried forward from previous visit, as well as I have updated appropriately those that have changed.”                          Mita Joyce, APRN.

## 2020-10-08 ENCOUNTER — NURSING HOME (OUTPATIENT)
Dept: INTERNAL MEDICINE | Facility: CLINIC | Age: 80
End: 2020-10-08

## 2020-10-08 VITALS
RESPIRATION RATE: 18 BRPM | OXYGEN SATURATION: 94 % | WEIGHT: 182 LBS | HEART RATE: 78 BPM | SYSTOLIC BLOOD PRESSURE: 116 MMHG | TEMPERATURE: 98.1 F | BODY MASS INDEX: 34.39 KG/M2 | DIASTOLIC BLOOD PRESSURE: 62 MMHG

## 2020-10-08 DIAGNOSIS — F02.818 LATE ONSET ALZHEIMER'S DISEASE WITH BEHAVIORAL DISTURBANCE (HCC): Primary | ICD-10-CM

## 2020-10-08 DIAGNOSIS — R26.81 GAIT INSTABILITY: ICD-10-CM

## 2020-10-08 DIAGNOSIS — E53.8 VITAMIN B 12 DEFICIENCY: ICD-10-CM

## 2020-10-08 DIAGNOSIS — F33.1 MODERATE EPISODE OF RECURRENT MAJOR DEPRESSIVE DISORDER (HCC): ICD-10-CM

## 2020-10-08 DIAGNOSIS — G89.29 CHRONIC LOW BACK PAIN WITH SCIATICA, SCIATICA LATERALITY UNSPECIFIED, UNSPECIFIED BACK PAIN LATERALITY: ICD-10-CM

## 2020-10-08 DIAGNOSIS — M54.40 CHRONIC LOW BACK PAIN WITH SCIATICA, SCIATICA LATERALITY UNSPECIFIED, UNSPECIFIED BACK PAIN LATERALITY: ICD-10-CM

## 2020-10-08 DIAGNOSIS — E11.9 TYPE 2 DIABETES MELLITUS WITHOUT COMPLICATION, WITHOUT LONG-TERM CURRENT USE OF INSULIN (HCC): ICD-10-CM

## 2020-10-08 DIAGNOSIS — E78.2 MIXED HYPERLIPIDEMIA: ICD-10-CM

## 2020-10-08 DIAGNOSIS — G30.1 LATE ONSET ALZHEIMER'S DISEASE WITH BEHAVIORAL DISTURBANCE (HCC): Primary | ICD-10-CM

## 2020-10-08 DIAGNOSIS — I10 BENIGN ESSENTIAL HYPERTENSION: ICD-10-CM

## 2020-10-08 PROCEDURE — 99308 SBSQ NF CARE LOW MDM 20: CPT | Performed by: INTERNAL MEDICINE

## 2020-10-12 NOTE — PROGRESS NOTES
Nursing Home Progress Note        Layo Arango DO ?  Mita Joyce, APRN ?  [address] [facility] [facility], [place] [address]  Phone: [phone]  Fax: [phone] Dorcas Duncan MD ?  Dc Fernandez DO [x]   [address] [place] [address]  Phone: [phone]  Fax: [phone]     PATIENT NAME: Danae Harmon                                                                          YOB: 1940           DATE OF SERVICE: 10/08/2020  FACILITY:  [] [place]   [] [facility]   [] [facility]   [x] [facility]  []  [facility]  [] Other ______________________________________________________________________     CHIEF COMPLAINT:  Chronic medical management      HISTORY OF PRESENT ILLNESS:   Patient was sleeping comfortably in her bed on exam today.  Upon awaking, patient was carrying she was sleepy and had no other specific complaints or concerns.  Nurses did share that the patient has not been crawling on the floor or falling as she was a month ago.  Behaviors are improving although her dementia does appear to be progressing.    PAST MEDICAL & SURGICAL HISTORY:   Past Medical History:   Diagnosis Date   • Colon polyp    • Osteoporosis    • Pain in thoracic spine    • Pneumonia    • UTI (urinary tract infection)       Past Surgical History:   Procedure Laterality Date   • BLADDER SURGERY  2000    bladder suspension   • CERVICAL LAMINECTOMY     • COLONOSCOPY     • COLOSTOMY  1979    temporary sigmoid   • HYSTERECTOMY           MEDICATIONS:  I have reviewed and reconciled the patients medication list in the patients chart at the skilled nursing facility today.      ALLERGIES:  Allergies   Allergen Reactions   • Lexapro [Escitalopram Oxalate] Nausea Only         SOCIAL HISTORY:  Social History     Socioeconomic History   • Marital status:      Spouse name: Not on file   • Number of children: Not on file   • Years of education: Not on file   • Highest education level: Not on file    Tobacco Use   • Smoking status: Never Smoker   • Smokeless tobacco: Never Used   Substance and Sexual Activity   • Alcohol use: No   • Drug use: No   • Sexual activity: Defer       FAMILY HISTORY:  Family History   Problem Relation Age of Onset   • Blindness Mother    • Other Mother         arteriosclerotic cardiovascular disease    • Diabetes Mother    • Osteoporosis Mother    • Stroke Mother    • Cancer Brother    • Lung cancer Sister    • Osteoporosis Sister    • Stroke Sister    • Stroke Other        REVIEW OF SYSTEMS:  Review of Systems   Constitutional: Negative for chills, fatigue and fever.   HENT: Negative for congestion, ear pain, rhinorrhea, sinus pressure and sore throat.    Eyes: Negative for visual disturbance.   Respiratory: Negative for cough, chest tightness, shortness of breath and wheezing.    Cardiovascular: Negative for chest pain, palpitations and leg swelling.   Gastrointestinal: Negative for abdominal pain, blood in stool, constipation, diarrhea, nausea and vomiting.   Endocrine: Negative for polydipsia and polyuria.   Genitourinary: Negative for dysuria, hematuria and urgency.   Musculoskeletal: Negative for arthralgias and back pain.   Skin: Negative for rash.   Neurological: Negative for dizziness, light-headedness, numbness and headaches.   Psychiatric/Behavioral: Negative for dysphoric mood and sleep disturbance. The patient is not nervous/anxious.           PHYSICAL EXAMINATION:     VITAL SIGNS:  /62   Pulse 78   Temp 98.1 °F (36.7 °C)   Resp 18   Wt 82.6 kg (182 lb)   SpO2 94%   BMI 34.39 kg/m²     Physical Exam   Constitutional: She is oriented to person, place, and time. She appears well-developed.   Morbidly obese elderly female resting comfortably   HENT:   Head: Normocephalic and atraumatic.   Mouth/Throat: No oropharyngeal exudate.   Eyes: Pupils are equal, round, and reactive to light. Conjunctivae are normal. No scleral icterus.   Neck: Normal range of motion.  Neck supple. No thyromegaly present.   Cardiovascular: Normal rate, regular rhythm and normal heart sounds. Exam reveals no gallop and no friction rub.   No murmur heard.  Pulmonary/Chest: Effort normal and breath sounds normal. No respiratory distress. She has no wheezes.   Abdominal: Soft. Bowel sounds are normal. She exhibits no distension. There is no abdominal tenderness.   Musculoskeletal: Normal range of motion.   Lymphadenopathy:     She has no cervical adenopathy.   Neurological: She is alert and oriented to person, place, and time.   Skin: Skin is warm and dry. No rash noted.   Psychiatric:   Pleasantly confused on exam today   Nursing note and vitals reviewed.  .    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    Alzheimer's disease with behavioral disturbances  - Currently mood and behaviors appear to be stable.  Continue current regimen.    Major depressive disorder  -Stable on current medications.    Bladder spasms/urinary frequency  -Cont Myrbetriq    Mixed hyperlipidemia  -Continue statin.    Essential hypertension  -Controlled on current medications.    Vitamin D and vitamin B12 deficiencies  -Stable on supplements.    Osteoporosis  -Stable on current medications.  No recent fractures.    Type 2 diabetes mellitus  -Monitor glucose levels in nursing facility  -Glucose has been stable with current medication  regimen.      [x]  Discussed Patient in detail with nursing/staff, addressed all needs today.     [x]  Plan of Care Reviewed   []  PT/OT Reviewed   []  Order Changes  []  Discharge Plans Reviewed  [x]  Advance Directive on file with Nursing Home.   [x]  POA on file with Nursing Home.    [x]  Code Status listed and reviewed.           Dc Fernandez DO.  10/12/2020

## 2020-11-12 ENCOUNTER — NURSING HOME (OUTPATIENT)
Dept: INTERNAL MEDICINE | Facility: CLINIC | Age: 80
End: 2020-11-12

## 2020-11-12 VITALS
DIASTOLIC BLOOD PRESSURE: 74 MMHG | TEMPERATURE: 98.2 F | SYSTOLIC BLOOD PRESSURE: 128 MMHG | RESPIRATION RATE: 18 BRPM | HEART RATE: 76 BPM | OXYGEN SATURATION: 96 % | WEIGHT: 183 LBS | BODY MASS INDEX: 34.58 KG/M2

## 2020-11-12 DIAGNOSIS — E11.9 TYPE 2 DIABETES MELLITUS WITHOUT COMPLICATION, WITHOUT LONG-TERM CURRENT USE OF INSULIN (HCC): ICD-10-CM

## 2020-11-12 DIAGNOSIS — E78.2 MIXED HYPERLIPIDEMIA: ICD-10-CM

## 2020-11-12 DIAGNOSIS — R26.81 GAIT INSTABILITY: ICD-10-CM

## 2020-11-12 DIAGNOSIS — F02.818 LATE ONSET ALZHEIMER'S DISEASE WITH BEHAVIORAL DISTURBANCE (HCC): Primary | ICD-10-CM

## 2020-11-12 DIAGNOSIS — G89.29 CHRONIC LOW BACK PAIN WITH SCIATICA, SCIATICA LATERALITY UNSPECIFIED, UNSPECIFIED BACK PAIN LATERALITY: ICD-10-CM

## 2020-11-12 DIAGNOSIS — I10 BENIGN ESSENTIAL HYPERTENSION: ICD-10-CM

## 2020-11-12 DIAGNOSIS — G30.1 LATE ONSET ALZHEIMER'S DISEASE WITH BEHAVIORAL DISTURBANCE (HCC): Primary | ICD-10-CM

## 2020-11-12 DIAGNOSIS — F33.1 MODERATE EPISODE OF RECURRENT MAJOR DEPRESSIVE DISORDER (HCC): ICD-10-CM

## 2020-11-12 DIAGNOSIS — M54.40 CHRONIC LOW BACK PAIN WITH SCIATICA, SCIATICA LATERALITY UNSPECIFIED, UNSPECIFIED BACK PAIN LATERALITY: ICD-10-CM

## 2020-11-12 PROCEDURE — 99308 SBSQ NF CARE LOW MDM 20: CPT | Performed by: INTERNAL MEDICINE

## 2020-11-13 NOTE — PROGRESS NOTES
Nursing Home Progress Note        Layo Arango DO ?  Mita Joyce, APRN ?  [address] [facility], [place] [address]  Phone: [phone]  Fax: [phone] Dorcas Duncan MD ?  Dc Fernandez DO [x]   [address] [place] [address]  Phone: [phone]  Fax: [phone]     PATIENT NAME: Danae Harmon                                                                          YOB: 1940           DATE OF SERVICE: 11/12/2020   FACILITY:  [] [facility]   [] [facility]   [] [facility]   [x] [facility]  []  [facility]  [] Other ______________________________________________________________________     CHIEF COMPLAINT:  Chronic medical management      HISTORY OF PRESENT ILLNESS:   Patient has been feeling much better and behaving much better over the last month.  Nurses have shared she is much more complaint and content ever since her medications were adjusted.  Patient had no specific concerns on exam.  She was being taken for a bath but was not able to state where she was going.  Appetite has been better and pain has been controlled.     PAST MEDICAL & SURGICAL HISTORY:   Past Medical History:   Diagnosis Date   • Colon polyp    • Osteoporosis    • Pain in thoracic spine    • Pneumonia    • UTI (urinary tract infection)       Past Surgical History:   Procedure Laterality Date   • BLADDER SURGERY  2000    bladder suspension   • CERVICAL LAMINECTOMY     • COLONOSCOPY     • COLOSTOMY  1979    temporary sigmoid   • HYSTERECTOMY           MEDICATIONS:  I have reviewed and reconciled the patients medication list in the patients chart at the skilled nursing facility today.      ALLERGIES:  Allergies   Allergen Reactions   • Lexapro [Escitalopram Oxalate] Nausea Only         SOCIAL HISTORY:  Social History     Socioeconomic History   • Marital status:      Spouse name: Not on file   • Number of children: Not on file   • Years of education: Not on file   • Highest education  level: Not on file   Tobacco Use   • Smoking status: Never Smoker   • Smokeless tobacco: Never Used   Substance and Sexual Activity   • Alcohol use: No   • Drug use: No   • Sexual activity: Defer       FAMILY HISTORY:  Family History   Problem Relation Age of Onset   • Blindness Mother    • Other Mother         arteriosclerotic cardiovascular disease    • Diabetes Mother    • Osteoporosis Mother    • Stroke Mother    • Cancer Brother    • Lung cancer Sister    • Osteoporosis Sister    • Stroke Sister    • Stroke Other        REVIEW OF SYSTEMS:  Review of Systems   Constitutional: Negative for chills, fatigue and fever.   HENT: Negative for congestion, ear pain, rhinorrhea, sinus pressure and sore throat.    Eyes: Negative for visual disturbance.   Respiratory: Negative for cough, chest tightness, shortness of breath and wheezing.    Cardiovascular: Negative for chest pain, palpitations and leg swelling.   Gastrointestinal: Negative for abdominal pain, blood in stool, constipation, diarrhea, nausea and vomiting.   Endocrine: Negative for polydipsia and polyuria.   Genitourinary: Negative for dysuria, hematuria and urgency.   Musculoskeletal: Negative for arthralgias and back pain.   Skin: Negative for rash.   Neurological: Negative for dizziness, light-headedness, numbness and headaches.   Psychiatric/Behavioral: Negative for dysphoric mood and sleep disturbance. The patient is not nervous/anxious.           PHYSICAL EXAMINATION:     VITAL SIGNS:  /74   Pulse 76   Temp 98.2 °F (36.8 °C)   Resp 18   Wt 83 kg (183 lb)   SpO2 96%   BMI 34.58 kg/m²     Physical Exam   Constitutional: She is oriented to person, place, and time. She appears well-developed.   Morbidly obese elderly female resting comfortably   HENT:   Head: Normocephalic and atraumatic.   Mouth/Throat: No oropharyngeal exudate.   Eyes: Pupils are equal, round, and reactive to light. Conjunctivae are normal. No scleral icterus.   Neck: Normal  range of motion. Neck supple. No thyromegaly present.   Cardiovascular: Normal rate, regular rhythm and normal heart sounds. Exam reveals no gallop and no friction rub.   No murmur heard.  Pulmonary/Chest: Effort normal and breath sounds normal. No respiratory distress. She has no wheezes.   Abdominal: Soft. Bowel sounds are normal. She exhibits no distension. There is no abdominal tenderness.   Musculoskeletal: Normal range of motion.   Lymphadenopathy:     She has no cervical adenopathy.   Neurological: She is alert and oriented to person, place, and time.   Skin: Skin is warm and dry. No rash noted.   Psychiatric:   Pleasantly confused on exam today   Nursing note and vitals reviewed.  .    RECORDS REVIEW:       ASSESSMENT     Diagnoses and all orders for this visit:    1. Late onset Alzheimer's disease with behavioral disturbance (CMS/HCC) (Primary)    2. Moderate episode of recurrent major depressive disorder (CMS/HCC)    3. Mixed hyperlipidemia    4. Benign essential hypertension    5. Type 2 diabetes mellitus without complication, without long-term current use of insulin (CMS/HCC)    6. Gait instability    7. Chronic low back pain with sciatica, sciatica laterality unspecified, unspecified back pain laterality        PLAN    Alzheimer's disease with behavioral disturbances  -Issues with mood and behavior have resolved over the last few months.  Given she is stable, is best not to adjust medications at this time.    Major depressive disorder  -Stable on current medications.    Bladder spasms/urinary frequency  -Cont Myrbetriq, still has incontinence episodes    Mixed hyperlipidemia  -Continue statin.    Essential hypertension  -Controlled on current medications.    Vitamin D and vitamin B12 deficiencies  -Stable on supplements.    Osteoporosis  -Stable on vitamins..    Type 2 diabetes mellitus  -Glucose levels in the facility have been in reasonable range recently.  No changes to regimen needed.    Chronic  pain  -Controlled on Norco being filled monthly.    [x]  Discussed Patient in detail with nursing/staff, addressed all needs today.     [x]  Plan of Care Reviewed   []  PT/OT Reviewed   []  Order Changes  []  Discharge Plans Reviewed  [x]  Advance Directive on file with Nursing Home.   [x]  POA on file with Nursing Home.    [x]  Code Status listed and reviewed.           I confirm accuracy of unchanged data/findings including physical exam and plan which have been carried forward from previous visit, as well as I have updated appropriately those that have changed     Dc Fernandez DO.  11/13/2020

## 2020-11-18 ENCOUNTER — NURSING HOME (OUTPATIENT)
Dept: FAMILY MEDICINE CLINIC | Facility: CLINIC | Age: 80
End: 2020-11-18

## 2020-11-18 VITALS
DIASTOLIC BLOOD PRESSURE: 90 MMHG | SYSTOLIC BLOOD PRESSURE: 124 MMHG | WEIGHT: 185 LBS | RESPIRATION RATE: 18 BRPM | BODY MASS INDEX: 34.96 KG/M2 | TEMPERATURE: 97.9 F | HEART RATE: 76 BPM | OXYGEN SATURATION: 95 %

## 2020-11-18 DIAGNOSIS — Z78.9 IMPAIRED MOBILITY AND ADLS: ICD-10-CM

## 2020-11-18 DIAGNOSIS — R63.2 APPETITE INCREASE: Primary | ICD-10-CM

## 2020-11-18 DIAGNOSIS — F01.518 VASCULAR DEMENTIA WITH BEHAVIOR DISTURBANCE (HCC): ICD-10-CM

## 2020-11-18 DIAGNOSIS — Z74.09 IMPAIRED MOBILITY AND ADLS: ICD-10-CM

## 2020-11-18 DIAGNOSIS — R63.5 WEIGHT INCREASING: ICD-10-CM

## 2020-11-18 PROCEDURE — 99308 SBSQ NF CARE LOW MDM 20: CPT | Performed by: NURSE PRACTITIONER

## 2020-11-22 NOTE — PROGRESS NOTES
Nursing Home Follow Up Note      Layo Arango DO []   WILLIAM Alfaro [x]  [address] [place] [address]  Phone: [phone]  Fax: [phone] Dorcas Duncan MD []    Dc Fernandez DO []   [address] [place] [address]  Phone: [phone]  Fax: [phone]     PATIENT NAME: Danae Harmon                                                                          YOB: 1940           DATE OF SERVICE: 11/18/2020  FACILITY:  [][place]   [] [place]   [] [facility]   [x] [facility]   [] Other ______________________________________________________________________      CHIEF COMPLAINT:    Follow-up appetite and behaviors      HISTORY OF PRESENT ILLNESS:     Patient started on Remeron 2 months ago for decreased appetite and weight loss.  Her appetite has increased and she has gained 5 pounds in the past 2 months.  Her behaviors are doing very well and stable on her current medications.    PAST MEDICAL & SURGICAL HISTORY:   Past Medical History:   Diagnosis Date   • Colon polyp    • Osteoporosis    • Pain in thoracic spine    • Pneumonia    • UTI (urinary tract infection)       Past Surgical History:   Procedure Laterality Date   • BLADDER SURGERY  2000    bladder suspension   • CERVICAL LAMINECTOMY     • COLONOSCOPY     • COLOSTOMY  1979    temporary sigmoid   • HYSTERECTOMY           MEDICATIONS:  I have reviewed and reconciled the patients medication list in the patients chart at the skilled nursing facility today.      ALLERGIES:    Allergies   Allergen Reactions   • Lexapro [Escitalopram Oxalate] Nausea Only         SOCIAL HISTORY:    Social History     Socioeconomic History   • Marital status:      Spouse name: Not on file   • Number of children: Not on file   • Years of education: Not on file   • Highest education level: Not on file   Tobacco Use   • Smoking status: Never Smoker   • Smokeless tobacco: Never Used   Substance and Sexual Activity   • Alcohol  use: No   • Drug use: No   • Sexual activity: Defer       FAMILY HISTORY:    Family History   Problem Relation Age of Onset   • Blindness Mother    • Other Mother         arteriosclerotic cardiovascular disease    • Diabetes Mother    • Osteoporosis Mother    • Stroke Mother    • Cancer Brother    • Lung cancer Sister    • Osteoporosis Sister    • Stroke Sister    • Stroke Other        REVIEW OF SYSTEMS:    Review of Systems   Reason unable to perform ROS: ROS per nursing and patient.   Constitutional: Positive for activity change, appetite change and unexpected weight loss. Negative for chills, diaphoresis, fatigue, fever and unexpected weight gain.   HENT: Negative for congestion, mouth sores, nosebleeds, postnasal drip, rhinorrhea, sneezing, sore throat and trouble swallowing.    Eyes: Negative for pain, discharge, redness and itching.   Respiratory: Negative for apnea, cough, choking, shortness of breath, wheezing and stridor.    Cardiovascular: Negative for palpitations and leg swelling.   Gastrointestinal: Negative for abdominal distention, abdominal pain, blood in stool, constipation, diarrhea, nausea and vomiting.   Endocrine: Negative for polydipsia and polyuria.   Genitourinary: Positive for urinary incontinence. Negative for decreased urine volume, difficulty urinating, frequency, hematuria and urgency.   Musculoskeletal: Positive for arthralgias and gait problem. Negative for back pain, joint swelling, myalgias and neck pain.   Skin: Negative for color change, dry skin, rash and skin lesions.   Allergic/Immunologic: Negative for environmental allergies.   Neurological: Positive for speech difficulty, weakness, memory problem and confusion. Negative for seizures.   Psychiatric/Behavioral: Positive for behavioral problems and positive for hyperactivity. Negative for agitation, dysphoric mood, hallucinations, self-injury, sleep disturbance, suicidal ideas, depressed mood and stress. The patient is  nervous/anxious.          PHYSICAL EXAMINATION:   VITAL SIGNS:   Vitals:    11/18/20 1313   BP: 124/90   Pulse: 76   Resp: 18   Temp: 97.9 °F (36.6 °C)   SpO2: 95%   Weight: 83.9 kg (185 lb)       Physical Exam   Constitutional: Vital signs are normal. She appears well-developed and well-nourished. She is sleeping.   HENT:   Head: Normocephalic.   Right Ear: External ear normal.   Left Ear: External ear normal.   Nose: Nose normal.   Eyes: Conjunctivae are normal.   Neck: Normal range of motion.   Cardiovascular: Normal rate, regular rhythm and normal heart sounds.   Pulmonary/Chest: Effort normal and breath sounds normal. No respiratory distress. She has no wheezes. She has no rales.   Abdominal: Soft. Bowel sounds are normal. She exhibits no distension and no mass. There is no abdominal tenderness. No hernia.   Musculoskeletal:      Comments: Weakness BLE   Neurological: She is disoriented.   Confused conversation   Skin: Skin is warm and dry. Turgor is normal. No rash noted.   Psychiatric: Her speech is tangential. She is slowed. Cognition and memory are impaired. She has a flat affect.   Confused conversation    Nursing note and vitals reviewed.      RECORDS REVIEW:   I have reviewed and interpreted the following lab test results  obtained at the time of the visit today.     ASSESSMENT     Diagnoses and all orders for this visit:    1. Appetite increase (Primary)    2. Weight increasing    3. Vascular dementia with behavior disturbance (CMS/HCC)    4. Impaired mobility and ADLs        PLAN    Appetite increase/weight increasing/dementia  -Appetite and weight improved over the past 2 months since starting Remeron.  Her dementia behaviors are stable.  We will continue to monitor.    Nursing encouraged to keep me informed of any acute changes, lack of improvement, or any new concerning symptoms.    Nursing to continue supportive care for all ADLs.      [x]  Discussed Patient in detail with nursing/staff, addressed  all needs today.     [x]  Plan of Care Reviewed   [x]  PT/OT Reviewed   [x]  Order Changes  []  Discharge Plans Reviewed  [x]  Advance Directive on file with Nursing Home.   [x]  POA on file with Nursing Home.   [x]  Code Status listed: []  Full Code   [x]  DNR        “I confirm accuracy of unchanged data/findings which have been carried forward from previous visit, as well as I have updated appropriately those that have changed.”                          Mita Joyce, APRN.

## 2020-11-23 DIAGNOSIS — G89.29 CHRONIC LOW BACK PAIN WITH SCIATICA, SCIATICA LATERALITY UNSPECIFIED, UNSPECIFIED BACK PAIN LATERALITY: ICD-10-CM

## 2020-11-23 DIAGNOSIS — M54.40 CHRONIC LOW BACK PAIN WITH SCIATICA, SCIATICA LATERALITY UNSPECIFIED, UNSPECIFIED BACK PAIN LATERALITY: ICD-10-CM

## 2020-11-23 RX ORDER — HYDROCODONE BITARTRATE AND ACETAMINOPHEN 5; 325 MG/1; MG/1
TABLET ORAL
Qty: 120 TABLET | Refills: 0 | Status: SHIPPED | OUTPATIENT
Start: 2020-11-23 | End: 2021-01-27 | Stop reason: SDUPTHER

## 2021-01-14 ENCOUNTER — NURSING HOME (OUTPATIENT)
Dept: INTERNAL MEDICINE | Facility: CLINIC | Age: 81
End: 2021-01-14

## 2021-01-14 VITALS
HEART RATE: 76 BPM | BODY MASS INDEX: 35.52 KG/M2 | OXYGEN SATURATION: 91 % | WEIGHT: 188 LBS | DIASTOLIC BLOOD PRESSURE: 64 MMHG | RESPIRATION RATE: 24 BRPM | SYSTOLIC BLOOD PRESSURE: 104 MMHG | TEMPERATURE: 98.2 F

## 2021-01-14 DIAGNOSIS — E11.9 TYPE 2 DIABETES MELLITUS WITHOUT COMPLICATION, WITHOUT LONG-TERM CURRENT USE OF INSULIN (HCC): ICD-10-CM

## 2021-01-14 DIAGNOSIS — R26.81 GAIT INSTABILITY: ICD-10-CM

## 2021-01-14 DIAGNOSIS — E78.2 MIXED HYPERLIPIDEMIA: ICD-10-CM

## 2021-01-14 DIAGNOSIS — G89.29 CHRONIC LOW BACK PAIN WITH SCIATICA, SCIATICA LATERALITY UNSPECIFIED, UNSPECIFIED BACK PAIN LATERALITY: ICD-10-CM

## 2021-01-14 DIAGNOSIS — F02.818 LATE ONSET ALZHEIMER'S DISEASE WITH BEHAVIORAL DISTURBANCE (HCC): Primary | ICD-10-CM

## 2021-01-14 DIAGNOSIS — G30.1 LATE ONSET ALZHEIMER'S DISEASE WITH BEHAVIORAL DISTURBANCE (HCC): Primary | ICD-10-CM

## 2021-01-14 DIAGNOSIS — I10 BENIGN ESSENTIAL HYPERTENSION: ICD-10-CM

## 2021-01-14 DIAGNOSIS — E53.8 VITAMIN B 12 DEFICIENCY: ICD-10-CM

## 2021-01-14 DIAGNOSIS — M54.40 CHRONIC LOW BACK PAIN WITH SCIATICA, SCIATICA LATERALITY UNSPECIFIED, UNSPECIFIED BACK PAIN LATERALITY: ICD-10-CM

## 2021-01-14 PROCEDURE — 99318 PR E/M ANNUAL NURSING FACILITY ASSESS STABLE 30 MIN: CPT | Performed by: INTERNAL MEDICINE

## 2021-01-18 NOTE — PROGRESS NOTES
Annual nursing facility assessment      Layo Arango DO ?  Mita Joyce APRN ?  [address] [facility], [place] [address]  Phone: [phone]  Fax: [phone] Dorcas Duncan MD ?  Dc Fernandez DO [x]   [address] [place] [address]  Phone: [phone]  Fax: [phone]     PATIENT NAME: Danae Harmon                                                                          YOB: 1940           DATE OF SERVICE: 01/14/2021   FACILITY:  [] [place]   [] [place]   [] [facility]   [x] [facility]  []  [facility]  [] Other ______________________________________________________________________     CHIEF COMPLAINT:  Chronic medical management      HISTORY OF PRESENT ILLNESS:   Patient has been feeling much better and behaving much better over the last month.  Nurses have shared she is much more complaint and content ever since her medications were adjusted.  Patient had no specific concerns on exam.  She was being taken for a bath but was not able to state where she was going.  Appetite has been better and pain has been controlled.     Patient was sleepy on exam and had no specific complaints or concerns.  She remains confused and unable to provide much history.  Over the last month, she has been doing well especially with regards to behaviors.  She has been eating and drinking appropriately.  Ever since her psychiatric admission a few months back, she has done very well at the nursing facility.    PAST MEDICAL & SURGICAL HISTORY:   Past Medical History:   Diagnosis Date   • Colon polyp    • Osteoporosis    • Pain in thoracic spine    • Pneumonia    • UTI (urinary tract infection)       Past Surgical History:   Procedure Laterality Date   • BLADDER SURGERY  2000    bladder suspension   • CERVICAL LAMINECTOMY     • COLONOSCOPY     • COLOSTOMY  1979    temporary sigmoid   • HYSTERECTOMY           MEDICATIONS:  I have reviewed and reconciled the patients medication list  in the patients chart at the skilled nursing facility today.      ALLERGIES:  Allergies   Allergen Reactions   • Lexapro [Escitalopram Oxalate] Nausea Only         SOCIAL HISTORY:  Social History     Socioeconomic History   • Marital status:      Spouse name: Not on file   • Number of children: Not on file   • Years of education: Not on file   • Highest education level: Not on file   Tobacco Use   • Smoking status: Never Smoker   • Smokeless tobacco: Never Used   Substance and Sexual Activity   • Alcohol use: No   • Drug use: No   • Sexual activity: Defer       FAMILY HISTORY:  Family History   Problem Relation Age of Onset   • Blindness Mother    • Other Mother         arteriosclerotic cardiovascular disease    • Diabetes Mother    • Osteoporosis Mother    • Stroke Mother    • Cancer Brother    • Lung cancer Sister    • Osteoporosis Sister    • Stroke Sister    • Stroke Other        REVIEW OF SYSTEMS:  Review of Systems   Constitutional: Negative for chills, fatigue and fever.   HENT: Negative for congestion, ear pain, rhinorrhea, sinus pressure and sore throat.    Eyes: Negative for visual disturbance.   Respiratory: Negative for cough, chest tightness, shortness of breath and wheezing.    Cardiovascular: Negative for chest pain, palpitations and leg swelling.   Gastrointestinal: Negative for abdominal pain, blood in stool, constipation, diarrhea, nausea and vomiting.   Endocrine: Negative for polydipsia and polyuria.   Genitourinary: Negative for dysuria, hematuria and urgency.   Musculoskeletal: Negative for arthralgias and back pain.   Skin: Negative for rash.   Neurological: Negative for dizziness, light-headedness, numbness and headaches.   Psychiatric/Behavioral: Negative for dysphoric mood and sleep disturbance. The patient is not nervous/anxious.           PHYSICAL EXAMINATION:     VITAL SIGNS:  /64   Pulse 76   Temp 98.2 °F (36.8 °C)   Resp 24   Wt 85.3 kg (188 lb)   SpO2 91%   BMI  35.52 kg/m²     Physical Exam   Constitutional: She is oriented to person, place, and time. She appears well-developed.   Morbidly obese elderly female resting comfortably   HENT:   Head: Normocephalic and atraumatic.   Mouth/Throat: No oropharyngeal exudate.   Eyes: Pupils are equal, round, and reactive to light. Conjunctivae are normal. No scleral icterus.   Neck: Normal range of motion. Neck supple. No thyromegaly present.   Cardiovascular: Normal rate, regular rhythm and normal heart sounds. Exam reveals no gallop and no friction rub.   No murmur heard.  Pulmonary/Chest: Effort normal and breath sounds normal. No respiratory distress. She has no wheezes.   Abdominal: Soft. Bowel sounds are normal. She exhibits no distension. There is no abdominal tenderness.   Musculoskeletal: Normal range of motion.   Lymphadenopathy:     She has no cervical adenopathy.   Neurological: She is alert and oriented to person, place, and time.   Skin: Skin is warm and dry. No rash noted.   Psychiatric:   Pleasantly confused on exam today   Nursing note and vitals reviewed.  .    RECORDS REVIEW:       ASSESSMENT     Diagnoses and all orders for this visit:    1. Late onset Alzheimer's disease with behavioral disturbance (CMS/HCC) (Primary)    2. Chronic low back pain with sciatica, sciatica laterality unspecified, unspecified back pain laterality    3. Benign essential hypertension    4. Type 2 diabetes mellitus without complication, without long-term current use of insulin (CMS/HCC)    5. Gait instability    6. Vitamin B 12 deficiency    7. Mixed hyperlipidemia        PLAN    Alzheimer's disease with behavioral disturbances  -Mood and behaviors have been much better recently.  I would hesitate to adjust any medications given how well she is doing.    Major depressive disorder  -Stable on Seroquel and Depakote per psychiatric recommendations.    Bladder spasms/urinary frequency  -Cont Myrbetriq, still has incontinence  episodes    Mixed hyperlipidemia  -Continue statin.    Essential hypertension  -Controlled on current medications.    Vitamin D and vitamin B12 deficiencies  -Stable on supplements.    Osteoporosis  -Stable on vitamins..    Type 2 diabetes mellitus  -Glucose levels in the facility have been in reasonable range recently.  No changes to regimen needed.    Chronic pain  -Controlled on Norco being filled monthly and this has been controlling her symptoms fairly well.    [x]  Discussed Patient in detail with nursing/staff, addressed all needs today.     [x]  Plan of Care Reviewed   []  PT/OT Reviewed   []  Order Changes  []  Discharge Plans Reviewed  [x]  Advance Directive on file with Nursing Home.   [x]  POA on file with Nursing Home.    [x]  Code Status listed and reviewed.           I confirm accuracy of unchanged data/findings including physical exam and plan which have been carried forward from previous visit, as well as I have updated appropriately those that have changed     Dc Fernandez DO.  1/18/2021

## 2021-01-27 DIAGNOSIS — M54.40 CHRONIC LOW BACK PAIN WITH SCIATICA, SCIATICA LATERALITY UNSPECIFIED, UNSPECIFIED BACK PAIN LATERALITY: ICD-10-CM

## 2021-01-27 DIAGNOSIS — G89.29 CHRONIC LOW BACK PAIN WITH SCIATICA, SCIATICA LATERALITY UNSPECIFIED, UNSPECIFIED BACK PAIN LATERALITY: ICD-10-CM

## 2021-01-27 RX ORDER — HYDROCODONE BITARTRATE AND ACETAMINOPHEN 5; 325 MG/1; MG/1
TABLET ORAL
Qty: 120 TABLET | Refills: 0 | Status: SHIPPED | OUTPATIENT
Start: 2021-01-27 | End: 2021-03-29 | Stop reason: SDUPTHER

## 2021-01-27 NOTE — TELEPHONE ENCOUNTER
[facility] MEDICATION REFILL REQUEST FOR HYDROCODONE 5/325 MG.    DIRECTIONS: TAKE 1 TAB PO BID AND 1 TAB PRN Q 12 HRS.

## 2021-02-04 DIAGNOSIS — F32.89 OTHER DEPRESSION: ICD-10-CM

## 2021-02-04 RX ORDER — MIRTAZAPINE 15 MG/1
15 TABLET, FILM COATED ORAL NIGHTLY
Qty: 90 TABLET | Refills: 3 | Status: SHIPPED | OUTPATIENT
Start: 2021-02-04

## 2021-02-10 VITALS
TEMPERATURE: 97.5 F | BODY MASS INDEX: 35.14 KG/M2 | SYSTOLIC BLOOD PRESSURE: 122 MMHG | WEIGHT: 186 LBS | HEART RATE: 66 BPM | RESPIRATION RATE: 18 BRPM | OXYGEN SATURATION: 92 % | DIASTOLIC BLOOD PRESSURE: 72 MMHG

## 2021-02-11 ENCOUNTER — NURSING HOME (OUTPATIENT)
Dept: INTERNAL MEDICINE | Facility: CLINIC | Age: 81
End: 2021-02-11

## 2021-02-11 DIAGNOSIS — E78.2 MIXED HYPERLIPIDEMIA: ICD-10-CM

## 2021-02-11 DIAGNOSIS — G89.29 CHRONIC LOW BACK PAIN WITH SCIATICA, SCIATICA LATERALITY UNSPECIFIED, UNSPECIFIED BACK PAIN LATERALITY: ICD-10-CM

## 2021-02-11 DIAGNOSIS — F32.89 OTHER DEPRESSION: ICD-10-CM

## 2021-02-11 DIAGNOSIS — M54.40 CHRONIC LOW BACK PAIN WITH SCIATICA, SCIATICA LATERALITY UNSPECIFIED, UNSPECIFIED BACK PAIN LATERALITY: ICD-10-CM

## 2021-02-11 DIAGNOSIS — F02.818 LATE ONSET ALZHEIMER'S DISEASE WITH BEHAVIORAL DISTURBANCE (HCC): Primary | ICD-10-CM

## 2021-02-11 DIAGNOSIS — I10 BENIGN ESSENTIAL HYPERTENSION: ICD-10-CM

## 2021-02-11 DIAGNOSIS — R26.81 GAIT INSTABILITY: ICD-10-CM

## 2021-02-11 DIAGNOSIS — G30.1 LATE ONSET ALZHEIMER'S DISEASE WITH BEHAVIORAL DISTURBANCE (HCC): Primary | ICD-10-CM
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
## 2021-02-11 DIAGNOSIS — F33.1 MODERATE EPISODE OF RECURRENT MAJOR DEPRESSIVE DISORDER (HCC): ICD-10-CM

## 2021-02-11 DIAGNOSIS — E11.9 TYPE 2 DIABETES MELLITUS WITHOUT COMPLICATION, WITHOUT LONG-TERM CURRENT USE OF INSULIN (HCC): ICD-10-CM

## 2021-02-11 DIAGNOSIS — E53.8 VITAMIN B 12 DEFICIENCY: ICD-10-CM

## 2021-02-11 PROCEDURE — 99308 SBSQ NF CARE LOW MDM 20: CPT | Performed by: INTERNAL MEDICINE

## 2021-02-15 NOTE — PROGRESS NOTES
Nursing Home Progress Note        Layo Arango DO []  WILLIAM Alfaro []  [address] [place] [address]  Phone: [phone]  Fax: [phone] Dorcas Duncan MD []  Dc Fernandez DO [x]   [address] [place] [address]  Phone: [phone]  Fax: [phone]     PATIENT NAME: Danae Harmon                                                                          YOB: 1940           DATE OF SERVICE: 02/11/2021  FACILITY:  [] [place]   [] [place]   [] [facility]   [x] [facility]  []  [facility]  [] Other ______________________________________________________________________     CHIEF COMPLAINT:  Chronic medical management      HISTORY OF PRESENT ILLNESS:   Patient was sleepy on exam however her mood and behaviors have remained stable over the last month.  No acute issues reported by nursing.  Blood pressure has been well controlled.  She has not had any falls recently.  She remains compliant with nursing instructions.    PAST MEDICAL & SURGICAL HISTORY:   Past Medical History:   Diagnosis Date   • Colon polyp    • Osteoporosis    • Pain in thoracic spine    • Pneumonia    • UTI (urinary tract infection)       Past Surgical History:   Procedure Laterality Date   • BLADDER SURGERY  2000    bladder suspension   • CERVICAL LAMINECTOMY     • COLONOSCOPY     • COLOSTOMY  1979    temporary sigmoid   • HYSTERECTOMY           MEDICATIONS:  I have reviewed and reconciled the patients medication list in the patients chart at the skilled nursing facility today, 02/11/2021.      ALLERGIES:  Allergies   Allergen Reactions   • Lexapro [Escitalopram Oxalate] Nausea Only         SOCIAL HISTORY:  Social History     Socioeconomic History   • Marital status:      Spouse name: Not on file   • Number of children: Not on file   • Years of education: Not on file   • Highest education level: Not on file   Tobacco Use   • Smoking status: Never Smoker   • Smokeless  tobacco: Never Used   Substance and Sexual Activity   • Alcohol use: No   • Drug use: No   • Sexual activity: Defer       FAMILY HISTORY:  Family History   Problem Relation Age of Onset   • Blindness Mother    • Other Mother         arteriosclerotic cardiovascular disease    • Diabetes Mother    • Osteoporosis Mother    • Stroke Mother    • Cancer Brother    • Lung cancer Sister    • Osteoporosis Sister    • Stroke Sister    • Stroke Other        REVIEW OF SYSTEMS:  Review of Systems   Unable to perform ROS: Dementia          PHYSICAL EXAMINATION:     VITAL SIGNS:  /72   Pulse 66   Temp 97.5 °F (36.4 °C)   Resp 18   Wt 84.4 kg (186 lb)   SpO2 92%   BMI 35.14 kg/m²     Physical Exam  Vitals signs and nursing note reviewed.   Constitutional:       Appearance: Normal appearance. She is well-developed. She is obese.   HENT:      Head: Normocephalic and atraumatic.   Eyes:      Extraocular Movements: Extraocular movements intact.      Conjunctiva/sclera: Conjunctivae normal.   Neck:      Musculoskeletal: Normal range of motion and neck supple.   Cardiovascular:      Rate and Rhythm: Normal rate and regular rhythm.   Pulmonary:      Effort: Pulmonary effort is normal.      Breath sounds: Normal breath sounds.   Abdominal:      General: Abdomen is flat.      Palpations: Abdomen is soft.   Musculoskeletal:      Comments: Bedridden   Skin:     General: Skin is warm and dry.      Findings: No rash.   Neurological:      General: No focal deficit present.      Mental Status: She is alert and oriented to person, place, and time.   Psychiatric:         Mood and Affect: Mood normal.         Behavior: Behavior normal.         RECORDS REVIEW:        ASSESSMENT     Diagnoses and all orders for this visit:    1. Late onset Alzheimer's disease with behavioral disturbance (CMS/HCC) (Primary)    2. Type 2 diabetes mellitus without complication, without long-term current use of insulin (CMS/HCC)    3. Benign essential  hypertension    4. Moderate episode of recurrent major depressive disorder (CMS/[facility])    5. Fall, subsequent encounter    6. Mixed hyperlipidemia    7. Other depression    8. Gait instability    9. Vitamin B 12 deficiency    10. Chronic low back pain with sciatica, sciatica laterality unspecified, unspecified back pain laterality        PLAN  Alzheimer's disease with behavioral disturbances  -Mood and behaviors are stable. Cont supportive care.      Major depressive disorder  -Stable on Seroquel and Depakote per psychiatric recommendations.     Bladder spasms/urinary frequency  -Cont Myrbetriq, still has incontinence episodes     Mixed hyperlipidemia  -Continue statin.     Essential hypertension  -Controlled on current medications.     Vitamin D and vitamin B12 deficiencies  -Stable on supplements.     Osteoporosis  -Stable on vitamins..     Type 2 diabetes mellitus  -Glucose levels in the facility have been in reasonable range recently.  No changes to regimen needed.     Chronic pain  -Controlled on Norco being filled monthly and this has been controlling her symptoms fairly well.    [x]  Discussed Patient in detail with nursing/staff, addressed all needs today.     [x]  Plan of Care Reviewed   []  PT/OT Reviewed   []  Order Changes  []  Discharge Plans Reviewed  [x]  Advance Directive on file with Nursing Home.   [x]  POA on file with Nursing Home.    [x]  Code Status listed and reviewed.      I confirm accuracy of unchanged data/findings including physical exam and plan which have been carried forward from previous visit, as well as I have updated appropriately those that have changed        Dc Fernandez DO.  2/15/2021

## 2021-03-11 ENCOUNTER — NURSING HOME (OUTPATIENT)
Dept: INTERNAL MEDICINE | Facility: CLINIC | Age: 81
End: 2021-03-11

## 2021-03-11 VITALS
DIASTOLIC BLOOD PRESSURE: 74 MMHG | RESPIRATION RATE: 18 BRPM | TEMPERATURE: 97.9 F | SYSTOLIC BLOOD PRESSURE: 128 MMHG | HEART RATE: 72 BPM | WEIGHT: 181 LBS | BODY MASS INDEX: 34.2 KG/M2 | OXYGEN SATURATION: 95 %

## 2021-03-11 DIAGNOSIS — G30.1 LATE ONSET ALZHEIMER'S DISEASE WITH BEHAVIORAL DISTURBANCE (HCC): Primary | ICD-10-CM

## 2021-03-11 DIAGNOSIS — E78.2 MIXED HYPERLIPIDEMIA: ICD-10-CM

## 2021-03-11 DIAGNOSIS — E11.9 TYPE 2 DIABETES MELLITUS WITHOUT COMPLICATION, WITHOUT LONG-TERM CURRENT USE OF INSULIN (HCC): ICD-10-CM

## 2021-03-11 DIAGNOSIS — R26.81 GAIT INSTABILITY: ICD-10-CM

## 2021-03-11 DIAGNOSIS — F02.818 LATE ONSET ALZHEIMER'S DISEASE WITH BEHAVIORAL DISTURBANCE (HCC): Primary | ICD-10-CM
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
## 2021-03-11 DIAGNOSIS — I10 BENIGN ESSENTIAL HYPERTENSION: ICD-10-CM

## 2021-03-11 DIAGNOSIS — F33.1 MODERATE EPISODE OF RECURRENT MAJOR DEPRESSIVE DISORDER (HCC): ICD-10-CM

## 2021-03-11 PROCEDURE — 99308 SBSQ NF CARE LOW MDM 20: CPT | Performed by: INTERNAL MEDICINE

## 2021-03-22 NOTE — PROGRESS NOTES
"  Nursing Home Progress Note        Layo Arango DO []  WILLIAM Alfaro []  [address] [place] [address]  Phone: [phone]  Fax: [phone] Dorcas Duncan MD []  Dc Fernandez DO [x]   [address] [place] [address]  Phone: [phone]  Fax: [phone]     PATIENT NAME: Danae Harmon                                                                          YOB: 1940           DATE OF SERVICE: 03/11/2021  FACILITY:  [] [place]   [] [place]   [] [facility]   [x] [facility]  []  [facility]  [] Other ______________________________________________________________________     CHIEF COMPLAINT:  Chronic Medical Management      HISTORY OF PRESENT ILLNESS:   Patient was resting comfortably with no specific complaints or concerns.  She was less interactive than in the past.  She did appear comfortable and content with her care.  She stated that she has been feeling a little bit \"stiff in her back\".  She remains bedbound for the most part.  She does appear to have lost some weight.      PAST MEDICAL & SURGICAL HISTORY:   Past Medical History:   Diagnosis Date   • Colon polyp    • Osteoporosis    • Pain in thoracic spine    • Pneumonia    • UTI (urinary tract infection)       Past Surgical History:   Procedure Laterality Date   • BLADDER SURGERY  2000    bladder suspension   • CERVICAL LAMINECTOMY     • COLONOSCOPY     • COLOSTOMY  1979    temporary sigmoid   • HYSTERECTOMY           MEDICATIONS:  I have reviewed and reconciled the patients medication list in the patients chart at the skilled nursing facility today, 03/11/2021.      ALLERGIES:  Allergies   Allergen Reactions   • Lexapro [Escitalopram Oxalate] Nausea Only         SOCIAL HISTORY:  Social History     Socioeconomic History   • Marital status:      Spouse name: Not on file   • Number of children: Not on file   • Years of education: Not on file   • Highest education level: Not on file "   Tobacco Use   • Smoking status: Never Smoker   • Smokeless tobacco: Never Used   Substance and Sexual Activity   • Alcohol use: No   • Drug use: No   • Sexual activity: Defer       FAMILY HISTORY:  Family History   Problem Relation Age of Onset   • Blindness Mother    • Other Mother         arteriosclerotic cardiovascular disease    • Diabetes Mother    • Osteoporosis Mother    • Stroke Mother    • Cancer Brother    • Lung cancer Sister    • Osteoporosis Sister    • Stroke Sister    • Stroke Other        REVIEW OF SYSTEMS:  Review of Systems   Constitutional: Negative for chills, fatigue and fever.   HENT: Negative for congestion, ear pain, rhinorrhea, sinus pressure and sore throat.    Eyes: Negative for visual disturbance.   Respiratory: Negative for cough, chest tightness, shortness of breath and wheezing.    Cardiovascular: Negative for chest pain, palpitations and leg swelling.   Gastrointestinal: Negative for abdominal pain, blood in stool, constipation, diarrhea, nausea and vomiting.   Endocrine: Negative for polydipsia and polyuria.   Genitourinary: Negative for dysuria and hematuria.   Musculoskeletal: Positive for back pain. Negative for arthralgias.   Skin: Negative for rash.   Neurological: Negative for dizziness, light-headedness, numbness and headaches.   Psychiatric/Behavioral: Negative for dysphoric mood and sleep disturbance. The patient is not nervous/anxious.           PHYSICAL EXAMINATION:     VITAL SIGNS:  /74   Pulse 72   Temp 97.9 °F (36.6 °C)   Resp 18   Wt 82.1 kg (181 lb)   SpO2 95%   BMI 34.20 kg/m²     Physical Exam  Vitals and nursing note reviewed.   Constitutional:       Appearance: Normal appearance. She is well-developed. She is obese.      Comments: Frail elderly female   HENT:      Head: Normocephalic and atraumatic.   Eyes:      Extraocular Movements: Extraocular movements intact.      Conjunctiva/sclera: Conjunctivae normal.   Cardiovascular:      Rate and Rhythm:  Normal rate and regular rhythm.   Pulmonary:      Effort: Pulmonary effort is normal.      Breath sounds: Normal breath sounds.   Abdominal:      General: Abdomen is flat.      Palpations: Abdomen is soft.   Musculoskeletal:      Cervical back: Normal range of motion and neck supple.      Comments: Bedridden   Skin:     General: Skin is warm and dry.      Findings: No rash.   Neurological:      General: No focal deficit present.      Mental Status: She is alert and oriented to person, place, and time.   Psychiatric:         Mood and Affect: Mood normal.         Behavior: Behavior normal.         RECORDS REVIEW:       ASSESSMENT     Diagnoses and all orders for this visit:    1. Late onset Alzheimer's disease with behavioral disturbance (CMS/HCC) (Primary)    2. Type 2 diabetes mellitus without complication, without long-term current use of insulin (CMS/HCC)    3. Benign essential hypertension    4. Moderate episode of recurrent major depressive disorder (CMS/HCC)    5. Fall, subsequent encounter    6. Mixed hyperlipidemia    7. Gait instability        PLAN    Alzheimer's disease with behavioral disturbances  -Overall functional status does appear to be gradually declining.  Some weight loss at this time is okay given she is obese.  P.o. intake will be monitored.       Major depressive disorder  -Stable on Seroquel and Depakote  -Psychiatry is following     Bladder spasms/urinary frequency  -Cont Myrbetriq, still has incontinence episodes     Mixed hyperlipidemia  -Continue statin.     Essential hypertension  -Controlled on current medications.     Vitamin D and vitamin B12 deficiencies  -Stable on supplements.     Osteoporosis  -Stable on vitamins.  Avoiding aggressive therapy/bisphosphonates at this time.     Type 2 diabetes mellitus  -Glucose levels in the facility have been in reasonable range recently.  No changes to regimen needed.     Chronic pain  -Controlled on Norco being filled monthly and this has been  controlling her symptoms fairly well.      [x]  Discussed Patient in detail with nursing/staff, addressed all needs today.     [x]  Plan of Care Reviewed   []  PT/OT Reviewed   []  Order Changes  []  Discharge Plans Reviewed  [x]  Advance Directive on file with Nursing Home.   [x]  POA on file with Nursing Home.    [x]  Code Status listed and reviewed.     I confirm accuracy of unchanged data/findings including physical exam and plan which have been carried forward from previous visit, as well as I have updated appropriately those that have changed        Dc Fernandez DO.  3/22/2021

## 2021-03-23 ENCOUNTER — NURSING HOME (OUTPATIENT)
Dept: FAMILY MEDICINE CLINIC | Facility: CLINIC | Age: 81
End: 2021-03-23

## 2021-03-23 VITALS
OXYGEN SATURATION: 96 % | SYSTOLIC BLOOD PRESSURE: 128 MMHG | DIASTOLIC BLOOD PRESSURE: 68 MMHG | BODY MASS INDEX: 34.2 KG/M2 | TEMPERATURE: 98.3 F | WEIGHT: 181 LBS | HEART RATE: 72 BPM | RESPIRATION RATE: 18 BRPM

## 2021-03-23 DIAGNOSIS — Z74.09 IMPAIRED MOBILITY AND ADLS: ICD-10-CM

## 2021-03-23 DIAGNOSIS — F01.518 VASCULAR DEMENTIA WITH BEHAVIOR DISTURBANCE (HCC): ICD-10-CM

## 2021-03-23 DIAGNOSIS — R63.4 WEIGHT LOSS: Primary | ICD-10-CM

## 2021-03-23 DIAGNOSIS — R63.8 ALTERATION IN APPETITE: ICD-10-CM

## 2021-03-23 DIAGNOSIS — Z78.9 IMPAIRED MOBILITY AND ADLS: ICD-10-CM

## 2021-03-23 PROCEDURE — 99309 SBSQ NF CARE MODERATE MDM 30: CPT | Performed by: NURSE PRACTITIONER

## 2021-03-24 NOTE — PROGRESS NOTES
Nursing Home Follow Up Note      Layo Arango DO []   WILLIAM Alfaro [x]  [address] [place] [address]  Phone: [phone]  Fax: [phone] Dorcas Duncan MD []    Dc Fernandez DO []   [address] [place] [address]  Phone: [phone]  Fax: [phone]     PATIENT NAME: Danae Harmon                                                                          YOB: 1940           DATE OF SERVICE: 3/23/2021  FACILITY:  [][place] [facility]   [] [facility]   [] [facility]   [x] [facility]   [] Other ______________________________________________________________________      CHIEF COMPLAINT:    Follow-up appetite and weight      HISTORY OF PRESENT ILLNESS:     Patient's appetite and weight have remained stable since October 2020, weight staying 184-186 lbs. Late February, and earlier this month, patient had some decreased intake, skipping at least 1 meal per day. She was weighed 181 with her weight this month. For the past couple weeks, she has been eating at least 25 % of each meal, usually 50% or more. She is resting in bed without complaints today, no s/s of any distress.       PAST MEDICAL & SURGICAL HISTORY:   Past Medical History:   Diagnosis Date   • Colon polyp    • Osteoporosis    • Pain in thoracic spine    • Pneumonia    • UTI (urinary tract infection)       Past Surgical History:   Procedure Laterality Date   • BLADDER SURGERY  2000    bladder suspension   • CERVICAL LAMINECTOMY     • COLONOSCOPY     • COLOSTOMY  1979    temporary sigmoid   • HYSTERECTOMY           MEDICATIONS:  I have reviewed and reconciled the patients medication list in the patients chart at the skilled nursing facility today.      ALLERGIES:    Allergies   Allergen Reactions   • Lexapro [Escitalopram Oxalate] Nausea Only         SOCIAL HISTORY:    Social History     Socioeconomic History   • Marital status:      Spouse name: Not on file   • Number of children: Not on file   • Years of  education: Not on file   • Highest education level: Not on file   Tobacco Use   • Smoking status: Never Smoker   • Smokeless tobacco: Never Used   Substance and Sexual Activity   • Alcohol use: No   • Drug use: No   • Sexual activity: Defer       FAMILY HISTORY:    Family History   Problem Relation Age of Onset   • Blindness Mother    • Other Mother         arteriosclerotic cardiovascular disease    • Diabetes Mother    • Osteoporosis Mother    • Stroke Mother    • Cancer Brother    • Lung cancer Sister    • Osteoporosis Sister    • Stroke Sister    • Stroke Other        REVIEW OF SYSTEMS:    Review of Systems   Reason unable to perform ROS: ROS per nursing and patient.   Constitutional: Positive for appetite change and unexpected weight loss. Negative for activity change, chills, diaphoresis, fatigue, fever and unexpected weight gain.   HENT: Negative for congestion, mouth sores, nosebleeds, postnasal drip, rhinorrhea, sneezing, sore throat and trouble swallowing.    Eyes: Negative for pain, discharge, redness and itching.   Respiratory: Negative for apnea, cough, choking, shortness of breath, wheezing and stridor.    Cardiovascular: Negative for palpitations and leg swelling.   Gastrointestinal: Negative for abdominal distention, abdominal pain, blood in stool, constipation, diarrhea, nausea and vomiting.   Endocrine: Negative for polydipsia and polyuria.   Genitourinary: Positive for urinary incontinence. Negative for decreased urine volume, difficulty urinating, frequency, hematuria and urgency.   Musculoskeletal: Positive for arthralgias and gait problem. Negative for back pain, joint swelling, myalgias and neck pain.   Skin: Negative for color change, dry skin, rash and skin lesions.   Allergic/Immunologic: Negative for environmental allergies.   Neurological: Positive for speech difficulty, weakness, memory problem and confusion. Negative for seizures.   Psychiatric/Behavioral: Positive for behavioral  problems and positive for hyperactivity. Negative for agitation, dysphoric mood, hallucinations, self-injury, sleep disturbance, suicidal ideas, depressed mood and stress. The patient is nervous/anxious.          PHYSICAL EXAMINATION:   VITAL SIGNS:   Vitals:    03/23/21 1532   BP: 128/68   Pulse: 72   Resp: 18   Temp: 98.3 °F (36.8 °C)   SpO2: 96%   Weight: 82.1 kg (181 lb)       Physical Exam   Constitutional: Vital signs are normal. She appears well-developed and well-nourished. She is sleeping.   HENT:   Head: Normocephalic.   Right Ear: External ear normal.   Left Ear: External ear normal.   Nose: Nose normal.   Eyes: Conjunctivae are normal.   Cardiovascular: Normal rate, regular rhythm and normal heart sounds.   Pulmonary/Chest: Effort normal and breath sounds normal. No respiratory distress. She has no wheezes. She has no rales.   Abdominal: Soft. Bowel sounds are normal. She exhibits no distension and no mass. There is no abdominal tenderness. No hernia.   Musculoskeletal:      Comments: Weakness BLE   Neurological: She is disoriented.   Confused conversation   Skin: Skin is warm and dry. Turgor is normal. No rash noted.   Psychiatric: Her speech is normal and behavior is normal. Mood normal. Cognition and memory are impaired.   Confused conversation    Nursing note and vitals reviewed.      RECORDS REVIEW:   I have reviewed and interpreted the following lab test results  obtained at the time of the visit today.     ASSESSMENT     Diagnoses and all orders for this visit:    1. Weight loss (Primary)    2. Alteration in appetite    3. Vascular dementia with behavior disturbance (CMS/HCC)    4. Impaired mobility and ADLs        PLAN  Weight loss/alteration appetite  -Patient with 5 pound weight loss earlier this month. She had decreased appetite approximately one month ago, for a couple weeks but it has improved the past couple weeks. Patient will be weighed next week. Will follow up and continue to monitor.  Will adjust appetite stimulant if needed.     Nursing encouraged to keep me informed of any acute changes, lack of improvement, or any new concerning symptoms.    Nursing to continue supportive care for all ADLs.      [x]  Discussed Patient in detail with nursing/staff, addressed all needs today.     [x]  Plan of Care Reviewed   [x]  PT/OT Reviewed   [x]  Order Changes  []  Discharge Plans Reviewed  [x]  Advance Directive on file with Nursing Home.   [x]  POA on file with Nursing Home.   [x]  Code Status listed: []  Full Code   [x]  DNR     “I confirm accuracy of unchanged data/findings which have been carried forward from previous visit, as well as I have updated appropriately those that have changed.”                            Mita Joyce, APRN.

## 2021-03-29 DIAGNOSIS — M54.40 CHRONIC LOW BACK PAIN WITH SCIATICA, SCIATICA LATERALITY UNSPECIFIED, UNSPECIFIED BACK PAIN LATERALITY: ICD-10-CM

## 2021-03-29 DIAGNOSIS — G89.29 CHRONIC LOW BACK PAIN WITH SCIATICA, SCIATICA LATERALITY UNSPECIFIED, UNSPECIFIED BACK PAIN LATERALITY: ICD-10-CM

## 2021-03-29 NOTE — TELEPHONE ENCOUNTER
[facility] MEDICATION REFILL REQUEST FOR NORCO 5/325 MG.    DIRECTIONS: TAKE 1 TAB PO Q 12 HRS SCHEDULED FOR PAIN AND 1 TAB Q 12 HRS PRN FOR PAIN.

## 2021-03-30 RX ORDER — HYDROCODONE BITARTRATE AND ACETAMINOPHEN 5; 325 MG/1; MG/1
1 TABLET ORAL 2 TIMES DAILY PRN
Qty: 60 TABLET | Refills: 0 | Status: SHIPPED | OUTPATIENT
Start: 2021-03-30 | End: 2021-12-02 | Stop reason: SDUPTHER

## 2021-04-14 ENCOUNTER — NURSING HOME (OUTPATIENT)
Dept: FAMILY MEDICINE CLINIC | Facility: CLINIC | Age: 81
End: 2021-04-14

## 2021-04-14 VITALS
WEIGHT: 187 LBS | RESPIRATION RATE: 18 BRPM | BODY MASS INDEX: 35.33 KG/M2 | DIASTOLIC BLOOD PRESSURE: 68 MMHG | OXYGEN SATURATION: 96 % | SYSTOLIC BLOOD PRESSURE: 128 MMHG | TEMPERATURE: 98.3 F | HEART RATE: 72 BPM

## 2021-04-14 DIAGNOSIS — F01.518 VASCULAR DEMENTIA WITH BEHAVIOR DISTURBANCE (HCC): ICD-10-CM

## 2021-04-14 DIAGNOSIS — Z78.9 IMPAIRED MOBILITY AND ADLS: ICD-10-CM

## 2021-04-14 DIAGNOSIS — Z74.09 IMPAIRED MOBILITY AND ADLS: ICD-10-CM

## 2021-04-14 DIAGNOSIS — H91.92 DECREASED HEARING OF LEFT EAR: ICD-10-CM

## 2021-04-14 DIAGNOSIS — H61.22 LEFT EAR IMPACTED CERUMEN: Primary | ICD-10-CM

## 2021-04-14 DIAGNOSIS — H57.89 EYE DRAINAGE: ICD-10-CM

## 2021-04-14 PROCEDURE — 99309 SBSQ NF CARE MODERATE MDM 30: CPT | Performed by: NURSE PRACTITIONER

## 2021-04-15 ENCOUNTER — NURSING HOME (OUTPATIENT)
Dept: INTERNAL MEDICINE | Facility: CLINIC | Age: 81
End: 2021-04-15

## 2021-04-15 VITALS
RESPIRATION RATE: 18 BRPM | OXYGEN SATURATION: 96 % | TEMPERATURE: 97.6 F | BODY MASS INDEX: 35.33 KG/M2 | WEIGHT: 187 LBS | DIASTOLIC BLOOD PRESSURE: 68 MMHG | HEART RATE: 72 BPM | SYSTOLIC BLOOD PRESSURE: 128 MMHG

## 2021-04-15 DIAGNOSIS — E78.2 MIXED HYPERLIPIDEMIA: ICD-10-CM

## 2021-04-15 DIAGNOSIS — R14.0 ABDOMINAL DISTENSION (GASEOUS): Primary | ICD-10-CM

## 2021-04-15 DIAGNOSIS — G89.29 CHRONIC LOW BACK PAIN WITH SCIATICA, SCIATICA LATERALITY UNSPECIFIED, UNSPECIFIED BACK PAIN LATERALITY: ICD-10-CM

## 2021-04-15 DIAGNOSIS — F33.1 MODERATE EPISODE OF RECURRENT MAJOR DEPRESSIVE DISORDER (HCC): ICD-10-CM

## 2021-04-15 DIAGNOSIS — G30.1 LATE ONSET ALZHEIMER'S DISEASE WITH BEHAVIORAL DISTURBANCE (HCC): ICD-10-CM

## 2021-04-15 DIAGNOSIS — M54.40 CHRONIC LOW BACK PAIN WITH SCIATICA, SCIATICA LATERALITY UNSPECIFIED, UNSPECIFIED BACK PAIN LATERALITY: ICD-10-CM

## 2021-04-15 DIAGNOSIS — I10 BENIGN ESSENTIAL HYPERTENSION: ICD-10-CM

## 2021-04-15 DIAGNOSIS — F32.89 OTHER DEPRESSION: ICD-10-CM

## 2021-04-15 DIAGNOSIS — F02.818 LATE ONSET ALZHEIMER'S DISEASE WITH BEHAVIORAL DISTURBANCE (HCC): ICD-10-CM

## 2021-04-15 PROCEDURE — 99309 SBSQ NF CARE MODERATE MDM 30: CPT | Performed by: INTERNAL MEDICINE

## 2021-04-15 NOTE — PROGRESS NOTES
Nursing Home Progress Note        Layo Arango DO []  WILLIAM Alfaro []  [address] [place] [address]  Phone: [phone]  Fax: [phone] Dorcas Duncan MD []  Dc Fernandez DO [x]   [address] [place] [address]  Phone: [phone]  Fax: [phone]     PATIENT NAME: Danae Harmon                                                                          YOB: 1940           DATE OF SERVICE: 04/15/2021  FACILITY:  [] [facility]   [] [facility]   [] [facility]   [x] [facility]  []  [facility]  [] Other ______________________________________________________________________     CHIEF COMPLAINT:  Chronic Medical Management, Constipation      HISTORY OF PRESENT ILLNESS:   Patient was resting comfortably on exam today.  She was much more interactive and clear headed.  She shared that she had gas and abdominal distention.  She stated she was constipated.  Nurses checked log and patient had actually had a few BM yesterday.      PAST MEDICAL & SURGICAL HISTORY:   Past Medical History:   Diagnosis Date   • Colon polyp    • Osteoporosis    • Pain in thoracic spine    • Pneumonia    • UTI (urinary tract infection)       Past Surgical History:   Procedure Laterality Date   • BLADDER SURGERY  2000    bladder suspension   • CERVICAL LAMINECTOMY     • COLONOSCOPY     • COLOSTOMY  1979    temporary sigmoid   • HYSTERECTOMY           MEDICATIONS:  I have reviewed and reconciled the patients medication list in the patients chart at the skilled nursing facility today, 04/15/2021.      ALLERGIES:  Allergies   Allergen Reactions   • Lexapro [Escitalopram Oxalate] Nausea Only         SOCIAL HISTORY:  Social History     Socioeconomic History   • Marital status:      Spouse name: Not on file   • Number of children: Not on file   • Years of education: Not on file   • Highest education level: Not on file   Tobacco Use   • Smoking status: Never Smoker   • Smokeless  tobacco: Never Used   Substance and Sexual Activity   • Alcohol use: No   • Drug use: No   • Sexual activity: Defer       FAMILY HISTORY:  Family History   Problem Relation Age of Onset   • Blindness Mother    • Other Mother         arteriosclerotic cardiovascular disease    • Diabetes Mother    • Osteoporosis Mother    • Stroke Mother    • Cancer Brother    • Lung cancer Sister    • Osteoporosis Sister    • Stroke Sister    • Stroke Other        REVIEW OF SYSTEMS:  Review of Systems   Constitutional: Negative for chills, fatigue and fever.   HENT: Negative for congestion, ear pain, rhinorrhea, sinus pressure and sore throat.    Eyes: Negative for visual disturbance.   Respiratory: Negative for cough, chest tightness, shortness of breath and wheezing.    Cardiovascular: Negative for chest pain, palpitations and leg swelling.   Gastrointestinal: Positive for abdominal distention. Negative for abdominal pain, blood in stool, constipation, diarrhea, nausea and vomiting.   Endocrine: Negative for polydipsia and polyuria.   Genitourinary: Negative for dysuria and hematuria.   Musculoskeletal: Negative for arthralgias and back pain.   Skin: Negative for rash.   Neurological: Negative for dizziness, light-headedness, numbness and headaches.   Psychiatric/Behavioral: Negative for dysphoric mood and sleep disturbance. The patient is not nervous/anxious.           PHYSICAL EXAMINATION:     VITAL SIGNS:  /68   Pulse 72   Temp 97.6 °F (36.4 °C)   Resp 18   Wt 84.8 kg (187 lb)   SpO2 96%   BMI 35.33 kg/m²     Physical Exam  Vitals and nursing note reviewed.   Constitutional:       Appearance: Normal appearance. She is well-developed. She is obese.      Comments: Frail elderly female   HENT:      Head: Normocephalic and atraumatic.   Eyes:      Extraocular Movements: Extraocular movements intact.      Conjunctiva/sclera: Conjunctivae normal.   Cardiovascular:      Rate and Rhythm: Normal rate and regular rhythm.    Pulmonary:      Effort: Pulmonary effort is normal.      Breath sounds: Normal breath sounds.   Abdominal:      General: Abdomen is flat.      Palpations: Abdomen is soft.   Musculoskeletal:      Cervical back: Normal range of motion and neck supple.      Comments: Bedridden   Skin:     General: Skin is warm and dry.      Findings: No rash.   Neurological:      General: No focal deficit present.      Mental Status: She is alert. Mental status is at baseline.   Psychiatric:         Mood and Affect: Mood normal.         Behavior: Behavior normal.      Comments: Appears more mentally clear         RECORDS REVIEW:        ASSESSMENT     Diagnoses and all orders for this visit:    1. Abdominal distension (gaseous) (Primary)    2. Chronic low back pain with sciatica, sciatica laterality unspecified, unspecified back pain laterality    3. Late onset Alzheimer's disease with behavioral disturbance (CMS/HCC)    4. Benign essential hypertension    5. Moderate episode of recurrent major depressive disorder (CMS/HCC)    6. Mixed hyperlipidemia    7. Other depression        PLAN    Abdominal gas  - pt is having BM regularly despite saying she is constipated.  Start simethicone.  If symptoms persist over 3-4 days, obtain KUB.     Alzheimer's disease with behavioral disturbances  -Surprisingly mental status is better. Mood and behaviors are stable.       Major depressive disorder  -Stable on Seroquel, Remeron, and Depakote  -Psychiatry is following and adjusting as appropriate     Bladder spasms/urinary frequency  -Cont Myrbetriq, still has incontinence episodes     Mixed hyperlipidemia  -Continue statin.     Essential hypertension  -Controlled on Coreg     Vitamin D and vitamin B12 deficiencies  -Stable on supplements.     Osteoporosis  -Stable on vitamins.  Avoiding aggressive therapy/bisphosphonates at this time.     Type 2 diabetes mellitus  -due for A1C, ordered.      Chronic pain  -Controlled on Norco being filled  monthly and this has been controlling her symptoms fairly well.    [x]  Discussed Patient in detail with nursing/staff, addressed all needs today.     [x]  Plan of Care Reviewed   []  PT/OT Reviewed   []  Order Changes  []  Discharge Plans Reviewed  [x]  Advance Directive on file with Nursing Home.   [x]  POA on file with Nursing Home.    [x]  Code Status listed and reviewed.     I confirm accuracy of unchanged data/findings including physical exam and plan which have been carried forward from previous visit, as well as I have updated appropriately those that have changed        Dc Fernandez DO.  4/15/2021

## 2021-04-17 NOTE — PROGRESS NOTES
Nursing Home Follow Up Note      Layo Arango DO []   WILLIAM Alfaro [x]  [address] [place] [address]  Phone: [phone]  Fax: [phone] Dorcas Duncan MD []    Dc Fernandez DO []   [address] [place] [address]  Phone: [phone]  Fax: [phone]     PATIENT NAME: Danae Harmon                                                                          YOB: 1940           DATE OF SERVICE: 4/14/2021  FACILITY:  [][facility]   [] [facility]   [] [facility]   [x] [facility]   [] Other ______________________________________________________________________      CHIEF COMPLAINT:    Decreased hearing and fullness of left ear    Eye drainage      HISTORY OF PRESENT ILLNESS:      Patient has complaints of her left ear fullness and decreased hearing the past couple days. The ear is not painful, just feels full and she can not hear from it.     She also has complaints of bilateral eye drainage the past couple days when she wakes up in the morning. She reports that they do not hurt, just feel irritated and are draining. No change in vision.     Patient is much more alert and oriented today, sitting up in her wheelchair, moving about the facility.       PAST MEDICAL & SURGICAL HISTORY:   Past Medical History:   Diagnosis Date   • Colon polyp    • Osteoporosis    • Pain in thoracic spine    • Pneumonia    • UTI (urinary tract infection)       Past Surgical History:   Procedure Laterality Date   • BLADDER SURGERY  2000    bladder suspension   • CERVICAL LAMINECTOMY     • COLONOSCOPY     • COLOSTOMY  1979    temporary sigmoid   • HYSTERECTOMY           MEDICATIONS:  I have reviewed and reconciled the patients medication list in the patients chart at the skilled nursing facility today.      ALLERGIES:    Allergies   Allergen Reactions   • Lexapro [Escitalopram Oxalate] Nausea Only         SOCIAL HISTORY:    Social History     Socioeconomic History   • Marital status:       Spouse name: Not on file   • Number of children: Not on file   • Years of education: Not on file   • Highest education level: Not on file   Tobacco Use   • Smoking status: Never Smoker   • Smokeless tobacco: Never Used   Substance and Sexual Activity   • Alcohol use: No   • Drug use: No   • Sexual activity: Defer       FAMILY HISTORY:    Family History   Problem Relation Age of Onset   • Blindness Mother    • Other Mother         arteriosclerotic cardiovascular disease    • Diabetes Mother    • Osteoporosis Mother    • Stroke Mother    • Cancer Brother    • Lung cancer Sister    • Osteoporosis Sister    • Stroke Sister    • Stroke Other        REVIEW OF SYSTEMS:    Review of Systems   Reason unable to perform ROS: ROS per nursing and patient.   Constitutional: Positive for appetite change and unexpected weight loss. Negative for activity change, chills, diaphoresis, fatigue, fever and unexpected weight gain.   HENT: Positive for hearing loss. Negative for congestion, mouth sores, nosebleeds, postnasal drip, rhinorrhea, sneezing, sore throat and trouble swallowing.    Eyes: Positive for discharge. Negative for pain, redness, itching and visual disturbance.   Respiratory: Negative for apnea, cough, choking, shortness of breath, wheezing and stridor.    Cardiovascular: Negative for palpitations and leg swelling.   Gastrointestinal: Negative for abdominal distention, abdominal pain, blood in stool, constipation, diarrhea, nausea and vomiting.   Endocrine: Negative for polydipsia and polyuria.   Genitourinary: Positive for urinary incontinence. Negative for decreased urine volume, difficulty urinating, frequency, hematuria and urgency.   Musculoskeletal: Positive for arthralgias and gait problem. Negative for back pain, joint swelling, myalgias and neck pain.   Skin: Negative for color change, dry skin, rash and skin lesions.   Allergic/Immunologic: Negative for environmental allergies.   Neurological:  Positive for weakness, memory problem and confusion. Negative for seizures and speech difficulty.   Psychiatric/Behavioral: Negative for agitation, behavioral problems, dysphoric mood, hallucinations, self-injury, sleep disturbance, suicidal ideas, negative for hyperactivity, depressed mood and stress. The patient is not nervous/anxious.          PHYSICAL EXAMINATION:   VITAL SIGNS:   Vitals:    04/14/21 1319   BP: 128/68   Pulse: 72   Resp: 18   Temp: 98.3 °F (36.8 °C)   SpO2: 96%   Weight: 84.8 kg (187 lb)       Physical Exam   Constitutional: Vital signs are normal. She appears well-developed and well-nourished.   HENT:   Head: Normocephalic.   Right Ear: External ear normal.   Left Ear: External ear normal. Decreased hearing is noted. An impacted cerumen is present.  Nose: Nose normal.   Eyes: Conjunctivae are normal. Right eye exhibits exudate. Left eye exhibits exudate.   Cardiovascular: Normal rate, regular rhythm and normal heart sounds.   Pulmonary/Chest: Effort normal and breath sounds normal. No respiratory distress. She has no wheezes. She has no rales.   Abdominal: Soft. Bowel sounds are normal. She exhibits no distension and no mass. There is no abdominal tenderness. No hernia.   Musculoskeletal:      Comments: Weakness BLE   Neurological: She is alert.   Oriented x 2-3   Skin: Skin is warm and dry. Turgor is normal. No rash noted.   Psychiatric: Her speech is normal and behavior is normal. Mood normal. Cognition and memory are impaired.   Nursing note and vitals reviewed.      RECORDS REVIEW:   I have reviewed and interpreted the following lab test results  obtained at the time of the visit today.     ASSESSMENT     Diagnoses and all orders for this visit:    1. Left ear impacted cerumen (Primary)    2. Decreased hearing of left ear    3. Vascular dementia with behavior disturbance (CMS/HCC)    4. Eye drainage    5. Impaired mobility and ADLs        PLAN    Left ear cerumen impaction/decreased  hearing  -apply debrox drops, 5 drops bid to left ear x 4 days then irrigate. Will follow up and continue to monitor.     Bilateral eye drainage  -apply Cortisporin drops, 2 drops to bilateral eyes qid x 7 days. Will follow up and continue to monitor.     Dementia  -Behaviors and cognition much improved. Appetite and weight improved over the past month. Will follow up and continue to monitor.     Nursing encouraged to keep me informed of any acute changes, lack of improvement, or any new concerning symptoms.    Nursing to continue supportive care for all ADLs.      [x]  Discussed Patient in detail with nursing/staff, addressed all needs today.     [x]  Plan of Care Reviewed   [x]  PT/OT Reviewed   [x]  Order Changes  []  Discharge Plans Reviewed  [x]  Advance Directive on file with Nursing Home.   [x]  POA on file with Nursing Home.   [x]  Code Status listed: []  Full Code   [x]  DNR       “I confirm accuracy of unchanged data/findings which have been carried forward from previous visit, as well as I have updated appropriately those that have changed.”                                Mita Joyce, APRN.

## 2021-04-27 ENCOUNTER — NURSING HOME (OUTPATIENT)
Dept: FAMILY MEDICINE CLINIC | Facility: CLINIC | Age: 81
End: 2021-04-27

## 2021-04-27 VITALS
HEART RATE: 70 BPM | RESPIRATION RATE: 18 BRPM | SYSTOLIC BLOOD PRESSURE: 128 MMHG | DIASTOLIC BLOOD PRESSURE: 64 MMHG | TEMPERATURE: 97.4 F | BODY MASS INDEX: 35.33 KG/M2 | WEIGHT: 187 LBS

## 2021-04-27 DIAGNOSIS — Z74.09 IMPAIRED MOBILITY AND ADLS: ICD-10-CM

## 2021-04-27 DIAGNOSIS — H04.123 BILATERAL DRY EYES: Primary | ICD-10-CM

## 2021-04-27 DIAGNOSIS — F01.518 VASCULAR DEMENTIA WITH BEHAVIOR DISTURBANCE (HCC): ICD-10-CM

## 2021-04-27 DIAGNOSIS — Z78.9 IMPAIRED MOBILITY AND ADLS: ICD-10-CM

## 2021-04-27 PROCEDURE — 99309 SBSQ NF CARE MODERATE MDM 30: CPT | Performed by: NURSE PRACTITIONER

## 2021-04-29 NOTE — PROGRESS NOTES
Nursing Home Follow Up Note      Layo Arango DO []   WILLIAM Alfaro [x]  [address] [place] [address]  Phone: [phone]  Fax: [phone] Dorcas Duncan MD []    Dc Fernandez DO []   [address] [place] [address]  Phone: [phone]  Fax: [phone]     PATIENT NAME: Danae Harmon                                                                          YOB: 1940           DATE OF SERVICE: 4/27/2021  FACILITY:  [][facility]   [] [place]   [] [facility]   [x] [facility]   [] Other ______________________________________________________________________      CHIEF COMPLAINT:    Dry eyes    HISTORY OF PRESENT ILLNESS:     Patient has complaints today of dry eyes for the past several days.  She reports that they do not hurt or itch are just very dry and she would like to have some lubricating drops.  She has no other complaints at this time and no signs and symptoms of any distress.  She is very pleasant today sitting up in wheelchair.      PAST MEDICAL & SURGICAL HISTORY:   Past Medical History:   Diagnosis Date   • Colon polyp    • Osteoporosis    • Pain in thoracic spine    • Pneumonia    • UTI (urinary tract infection)       Past Surgical History:   Procedure Laterality Date   • BLADDER SURGERY  2000    bladder suspension   • CERVICAL LAMINECTOMY     • COLONOSCOPY     • COLOSTOMY  1979    temporary sigmoid   • HYSTERECTOMY           MEDICATIONS:  I have reviewed and reconciled the patients medication list in the patients chart at the skilled nursing facility today.      ALLERGIES:    Allergies   Allergen Reactions   • Lexapro [Escitalopram Oxalate] Nausea Only         SOCIAL HISTORY:    Social History     Socioeconomic History   • Marital status:      Spouse name: Not on file   • Number of children: Not on file   • Years of education: Not on file   • Highest education level: Not on file   Tobacco Use   • Smoking status: Never Smoker   • Smokeless  tobacco: Never Used   Substance and Sexual Activity   • Alcohol use: No   • Drug use: No   • Sexual activity: Defer       FAMILY HISTORY:    Family History   Problem Relation Age of Onset   • Blindness Mother    • Other Mother         arteriosclerotic cardiovascular disease    • Diabetes Mother    • Osteoporosis Mother    • Stroke Mother    • Cancer Brother    • Lung cancer Sister    • Osteoporosis Sister    • Stroke Sister    • Stroke Other        REVIEW OF SYSTEMS:    Review of Systems   Reason unable to perform ROS: ROS per nursing and patient.   Constitutional: Negative for activity change, appetite change, chills, diaphoresis, fatigue, fever, unexpected weight gain and unexpected weight loss.   HENT: Negative for congestion, hearing loss, mouth sores, nosebleeds, postnasal drip, rhinorrhea, sneezing, sore throat and trouble swallowing.    Eyes: Negative for pain, discharge, redness, itching and visual disturbance.        Dry eyes   Respiratory: Negative for apnea, cough, choking, shortness of breath, wheezing and stridor.    Cardiovascular: Negative for palpitations and leg swelling.   Gastrointestinal: Negative for abdominal distention, abdominal pain, blood in stool, constipation, diarrhea, nausea and vomiting.   Endocrine: Negative for polydipsia and polyuria.   Genitourinary: Positive for urinary incontinence. Negative for decreased urine volume, difficulty urinating, frequency, hematuria and urgency.   Musculoskeletal: Positive for arthralgias and gait problem. Negative for back pain, joint swelling, myalgias and neck pain.   Skin: Negative for color change, dry skin, rash and skin lesions.   Allergic/Immunologic: Negative for environmental allergies.   Neurological: Positive for weakness, memory problem and confusion. Negative for seizures and speech difficulty.   Psychiatric/Behavioral: Negative for agitation, behavioral problems, dysphoric mood, hallucinations, self-injury, sleep disturbance, suicidal  ideas, negative for hyperactivity, depressed mood and stress. The patient is not nervous/anxious.          PHYSICAL EXAMINATION:   VITAL SIGNS:   Vitals:    04/27/21 1544   BP: 128/64   Pulse: 70   Resp: 18   Temp: 97.4 °F (36.3 °C)   Weight: 84.8 kg (187 lb)       Physical Exam   Constitutional: Vital signs are normal. She appears well-developed and well-nourished.   HENT:   Head: Normocephalic.   Right Ear: External ear normal.   Left Ear: External ear normal. Decreased hearing is noted.   Nose: Nose normal.   Eyes: Conjunctivae are normal.   Cardiovascular: Normal rate, regular rhythm and normal heart sounds.   Pulmonary/Chest: Effort normal and breath sounds normal. No respiratory distress. She has no wheezes. She has no rales.   Abdominal: Soft. Bowel sounds are normal. She exhibits no distension and no mass. There is no abdominal tenderness. No hernia.   Musculoskeletal:      Comments: Weakness BLE   Neurological: She is alert.   Oriented x 2-3   Skin: Skin is warm and dry. Turgor is normal. No rash noted.   Psychiatric: Her speech is normal and behavior is normal. Mood normal. Cognition and memory are impaired.   Nursing note and vitals reviewed.      RECORDS REVIEW:   I have reviewed and interpreted the following lab test results  obtained at the time of the visit today.     ASSESSMENT     Diagnoses and all orders for this visit:    1. Bilateral dry eyes (Primary)    2. Vascular dementia with behavior disturbance (CMS/HCC)    3. Impaired mobility and ADLs        PLAN    Dry eyes  -Apply lubricating eye drops to bilateral eyes bid prn for dry eyes. Will follow up and continue to monitor.     Dementia  -Behaviors and cognition much improved. Appetite and weight stable. Will follow up and continue to monitor.     Nursing encouraged to keep me informed of any acute changes, lack of improvement, or any new concerning symptoms.    Nursing to continue supportive care for all ADLs.      [x]  Discussed Patient in  detail with nursing/staff, addressed all needs today.     [x]  Plan of Care Reviewed   [x]  PT/OT Reviewed   [x]  Order Changes  []  Discharge Plans Reviewed  [x]  Advance Directive on file with Nursing Home.   [x]  POA on file with Nursing Home.   [x]  Code Status listed: []  Full Code   [x]  DNR       “I confirm accuracy of unchanged data/findings which have been carried forward from previous visit, as well as I have updated appropriately those that have changed.”                                  Mita Joyce, APRN.

## 2021-05-04 ENCOUNTER — NURSING HOME (OUTPATIENT)
Dept: FAMILY MEDICINE CLINIC | Facility: CLINIC | Age: 81
End: 2021-05-04

## 2021-05-04 VITALS
WEIGHT: 207 LBS | RESPIRATION RATE: 18 BRPM | OXYGEN SATURATION: 95 % | TEMPERATURE: 97.2 F | HEART RATE: 66 BPM | BODY MASS INDEX: 39.11 KG/M2 | DIASTOLIC BLOOD PRESSURE: 72 MMHG | SYSTOLIC BLOOD PRESSURE: 126 MMHG

## 2021-05-04 DIAGNOSIS — Z74.09 IMPAIRED MOBILITY AND ADLS: ICD-10-CM

## 2021-05-04 DIAGNOSIS — R60.0 BILATERAL LOWER EXTREMITY EDEMA: Primary | ICD-10-CM

## 2021-05-04 DIAGNOSIS — F01.518 VASCULAR DEMENTIA WITH BEHAVIOR DISTURBANCE (HCC): ICD-10-CM

## 2021-05-04 DIAGNOSIS — Z78.9 IMPAIRED MOBILITY AND ADLS: ICD-10-CM

## 2021-05-04 PROCEDURE — 99309 SBSQ NF CARE MODERATE MDM 30: CPT | Performed by: NURSE PRACTITIONER

## 2021-05-04 NOTE — PROGRESS NOTES
Nursing Home Follow Up Note      Layo Arango DO []   WILLIAM Alfaro [x]  [address] [place] [address]  Phone: [phone]  Fax: [phone] Dorcas Duncan MD []    Dc Fernandez DO []   [address] [place] [address]  Phone: [phone]  Fax: [phone]     PATIENT NAME: Danae Harmon                                                                          YOB: 1940           DATE OF SERVICE: 5/4/2021  FACILITY:  [][place]   [] [place]   [] [facility]   [x] [facility]   [] Other ______________________________________________________________________      CHIEF COMPLAINT:    LE edema    HISTORY OF PRESENT ILLNESS:     Patient with increased edema to BLE for the past couple days. She denies any increased work of breathing or 02 demand. He VS are WNL. She has no other complaints today. She has been up in wheelchair moving about facility but is laying in bed at this time. She has no s/s of any distress.      PAST MEDICAL & SURGICAL HISTORY:   Past Medical History:   Diagnosis Date   • Colon polyp    • Osteoporosis    • Pain in thoracic spine    • Pneumonia    • UTI (urinary tract infection)       Past Surgical History:   Procedure Laterality Date   • BLADDER SURGERY  2000    bladder suspension   • CERVICAL LAMINECTOMY     • COLONOSCOPY     • COLOSTOMY  1979    temporary sigmoid   • HYSTERECTOMY           MEDICATIONS:  I have reviewed and reconciled the patients medication list in the patients chart at the skilled nursing facility today.      ALLERGIES:    Allergies   Allergen Reactions   • Lexapro [Escitalopram Oxalate] Nausea Only         SOCIAL HISTORY:    Social History     Socioeconomic History   • Marital status:      Spouse name: Not on file   • Number of children: Not on file   • Years of education: Not on file   • Highest education level: Not on file   Tobacco Use   • Smoking status: Never Smoker   • Smokeless tobacco: Never Used   Substance and  Sexual Activity   • Alcohol use: No   • Drug use: No   • Sexual activity: Defer       FAMILY HISTORY:    Family History   Problem Relation Age of Onset   • Blindness Mother    • Other Mother         arteriosclerotic cardiovascular disease    • Diabetes Mother    • Osteoporosis Mother    • Stroke Mother    • Cancer Brother    • Lung cancer Sister    • Osteoporosis Sister    • Stroke Sister    • Stroke Other        REVIEW OF SYSTEMS:    Review of Systems   Reason unable to perform ROS: ROS per nursing and patient.   Constitutional: Negative for activity change, appetite change, chills, diaphoresis, fatigue, fever, unexpected weight gain and unexpected weight loss.   HENT: Negative for congestion, hearing loss, mouth sores, nosebleeds, postnasal drip, rhinorrhea, sneezing, sore throat and trouble swallowing.    Eyes: Negative for pain, discharge, redness, itching and visual disturbance.   Respiratory: Negative for apnea, cough, choking, shortness of breath, wheezing and stridor.    Cardiovascular: Positive for leg swelling. Negative for palpitations.   Gastrointestinal: Negative for abdominal distention, abdominal pain, blood in stool, constipation, diarrhea, nausea and vomiting.   Endocrine: Negative for polydipsia and polyuria.   Genitourinary: Positive for urinary incontinence. Negative for decreased urine volume, difficulty urinating, frequency, hematuria and urgency.   Musculoskeletal: Positive for arthralgias and gait problem. Negative for back pain, joint swelling, myalgias and neck pain.   Skin: Negative for color change, dry skin, rash and skin lesions.   Allergic/Immunologic: Negative for environmental allergies.   Neurological: Positive for weakness, memory problem and confusion. Negative for seizures and speech difficulty.   Psychiatric/Behavioral: Negative for agitation, behavioral problems, dysphoric mood, hallucinations, self-injury, sleep disturbance, suicidal ideas, negative for hyperactivity,  depressed mood and stress. The patient is not nervous/anxious.          PHYSICAL EXAMINATION:   VITAL SIGNS:   Vitals:    05/04/21 1618   BP: 126/72   Pulse: 66   Resp: 18   Temp: 97.2 °F (36.2 °C)   SpO2: 95%   Weight: 93.9 kg (207 lb)       Physical Exam   Constitutional: Vital signs are normal. She appears well-developed and well-nourished.   HENT:   Head: Normocephalic.   Right Ear: External ear normal.   Left Ear: External ear normal.   Nose: Nose normal.   Eyes: Conjunctivae are normal.   Cardiovascular: Normal rate, regular rhythm and normal heart sounds.   Pulmonary/Chest: Effort normal and breath sounds normal. No respiratory distress. She has no wheezes. She has no rales.   Abdominal: Soft. Bowel sounds are normal. She exhibits no distension and no mass. There is no abdominal tenderness. No hernia.   Musculoskeletal:      Comments: Weakness BLE   Neurological: She is alert.   Oriented x 2-3   Skin: Skin is warm and dry. Turgor is normal. No rash noted.   Psychiatric: Her speech is normal and behavior is normal. Mood normal. Cognition and memory are impaired.   Nursing note and vitals reviewed.      RECORDS REVIEW:   I have reviewed and interpreted the following lab test results  obtained at the time of the visit today.     ASSESSMENT     Diagnoses and all orders for this visit:    1. Bilateral lower extremity edema (Primary)    2. Vascular dementia with behavior disturbance (CMS/HCC)    3. Impaired mobility and ADLs        PLAN    BLE edema  -Patient has been sitting up in her wheelchair a lot during the day, with legs dependent. She was advised to elevate legs when possible. Give Lasix 20 mg po daily x 3 days. Apply MIR hose during day, off at night. Will follow up and continue to monitor.     Dementia  -Behaviors and cognition much improved. Appetite and weight stable. She has improved so much she has gain 20 pounds in the past month. Will follow up and continue to monitor.     Nursing encouraged to keep  me informed of any acute changes, lack of improvement, or any new concerning symptoms.    Nursing to continue supportive care for all ADLs.      [x]  Discussed Patient in detail with nursing/staff, addressed all needs today.     [x]  Plan of Care Reviewed   [x]  PT/OT Reviewed   [x]  Order Changes  []  Discharge Plans Reviewed  [x]  Advance Directive on file with Nursing Home.   [x]  POA on file with Nursing Home.   [x]  Code Status listed: []  Full Code   [x]  DNR       “I confirm accuracy of unchanged data/findings which have been carried forward from previous visit, as well as I have updated appropriately those that have changed.”                                      Mita Joyce, APRN.

## 2021-05-10 ENCOUNTER — NURSING HOME (OUTPATIENT)
Dept: INTERNAL MEDICINE | Facility: CLINIC | Age: 81
End: 2021-05-10

## 2021-05-10 VITALS
DIASTOLIC BLOOD PRESSURE: 72 MMHG | TEMPERATURE: 97.2 F | BODY MASS INDEX: 38.55 KG/M2 | OXYGEN SATURATION: 95 % | HEART RATE: 66 BPM | SYSTOLIC BLOOD PRESSURE: 126 MMHG | WEIGHT: 204 LBS | RESPIRATION RATE: 16 BRPM

## 2021-05-10 DIAGNOSIS — R53.83 OTHER FATIGUE: ICD-10-CM

## 2021-05-10 DIAGNOSIS — G30.1 LATE ONSET ALZHEIMER'S DISEASE WITH BEHAVIORAL DISTURBANCE (HCC): ICD-10-CM

## 2021-05-10 DIAGNOSIS — I10 BENIGN ESSENTIAL HYPERTENSION: ICD-10-CM

## 2021-05-10 DIAGNOSIS — E78.2 MIXED HYPERLIPIDEMIA: ICD-10-CM

## 2021-05-10 DIAGNOSIS — F32.89 OTHER DEPRESSION: ICD-10-CM

## 2021-05-10 DIAGNOSIS — F33.1 MODERATE EPISODE OF RECURRENT MAJOR DEPRESSIVE DISORDER (HCC): ICD-10-CM

## 2021-05-10 DIAGNOSIS — E11.9 TYPE 2 DIABETES MELLITUS WITHOUT COMPLICATION, WITHOUT LONG-TERM CURRENT USE OF INSULIN (HCC): ICD-10-CM

## 2021-05-10 DIAGNOSIS — F02.818 LATE ONSET ALZHEIMER'S DISEASE WITH BEHAVIORAL DISTURBANCE (HCC): ICD-10-CM

## 2021-05-10 DIAGNOSIS — R60.0 LOWER EXTREMITY EDEMA: Primary | ICD-10-CM

## 2021-05-10 PROCEDURE — 99309 SBSQ NF CARE MODERATE MDM 30: CPT | Performed by: INTERNAL MEDICINE

## 2021-05-17 NOTE — PROGRESS NOTES
Nursing Home Progress Note        Layo Arango DO []  WILLIAM Alfaro []  [address] [place] [address]  Phone: [phone]  Fax: [phone] Dorcas Duncan MD []  Dc Fernandez DO [x]   [address] [place] [address]  Phone: [phone]  Fax: [phone]     PATIENT NAME: Danae Harmon                                                                          YOB: 1940           DATE OF SERVICE: 05/10/2021  FACILITY:  [] [facility]   [] [place]   [] [facility]   [x] [facility]  []  [facility]  [] Other ______________________________________________________________________     CHIEF COMPLAINT:  Chronic Medical Management      HISTORY OF PRESENT ILLNESS:   Patient was laying comfortably in her bed.  She seems less confused and more interactive the last few months.  Patient shared concerns about bilateral lower extremity edema which seem to be a new issue for her.  She also states that she has been feeling sleepy and tired throughout the day and question whether some of her medications were causing this.    PAST MEDICAL & SURGICAL HISTORY:   Past Medical History:   Diagnosis Date   • Colon polyp    • Osteoporosis    • Pain in thoracic spine    • Pneumonia    • UTI (urinary tract infection)       Past Surgical History:   Procedure Laterality Date   • BLADDER SURGERY  2000    bladder suspension   • CERVICAL LAMINECTOMY     • COLONOSCOPY     • COLOSTOMY  1979    temporary sigmoid   • HYSTERECTOMY           MEDICATIONS:  I have reviewed and reconciled the patients medication list in the patients chart at the skilled nursing facility today, 05/10/2021.      ALLERGIES:  Allergies   Allergen Reactions   • Lexapro [Escitalopram Oxalate] Nausea Only         SOCIAL HISTORY:  Social History     Socioeconomic History   • Marital status:      Spouse name: Not on file   • Number of children: Not on file   • Years of education: Not on file   • Highest  education level: Not on file   Tobacco Use   • Smoking status: Never Smoker   • Smokeless tobacco: Never Used   Substance and Sexual Activity   • Alcohol use: No   • Drug use: No   • Sexual activity: Defer       FAMILY HISTORY:  Family History   Problem Relation Age of Onset   • Blindness Mother    • Other Mother         arteriosclerotic cardiovascular disease    • Diabetes Mother    • Osteoporosis Mother    • Stroke Mother    • Cancer Brother    • Lung cancer Sister    • Osteoporosis Sister    • Stroke Sister    • Stroke Other        REVIEW OF SYSTEMS:  Review of Systems   Constitutional: Positive for fatigue. Negative for chills and fever.   HENT: Negative for congestion, ear pain, rhinorrhea, sinus pressure and sore throat.    Eyes: Negative for visual disturbance.   Respiratory: Negative for cough, chest tightness, shortness of breath and wheezing.    Cardiovascular: Negative for chest pain, palpitations and leg swelling.   Gastrointestinal: Negative for abdominal pain, blood in stool, constipation, diarrhea, nausea and vomiting.   Endocrine: Negative for polydipsia and polyuria.   Genitourinary: Negative for dysuria and hematuria.   Musculoskeletal: Negative for arthralgias and back pain.   Skin: Negative for rash.   Neurological: Negative for dizziness, light-headedness, numbness and headaches.   Psychiatric/Behavioral: Negative for dysphoric mood and sleep disturbance. The patient is not nervous/anxious.           PHYSICAL EXAMINATION:     VITAL SIGNS:  /72   Pulse 66   Temp 97.2 °F (36.2 °C)   Resp 16   Wt 92.5 kg (204 lb)   SpO2 95%   BMI 38.55 kg/m²     Physical Exam  Vitals and nursing note reviewed.   Constitutional:       Appearance: Normal appearance. She is well-developed. She is obese.      Comments: Frail elderly female   HENT:      Head: Normocephalic and atraumatic.   Eyes:      Extraocular Movements: Extraocular movements intact.      Conjunctiva/sclera: Conjunctivae normal.    Cardiovascular:      Rate and Rhythm: Normal rate and regular rhythm.   Pulmonary:      Effort: Pulmonary effort is normal.      Breath sounds: Normal breath sounds.   Abdominal:      General: Abdomen is flat.      Palpations: Abdomen is soft.   Musculoskeletal:      Cervical back: Normal range of motion and neck supple.      Right lower leg: Edema present.      Left lower leg: Edema present.      Comments: Bedridden   Skin:     General: Skin is warm and dry.      Findings: No rash.   Neurological:      General: No focal deficit present.      Mental Status: She is alert. Mental status is at baseline. She is disoriented.   Psychiatric:         Mood and Affect: Mood normal.         Behavior: Behavior normal.         RECORDS REVIEW:        ASSESSMENT     Diagnoses and all orders for this visit:    1. Lower extremity edema (Primary)    2. Other fatigue    3. Late onset Alzheimer's disease with behavioral disturbance (CMS/HCC)    4. Benign essential hypertension    5. Moderate episode of recurrent major depressive disorder (CMS/HCC)    6. Mixed hyperlipidemia    7. Other depression    8. Type 2 diabetes mellitus without complication, without long-term current use of insulin (CMS/HCC)        PLAN  Lower Extremity Edema  - start MIR hose, monitor and consider diuretics if needed.     Fatigue  - Reduce Seroquel nightly dose to 75mg QHS, cont current daily doses.     Alzheimer's disease with behavioral disturbances  -Surprisingly mental status is better. Mood and behaviors are stable.       Major depressive disorder  -Stable on Seroquel, Remeron, and Depakote  -Psychiatry is following and adjusting as appropriate     Bladder spasms/urinary frequency  -Cont Myrbetriq, still has incontinence episodes     Mixed hyperlipidemia  -Continue statin.     Essential hypertension  -Controlled on Coreg     Vitamin D and vitamin B12 deficiencies  -Stable on supplements.     Osteoporosis  -Stable on vitamins.  Avoiding aggressive  therapy/bisphosphonates at this time.     Type 2 diabetes mellitus  -due for A1C, ordered.      Chronic pain  -Controlled on Norco being filled monthly and this has been controlling her symptoms fairly well.    [x]  Discussed Patient in detail with nursing/staff, addressed all needs today.     [x]  Plan of Care Reviewed   []  PT/OT Reviewed   [x]  Order Changes  []  Discharge Plans Reviewed  [x]  Advance Directive on file with Nursing Home.   [x]  POA on file with Nursing Home.    [x]  Code Status listed and reviewed.     I confirm accuracy of unchanged data/findings including physical exam and plan which have been carried forward from previous visit, as well as I have updated appropriately those that have changed        Dc Fernandez DO.  5/17/2021

## 2021-05-19 ENCOUNTER — NURSING HOME (OUTPATIENT)
Dept: FAMILY MEDICINE CLINIC | Facility: CLINIC | Age: 81
End: 2021-05-19

## 2021-05-19 VITALS
TEMPERATURE: 98 F | HEART RATE: 66 BPM | BODY MASS INDEX: 38.55 KG/M2 | OXYGEN SATURATION: 96 % | RESPIRATION RATE: 16 BRPM | DIASTOLIC BLOOD PRESSURE: 74 MMHG | SYSTOLIC BLOOD PRESSURE: 132 MMHG | WEIGHT: 204 LBS

## 2021-05-19 DIAGNOSIS — H61.23 BILATERAL HEARING LOSS DUE TO CERUMEN IMPACTION: ICD-10-CM

## 2021-05-19 DIAGNOSIS — Z74.09 IMPAIRED MOBILITY AND ADLS: ICD-10-CM

## 2021-05-19 DIAGNOSIS — F01.518 VASCULAR DEMENTIA WITH BEHAVIOR DISTURBANCE (HCC): ICD-10-CM

## 2021-05-19 DIAGNOSIS — Z78.9 IMPAIRED MOBILITY AND ADLS: ICD-10-CM

## 2021-05-19 DIAGNOSIS — H61.23 BILATERAL IMPACTED CERUMEN: ICD-10-CM

## 2021-05-19 PROCEDURE — 99309 SBSQ NF CARE MODERATE MDM 30: CPT | Performed by: NURSE PRACTITIONER

## 2021-05-22 PROBLEM — H61.23 BILATERAL IMPACTED CERUMEN: Status: ACTIVE | Noted: 2021-05-22

## 2021-05-22 PROBLEM — H61.23 BILATERAL IMPACTED CERUMEN: Status: RESOLVED | Noted: 2021-05-22 | Resolved: 2021-05-22

## 2021-05-22 NOTE — PROGRESS NOTES
Nursing Home Follow Up Note      Layo Arango DO []   WILLIAM Alfaro [x]  [address] [place] [address]  Phone: [phone]  Fax: [phone] Dorcas Duncan MD []    Dc Fernandez DO []   [address] [place] [address]  Phone: [phone]  Fax: [phone]     PATIENT NAME: Danae Harmon                                                                          YOB: 1940           DATE OF SERVICE: 5/19/2021  FACILITY:  [][facility]   [] [facility]   [] [facility]   [x] [facility]   [] Other ______________________________________________________________________      CHIEF COMPLAINT:    Decreased hearing bilateral ears for past several days    HISTORY OF PRESENT ILLNESS:   Patient has complaints today of decreased hearing in bilateral ears for the past couple days. She feels like she has cerumen impaction like she did in her left ear a month or so ago. No pain or drainage to ears. No other complaints today, resting in bed without complaints or signs and symptoms of distress.      PAST MEDICAL & SURGICAL HISTORY:   Past Medical History:   Diagnosis Date   • Colon polyp    • Osteoporosis    • Pain in thoracic spine    • Pneumonia    • UTI (urinary tract infection)       Past Surgical History:   Procedure Laterality Date   • BLADDER SURGERY  2000    bladder suspension   • CERVICAL LAMINECTOMY     • COLONOSCOPY     • COLOSTOMY  1979    temporary sigmoid   • HYSTERECTOMY           MEDICATIONS:  I have reviewed and reconciled the patients medication list in the patients chart at the skilled nursing facility today.      ALLERGIES:    Allergies   Allergen Reactions   • Lexapro [Escitalopram Oxalate] Nausea Only         SOCIAL HISTORY:    Social History     Socioeconomic History   • Marital status:      Spouse name: Not on file   • Number of children: Not on file   • Years of education: Not on file   • Highest education level: Not on file   Tobacco Use   • Smoking  status: Never Smoker   • Smokeless tobacco: Never Used   Substance and Sexual Activity   • Alcohol use: No   • Drug use: No   • Sexual activity: Defer       FAMILY HISTORY:    Family History   Problem Relation Age of Onset   • Blindness Mother    • Other Mother         arteriosclerotic cardiovascular disease    • Diabetes Mother    • Osteoporosis Mother    • Stroke Mother    • Cancer Brother    • Lung cancer Sister    • Osteoporosis Sister    • Stroke Sister    • Stroke Other        REVIEW OF SYSTEMS:    Review of Systems   Reason unable to perform ROS: ROS per nursing and patient.   Constitutional: Negative for activity change, appetite change, chills, diaphoresis, fatigue, fever, unexpected weight gain and unexpected weight loss.   HENT: Positive for hearing loss. Negative for congestion, mouth sores, nosebleeds, postnasal drip, rhinorrhea, sneezing, sore throat and trouble swallowing.    Eyes: Negative for pain, discharge, redness, itching and visual disturbance.   Respiratory: Negative for apnea, cough, choking, shortness of breath, wheezing and stridor.    Cardiovascular: Positive for leg swelling. Negative for palpitations.   Gastrointestinal: Negative for abdominal distention, abdominal pain, blood in stool, constipation, diarrhea, nausea and vomiting.   Endocrine: Negative for polydipsia and polyuria.   Genitourinary: Positive for urinary incontinence. Negative for decreased urine volume, difficulty urinating, frequency, hematuria and urgency.   Musculoskeletal: Positive for arthralgias and gait problem. Negative for back pain, joint swelling, myalgias and neck pain.   Skin: Negative for color change, dry skin, rash and skin lesions.   Allergic/Immunologic: Negative for environmental allergies.   Neurological: Positive for weakness, memory problem and confusion. Negative for seizures and speech difficulty.   Psychiatric/Behavioral: Negative for agitation, behavioral problems, dysphoric mood, hallucinations,  self-injury, sleep disturbance, suicidal ideas, negative for hyperactivity, depressed mood and stress. The patient is not nervous/anxious.          PHYSICAL EXAMINATION:   VITAL SIGNS:   Vitals:    05/19/21 1453   BP: 132/74   Pulse: 66   Resp: 16   Temp: 98 °F (36.7 °C)   SpO2: 96%   Weight: 92.5 kg (204 lb)       Physical Exam   Constitutional: Vital signs are normal. She appears well-developed and well-nourished.   HENT:   Head: Normocephalic.   Right Ear: External ear normal. cerumen impaction is present.  Left Ear: External ear normal. An impacted cerumen is present.  Nose: Nose normal.   Eyes: Conjunctivae are normal.   Cardiovascular: Normal rate, regular rhythm and normal heart sounds.   Pulmonary/Chest: Effort normal and breath sounds normal. No respiratory distress. She has no wheezes. She has no rales.   Abdominal: Soft. Bowel sounds are normal. She exhibits no distension and no mass. There is no abdominal tenderness. No hernia.   Musculoskeletal:      Comments: Weakness BLE   Neurological: She is alert.   Oriented x 2-3   Skin: Skin is warm and dry. Turgor is normal. No rash noted.   Psychiatric: Her speech is normal and behavior is normal. Mood normal. Cognition and memory are impaired.   Nursing note and vitals reviewed.      RECORDS REVIEW:   I have reviewed and interpreted the following lab test results  obtained at the time of the visit today.     ASSESSMENT     Diagnoses and all orders for this visit:    1. Bilateral hearing loss due to cerumen impaction    2. Bilateral impacted cerumen    3. Vascular dementia with behavior disturbance (CMS/HCC)    4. Impaired mobility and ADLs        PLAN    Bilateral hearing loss with cerumen impaction  -Apply Debrox drops, 5 drops to bilateral ears bid x 5 days then irrigate. Will follow up and continue to monitor.     Dementia  -Behaviors and cognition much improved. Appetite and weight stable. She has improved so much she has gain 20 pounds in the past month.  Will follow up and continue to monitor.     Nursing encouraged to keep me informed of any acute changes, lack of improvement, or any new concerning symptoms.    Nursing to continue supportive care for all ADLs.      [x]  Discussed Patient in detail with nursing/staff, addressed all needs today.     [x]  Plan of Care Reviewed   [x]  PT/OT Reviewed   [x]  Order Changes  []  Discharge Plans Reviewed  [x]  Advance Directive on file with Nursing Home.   [x]  POA on file with Nursing Home.   [x]  Code Status listed: []  Full Code   [x]  DNR       “I confirm accuracy of unchanged data/findings which have been carried forward from previous visit, as well as I have updated appropriately those that have changed.”                                        Mita Joyce, APRN.

## 2021-06-04 ENCOUNTER — NURSING HOME (OUTPATIENT)
Dept: FAMILY MEDICINE CLINIC | Facility: CLINIC | Age: 81
End: 2021-06-04

## 2021-06-04 VITALS
SYSTOLIC BLOOD PRESSURE: 130 MMHG | HEART RATE: 70 BPM | TEMPERATURE: 98.3 F | DIASTOLIC BLOOD PRESSURE: 62 MMHG | BODY MASS INDEX: 38.55 KG/M2 | WEIGHT: 204 LBS | OXYGEN SATURATION: 96 % | RESPIRATION RATE: 20 BRPM

## 2021-06-04 DIAGNOSIS — Z78.9 IMPAIRED MOBILITY AND ADLS: ICD-10-CM

## 2021-06-04 DIAGNOSIS — F01.518 VASCULAR DEMENTIA WITH BEHAVIOR DISTURBANCE (HCC): ICD-10-CM

## 2021-06-04 DIAGNOSIS — Z74.09 IMPAIRED MOBILITY AND ADLS: ICD-10-CM

## 2021-06-04 DIAGNOSIS — R60.0 BILATERAL LOWER EXTREMITY EDEMA: Primary | ICD-10-CM

## 2021-06-04 PROCEDURE — 99309 SBSQ NF CARE MODERATE MDM 30: CPT | Performed by: NURSE PRACTITIONER

## 2021-06-06 NOTE — PROGRESS NOTES
Nursing Home Follow Up Note      Layo Arango DO []   WILLIAM Alfaro [x]  [address] [place] [address]  Phone: [phone]  Fax: [phone] Dorcas Duncan MD []    Dc Fernandez DO []   [address] [place] [address]  Phone: [phone]  Fax: [phone]     PATIENT NAME: Danae Harmon                                                                          YOB: 1940           DATE OF SERVICE: 6/4/2021  FACILITY:  [][facility]   [] [facility]   [] [facility]   [x] [facility]   [] Other ______________________________________________________________________      CHIEF COMPLAINT:    BLE edema    HISTORY OF PRESENT ILLNESS:     Patient has complaints today of increased lower extremity edema. She reports that she always has some but it has been increased for the pat couple days. She denies any increased work of breathing or O2 demand. She has no other complaints today.    PAST MEDICAL & SURGICAL HISTORY:   Past Medical History:   Diagnosis Date   • Colon polyp    • Osteoporosis    • Pain in thoracic spine    • Pneumonia    • UTI (urinary tract infection)       Past Surgical History:   Procedure Laterality Date   • BLADDER SURGERY  2000    bladder suspension   • CERVICAL LAMINECTOMY     • COLONOSCOPY     • COLOSTOMY  1979    temporary sigmoid   • HYSTERECTOMY           MEDICATIONS:  I have reviewed and reconciled the patients medication list in the patients chart at the skilled nursing facility today.      ALLERGIES:    Allergies   Allergen Reactions   • Lexapro [Escitalopram Oxalate] Nausea Only         SOCIAL HISTORY:    Social History     Socioeconomic History   • Marital status:      Spouse name: Not on file   • Number of children: Not on file   • Years of education: Not on file   • Highest education level: Not on file   Tobacco Use   • Smoking status: Never Smoker   • Smokeless tobacco: Never Used   Substance and Sexual Activity   • Alcohol use: No   •  Drug use: No   • Sexual activity: Defer       FAMILY HISTORY:    Family History   Problem Relation Age of Onset   • Blindness Mother    • Other Mother         arteriosclerotic cardiovascular disease    • Diabetes Mother    • Osteoporosis Mother    • Stroke Mother    • Cancer Brother    • Lung cancer Sister    • Osteoporosis Sister    • Stroke Sister    • Stroke Other        REVIEW OF SYSTEMS:    Review of Systems   Reason unable to perform ROS: ROS per nursing and patient.   Constitutional: Negative for activity change, appetite change, chills, diaphoresis, fatigue, fever, unexpected weight gain and unexpected weight loss.   HENT: Negative for congestion, hearing loss, mouth sores, nosebleeds, postnasal drip, rhinorrhea, sneezing, sore throat and trouble swallowing.    Eyes: Negative for pain, discharge, redness, itching and visual disturbance.   Respiratory: Negative for apnea, cough, choking, shortness of breath, wheezing and stridor.    Cardiovascular: Positive for leg swelling. Negative for palpitations.   Gastrointestinal: Negative for abdominal distention, abdominal pain, blood in stool, constipation, diarrhea, nausea and vomiting.   Endocrine: Negative for polydipsia and polyuria.   Genitourinary: Positive for urinary incontinence. Negative for decreased urine volume, difficulty urinating, frequency, hematuria and urgency.   Musculoskeletal: Positive for arthralgias and gait problem. Negative for back pain, joint swelling, myalgias and neck pain.   Skin: Negative for color change, dry skin, rash and skin lesions.   Allergic/Immunologic: Negative for environmental allergies.   Neurological: Positive for weakness, memory problem and confusion. Negative for seizures and speech difficulty.   Psychiatric/Behavioral: Negative for agitation, behavioral problems, dysphoric mood, hallucinations, self-injury, sleep disturbance, suicidal ideas, negative for hyperactivity, depressed mood and stress. The patient is not  nervous/anxious.          PHYSICAL EXAMINATION:   VITAL SIGNS:   Vitals:    21 1348   BP: 130/62   Pulse: 70   Resp: 20   Temp: 98.3 °F (36.8 °C)   SpO2: 96%   Weight: 92.5 kg (204 lb)       Physical Exam   Constitutional: Vital signs are normal. She appears well-developed and well-nourished.   HENT:   Head: Normocephalic.   Right Ear: External ear normal.   Left Ear: External ear normal.   Nose: Nose normal.   Eyes: Conjunctivae are normal.   Cardiovascular: Normal rate, regular rhythm and normal heart sounds.   Pulmonary/Chest: Effort normal and breath sounds normal. No respiratory distress. She has no wheezes. She has no rales.   Abdominal: Soft. Bowel sounds are normal. She exhibits no distension and no mass. There is no abdominal tenderness. No hernia.   Musculoskeletal:      Right lower le+ Edema present.      Left lower le+ Edema present.      Comments: Weakness BLE   Neurological: She is alert.   Oriented x 2-3   Skin: Skin is warm and dry. Turgor is normal. No rash noted.   Psychiatric: Her speech is normal and behavior is normal. Mood normal. Cognition and memory are impaired.   Nursing note and vitals reviewed.      RECORDS REVIEW:   I have reviewed and interpreted the following lab test results  obtained at the time of the visit today.     ASSESSMENT     Diagnoses and all orders for this visit:    1. Bilateral lower extremity edema (Primary)    2. Vascular dementia with behavior disturbance (CMS/HCC)    3. Impaired mobility and ADLs        PLAN    BLE edema  -Check CMP, BNP Monday. Give Lasix 40 mg po bid at 0600 and 1800 x 2 days. Keep legs elevated as often as possible. MIR hose when out of bed.     Dementia  -Behaviors and cognition much improved. Appetite and weight stable. Will follow up and continue to monitor.     Nursing encouraged to keep me informed of any acute changes, lack of improvement, or any new concerning symptoms.    Nursing to continue supportive care for all  ADLs.      [x]  Discussed Patient in detail with nursing/staff, addressed all needs today.     [x]  Plan of Care Reviewed   [x]  PT/OT Reviewed   [x]  Order Changes  []  Discharge Plans Reviewed  [x]  Advance Directive on file with Nursing Home.   [x]  POA on file with Nursing Home.   [x]  Code Status listed: []  Full Code   [x]  DNR       “I confirm accuracy of unchanged data/findings which have been carried forward from previous visit, as well as I have updated appropriately those that have changed.”                                        Mita Joyce, APRN.

## 2021-06-10 ENCOUNTER — NURSING HOME (OUTPATIENT)
Dept: INTERNAL MEDICINE | Facility: CLINIC | Age: 81
End: 2021-06-10

## 2021-06-10 VITALS
TEMPERATURE: 98 F | HEART RATE: 70 BPM | DIASTOLIC BLOOD PRESSURE: 70 MMHG | BODY MASS INDEX: 40.06 KG/M2 | SYSTOLIC BLOOD PRESSURE: 128 MMHG | OXYGEN SATURATION: 92 % | WEIGHT: 212 LBS | RESPIRATION RATE: 18 BRPM

## 2021-06-10 DIAGNOSIS — G30.1 LATE ONSET ALZHEIMER'S DISEASE WITH BEHAVIORAL DISTURBANCE (HCC): Primary | ICD-10-CM

## 2021-06-10 DIAGNOSIS — R60.0 LOWER EXTREMITY EDEMA: ICD-10-CM

## 2021-06-10 DIAGNOSIS — R53.83 OTHER FATIGUE: ICD-10-CM

## 2021-06-10 DIAGNOSIS — M54.40 CHRONIC LOW BACK PAIN WITH SCIATICA, SCIATICA LATERALITY UNSPECIFIED, UNSPECIFIED BACK PAIN LATERALITY: ICD-10-CM

## 2021-06-10 DIAGNOSIS — F33.1 MODERATE EPISODE OF RECURRENT MAJOR DEPRESSIVE DISORDER (HCC): ICD-10-CM

## 2021-06-10 DIAGNOSIS — G89.29 CHRONIC LOW BACK PAIN WITH SCIATICA, SCIATICA LATERALITY UNSPECIFIED, UNSPECIFIED BACK PAIN LATERALITY: ICD-10-CM

## 2021-06-10 DIAGNOSIS — E11.9 TYPE 2 DIABETES MELLITUS WITHOUT COMPLICATION, WITHOUT LONG-TERM CURRENT USE OF INSULIN (HCC): ICD-10-CM

## 2021-06-10 DIAGNOSIS — I10 BENIGN ESSENTIAL HYPERTENSION: ICD-10-CM

## 2021-06-10 DIAGNOSIS — F32.89 OTHER DEPRESSION: ICD-10-CM

## 2021-06-10 DIAGNOSIS — E78.2 MIXED HYPERLIPIDEMIA: ICD-10-CM

## 2021-06-10 DIAGNOSIS — F02.818 LATE ONSET ALZHEIMER'S DISEASE WITH BEHAVIORAL DISTURBANCE (HCC): Primary | ICD-10-CM

## 2021-06-10 PROCEDURE — 99308 SBSQ NF CARE LOW MDM 20: CPT | Performed by: INTERNAL MEDICINE

## 2021-06-12 NOTE — PROGRESS NOTES
Nursing Home Progress Note        Layo Arango DO []  WILLIAM Alfaro []  [address] [place] [address]  Phone: [phone]  Fax: [phone] Dorcas Duncan MD []  Dc Fernandez DO [x]   [address] [place] [address]  Phone: [phone]  Fax: [phone]     PATIENT NAME: Danae Harmon                                                                          YOB: 1940           DATE OF SERVICE: 06/10/2021  FACILITY:  [] [place]   [] [place]   [] [facility]   [x] [facility]  []  [facility]  [] Other ______________________________________________________________________     CHIEF COMPLAINT:  Chronic Medical Management      HISTORY OF PRESENT ILLNESS:   Patient was sitting up in her bed comfortably.  On questioning, she stated she had no new complaints or concerns.  Lower extremity edema is to be in reasonable control recently.  She remains pleasant with staff.  Oral intake has been appropriate.  She has been sleeping well at night.    PAST MEDICAL & SURGICAL HISTORY:   Past Medical History:   Diagnosis Date   • Colon polyp    • Osteoporosis    • Pain in thoracic spine    • Pneumonia    • UTI (urinary tract infection)       Past Surgical History:   Procedure Laterality Date   • BLADDER SURGERY  2000    bladder suspension   • CERVICAL LAMINECTOMY     • COLONOSCOPY     • COLOSTOMY  1979    temporary sigmoid   • HYSTERECTOMY           MEDICATIONS:  I have reviewed and reconciled the patients medication list in the patients chart at the skilled nursing facility today, 06/10/2021.      ALLERGIES:  Allergies   Allergen Reactions   • Lexapro [Escitalopram Oxalate] Nausea Only         SOCIAL HISTORY:  Social History     Socioeconomic History   • Marital status:      Spouse name: Not on file   • Number of children: Not on file   • Years of education: Not on file   • Highest education level: Not on file   Tobacco Use   • Smoking status: Never Smoker    • Smokeless tobacco: Never Used   Substance and Sexual Activity   • Alcohol use: No   • Drug use: No   • Sexual activity: Defer       FAMILY HISTORY:  Family History   Problem Relation Age of Onset   • Blindness Mother    • Other Mother         arteriosclerotic cardiovascular disease    • Diabetes Mother    • Osteoporosis Mother    • Stroke Mother    • Cancer Brother    • Lung cancer Sister    • Osteoporosis Sister    • Stroke Sister    • Stroke Other        REVIEW OF SYSTEMS:  Review of Systems   Constitutional: Negative for chills, fatigue and fever.   HENT: Negative for congestion, ear pain, rhinorrhea, sinus pressure and sore throat.    Eyes: Negative for visual disturbance.   Respiratory: Negative for cough, chest tightness, shortness of breath and wheezing.    Cardiovascular: Negative for chest pain, palpitations and leg swelling.   Gastrointestinal: Negative for abdominal pain, blood in stool, constipation, diarrhea, nausea and vomiting.   Endocrine: Negative for polydipsia and polyuria.   Genitourinary: Negative for dysuria and hematuria.   Musculoskeletal: Negative for arthralgias and back pain.   Skin: Negative for rash.   Neurological: Negative for dizziness, light-headedness, numbness and headaches.   Psychiatric/Behavioral: Negative for dysphoric mood and sleep disturbance. The patient is not nervous/anxious.           PHYSICAL EXAMINATION:     VITAL SIGNS:  /70   Pulse 70   Temp 98 °F (36.7 °C)   Resp 18   Wt 96.2 kg (212 lb)   SpO2 92%   BMI 40.06 kg/m²     Physical Exam  Vitals and nursing note reviewed.   Constitutional:       Appearance: Normal appearance. She is well-developed. She is obese.      Comments: Frail elderly female   HENT:      Head: Normocephalic and atraumatic.   Eyes:      Extraocular Movements: Extraocular movements intact.      Conjunctiva/sclera: Conjunctivae normal.   Cardiovascular:      Rate and Rhythm: Normal rate and regular rhythm.   Pulmonary:      Effort:  Pulmonary effort is normal.      Breath sounds: Normal breath sounds.   Abdominal:      General: Abdomen is flat.      Palpations: Abdomen is soft.   Musculoskeletal:      Cervical back: Normal range of motion and neck supple.      Right lower leg: Edema present.      Left lower leg: Edema present.      Comments: Bedridden   Skin:     General: Skin is warm and dry.      Findings: No rash.   Neurological:      General: No focal deficit present.      Mental Status: She is alert. Mental status is at baseline. She is disoriented.   Psychiatric:         Mood and Affect: Mood normal.         Behavior: Behavior normal.         RECORDS REVIEW:       ASSESSMENT     Diagnoses and all orders for this visit:    1. Late onset Alzheimer's disease with behavioral disturbance (CMS/HCC) (Primary)    2. Lower extremity edema    3. Other fatigue    4. Benign essential hypertension    5. Moderate episode of recurrent major depressive disorder (CMS/HCC)    6. Mixed hyperlipidemia    7. Type 2 diabetes mellitus without complication, without long-term current use of insulin (CMS/HCC)    8. Other depression    9. Chronic low back pain with sciatica, sciatica laterality unspecified, unspecified back pain laterality        PLAN      Lower Extremity Edema  -Stable control of symptoms recently.    Fatigue  -Less of an issue recently.  She continues to be on Seroquel nightly dose to 75mg QHS.     Alzheimer's disease with behavioral disturbances  -Mental status remained stable recently.     Major depressive disorder  -Stable on Seroquel, Remeron, and Depakote  -Psychiatry is following and adjusting as appropriate     Bladder spasms/urinary frequency  -Cont Myrbetriq, still has incontinence episodes     Mixed hyperlipidemia  -Continue statin.     Essential hypertension  -Controlled on Coreg     Vitamin D and vitamin B12 deficiencies  -Stable on supplements.     Osteoporosis  -Stable on vitamins.  Avoiding aggressive therapy/bisphosphonates at  this time.     Type 2 diabetes mellitus  -due for A1C, ordered.      Chronic pain  -Controlled on Norco being filled monthly and this has been controlling her symptoms fairly well.    [x]  Discussed Patient in detail with nursing/staff, addressed all needs today.     [x]  Plan of Care Reviewed   []  PT/OT Reviewed   []  Order Changes  []  Discharge Plans Reviewed  [x]  Advance Directive on file with Nursing Home.   [x]  POA on file with Nursing Home.    [x]  Code Status listed and reviewed.     I confirm accuracy of unchanged data/findings including physical exam and plan which have been carried forward from previous visit, as well as I have updated appropriately those that have changed        Dc Fernandez DO.  6/12/2021

## 2021-06-14 ENCOUNTER — NURSING HOME (OUTPATIENT)
Dept: FAMILY MEDICINE CLINIC | Facility: CLINIC | Age: 81
End: 2021-06-14

## 2021-06-14 VITALS
WEIGHT: 212 LBS | SYSTOLIC BLOOD PRESSURE: 128 MMHG | HEART RATE: 70 BPM | DIASTOLIC BLOOD PRESSURE: 70 MMHG | TEMPERATURE: 98 F | BODY MASS INDEX: 40.06 KG/M2 | RESPIRATION RATE: 18 BRPM

## 2021-06-14 DIAGNOSIS — Z74.09 IMPAIRED MOBILITY AND ADLS: ICD-10-CM

## 2021-06-14 DIAGNOSIS — F01.518 VASCULAR DEMENTIA WITH BEHAVIOR DISTURBANCE (HCC): ICD-10-CM

## 2021-06-14 DIAGNOSIS — R60.0 BILATERAL LOWER EXTREMITY EDEMA: Primary | ICD-10-CM

## 2021-06-14 DIAGNOSIS — Z78.9 IMPAIRED MOBILITY AND ADLS: ICD-10-CM

## 2021-06-14 PROCEDURE — 99309 SBSQ NF CARE MODERATE MDM 30: CPT | Performed by: NURSE PRACTITIONER

## 2021-06-16 NOTE — PROGRESS NOTES
Nursing Home Follow Up Note      Layo Arango DO []   WILLIAM Alfaro [x]  [address] [place] [address]  Phone: [phone]  Fax: [phone] Dorcas Duncan MD []    Dc Fernandez DO []   [address] [place] [address]  Phone: [phone]  Fax: [phone]     PATIENT NAME: Danae Harmon                                                                          YOB: 1940           DATE OF SERVICE: 6/14/2021  FACILITY:  [][facility]   [] [facility]   [] [facility]   [x] [facility]   [] Other ______________________________________________________________________      CHIEF COMPLAINT:    BLE edema    HISTORY OF PRESENT ILLNESS:     Patient has complaints today of increased lower extremity edema. She reports that she was treated with diuretics about about 10 days ago and it did help somewhat the edema but it has been increased for the past couple days. She denies any increased work of breathing or O2 demand. She has no other complaints today.  BNP was normal at 30.9 on 6/7.    PAST MEDICAL & SURGICAL HISTORY:   Past Medical History:   Diagnosis Date   • Colon polyp    • Osteoporosis    • Pain in thoracic spine    • Pneumonia    • UTI (urinary tract infection)       Past Surgical History:   Procedure Laterality Date   • BLADDER SURGERY  2000    bladder suspension   • CERVICAL LAMINECTOMY     • COLONOSCOPY     • COLOSTOMY  1979    temporary sigmoid   • HYSTERECTOMY           MEDICATIONS:  I have reviewed and reconciled the patients medication list in the patients chart at the skilled nursing facility today.      ALLERGIES:    Allergies   Allergen Reactions   • Lexapro [Escitalopram Oxalate] Nausea Only         SOCIAL HISTORY:    Social History     Socioeconomic History   • Marital status:      Spouse name: Not on file   • Number of children: Not on file   • Years of education: Not on file   • Highest education level: Not on file   Tobacco Use   • Smoking status:  Never Smoker   • Smokeless tobacco: Never Used   Substance and Sexual Activity   • Alcohol use: No   • Drug use: No   • Sexual activity: Defer       FAMILY HISTORY:    Family History   Problem Relation Age of Onset   • Blindness Mother    • Other Mother         arteriosclerotic cardiovascular disease    • Diabetes Mother    • Osteoporosis Mother    • Stroke Mother    • Cancer Brother    • Lung cancer Sister    • Osteoporosis Sister    • Stroke Sister    • Stroke Other        REVIEW OF SYSTEMS:    Review of Systems   Reason unable to perform ROS: ROS per nursing and patient.   Constitutional: Negative for activity change, appetite change, chills, diaphoresis, fatigue, fever, unexpected weight gain and unexpected weight loss.   HENT: Negative for congestion, hearing loss, mouth sores, nosebleeds, postnasal drip, rhinorrhea, sneezing, sore throat and trouble swallowing.    Eyes: Negative for pain, discharge, redness, itching and visual disturbance.   Respiratory: Negative for apnea, cough, choking, shortness of breath, wheezing and stridor.    Cardiovascular: Positive for leg swelling. Negative for palpitations.   Gastrointestinal: Negative for abdominal distention, abdominal pain, blood in stool, constipation, diarrhea, nausea and vomiting.   Endocrine: Negative for polydipsia and polyuria.   Genitourinary: Positive for urinary incontinence. Negative for decreased urine volume, difficulty urinating, frequency, hematuria and urgency.   Musculoskeletal: Positive for arthralgias and gait problem. Negative for back pain, joint swelling, myalgias and neck pain.   Skin: Negative for color change, dry skin, rash and skin lesions.   Allergic/Immunologic: Negative for environmental allergies.   Neurological: Positive for weakness, memory problem and confusion. Negative for seizures and speech difficulty.   Psychiatric/Behavioral: Negative for agitation, behavioral problems, dysphoric mood, hallucinations, self-injury, sleep  disturbance, suicidal ideas, negative for hyperactivity, depressed mood and stress. The patient is not nervous/anxious.          PHYSICAL EXAMINATION:   VITAL SIGNS:   Vitals:    21 1533   BP: 128/70   Pulse: 70   Resp: 18   Temp: 98 °F (36.7 °C)   Weight: 96.2 kg (212 lb)       Physical Exam   Constitutional: Vital signs are normal. She appears well-developed and well-nourished.   HENT:   Head: Normocephalic.   Right Ear: External ear normal.   Left Ear: External ear normal.   Nose: Nose normal.   Eyes: Conjunctivae are normal.   Cardiovascular: Normal rate, regular rhythm and normal heart sounds.   Pulmonary/Chest: Effort normal and breath sounds normal. No respiratory distress. She has no wheezes. She has no rales.   Abdominal: Soft. Bowel sounds are normal. She exhibits no distension and no mass. There is no abdominal tenderness. No hernia.   Musculoskeletal:      Right lower le+ Edema present.      Left lower le+ Edema present.      Comments: Weakness BLE   Neurological: She is alert.   Oriented x 2-3   Skin: Skin is warm and dry. Turgor is normal. No rash noted.   Psychiatric: Her speech is normal and behavior is normal. Mood normal. Cognition and memory are impaired.   Nursing note and vitals reviewed.      RECORDS REVIEW:   I have reviewed and interpreted the following lab test results  obtained at the time of the visit today.     ASSESSMENT     Diagnoses and all orders for this visit:    1. Bilateral lower extremity edema (Primary)    2. Vascular dementia with behavior disturbance (CMS/HCC)    3. Impaired mobility and ADLs        PLAN    BLE edema  -Give Bumex 0.5 mg p.o. twice daily x3 days then continue Bumex 0.5 mg daily.  MIR hose were ordered at previous visit but she has not been wearing them.  Nursing to apply MIR hose during the day while she is awake and off at night.  BNP 30.9 on .  She has no increased work of breathing or O2 demand.  She is also on Seroquel which causes  peripheral edema.  Check BMP Monday.  Will follow-up and continue to monitor.    Nursing encouraged to keep me informed of any acute changes, lack of improvement, or any new concerning symptoms.    Nursing to continue supportive care for all ADLs.      [x]  Discussed Patient in detail with nursing/staff, addressed all needs today.     [x]  Plan of Care Reviewed   [x]  PT/OT Reviewed   [x]  Order Changes  []  Discharge Plans Reviewed  [x]  Advance Directive on file with Nursing Home.   [x]  POA on file with Nursing Home.   [x]  Code Status listed: []  Full Code   [x]  DNR       “I confirm accuracy of unchanged data/findings which have been carried forward from previous visit, as well as I have updated appropriately those that have changed.”                                            Mita Joyce, APRN.

## 2021-07-19 ENCOUNTER — NURSING HOME (OUTPATIENT)
Dept: FAMILY MEDICINE CLINIC | Facility: CLINIC | Age: 81
End: 2021-07-19

## 2021-07-19 VITALS
RESPIRATION RATE: 18 BRPM | SYSTOLIC BLOOD PRESSURE: 128 MMHG | DIASTOLIC BLOOD PRESSURE: 76 MMHG | HEART RATE: 74 BPM | BODY MASS INDEX: 39.49 KG/M2 | WEIGHT: 209 LBS | OXYGEN SATURATION: 95 % | TEMPERATURE: 97.6 F

## 2021-07-19 DIAGNOSIS — L60.0 INGROWN TOENAIL OF RIGHT FOOT WITH INFECTION: Primary | ICD-10-CM

## 2021-07-19 DIAGNOSIS — F01.518 VASCULAR DEMENTIA WITH BEHAVIOR DISTURBANCE (HCC): ICD-10-CM

## 2021-07-19 DIAGNOSIS — R26.81 GAIT INSTABILITY: ICD-10-CM

## 2021-07-19 PROCEDURE — 99309 SBSQ NF CARE MODERATE MDM 30: CPT | Performed by: NURSE PRACTITIONER

## 2021-07-20 NOTE — PROGRESS NOTES
Nursing Home Follow Up Note      Layo Arango DO []   WILLIAM Alfaro [x]  [address] [place] [address]  Phone: [phone]  Fax: [phone] Dorcas Duncan MD []    Dc Fernandez DO []   [address] [place] [address]  Phone: [phone]  Fax: [phone]     PATIENT NAME: Danae Harmon                                                                          YOB: 1940           DATE OF SERVICE: 7/19/2021  FACILITY:  [][facility]   [] [place]   [] [facility]   [x] [facility]   [] Other ______________________________________________________________________      CHIEF COMPLAINT:    Redness and bleeding right great toe    HISTORY OF PRESENT ILLNESS:     Patient has had some redness and bleeding to right great toe for the past couple days.  Podiatrist worked on an ingrown toenail last week and she has had some redness and slight bleeding from it since and has complained of pain.  Bactroban and Keflex started Friday evening and patient reports it is starting to feel better.    PAST MEDICAL & SURGICAL HISTORY:   Past Medical History:   Diagnosis Date   • Colon polyp    • Osteoporosis    • Pain in thoracic spine    • Pneumonia    • UTI (urinary tract infection)       Past Surgical History:   Procedure Laterality Date   • BLADDER SURGERY  2000    bladder suspension   • CERVICAL LAMINECTOMY     • COLONOSCOPY     • COLOSTOMY  1979    temporary sigmoid   • HYSTERECTOMY           MEDICATIONS:  I have reviewed and reconciled the patients medication list in the patients chart at the skilled nursing facility today.      ALLERGIES:    Allergies   Allergen Reactions   • Lexapro [Escitalopram Oxalate] Nausea Only         SOCIAL HISTORY:    Social History     Socioeconomic History   • Marital status:      Spouse name: Not on file   • Number of children: Not on file   • Years of education: Not on file   • Highest education level: Not on file   Tobacco Use   • Smoking  status: Never Smoker   • Smokeless tobacco: Never Used   Substance and Sexual Activity   • Alcohol use: No   • Drug use: No   • Sexual activity: Defer       FAMILY HISTORY:    Family History   Problem Relation Age of Onset   • Blindness Mother    • Other Mother         arteriosclerotic cardiovascular disease    • Diabetes Mother    • Osteoporosis Mother    • Stroke Mother    • Cancer Brother    • Lung cancer Sister    • Osteoporosis Sister    • Stroke Sister    • Stroke Other        REVIEW OF SYSTEMS:    Review of Systems   Reason unable to perform ROS: ROS per nursing and patient.   Constitutional: Negative for activity change, appetite change, chills, diaphoresis, fatigue, fever, unexpected weight gain and unexpected weight loss.   HENT: Negative for congestion, hearing loss, mouth sores, nosebleeds, postnasal drip, rhinorrhea, sneezing, sore throat and trouble swallowing.    Eyes: Negative for pain, discharge, redness, itching and visual disturbance.   Respiratory: Negative for apnea, cough, choking, shortness of breath, wheezing and stridor.    Cardiovascular: Positive for leg swelling. Negative for palpitations.   Gastrointestinal: Negative for abdominal distention, abdominal pain, blood in stool, constipation, diarrhea, nausea and vomiting.   Endocrine: Negative for polydipsia and polyuria.   Genitourinary: Positive for urinary incontinence. Negative for decreased urine volume, difficulty urinating, frequency, hematuria and urgency.   Musculoskeletal: Positive for arthralgias and gait problem. Negative for back pain, joint swelling, myalgias and neck pain.   Skin: Negative for color change, dry skin, rash and skin lesions.        Tenderness and redness right great toenail.   Allergic/Immunologic: Negative for environmental allergies.   Neurological: Positive for weakness, memory problem and confusion. Negative for seizures and speech difficulty.   Psychiatric/Behavioral: Negative for agitation, behavioral  problems, dysphoric mood, hallucinations, self-injury, sleep disturbance, suicidal ideas, negative for hyperactivity, depressed mood and stress. The patient is not nervous/anxious.          PHYSICAL EXAMINATION:   VITAL SIGNS:   Vitals:    21 1403   BP: 128/76   Pulse: 74   Resp: 18   Temp: 97.6 °F (36.4 °C)   SpO2: 95%   Weight: 94.8 kg (209 lb)       Physical Exam   Constitutional: Vital signs are normal. She appears well-developed and well-nourished.   HENT:   Head: Normocephalic.   Right Ear: External ear normal.   Left Ear: External ear normal.   Nose: Nose normal.   Eyes: Conjunctivae are normal.   Cardiovascular: Normal rate, regular rhythm and normal heart sounds.   Pulmonary/Chest: Effort normal and breath sounds normal. No respiratory distress. She has no wheezes. She has no rales.   Abdominal: Soft. Bowel sounds are normal. She exhibits no distension and no mass. There is no abdominal tenderness. No hernia.   Musculoskeletal:      Right lower le+ Edema present.      Left lower le+ Edema present.      Comments: Weakness BLE        Neurological: She is alert.   Oriented x 2-3   Skin: Skin is warm and dry. Turgor is normal. No rash noted.   Psychiatric: Her speech is normal and behavior is normal. Mood normal. Cognition and memory are impaired.   Nursing note and vitals reviewed.      RECORDS REVIEW:   I have reviewed and interpreted the following lab test results  obtained at the time of the visit today.     ASSESSMENT     Diagnoses and all orders for this visit:    1. Ingrown toenail of right foot with infection (Primary)    2. Vascular dementia with behavior disturbance (CMS/HCC)    3. Gait instability        PLAN    Ingrown toenail right foot  -Bactroban and Keflex started Friday evening.  Continue to apply Bactroban and Band-Aid toe twice daily x10 days.  Continue Keflex as directed twice daily x10 days.  Will follow-up and continue to monitor and assess further need for treatment of toe  and nail.    Nursing encouraged to keep me informed of any acute changes, lack of improvement, or any new concerning symptoms.    Nursing to continue supportive care for all ADLs.      [x]  Discussed Patient in detail with nursing/staff, addressed all needs today.     [x]  Plan of Care Reviewed   [x]  PT/OT Reviewed   [x]  Order Changes  []  Discharge Plans Reviewed  [x]  Advance Directive on file with Nursing Home.   [x]  POA on file with Nursing Home.   [x]  Code Status listed: []  Full Code   [x]  DNR       “I confirm accuracy of unchanged data/findings which have been carried forward from previous visit, as well as I have updated appropriately those that have changed.”                                            Mita Joyce, APRN.

## 2021-07-22 ENCOUNTER — NURSING HOME (OUTPATIENT)
Dept: INTERNAL MEDICINE | Facility: CLINIC | Age: 81
End: 2021-07-22

## 2021-07-22 VITALS
RESPIRATION RATE: 20 BRPM | OXYGEN SATURATION: 96 % | DIASTOLIC BLOOD PRESSURE: 78 MMHG | HEART RATE: 80 BPM | WEIGHT: 209 LBS | BODY MASS INDEX: 39.49 KG/M2 | TEMPERATURE: 98.4 F | SYSTOLIC BLOOD PRESSURE: 120 MMHG

## 2021-07-22 DIAGNOSIS — F32.89 OTHER DEPRESSION: ICD-10-CM

## 2021-07-22 DIAGNOSIS — G30.1 LATE ONSET ALZHEIMER'S DISEASE WITH BEHAVIORAL DISTURBANCE (HCC): Primary | ICD-10-CM

## 2021-07-22 DIAGNOSIS — G89.29 CHRONIC LOW BACK PAIN WITH SCIATICA, SCIATICA LATERALITY UNSPECIFIED, UNSPECIFIED BACK PAIN LATERALITY: ICD-10-CM

## 2021-07-22 DIAGNOSIS — I10 BENIGN ESSENTIAL HYPERTENSION: ICD-10-CM

## 2021-07-22 DIAGNOSIS — E78.2 MIXED HYPERLIPIDEMIA: ICD-10-CM

## 2021-07-22 DIAGNOSIS — R53.83 OTHER FATIGUE: ICD-10-CM

## 2021-07-22 DIAGNOSIS — F02.818 LATE ONSET ALZHEIMER'S DISEASE WITH BEHAVIORAL DISTURBANCE (HCC): Primary | ICD-10-CM

## 2021-07-22 DIAGNOSIS — M54.40 CHRONIC LOW BACK PAIN WITH SCIATICA, SCIATICA LATERALITY UNSPECIFIED, UNSPECIFIED BACK PAIN LATERALITY: ICD-10-CM

## 2021-07-22 DIAGNOSIS — R60.0 LOWER EXTREMITY EDEMA: ICD-10-CM

## 2021-07-22 DIAGNOSIS — F33.1 MODERATE EPISODE OF RECURRENT MAJOR DEPRESSIVE DISORDER (HCC): ICD-10-CM

## 2021-07-22 DIAGNOSIS — E11.9 TYPE 2 DIABETES MELLITUS WITHOUT COMPLICATION, WITHOUT LONG-TERM CURRENT USE OF INSULIN (HCC): ICD-10-CM

## 2021-07-22 PROCEDURE — 99308 SBSQ NF CARE LOW MDM 20: CPT | Performed by: INTERNAL MEDICINE

## 2021-07-26 NOTE — PATIENT INSTRUCTIONS
"[email or web address]   Generalized Anxiety Disorder, Adult  Generalized anxiety disorder (FAREED) is a mental health condition. Unlike normal worries, anxiety related to FAREED is not triggered by a specific event. These worries do not fade or get better with time. FAREED interferes with relationships, work, and school.  FAREED symptoms can vary from mild to severe. People with severe FAREED can have intense waves of anxiety with physical symptoms that are similar to panic attacks.  What are the causes?  The exact cause of FAREED is not known, but the following are believed to have an impact:  · Differences in natural brain chemicals.  · Genes passed down from parents to children.  · Differences in the way threats are perceived.  · Development during childhood.  · Personality.  What increases the risk?  The following factors may make you more likely to develop this condition:  · Being female.  · Having a family history of anxiety disorders.  · Being very shy.  · Experiencing very stressful life events, such as the death of a loved one.  · Having a very stressful family environment.  What are the signs or symptoms?  People with FAREED often worry excessively about many things in their lives, such as their health and family. Symptoms may also include:  · Mental and emotional symptoms:  ? Worrying excessively about natural disasters.  ? Fear of being late.  ? Difficulty concentrating.  ? Fears that others are judging your performance.  · Physical symptoms:  ? Fatigue.  ? Headaches, muscle tension, muscle twitches, trembling, or feeling shaky.  ? Feeling like your heart is pounding or beating very fast.  ? Feeling out of breath or like you cannot take a deep breath.  ? Having trouble falling asleep or staying asleep, or experiencing restlessness.  ? Sweating.  ? Nausea, diarrhea, or irritable bowel syndrome (IBS).  · Behavioral symptoms:  ? Experiencing erratic moods or irritability.  ? Avoidance of new situations.  ? Avoidance of " people.  ? Extreme difficulty making decisions.  How is this diagnosed?  This condition is diagnosed based on your symptoms and medical history. You will also have a physical exam. Your health care provider may perform tests to rule out other possible causes of your symptoms.  To be diagnosed with FAREED, a person must have anxiety that:  · Is out of his or her control.  · Affects several different aspects of his or her life, such as work and relationships.  · Causes distress that makes him or her unable to take part in normal activities.  · Includes at least three symptoms of FAREED, such as restlessness, fatigue, trouble concentrating, irritability, muscle tension, or sleep problems.  Before your health care provider can confirm a diagnosis of FAREED, these symptoms must be present more days than they are not, and they must last for 6 months or longer.  How is this treated?  This condition may be treated with:  · Medicine. Antidepressant medicine is usually prescribed for long-term daily control. Anti-anxiety medicines may be added in severe cases, especially when panic attacks occur.  · Talk therapy (psychotherapy). Certain types of talk therapy can be helpful in treating FAREED by providing support, education, and guidance. Options include:  ? Cognitive behavioral therapy (CBT). People learn coping skills and self-calming techniques to ease their physical symptoms. They learn to identify unrealistic thoughts and behaviors and to replace them with more appropriate thoughts and behaviors.  ? Acceptance and commitment therapy (ACT). This treatment teaches people how to be mindful as a way to cope with unwanted thoughts and feelings.  ? Biofeedback. This process trains you to manage your body's response (physiological response) through breathing techniques and relaxation methods. You will work with a therapist while machines are used to monitor your physical symptoms.  · Stress management techniques. These include yoga,  meditation, and exercise.  A mental health specialist can help determine which treatment is best for you. Some people see improvement with one type of therapy. However, other people require a combination of therapies.  Follow these instructions at home:  Lifestyle  · Maintain a consistent routine and schedule.  · Anticipate stressful situations. Create a plan, and allow extra time to work with your plan.  · Practice stress management or self-calming techniques that you have learned from your therapist or your health care provider.  General instructions  · Take over-the-counter and prescription medicines only as told by your health care provider.  · Understand that you are likely to have setbacks. Accept this and be kind to yourself as you persist to take better care of yourself.  · Recognize and accept your accomplishments, even if you  them as small.  · Keep all follow-up visits as told by your health care provider. This is important.  Contact a health care provider if:  · Your symptoms do not get better.  · Your symptoms get worse.  · You have signs of depression, such as:  ? A persistently sad or irritable mood.  ? Loss of enjoyment in activities that used to bring you gabe.  ? Change in weight or eating.  ? Changes in sleeping habits.  ? Avoiding friends or family members.  ? Loss of energy for normal tasks.  ? Feelings of guilt or worthlessness.  Get help right away if:  · You have serious thoughts about hurting yourself or others.  If you ever feel like you may hurt yourself or others, or have thoughts about taking your own life, get help right away. Go to your nearest emergency department or:  · Call your local emergency services (691 in the [place]  · Call a suicide crisis helpline, such as the National Suicide Prevention Lifeline at 1-378.142.6023. This is open 24 hours a day in the [place]  · Text the Crisis Text Line at 271059 (in the [place]  Summary  · Generalized anxiety disorder (FAREED) is a mental  health condition that involves worry that is not triggered by a specific event.  · People with FAREED often worry excessively about many things in their lives, such as their health and family.  · FAREED may cause symptoms such as restlessness, trouble concentrating, sleep problems, frequent sweating, nausea, diarrhea, headaches, and trembling or muscle twitching.  · A mental health specialist can help determine which treatment is best for you. Some people see improvement with one type of therapy. However, other people require a combination of therapies.  This information is not intended to replace advice given to you by your health care provider. Make sure you discuss any questions you have with your health care provider.  Document Revised: 10/07/2020 Document Reviewed: 10/07/2020  [employer]vier Patient Education © 2021 [employer]

## 2021-07-26 NOTE — PROGRESS NOTES
Nursing Home Progress Note        Layo Arango DO []  WILLIAM Alfaro []  [address] [place] [address]  Phone: [phone]  Fax: [phone] Dorcas Duncan MD []  Dc Fernandez DO [x]   [address] [place] [address]  Phone: [phone]  Fax: [phone]     PATIENT NAME: Danae Harmon                                                                          YOB: 1940           DATE OF SERVICE: 07/22/2021  FACILITY:  [] [facility]   [] [facility]   [] [facility]   [x] [facility]  []  [facility]  [] Other ______________________________________________________________________     CHIEF COMPLAINT:  Chronic Medical Management      HISTORY OF PRESENT ILLNESS:   Patient was resting comfortably in her bed.  She has moved rooms and stated that she somewhat missed her prior room.  Otherwise, medically she has been holding stable.  Podiatry is currently following and had done some work on her right great toe.  Her toe seems to have a mild infection for which antibiotics were already started.  She denies any significant pain to the extremity at this time.    PAST MEDICAL & SURGICAL HISTORY:   Past Medical History:   Diagnosis Date   • Colon polyp    • Osteoporosis    • Pain in thoracic spine    • Pneumonia    • UTI (urinary tract infection)       Past Surgical History:   Procedure Laterality Date   • BLADDER SURGERY  2000    bladder suspension   • CERVICAL LAMINECTOMY     • COLONOSCOPY     • COLOSTOMY  1979    temporary sigmoid   • HYSTERECTOMY           MEDICATIONS:  I have reviewed and reconciled the patients medication list in the patients chart at the skilled nursing facility today, 07/22/2021.      ALLERGIES:  Allergies   Allergen Reactions   • Lexapro [Escitalopram Oxalate] Nausea Only         SOCIAL HISTORY:  Social History     Socioeconomic History   • Marital status:      Spouse name: Not on file   • Number of children: Not on file   • Years of  education: Not on file   • Highest education level: Not on file   Tobacco Use   • Smoking status: Never Smoker   • Smokeless tobacco: Never Used   Substance and Sexual Activity   • Alcohol use: No   • Drug use: No   • Sexual activity: Defer       FAMILY HISTORY:  Family History   Problem Relation Age of Onset   • Blindness Mother    • Other Mother         arteriosclerotic cardiovascular disease    • Diabetes Mother    • Osteoporosis Mother    • Stroke Mother    • Cancer Brother    • Lung cancer Sister    • Osteoporosis Sister    • Stroke Sister    • Stroke Other        REVIEW OF SYSTEMS:  Review of Systems   Constitutional: Negative for chills, fatigue and fever.   HENT: Negative for congestion, ear pain, rhinorrhea, sinus pressure and sore throat.    Eyes: Negative for visual disturbance.   Respiratory: Negative for cough, chest tightness, shortness of breath and wheezing.    Cardiovascular: Negative for chest pain, palpitations and leg swelling.   Gastrointestinal: Negative for abdominal pain, blood in stool, constipation, diarrhea, nausea and vomiting.   Endocrine: Negative for polydipsia and polyuria.   Genitourinary: Negative for dysuria and hematuria.   Musculoskeletal: Negative for arthralgias and back pain.   Skin: Negative for rash.   Neurological: Negative for dizziness, light-headedness, numbness and headaches.   Psychiatric/Behavioral: Negative for dysphoric mood and sleep disturbance. The patient is not nervous/anxious.           PHYSICAL EXAMINATION:     VITAL SIGNS:  /78   Pulse 80   Temp 98.4 °F (36.9 °C)   Resp 20   Wt 94.8 kg (209 lb)   SpO2 96%   BMI 39.49 kg/m²     Physical Exam  Vitals and nursing note reviewed.   Constitutional:       Appearance: Normal appearance. She is well-developed. She is obese.      Comments: Frail elderly female   HENT:      Head: Normocephalic and atraumatic.   Eyes:      Extraocular Movements: Extraocular movements intact.      Conjunctiva/sclera:  Conjunctivae normal.   Cardiovascular:      Rate and Rhythm: Normal rate and regular rhythm.   Pulmonary:      Effort: Pulmonary effort is normal.      Breath sounds: Normal breath sounds.   Abdominal:      General: Abdomen is flat.      Palpations: Abdomen is soft.   Musculoskeletal:      Cervical back: Normal range of motion and neck supple.      Right lower leg: Edema present.      Left lower leg: Edema present.      Comments: Bedridden   Skin:     General: Skin is warm and dry.      Findings: No rash.   Neurological:      General: No focal deficit present.      Mental Status: She is alert. Mental status is at baseline. She is disoriented.   Psychiatric:         Mood and Affect: Mood normal.         Behavior: Behavior normal.         RECORDS REVIEW:       ASSESSMENT     Diagnoses and all orders for this visit:    1. Late onset Alzheimer's disease with behavioral disturbance (CMS/HCC) (Primary)    2. Lower extremity edema    3. Other fatigue    4. Benign essential hypertension    5. Mixed hyperlipidemia    6. Moderate episode of recurrent major depressive disorder (CMS/HCC)    7. Type 2 diabetes mellitus without complication, without long-term current use of insulin (CMS/HCC)    8. Other depression    9. Chronic low back pain with sciatica, sciatica laterality unspecified, unspecified back pain laterality        PLAN       Alzheimer's disease with behavioral disturbances  -Mental status remained stable recently.  -Continue nightly Seroquel     Major depressive disorder  -Stable on Seroquel, Remeron, and Depakote  -Psychiatry is following and adjusting as appropriate     Bladder spasms/urinary frequency  -Cont Myrbetriq, still has incontinence episodes     Mixed hyperlipidemia  -Continue statin.    Lower Extremity Edema  -Stable control of symptoms recently.     Essential hypertension  -Controlled on Coreg     Vitamin D and vitamin B12 deficiencies  -Stable on supplements.     Osteoporosis  -Stable on  vitamins.  Avoiding aggressive therapy/bisphosphonates at this time.     Type 2 diabetes mellitus  -due for A1C, ordered.      Chronic pain  -Controlled on Norco being filled monthly and this has been controlling her symptoms fairly well.    [x]  Discussed Patient in detail with nursing/staff, addressed all needs today.     [x]  Plan of Care Reviewed   []  PT/OT Reviewed   []  Order Changes  []  Discharge Plans Reviewed  [x]  Advance Directive on file with Nursing Home.   [x]  POA on file with Nursing Home.    [x]  Code Status listed and reviewed.     I confirm accuracy of unchanged data/findings including physical exam and plan which have been carried forward from previous visit, as well as I have updated appropriately those that have changed        Dc Fernandez DO.  7/26/2021

## 2021-07-27 ENCOUNTER — NURSING HOME (OUTPATIENT)
Dept: FAMILY MEDICINE CLINIC | Facility: CLINIC | Age: 81
End: 2021-07-27

## 2021-07-27 VITALS
BODY MASS INDEX: 39.49 KG/M2 | TEMPERATURE: 98 F | SYSTOLIC BLOOD PRESSURE: 120 MMHG | RESPIRATION RATE: 20 BRPM | DIASTOLIC BLOOD PRESSURE: 75 MMHG | WEIGHT: 209 LBS | HEART RATE: 73 BPM | OXYGEN SATURATION: 95 %

## 2021-07-27 DIAGNOSIS — Z78.9 IMPAIRED MOBILITY AND ADLS: ICD-10-CM

## 2021-07-27 DIAGNOSIS — Z74.09 IMPAIRED MOBILITY AND ADLS: ICD-10-CM

## 2021-07-27 DIAGNOSIS — F01.518 VASCULAR DEMENTIA WITH BEHAVIOR DISTURBANCE (HCC): ICD-10-CM

## 2021-07-27 DIAGNOSIS — F51.04 PSYCHOPHYSIOLOGIC INSOMNIA: ICD-10-CM

## 2021-07-27 DIAGNOSIS — F22 DELUSIONAL THOUGHTS (HCC): Primary | ICD-10-CM

## 2021-07-27 PROCEDURE — 99309 SBSQ NF CARE MODERATE MDM 30: CPT | Performed by: NURSE PRACTITIONER

## 2021-07-28 NOTE — PROGRESS NOTES
"Nursing Home Follow Up Note      Layo Arango DO []   WILLIAM Alfaro [x]  [address] [place] [address]  Phone: [phone]  Fax: [phone] Dorcas Duncan MD []    Dc Fernandez DO []   [address] [place] [address]  Phone: [phone]  Fax: [phone]     PATIENT NAME: Danae Harmon                                                                          YOB: 1940           DATE OF SERVICE: 7/27/2021  FACILITY:  [][facility]   [] [facility]   [] [facility]   [x] [facility]   [] Other ______________________________________________________________________      CHIEF COMPLAINT:    Increased confusion, hallucinations and insomnia    HISTORY OF PRESENT ILLNESS:     Nursing reports the patient has had increased confusion and not been sleeping well for the past several days.  She also reported to her family that \"she has been crawling around on the floor like a mouse looking for food\".  She did not report this to staff but did report to them that she does not like her roommate and would like to be moved into another room.  She does not like that her roommate will not talk to her.  She is sitting in the hallway in her wheelchair today, very pleasant, and reports the same, that she does not like her roommate.    PAST MEDICAL & SURGICAL HISTORY:   Past Medical History:   Diagnosis Date   • Colon polyp    • Osteoporosis    • Pain in thoracic spine    • Pneumonia    • UTI (urinary tract infection)       Past Surgical History:   Procedure Laterality Date   • BLADDER SURGERY  2000    bladder suspension   • CERVICAL LAMINECTOMY     • COLONOSCOPY     • COLOSTOMY  1979    temporary sigmoid   • HYSTERECTOMY           MEDICATIONS:  I have reviewed and reconciled the patients medication list in the patients chart at the skilled nursing facility today.      ALLERGIES:    Allergies   Allergen Reactions   • Lexapro [Escitalopram Oxalate] Nausea Only         SOCIAL " HISTORY:    Social History     Socioeconomic History   • Marital status:      Spouse name: Not on file   • Number of children: Not on file   • Years of education: Not on file   • Highest education level: Not on file   Tobacco Use   • Smoking status: Never Smoker   • Smokeless tobacco: Never Used   Substance and Sexual Activity   • Alcohol use: No   • Drug use: No   • Sexual activity: Defer       FAMILY HISTORY:    Family History   Problem Relation Age of Onset   • Blindness Mother    • Other Mother         arteriosclerotic cardiovascular disease    • Diabetes Mother    • Osteoporosis Mother    • Stroke Mother    • Cancer Brother    • Lung cancer Sister    • Osteoporosis Sister    • Stroke Sister    • Stroke Other        REVIEW OF SYSTEMS:    Review of Systems   Reason unable to perform ROS: ROS per nursing and patient.   Constitutional: Negative for activity change, appetite change, chills, diaphoresis, fatigue, fever, unexpected weight gain and unexpected weight loss.   HENT: Negative for congestion, hearing loss, mouth sores, nosebleeds, postnasal drip, rhinorrhea, sneezing, sore throat and trouble swallowing.    Eyes: Negative for pain, discharge, redness, itching and visual disturbance.   Respiratory: Negative for apnea, cough, choking, shortness of breath, wheezing and stridor.    Cardiovascular: Positive for leg swelling. Negative for palpitations.   Gastrointestinal: Negative for abdominal distention, abdominal pain, blood in stool, constipation, diarrhea, nausea and vomiting.   Endocrine: Negative for polydipsia and polyuria.   Genitourinary: Positive for urinary incontinence. Negative for decreased urine volume, difficulty urinating, frequency, hematuria and urgency.   Musculoskeletal: Positive for arthralgias and gait problem. Negative for back pain, joint swelling, myalgias and neck pain.   Skin: Negative for color change, dry skin, rash and skin lesions.   Allergic/Immunologic: Negative for  environmental allergies.   Neurological: Positive for weakness, memory problem and confusion. Negative for seizures and speech difficulty.   Psychiatric/Behavioral: Positive for behavioral problems, hallucinations and sleep disturbance. Negative for agitation, dysphoric mood, self-injury, suicidal ideas, negative for hyperactivity, depressed mood and stress. The patient is not nervous/anxious.          PHYSICAL EXAMINATION:   VITAL SIGNS:   Vitals:    21 1040   BP: 120/75   Pulse: 73   Resp: 20   Temp: 98 °F (36.7 °C)   SpO2: 95%   Weight: 94.8 kg (209 lb)       Physical Exam   Constitutional: Vital signs are normal. She appears well-developed and well-nourished.   HENT:   Head: Normocephalic.   Right Ear: External ear normal.   Left Ear: External ear normal.   Nose: Nose normal.   Eyes: Conjunctivae are normal.   Cardiovascular: Normal rate, regular rhythm and normal heart sounds.   Pulmonary/Chest: Effort normal and breath sounds normal. No respiratory distress. She has no wheezes. She has no rales.   Abdominal: Soft. Bowel sounds are normal. She exhibits no distension and no mass. There is no abdominal tenderness. No hernia.   Musculoskeletal:      Right lower le+ Edema present.      Left lower le+ Edema present.      Comments: Weakness BLE   Neurological: She is alert.   Oriented x 2-3   Skin: Skin is warm and dry. Turgor is normal. No rash noted.   Psychiatric: Her speech is normal and behavior is normal. Mood normal. Thought content is delusional. Cognition and memory are impaired.   Nursing note and vitals reviewed.      RECORDS REVIEW:   I have reviewed and interpreted the following lab test results  obtained at the time of the visit today.     ASSESSMENT     Diagnoses and all orders for this visit:    1. Delusional thoughts (CMS/HCC) (Primary)    2. Vascular dementia with behavior disturbance (CMS/HCC)    3. Psychophysiologic insomnia    4. Impaired mobility and ADLs        PLAN    Delusional  thoughts/dementia/insomnia  -Will get UA with C&S, CBC and BMP.  Nursing to move patient to another room as they feel the dislike of her roommate is the main reason for her acute episode of behaviors and delusions.  Will not make any medication changes at this time.  Will follow-up and continue to monitor.    Nursing encouraged to keep me informed of any acute changes, lack of improvement, or any new concerning symptoms.    Nursing to continue supportive care for all ADLs.      [x]  Discussed Patient in detail with nursing/staff, addressed all needs today.     [x]  Plan of Care Reviewed   [x]  PT/OT Reviewed   [x]  Order Changes  []  Discharge Plans Reviewed  [x]  Advance Directive on file with Nursing Home.   [x]  POA on file with Nursing Home.   [x]  Code Status listed: []  Full Code   [x]  DNR       “I confirm accuracy of unchanged data/findings which have been carried forward from previous visit, as well as I have updated appropriately those that have changed.”                                            Mita Joyce, APRN.

## 2021-08-12 ENCOUNTER — NURSING HOME (OUTPATIENT)
Dept: FAMILY MEDICINE CLINIC | Facility: CLINIC | Age: 81
End: 2021-08-12

## 2021-08-12 VITALS
BODY MASS INDEX: 39.68 KG/M2 | TEMPERATURE: 97.3 F | WEIGHT: 210 LBS | DIASTOLIC BLOOD PRESSURE: 70 MMHG | RESPIRATION RATE: 20 BRPM | HEART RATE: 80 BPM | SYSTOLIC BLOOD PRESSURE: 130 MMHG

## 2021-08-12 DIAGNOSIS — Z78.9 IMPAIRED MOBILITY AND ADLS: ICD-10-CM

## 2021-08-12 DIAGNOSIS — Z74.09 IMPAIRED MOBILITY AND ADLS: ICD-10-CM

## 2021-08-12 DIAGNOSIS — Z22.39 ESBL ESCHERICHIA COLI CARRIER: Primary | ICD-10-CM

## 2021-08-12 DIAGNOSIS — F01.518 VASCULAR DEMENTIA WITH BEHAVIOR DISTURBANCE (HCC): ICD-10-CM

## 2021-08-12 PROCEDURE — 99309 SBSQ NF CARE MODERATE MDM 30: CPT | Performed by: NURSE PRACTITIONER

## 2021-08-15 NOTE — PROGRESS NOTES
Nursing Home Follow Up Note      Layo Arango DO []   WILLIAM Alfaro [x]  [address] [place] [address]  Phone: [phone]  Fax: [phone] Dorcas Duncan MD []    Dc Fernandez DO []   [address] [place] [address]  Phone: [phone]  Fax: [phone]     PATIENT NAME: Danae Harmon                                                                          YOB: 1940           DATE OF SERVICE: 8/12/2021  FACILITY:  [][facility]   [] [facility]   [] [facility]   [x] [facility]   [] Other ______________________________________________________________________      CHIEF COMPLAINT:    Follow up abnormal UA    HISTORY OF PRESENT ILLNESS:     Patient with increased confusion and behaviors last week, so UA C&S obtained. UA did not report any nitrites or other signs or UTI. Urine did reflux to culture and reported E.Coli ESBL but only 10,000 CFU. She has not had any increased confusion or behaviors since her episode last week, because her room mate was changed, which was the cause of her increased behaviors. She appears to be a ESBL carrier.    She is resting in bed today without complaints or s/s of distress.    PAST MEDICAL & SURGICAL HISTORY:   Past Medical History:   Diagnosis Date   • Colon polyp    • Osteoporosis    • Pain in thoracic spine    • Pneumonia    • UTI (urinary tract infection)       Past Surgical History:   Procedure Laterality Date   • BLADDER SURGERY  2000    bladder suspension   • CERVICAL LAMINECTOMY     • COLONOSCOPY     • COLOSTOMY  1979    temporary sigmoid   • HYSTERECTOMY           MEDICATIONS:  I have reviewed and reconciled the patients medication list in the patients chart at the skilled nursing facility today.      ALLERGIES:    Allergies   Allergen Reactions   • Lexapro [Escitalopram Oxalate] Nausea Only         SOCIAL HISTORY:    Social History     Socioeconomic History   • Marital status:      Spouse name: Not on file   • Number  of children: Not on file   • Years of education: Not on file   • Highest education level: Not on file   Tobacco Use   • Smoking status: Never Smoker   • Smokeless tobacco: Never Used   Substance and Sexual Activity   • Alcohol use: No   • Drug use: No   • Sexual activity: Defer       FAMILY HISTORY:    Family History   Problem Relation Age of Onset   • Blindness Mother    • Other Mother         arteriosclerotic cardiovascular disease    • Diabetes Mother    • Osteoporosis Mother    • Stroke Mother    • Cancer Brother    • Lung cancer Sister    • Osteoporosis Sister    • Stroke Sister    • Stroke Other        REVIEW OF SYSTEMS:    Review of Systems   Reason unable to perform ROS: ROS per nursing and patient.   Constitutional: Negative for activity change, appetite change, chills, diaphoresis, fatigue, fever, unexpected weight gain and unexpected weight loss.   HENT: Negative for congestion, hearing loss, mouth sores, nosebleeds, postnasal drip, rhinorrhea, sneezing, sore throat and trouble swallowing.    Eyes: Negative for pain, discharge, redness, itching and visual disturbance.   Respiratory: Negative for apnea, cough, choking, shortness of breath, wheezing and stridor.    Cardiovascular: Positive for leg swelling. Negative for palpitations.   Gastrointestinal: Negative for abdominal distention, abdominal pain, blood in stool, constipation, diarrhea, nausea and vomiting.   Endocrine: Negative for polydipsia and polyuria.   Genitourinary: Positive for urinary incontinence. Negative for decreased urine volume, difficulty urinating, frequency, hematuria and urgency.   Musculoskeletal: Positive for arthralgias and gait problem. Negative for back pain, joint swelling, myalgias and neck pain.   Skin: Negative for color change, dry skin, rash and skin lesions.   Allergic/Immunologic: Negative for environmental allergies.   Neurological: Positive for weakness, memory problem and confusion. Negative for seizures and speech  difficulty.   Psychiatric/Behavioral: Negative for agitation, behavioral problems, dysphoric mood, hallucinations, self-injury, sleep disturbance, suicidal ideas, negative for hyperactivity, depressed mood and stress. The patient is not nervous/anxious.          PHYSICAL EXAMINATION:   VITAL SIGNS:   Vitals:    21 1353   BP: 130/70   Pulse: 80   Resp: 20   Temp: 97.3 °F (36.3 °C)   Weight: 95.3 kg (210 lb)       Physical Exam   Constitutional: Vital signs are normal. She appears well-developed and well-nourished.   HENT:   Head: Normocephalic.   Right Ear: External ear normal.   Left Ear: External ear normal.   Nose: Nose normal.   Eyes: Conjunctivae are normal.   Cardiovascular: Normal rate, regular rhythm and normal heart sounds.   Pulmonary/Chest: Effort normal and breath sounds normal. No respiratory distress. She has no wheezes. She has no rales.   Abdominal: Soft. Bowel sounds are normal. She exhibits no distension and no mass. There is no abdominal tenderness. No hernia.   Musculoskeletal:      Right lower le+ Edema present.      Left lower le+ Edema present.      Comments: Weakness BLE   Neurological: She is alert.   Oriented x 2-3   Skin: Skin is warm and dry. Turgor is normal. No rash noted.   Psychiatric: Her speech is normal and behavior is normal. Mood normal. Thought content is delusional. Cognition and memory are impaired.   Nursing note and vitals reviewed.      RECORDS REVIEW:   I have reviewed and interpreted the following lab test results  obtained at the time of the visit today.     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 Vascular dementia with behavior disturbance (CMS/HCC)    3. Impaired mobility and ADLs        PLAN    ESBL E. Coli carrier/dementia  -No increased confusion or behaviors. She appears to be a ESBL E. Coli carrier so does no need to be in contact precautions. Will follow up and continue to monitor.      Nursing encouraged to keep me informed of any acute changes, lack of improvement, or any new concerning symptoms.    Nursing to continue supportive care for all ADLs.      [x]  Discussed Patient in detail with nursing/staff, addressed all needs today.     [x]  Plan of Care Reviewed   [x]  PT/OT Reviewed   [x]  Order Changes  []  Discharge Plans Reviewed  [x]  Advance Directive on file with Nursing Home.   [x]  POA on file with Nursing Home.   [x]  Code Status listed: []  Full Code   [x]  DNR     “I confirm accuracy of unchanged data/findings which have been carried forward from previous visit, as well as I have updated appropriately those that have changed.”                                            Mita Joyce, APRN.

## 2021-08-25 ENCOUNTER — NURSING HOME (OUTPATIENT)
Dept: FAMILY MEDICINE CLINIC | Facility: CLINIC | Age: 81
End: 2021-08-25

## 2021-08-25 VITALS
TEMPERATURE: 97.2 F | BODY MASS INDEX: 39.68 KG/M2 | SYSTOLIC BLOOD PRESSURE: 130 MMHG | RESPIRATION RATE: 20 BRPM | WEIGHT: 210 LBS | DIASTOLIC BLOOD PRESSURE: 78 MMHG | HEART RATE: 72 BPM

## 2021-08-25 DIAGNOSIS — M79.89 RIGHT LEG SWELLING: ICD-10-CM

## 2021-08-25 DIAGNOSIS — Z13.1 SCREENING FOR DIABETES MELLITUS (DM): ICD-10-CM

## 2021-08-25 DIAGNOSIS — M79.89 LEFT LEG SWELLING: ICD-10-CM

## 2021-08-25 DIAGNOSIS — Z74.09 IMPAIRED MOBILITY AND ADLS: Primary | ICD-10-CM

## 2021-08-25 DIAGNOSIS — Z78.9 IMPAIRED MOBILITY AND ADLS: Primary | ICD-10-CM

## 2021-08-25 PROCEDURE — 99309 SBSQ NF CARE MODERATE MDM 30: CPT | Performed by: NURSE PRACTITIONER

## 2021-08-25 NOTE — PROGRESS NOTES
Nursing Home Follow Up Note      Layo Arango DO []   WILLIAM Alfaro [x]  [address] [place] [address]  Phone: [phone]  Fax: [phone] Dorcas Duncan MD []    Dc Fernandez DO []   [address] [place] [address]  Phone: [phone]  Fax: [phone]     PATIENT NAME: Danae Harmon                                                                          YOB: 1940           DATE OF SERVICE: 8/25/2021  FACILITY:  [][facility]   [] [facility]   [] [facility]   [x] [facility]   [] Other ______________________________________________________________________      CHIEF COMPLAINT:    Complaints of bilateral lower extremity edema and very concerned about having diabetes.     HISTORY OF PRESENT ILLNESS:     Patient states that she is worried about having diabetes because her roommate had it. Also, prior to coming into the facility, she said it was a little high. HgA1C 5.5 on 7/20/21. Discussed results with patient and stated we would keep a close eye on her sugar and check a HgA1c every three months. Patient seemed satisfied with explanation.    She did express concerns about her leg swelling, which seemed worse and was a bit painful and pulsating.     She is resting in bed today without complaints other than those mentioned above.    PAST MEDICAL & SURGICAL HISTORY:   Past Medical History:   Diagnosis Date   • Colon polyp    • Osteoporosis    • Pain in thoracic spine    • Pneumonia    • UTI (urinary tract infection)       Past Surgical History:   Procedure Laterality Date   • BLADDER SURGERY  2000    bladder suspension   • CERVICAL LAMINECTOMY     • COLONOSCOPY     • COLOSTOMY  1979    temporary sigmoid   • HYSTERECTOMY           MEDICATIONS:  I have reviewed and reconciled the patients medication list in the patients chart at the skilled nursing facility today.      ALLERGIES:    Allergies   Allergen Reactions   • Lexapro [Escitalopram Oxalate] Nausea Only          SOCIAL HISTORY:    Social History     Socioeconomic History   • Marital status:      Spouse name: Not on file   • Number of children: Not on file   • Years of education: Not on file   • Highest education level: Not on file   Tobacco Use   • Smoking status: Never Smoker   • Smokeless tobacco: Never Used   Substance and Sexual Activity   • Alcohol use: No   • Drug use: No   • Sexual activity: Defer       FAMILY HISTORY:    Family History   Problem Relation Age of Onset   • Blindness Mother    • Other Mother         arteriosclerotic cardiovascular disease    • Diabetes Mother    • Osteoporosis Mother    • Stroke Mother    • Cancer Brother    • Lung cancer Sister    • Osteoporosis Sister    • Stroke Sister    • Stroke Other        REVIEW OF SYSTEMS:    Review of Systems   Reason unable to perform ROS: ROS per nursing and patient.   Constitutional: Negative for activity change, appetite change, chills, diaphoresis, fatigue, fever, unexpected weight gain and unexpected weight loss.   HENT: Negative for congestion, hearing loss, mouth sores, nosebleeds, postnasal drip, rhinorrhea, sneezing, sore throat and trouble swallowing.    Eyes: Negative for pain, discharge, redness, itching and visual disturbance.   Respiratory: Negative for apnea, cough, choking, shortness of breath, wheezing and stridor.    Cardiovascular: Positive for leg swelling (bilateral). Negative for palpitations.   Gastrointestinal: Negative for abdominal distention, abdominal pain, blood in stool, constipation, diarrhea, nausea and vomiting.   Endocrine: Negative for polydipsia and polyuria.   Genitourinary: Positive for urinary incontinence. Negative for decreased urine volume, difficulty urinating, frequency, hematuria and urgency.   Musculoskeletal: Positive for arthralgias and gait problem. Negative for back pain, joint swelling, myalgias and neck pain.   Skin: Negative for color change, dry skin, rash and skin lesions.    Allergic/Immunologic: Negative for environmental allergies.   Neurological: Positive for weakness, memory problem and confusion. Negative for seizures and speech difficulty.   Psychiatric/Behavioral: Negative for agitation, behavioral problems, dysphoric mood, hallucinations, self-injury, sleep disturbance, suicidal ideas, negative for hyperactivity, depressed mood and stress. The patient is not nervous/anxious.          PHYSICAL EXAMINATION:   VITAL SIGNS:   Vitals:    21 1347   BP: 130/78   Pulse: 72   Resp: 20   Temp: 97.2 °F (36.2 °C)   Weight: 95.3 kg (210 lb)       Physical Exam   Constitutional: Vital signs are normal. She appears well-developed and well-nourished.   HENT:   Head: Normocephalic.   Right Ear: External ear normal.   Left Ear: External ear normal.   Nose: Nose normal.   Eyes: Conjunctivae are normal.   Cardiovascular: Normal rate, regular rhythm and normal heart sounds.   Pulmonary/Chest: Effort normal and breath sounds normal. No respiratory distress. She has no wheezes. She has no rales.   Abdominal: Soft. Bowel sounds are normal. She exhibits no distension and no mass. There is no abdominal tenderness. No hernia.   Musculoskeletal:      Right lower le+ Edema present.      Left lower le+ Edema present.      Comments: Weakness BLE   Neurological: She is alert.   Oriented x 2-3   Skin: Skin is warm and dry. Turgor is normal. No rash noted.   Psychiatric: Her speech is normal and behavior is normal. Mood normal. Thought content is delusional. Cognition and memory are impaired.   Nursing note and vitals reviewed.      RECORDS REVIEW:   I have reviewed and interpreted the following lab test results  obtained at the time of the visit today.     ASSESSMENT     Diagnoses and all orders for this visit:    1. Impaired mobility and ADLs (Primary)    2. Left leg swelling    3. Right leg swelling    4. Screening for diabetes mellitus (DM)        PLAN    Bilateral LE edema  - Increase Bumex  to 1 mg daily x 3 days  - Check BMP Friday  - Apply MIR hose or ACE wrap during the day     Screening for diabetes  -Monitor HgA1C every 3 months (due next after 10/20/21)    Nursing encouraged to keep me informed of any acute changes, lack of improvement, or any new concerning symptoms.    Nursing to continue supportive care for all ADLs.      [x]  Discussed Patient in detail with nursing/staff, addressed all needs today.     [x]  Plan of Care Reviewed   [x]  PT/OT Reviewed   [x]  Order Changes  []  Discharge Plans Reviewed  [x]  Advance Directive on file with Nursing Home.   [x]  POA on file with Nursing Home.   [x]  Code Status listed: []  Full Code   [x]  DNR     “I confirm accuracy of unchanged data/findings which have been carried forward from previous visit, as well as I have updated appropriately those that have changed.”                                            Mita Joyce, APRN.

## 2021-08-26 ENCOUNTER — NURSING HOME (OUTPATIENT)
Dept: INTERNAL MEDICINE | Facility: CLINIC | Age: 81
End: 2021-08-26

## 2021-08-26 VITALS
TEMPERATURE: 97.8 F | OXYGEN SATURATION: 97 % | DIASTOLIC BLOOD PRESSURE: 78 MMHG | SYSTOLIC BLOOD PRESSURE: 132 MMHG | BODY MASS INDEX: 39.68 KG/M2 | RESPIRATION RATE: 20 BRPM | WEIGHT: 210 LBS | HEART RATE: 80 BPM

## 2021-08-26 DIAGNOSIS — R53.83 OTHER FATIGUE: ICD-10-CM

## 2021-08-26 DIAGNOSIS — G30.1 LATE ONSET ALZHEIMER'S DISEASE WITH BEHAVIORAL DISTURBANCE (HCC): Primary | ICD-10-CM

## 2021-08-26 DIAGNOSIS — E11.9 TYPE 2 DIABETES MELLITUS WITHOUT COMPLICATION, WITHOUT LONG-TERM CURRENT USE OF INSULIN (HCC): ICD-10-CM

## 2021-08-26 DIAGNOSIS — G89.29 CHRONIC LOW BACK PAIN WITH SCIATICA, SCIATICA LATERALITY UNSPECIFIED, UNSPECIFIED BACK PAIN LATERALITY: ICD-10-CM

## 2021-08-26 DIAGNOSIS — R60.0 LOWER EXTREMITY EDEMA: ICD-10-CM

## 2021-08-26 DIAGNOSIS — I10 BENIGN ESSENTIAL HYPERTENSION: ICD-10-CM

## 2021-08-26 DIAGNOSIS — M54.40 CHRONIC LOW BACK PAIN WITH SCIATICA, SCIATICA LATERALITY UNSPECIFIED, UNSPECIFIED BACK PAIN LATERALITY: ICD-10-CM

## 2021-08-26 DIAGNOSIS — F32.89 OTHER DEPRESSION: ICD-10-CM

## 2021-08-26 DIAGNOSIS — F33.1 MODERATE EPISODE OF RECURRENT MAJOR DEPRESSIVE DISORDER (HCC): ICD-10-CM

## 2021-08-26 DIAGNOSIS — E78.2 MIXED HYPERLIPIDEMIA: ICD-10-CM

## 2021-08-26 DIAGNOSIS — F02.818 LATE ONSET ALZHEIMER'S DISEASE WITH BEHAVIORAL DISTURBANCE (HCC): Primary | ICD-10-CM

## 2021-08-26 PROCEDURE — 99308 SBSQ NF CARE LOW MDM 20: CPT | Performed by: INTERNAL MEDICINE

## 2021-08-30 NOTE — PROGRESS NOTES
Nursing Home Progress Note        Layo Arango DO []  WILLIAM Alfaro []  [address] [place] [address]  Phone: [phone]  Fax: [phone] Dorcas Duncan MD []  Dc Fernandez DO [x]   [address] [place] [address]  Phone: [phone]  Fax: [phone]     PATIENT NAME: Danae Harmon                                                                          YOB: 1940           DATE OF SERVICE: 08/26/2021  FACILITY:  [] [facility]   [] [facility]   [] [facility]   [x] [facility]  []  [facility]  [] Other ______________________________________________________________________     CHIEF COMPLAINT:  Chronic Medical Management      HISTORY OF PRESENT ILLNESS:   Patient was laying comfortably in her bed awake, alert, and aware of much of what was going on around her.  She stated that her swelling was starting to get better.  She has been wearing her MIR hose.  She seems to be responding to Bumex which was started recently.    PAST MEDICAL & SURGICAL HISTORY:   Past Medical History:   Diagnosis Date   • Colon polyp    • Osteoporosis    • Pain in thoracic spine    • Pneumonia    • UTI (urinary tract infection)       Past Surgical History:   Procedure Laterality Date   • BLADDER SURGERY  2000    bladder suspension   • CERVICAL LAMINECTOMY     • COLONOSCOPY     • COLOSTOMY  1979    temporary sigmoid   • HYSTERECTOMY           MEDICATIONS:  I have reviewed and reconciled the patients medication list in the patients chart at the skilled nursing facility today, 08/26/2021.      ALLERGIES:  Allergies   Allergen Reactions   • Lexapro [Escitalopram Oxalate] Nausea Only         SOCIAL HISTORY:  Social History     Socioeconomic History   • Marital status:      Spouse name: Not on file   • Number of children: Not on file   • Years of education: Not on file   • Highest education level: Not on file   Tobacco Use   • Smoking status: Never Smoker   • Smokeless  tobacco: Never Used   Substance and Sexual Activity   • Alcohol use: No   • Drug use: No   • Sexual activity: Defer       FAMILY HISTORY:  Family History   Problem Relation Age of Onset   • Blindness Mother    • Other Mother         arteriosclerotic cardiovascular disease    • Diabetes Mother    • Osteoporosis Mother    • Stroke Mother    • Cancer Brother    • Lung cancer Sister    • Osteoporosis Sister    • Stroke Sister    • Stroke Other        REVIEW OF SYSTEMS:  Review of Systems   Constitutional: Negative for chills, fatigue and fever.   HENT: Negative for congestion, ear pain, rhinorrhea, sinus pressure and sore throat.    Eyes: Negative for visual disturbance.   Respiratory: Negative for cough, chest tightness, shortness of breath and wheezing.    Cardiovascular: Negative for chest pain, palpitations and leg swelling.   Gastrointestinal: Negative for abdominal pain, blood in stool, constipation, diarrhea, nausea and vomiting.   Endocrine: Negative for polydipsia and polyuria.   Genitourinary: Negative for dysuria and hematuria.   Musculoskeletal: Negative for arthralgias and back pain.   Skin: Negative for rash.   Neurological: Negative for dizziness, light-headedness, numbness and headaches.   Psychiatric/Behavioral: Negative for dysphoric mood and sleep disturbance. The patient is not nervous/anxious.           PHYSICAL EXAMINATION:     VITAL SIGNS:  /78   Pulse 80   Temp 97.8 °F (36.6 °C)   Resp 20   Wt 95.3 kg (210 lb)   SpO2 97% Comment: RA  BMI 39.68 kg/m²     Physical Exam  Vitals and nursing note reviewed.   Constitutional:       Appearance: Normal appearance. She is well-developed. She is obese.      Comments: Frail elderly female   HENT:      Head: Normocephalic and atraumatic.   Eyes:      Extraocular Movements: Extraocular movements intact.      Conjunctiva/sclera: Conjunctivae normal.   Cardiovascular:      Rate and Rhythm: Normal rate and regular rhythm.   Pulmonary:      Effort:  Pulmonary effort is normal.      Breath sounds: Normal breath sounds.   Abdominal:      General: Abdomen is flat.      Palpations: Abdomen is soft.   Musculoskeletal:      Cervical back: Normal range of motion and neck supple.      Right lower leg: Edema (improved) present.      Left lower leg: Edema (improved) present.      Comments: Bedridden   Skin:     General: Skin is warm and dry.      Findings: No rash.   Neurological:      General: No focal deficit present.      Mental Status: She is alert. Mental status is at baseline. She is disoriented.   Psychiatric:         Mood and Affect: Mood normal.         Behavior: Behavior normal.         RECORDS REVIEW:   A1c 5.5 7/20/21    ASSESSMENT     Diagnoses and all orders for this visit:    1. Late onset Alzheimer's disease with behavioral disturbance (CMS/HCC) (Primary)    2. Lower extremity edema    3. Benign essential hypertension    4. Other fatigue    5. Mixed hyperlipidemia    6. Moderate episode of recurrent major depressive disorder (CMS/HCC)    7. Type 2 diabetes mellitus without complication, without long-term current use of insulin (CMS/HCC)    8. Other depression    9. Chronic low back pain with sciatica, sciatica laterality unspecified, unspecified back pain laterality        PLAN  Lower Extremity Edema  -Appears to be beginning to improve with Bumex and especially with MIR hose.  Continue monitoring.    Alzheimer's disease with behavioral disturbances  -Mental status remains at baseline..  -Continue nightly Seroquel     Major depressive disorder  -Stable on Seroquel, Remeron, and Depakote  -Psychiatry is following and adjusting as appropriate     Bladder spasms/urinary frequency  -Cont Myrbetriq, still has incontinence episodes     Mixed hyperlipidemia  -Continue statin.     Essential hypertension  -Controlled on Coreg     Vitamin D and vitamin B12 deficiencies  -Stable on supplements.     Osteoporosis  -Stable on vitamins.  Avoiding aggressive  therapy/bisphosphonates at this time.     Type 2 diabetes mellitus  -At goal, A1c 5.5     Chronic pain  -Controlled on Norco being filled monthly and this has been controlling her symptoms fairly well.     [x]  Discussed Patient in detail with nursing/staff, addressed all needs today.     [x]  Plan of Care Reviewed   []  PT/OT Reviewed   []  Order Changes  []  Discharge Plans Reviewed  [x]  Advance Directive on file with Nursing Home.   [x]  POA on file with Nursing Home.    [x]  Code Status listed and reviewed.     I confirm accuracy of unchanged data/findings including physical exam and plan which have been carried forward from previous visit, as well as I have updated appropriately those that have changed        Dc Fernandez DO.  8/29/2021

## 2021-09-11 ENCOUNTER — APPOINTMENT (OUTPATIENT)
Dept: CT IMAGING | Facility: HOSPITAL | Age: 81
End: 2021-09-11

## 2021-09-11 ENCOUNTER — HOSPITAL ENCOUNTER (EMERGENCY)
Facility: HOSPITAL | Age: 81
Discharge: NURSING, REHAB OR LONG-TERM CARE | End: 2021-09-12
Attending: FAMILY MEDICINE | Admitting: FAMILY MEDICINE

## 2021-09-11 ENCOUNTER — APPOINTMENT (OUTPATIENT)
Dept: GENERAL RADIOLOGY | Facility: HOSPITAL | Age: 81
End: 2021-09-11

## 2021-09-11 DIAGNOSIS — R60.9 PERIPHERAL EDEMA: ICD-10-CM

## 2021-09-11 DIAGNOSIS — I10 UNCONTROLLED HYPERTENSION: Primary | ICD-10-CM

## 2021-09-11 LAB
ALBUMIN SERPL-MCNC: 3.8 G/DL (ref 3.5–5.2)
ALBUMIN/GLOB SERPL: 1.6 G/DL
ALP SERPL-CCNC: 126 U/L (ref 39–117)
ALT SERPL W P-5'-P-CCNC: 8 U/L (ref 1–33)
ANION GAP SERPL CALCULATED.3IONS-SCNC: 9.2 MMOL/L (ref 5–15)
AST SERPL-CCNC: 15 U/L (ref 1–32)
BACTERIA UR QL AUTO: ABNORMAL /HPF
BASOPHILS # BLD AUTO: 0.04 10*3/MM3 (ref 0–0.2)
BASOPHILS NFR BLD AUTO: 0.6 % (ref 0–1.5)
BILIRUB SERPL-MCNC: 0.3 MG/DL (ref 0–1.2)
BILIRUB UR QL STRIP: NEGATIVE
BUN SERPL-MCNC: 23 MG/DL (ref 8–23)
BUN/CREAT SERPL: 35.9 (ref 7–25)
CALCIUM SPEC-SCNC: 9.1 MG/DL (ref 8.6–10.5)
CHLORIDE SERPL-SCNC: 106 MMOL/L (ref 98–107)
CLARITY UR: ABNORMAL
CO2 SERPL-SCNC: 27.8 MMOL/L (ref 22–29)
COLOR UR: YELLOW
CREAT SERPL-MCNC: 0.64 MG/DL (ref 0.57–1)
DEPRECATED RDW RBC AUTO: 50.4 FL (ref 37–54)
EOSINOPHIL # BLD AUTO: 0.2 10*3/MM3 (ref 0–0.4)
EOSINOPHIL NFR BLD AUTO: 3.1 % (ref 0.3–6.2)
ERYTHROCYTE [DISTWIDTH] IN BLOOD BY AUTOMATED COUNT: 14.7 % (ref 12.3–15.4)
GFR SERPL CREATININE-BSD FRML MDRD: 90 ML/MIN/1.73
GLOBULIN UR ELPH-MCNC: 2.4 GM/DL
GLUCOSE SERPL-MCNC: 115 MG/DL (ref 65–99)
GLUCOSE UR STRIP-MCNC: NEGATIVE MG/DL
HCT VFR BLD AUTO: 36.1 % (ref 34–46.6)
HGB BLD-MCNC: 11.6 G/DL (ref 12–15.9)
HGB UR QL STRIP.AUTO: ABNORMAL
HYALINE CASTS UR QL AUTO: ABNORMAL /LPF
IMM GRANULOCYTES # BLD AUTO: 0.03 10*3/MM3 (ref 0–0.05)
IMM GRANULOCYTES NFR BLD AUTO: 0.5 % (ref 0–0.5)
KETONES UR QL STRIP: NEGATIVE
LEUKOCYTE ESTERASE UR QL STRIP.AUTO: ABNORMAL
LIPASE SERPL-CCNC: 38 U/L (ref 13–60)
LYMPHOCYTES # BLD AUTO: 2.56 10*3/MM3 (ref 0.7–3.1)
LYMPHOCYTES NFR BLD AUTO: 39.8 % (ref 19.6–45.3)
MCH RBC QN AUTO: 30.2 PG (ref 26.6–33)
MCHC RBC AUTO-ENTMCNC: 32.1 G/DL (ref 31.5–35.7)
MCV RBC AUTO: 94 FL (ref 79–97)
MONOCYTES # BLD AUTO: 0.62 10*3/MM3 (ref 0.1–0.9)
MONOCYTES NFR BLD AUTO: 9.6 % (ref 5–12)
NEUTROPHILS NFR BLD AUTO: 2.99 10*3/MM3 (ref 1.7–7)
NEUTROPHILS NFR BLD AUTO: 46.4 % (ref 42.7–76)
NITRITE UR QL STRIP: NEGATIVE
NRBC BLD AUTO-RTO: 0 /100 WBC (ref 0–0.2)
NT-PROBNP SERPL-MCNC: 156 PG/ML (ref 0–1800)
PH UR STRIP.AUTO: 8 [PH] (ref 5–8)
PLATELET # BLD AUTO: 205 10*3/MM3 (ref 140–450)
PMV BLD AUTO: 10.4 FL (ref 6–12)
POTASSIUM SERPL-SCNC: 4.1 MMOL/L (ref 3.5–5.2)
PROT SERPL-MCNC: 6.2 G/DL (ref 6–8.5)
PROT UR QL STRIP: NEGATIVE
RBC # BLD AUTO: 3.84 10*6/MM3 (ref 3.77–5.28)
RBC # UR: ABNORMAL /HPF
REF LAB TEST METHOD: ABNORMAL
SARS-COV-2 RNA PNL SPEC NAA+PROBE: NOT DETECTED
SODIUM SERPL-SCNC: 143 MMOL/L (ref 136–145)
SP GR UR STRIP: 1.01 (ref 1–1.03)
SQUAMOUS #/AREA URNS HPF: ABNORMAL /HPF
TROPONIN T SERPL-MCNC: <0.01 NG/ML (ref 0–0.03)
UROBILINOGEN UR QL STRIP: ABNORMAL
WBC # BLD AUTO: 6.44 10*3/MM3 (ref 3.4–10.8)
WBC UR QL AUTO: ABNORMAL /HPF

## 2021-09-11 PROCEDURE — 99283 EMERGENCY DEPT VISIT LOW MDM: CPT

## 2021-09-11 PROCEDURE — 96374 THER/PROPH/DIAG INJ IV PUSH: CPT

## 2021-09-11 PROCEDURE — 70450 CT HEAD/BRAIN W/O DYE: CPT

## 2021-09-11 PROCEDURE — 85025 COMPLETE CBC W/AUTO DIFF WBC: CPT | Performed by: FAMILY MEDICINE

## 2021-09-11 PROCEDURE — 71045 X-RAY EXAM CHEST 1 VIEW: CPT

## 2021-09-11 PROCEDURE — 81001 URINALYSIS AUTO W/SCOPE: CPT | Performed by: FAMILY MEDICINE

## 2021-09-11 PROCEDURE — 84484 ASSAY OF TROPONIN QUANT: CPT | Performed by: FAMILY MEDICINE

## 2021-09-11 PROCEDURE — 93005 ELECTROCARDIOGRAM TRACING: CPT | Performed by: FAMILY MEDICINE

## 2021-09-11 PROCEDURE — 83690 ASSAY OF LIPASE: CPT | Performed by: FAMILY MEDICINE

## 2021-09-11 PROCEDURE — 87635 SARS-COV-2 COVID-19 AMP PRB: CPT | Performed by: FAMILY MEDICINE

## 2021-09-11 PROCEDURE — 83880 ASSAY OF NATRIURETIC PEPTIDE: CPT | Performed by: FAMILY MEDICINE

## 2021-09-11 PROCEDURE — 80053 COMPREHEN METABOLIC PANEL: CPT | Performed by: FAMILY MEDICINE

## 2021-09-11 RX ORDER — SODIUM CHLORIDE 0.9 % (FLUSH) 0.9 %
10 SYRINGE (ML) INJECTION AS NEEDED
Status: DISCONTINUED | OUTPATIENT
Start: 2021-09-11 | End: 2021-09-12 | Stop reason: HOSPADM

## 2021-09-11 RX ORDER — BUMETANIDE 0.25 MG/ML
2 INJECTION INTRAMUSCULAR; INTRAVENOUS ONCE
Status: COMPLETED | OUTPATIENT
Start: 2021-09-11 | End: 2021-09-12

## 2021-09-12 VITALS
SYSTOLIC BLOOD PRESSURE: 179 MMHG | DIASTOLIC BLOOD PRESSURE: 89 MMHG | TEMPERATURE: 97.7 F | RESPIRATION RATE: 20 BRPM | OXYGEN SATURATION: 96 % | HEART RATE: 72 BPM

## 2021-09-12 LAB — TROPONIN T SERPL-MCNC: <0.01 NG/ML (ref 0–0.03)

## 2021-09-12 PROCEDURE — 96374 THER/PROPH/DIAG INJ IV PUSH: CPT

## 2021-09-12 RX ADMIN — BUMETANIDE 2 MG: 0.25 INJECTION INTRAMUSCULAR; INTRAVENOUS at 00:23

## 2021-09-12 NOTE — ED PROVIDER NOTES
"Subjective   81-year-old female with past medical history of osteoporosis, hypertension and hyperlipidemia presents to the emergency department for elevated BP. She states that she has felt bad all evening. Pt reports tonight she had them check her vitals because she is retired from healthcare and she knew her vitals should be checked and they told her her bp was 223/100. Pt reports her hands and head have felt \"prickley\" for days and her feet have been swollen for months. She states she has been asking for the home to have her feet looked at however they havent. Pt reports in 2 months she has gained 30 pounds and she thinks she needs a water pill/ Pt reports she also felt short of breath that she \" could even hear it in her voice.\"      History provided by:  Patient  Illness  Location:  Swelling in legs / elevateed bp/ headache/ shortness ofbreath  Severity:  Moderate  Onset quality:  Gradual  Timing:  Intermittent  Progression:  Waxing and waning  Chronicity:  New  Context:  See hpi  Relieved by:  Nothing  Worsened by:  Nothing  Ineffective treatments:  TEDF hose  Associated symptoms: headaches and shortness of breath    Associated symptoms: no abdominal pain, no chest pain and no fever        Review of Systems   Constitutional: Negative.  Negative for fever.   Respiratory: Positive for shortness of breath.    Cardiovascular: Negative.  Negative for chest pain.   Gastrointestinal: Negative.  Negative for abdominal pain.   Endocrine: Negative.    Genitourinary: Negative.  Negative for dysuria.   Skin: Negative.    Neurological: Positive for headaches.   Psychiatric/Behavioral: Negative.    All other systems reviewed and are negative.      Past Medical History:   Diagnosis Date   • Colon polyp    • Osteoporosis    • Pain in thoracic spine    • Pneumonia    • UTI (urinary tract infection)        Allergies   Allergen Reactions   • Lexapro [Escitalopram Oxalate] Nausea Only       Past Surgical History:   Procedure " Laterality Date   • BLADDER SURGERY  2000    bladder suspension   • CERVICAL LAMINECTOMY     • COLONOSCOPY     • COLOSTOMY  1979    temporary sigmoid   • HYSTERECTOMY         Family History   Problem Relation Age of Onset   • Blindness Mother    • Other Mother         arteriosclerotic cardiovascular disease    • Diabetes Mother    • Osteoporosis Mother    • Stroke Mother    • Cancer Brother    • Lung cancer Sister    • Osteoporosis Sister    • Stroke Sister    • Stroke Other        Social History     Socioeconomic History   • Marital status:      Spouse name: Not on file   • Number of children: Not on file   • Years of education: Not on file   • Highest education level: Not on file   Tobacco Use   • Smoking status: Never Smoker   • Smokeless tobacco: Never Used   Substance and Sexual Activity   • Alcohol use: No   • Drug use: No   • Sexual activity: Defer           Objective   Physical Exam  Vitals and nursing note reviewed.   Constitutional:       Appearance: Normal appearance.   HENT:      Head: Normocephalic and atraumatic.      Right Ear: Tympanic membrane normal.      Left Ear: Tympanic membrane normal.      Nose: Nose normal.      Mouth/Throat:      Mouth: Mucous membranes are moist.   Eyes:      Extraocular Movements: Extraocular movements intact.      Pupils: Pupils are equal, round, and reactive to light.   Cardiovascular:      Rate and Rhythm: Normal rate and regular rhythm.   Pulmonary:      Effort: Pulmonary effort is normal.   Abdominal:      General: Abdomen is flat.      Palpations: Abdomen is soft.   Musculoskeletal:      Cervical back: Normal range of motion.      Right lower leg: Edema present.      Left lower leg: Edema present.   Skin:     General: Skin is warm.      Capillary Refill: Capillary refill takes less than 2 seconds.   Neurological:      General: No focal deficit present.      Mental Status: She is alert and oriented to person, place, and time.   Psychiatric:         Mood and  Affect: Mood normal.         Behavior: Behavior normal.         Thought Content: Thought content normal.         Judgment: Judgment normal.         Procedures           ED Course  ED Course as of Sep 12 0121   Sat Sep 11, 2021   2155 EKG interpretation time is 2152 sinus rhythm with first-degree AV block 84 bpm QRS duration 94 QT is 350 QTC is 390 no evidence of acute ST elevation    []   2346 In reviewing medical record it appears that this peripheral edema is not new to patient it looks like she was on Bumex in the nursing home August 26 for several days that improved it we will give her a one-time IV dose and advise the nursing home to have her provider determine if they need to continue that or not.  I discussed this with patient and her  both verbalized understanding and are current in agreement.    [MH]      ED Course User Index  [] Noris Case DO                                           [facility]  Number of Diagnoses or Management Options  Peripheral edema: new and requires workup  Uncontrolled hypertension: new and requires workup     Amount and/or Complexity of Data Reviewed  Clinical lab tests: ordered and reviewed  Tests in the radiology section of CPT®: ordered and reviewed  Tests in the medicine section of CPT®: reviewed and ordered  Independent visualization of images, tracings, or specimens: yes    Risk of Complications, Morbidity, and/or Mortality  Presenting problems: high  Diagnostic procedures: high  Management options: high    Patient Progress  Patient progress: stable      Final diagnoses:   Uncontrolled hypertension   Peripheral edema       ED Disposition  ED Disposition     ED Disposition Condition Comment    Discharge Stable           No follow-up provider specified.       Medication List      No changes were made to your prescriptions during this visit.          Noris Case DO  09/12/21 0121

## 2021-09-12 NOTE — ED NOTES
Dispatch called for transport back to [facility].      Jose Alberto Cox  09/12/21 0020

## 2021-09-13 ENCOUNTER — NURSING HOME (OUTPATIENT)
Dept: FAMILY MEDICINE CLINIC | Facility: CLINIC | Age: 81
End: 2021-09-13

## 2021-09-13 VITALS
TEMPERATURE: 97.8 F | HEART RATE: 80 BPM | WEIGHT: 226 LBS | RESPIRATION RATE: 18 BRPM | DIASTOLIC BLOOD PRESSURE: 72 MMHG | BODY MASS INDEX: 42.7 KG/M2 | SYSTOLIC BLOOD PRESSURE: 130 MMHG | OXYGEN SATURATION: 95 %

## 2021-09-13 DIAGNOSIS — Z74.09 IMPAIRED MOBILITY AND ADLS: ICD-10-CM

## 2021-09-13 DIAGNOSIS — R60.0 BILATERAL LOWER EXTREMITY EDEMA: ICD-10-CM

## 2021-09-13 DIAGNOSIS — R03.0 ELEVATED BLOOD PRESSURE READING: ICD-10-CM

## 2021-09-13 DIAGNOSIS — I10 BENIGN ESSENTIAL HYPERTENSION: ICD-10-CM

## 2021-09-13 DIAGNOSIS — Z78.9 IMPAIRED MOBILITY AND ADLS: ICD-10-CM

## 2021-09-13 DIAGNOSIS — R93.89 ABNORMAL CXR: ICD-10-CM

## 2021-09-13 DIAGNOSIS — R63.5 WEIGHT GAIN: ICD-10-CM

## 2021-09-13 PROCEDURE — 99309 SBSQ NF CARE MODERATE MDM 30: CPT | Performed by: NURSE PRACTITIONER

## 2021-09-14 NOTE — PROGRESS NOTES
Nursing Home Follow Up Note      Layo Arango DO []   WILLIAM Alfaro [x]  [address] [place] [address]  Phone: [phone]  Fax: [phone] Dorcas Duncan MD []    Dc Fernandez DO []   [address] [place] [address]  Phone: [phone]  Fax: [phone]     PATIENT NAME: Danae Harmon                                                                          YOB: 1940           DATE OF SERVICE: 9/13/2021  FACILITY:  [][place]   [] [place]   [] [facility]   [x] [facility]   [] Other ____________________________________________________________________      CHIEF COMPLAINT:    Follow up elevated BP    LE edema with weight gain    HISTORY OF PRESENT ILLNESS:     Patient sent to emergency room on 9/11 PM for reported blood pressure of 223/over 100 and headache.  In the emergency department BP was 170s over 80s.  She has no elevated blood pressures recorded in [facility].  Labs, EKG and CT negative for any acute findings.  Notably BNP was normal.  Chest x-ray reported no pulmonary edema but reported an opacity to correlate for possible pneumonia.  Given normal white blood cell count, no increased work of breathing or hypoxia, patient was not treated for this so will follow-up with this.  She reported in the emergency department that she had gained 30 pounds in the past 2 months but this is inaccurate.  She has gained 17 pounds in the past 2 months though according to PCC but has been eating much more and snacking more than she did prior.  She does have some lower extremity edema but this is a chronic issue for her.  Acute issues ruled out and patient was returned to facility within a few hours.  She often refuses her compression stockings as well.      PAST MEDICAL & SURGICAL HISTORY:   Past Medical History:   Diagnosis Date   • Colon polyp    • Osteoporosis    • Pain in thoracic spine    • Pneumonia    • UTI (urinary tract infection)       Past Surgical History:    Procedure Laterality Date   • BLADDER SURGERY  2000    bladder suspension   • CERVICAL LAMINECTOMY     • COLONOSCOPY     • COLOSTOMY  1979    temporary sigmoid   • HYSTERECTOMY           MEDICATIONS:  I have reviewed and reconciled the patients medication list in the patients chart at the skilled nursing facility today.      ALLERGIES:    Allergies   Allergen Reactions   • Lexapro [Escitalopram Oxalate] Nausea Only         SOCIAL HISTORY:    Social History     Socioeconomic History   • Marital status:      Spouse name: Not on file   • Number of children: Not on file   • Years of education: Not on file   • Highest education level: Not on file   Tobacco Use   • Smoking status: Never Smoker   • Smokeless tobacco: Never Used   Substance and Sexual Activity   • Alcohol use: No   • Drug use: No   • Sexual activity: Defer       FAMILY HISTORY:    Family History   Problem Relation Age of Onset   • Blindness Mother    • Other Mother         arteriosclerotic cardiovascular disease    • Diabetes Mother    • Osteoporosis Mother    • Stroke Mother    • Cancer Brother    • Lung cancer Sister    • Osteoporosis Sister    • Stroke Sister    • Stroke Other        REVIEW OF SYSTEMS:    Review of Systems   Reason unable to perform ROS: ROS per nursing and patient.   Constitutional: Negative for activity change, appetite change, chills, diaphoresis, fatigue, fever, unexpected weight gain and unexpected weight loss.   HENT: Negative for congestion, hearing loss, mouth sores, nosebleeds, postnasal drip, rhinorrhea, sneezing, sore throat and trouble swallowing.    Eyes: Negative for pain, discharge, redness, itching and visual disturbance.   Respiratory: Negative for apnea, cough, choking, shortness of breath, wheezing and stridor.    Cardiovascular: Positive for leg swelling (bilateral). Negative for palpitations.   Gastrointestinal: Negative for abdominal distention, abdominal pain, blood in stool, constipation, diarrhea, nausea  and vomiting.   Endocrine: Negative for polydipsia and polyuria.   Genitourinary: Positive for urinary incontinence. Negative for decreased urine volume, difficulty urinating, frequency, hematuria and urgency.   Musculoskeletal: Positive for arthralgias and gait problem. Negative for back pain, joint swelling, myalgias and neck pain.   Skin: Negative for color change, dry skin, rash and skin lesions.   Allergic/Immunologic: Negative for environmental allergies.   Neurological: Positive for weakness, memory problem and confusion (Intermittent). Negative for seizures and speech difficulty.   Psychiatric/Behavioral: Negative for agitation, behavioral problems, dysphoric mood, hallucinations, self-injury, sleep disturbance, suicidal ideas, negative for hyperactivity, depressed mood and stress. The patient is not nervous/anxious.          PHYSICAL EXAMINATION:   VITAL SIGNS:   Vitals:    21 1532   BP: 130/72   Pulse: 80   Resp: 18   Temp: 97.8 °F (36.6 °C)   SpO2: 95%   Weight: 103 kg (226 lb)       Physical Exam   Constitutional: She is oriented to person, place, and time. Vital signs are normal. She appears well-developed and well-nourished.   HENT:   Head: Normocephalic.   Right Ear: External ear normal.   Left Ear: External ear normal.   Nose: Nose normal.   Eyes: Conjunctivae are normal.   Cardiovascular: Normal rate, regular rhythm and normal heart sounds.   Pulmonary/Chest: Effort normal and breath sounds normal. No respiratory distress. She has no wheezes. She has no rales.   Abdominal: Soft. Bowel sounds are normal. She exhibits no distension and no mass. There is no abdominal tenderness. No hernia.   Musculoskeletal:      Right lower le+ Edema present.      Left lower le+ Edema present.      Comments: Weakness BLE   Neurological: She is alert and oriented to person, place, and time.   Oriented x 2-3   Skin: Skin is warm and dry. Turgor is normal. No rash noted.   Psychiatric: Her speech is normal  and behavior is normal. Mood normal. Cognition and memory are impaired.   Nursing note and vitals reviewed.      RECORDS REVIEW:   I have reviewed and interpreted the following lab test results  obtained at the time of the visit today.     ASSESSMENT     Diagnoses and all orders for this visit:    1. Benign essential hypertension    2. Elevated blood pressure reading    3. Bilateral lower extremity edema    4. Weight gain    5. Abnormal CXR    6. Impaired mobility and ADLs        PLAN    Hypertension  -Patient with elevated blood pressure on 9/11 PM and 9/12 AM but none reported prior or since.  Will be increasing Bumex for better control of edema so we will continue to monitor blood pressure.    Bilateral LE edema  - Increase Bumex to 0.5 mg bid  - Check BMP 1 week  -Orders given again to apply MIR hose or ACE wrap during the day.  Patient has history of refusing these.    Abnormal chest x-ray  -Chest x-ray in hospital reported an opacity and to correlate for possible pneumonia.  Patient had no signs and symptoms; hypoxia, shortness of breath, fever, leukocytosis, etc., so not treated at this time and will continue to monitor.    Nursing encouraged to keep me informed of any acute changes, lack of improvement, or any new concerning symptoms.    Nursing to continue supportive care for all ADLs.      [x]  Discussed Patient in detail with nursing/staff, addressed all needs today.     [x]  Plan of Care Reviewed   [x]  PT/OT Reviewed   [x]  Order Changes  []  Discharge Plans Reviewed  [x]  Advance Directive on file with Nursing Home.   [x]  POA on file with Nursing Home.   [x]  Code Status listed: []  Full Code   [x]  DNR     “I confirm accuracy of unchanged data/findings which have been carried forward from previous visit, as well as I have updated appropriately those that have changed.”                                              Mita Joyce, APRN.

## 2021-09-16 ENCOUNTER — NURSING HOME (OUTPATIENT)
Dept: FAMILY MEDICINE CLINIC | Facility: CLINIC | Age: 81
End: 2021-09-16

## 2021-09-16 VITALS
SYSTOLIC BLOOD PRESSURE: 122 MMHG | HEART RATE: 80 BPM | WEIGHT: 226 LBS | DIASTOLIC BLOOD PRESSURE: 74 MMHG | TEMPERATURE: 98 F | RESPIRATION RATE: 20 BRPM | OXYGEN SATURATION: 95 % | BODY MASS INDEX: 42.7 KG/M2

## 2021-09-16 DIAGNOSIS — R06.2 WHEEZING: ICD-10-CM

## 2021-09-16 DIAGNOSIS — R60.0 BILATERAL LOWER EXTREMITY EDEMA: ICD-10-CM

## 2021-09-16 DIAGNOSIS — Z78.9 IMPAIRED MOBILITY AND ADLS: ICD-10-CM

## 2021-09-16 DIAGNOSIS — Z74.09 IMPAIRED MOBILITY AND ADLS: ICD-10-CM

## 2021-09-16 PROCEDURE — 99308 SBSQ NF CARE LOW MDM 20: CPT | Performed by: NURSE PRACTITIONER

## 2021-09-18 NOTE — PROGRESS NOTES
Nursing Home Follow Up Note      Layo Arango DO []   WILLIAM Alfaro [x]  [address] [place] [address]  Phone: [phone]  Fax: [phone] Dorcas Duncan MD []    Dc Fernandez DO []   [address] [place] [address]  Phone: [phone]  Fax: [phone]     PATIENT NAME: Danae Harmon                                                                          YOB: 1940           DATE OF SERVICE: 9/16/2021  FACILITY:  [][place]   [] [place]   [] [facility]   [x] [facility]   [] Other ____________________________________________________________________      CHIEF COMPLAINT:    Follow up LE edema    Reported wheezing during the night.     HISTORY OF PRESENT ILLNESS:     Patient with increased lower extremity edema so diuretics increased.  MIR hose ordered as well to be on at all times except during the night.  Edema improved today and patient is wearing compression hose.    Per nursing, patient was heard having some wheezing lung sounds during the night.  Nurse reports she has not heard any wheezing today and no wheezing noted during visit today.  She has no complaints of shortness of breath and has had no increased O2 demand.    PAST MEDICAL & SURGICAL HISTORY:   Past Medical History:   Diagnosis Date   • Colon polyp    • Osteoporosis    • Pain in thoracic spine    • Pneumonia    • UTI (urinary tract infection)       Past Surgical History:   Procedure Laterality Date   • BLADDER SURGERY  2000    bladder suspension   • CERVICAL LAMINECTOMY     • COLONOSCOPY     • COLOSTOMY  1979    temporary sigmoid   • HYSTERECTOMY           MEDICATIONS:  I have reviewed and reconciled the patients medication list in the patients chart at the skilled nursing facility today.      ALLERGIES:    Allergies   Allergen Reactions   • Lexapro [Escitalopram Oxalate] Nausea Only         SOCIAL HISTORY:    Social History     Socioeconomic History   • Marital status:      Spouse  name: Not on file   • Number of children: Not on file   • Years of education: Not on file   • Highest education level: Not on file   Tobacco Use   • Smoking status: Never Smoker   • Smokeless tobacco: Never Used   Substance and Sexual Activity   • Alcohol use: No   • Drug use: No   • Sexual activity: Defer       FAMILY HISTORY:    Family History   Problem Relation Age of Onset   • Blindness Mother    • Other Mother         arteriosclerotic cardiovascular disease    • Diabetes Mother    • Osteoporosis Mother    • Stroke Mother    • Cancer Brother    • Lung cancer Sister    • Osteoporosis Sister    • Stroke Sister    • Stroke Other        REVIEW OF SYSTEMS:    Review of Systems   Reason unable to perform ROS: ROS per nursing and patient.   Constitutional: Negative for activity change, appetite change, chills, diaphoresis, fatigue, fever, unexpected weight gain and unexpected weight loss.   HENT: Negative for congestion, hearing loss, mouth sores, nosebleeds, postnasal drip, rhinorrhea, sneezing, sore throat and trouble swallowing.    Eyes: Negative for pain, discharge, redness, itching and visual disturbance.   Respiratory: Negative for apnea, cough, choking, shortness of breath, wheezing and stridor.    Cardiovascular: Positive for leg swelling (bilateral). Negative for palpitations.   Gastrointestinal: Negative for abdominal distention, abdominal pain, blood in stool, constipation, diarrhea, nausea and vomiting.   Endocrine: Negative for polydipsia and polyuria.   Genitourinary: Positive for urinary incontinence. Negative for decreased urine volume, difficulty urinating, frequency, hematuria and urgency.   Musculoskeletal: Positive for arthralgias and gait problem. Negative for back pain, joint swelling, myalgias and neck pain.   Skin: Negative for color change, dry skin, rash and skin lesions.   Allergic/Immunologic: Negative for environmental allergies.   Neurological: Positive for weakness, memory problem and  confusion (Intermittent). Negative for seizures and speech difficulty.   Psychiatric/Behavioral: Negative for agitation, behavioral problems, dysphoric mood, hallucinations, self-injury, sleep disturbance, suicidal ideas, negative for hyperactivity, depressed mood and stress. The patient is not nervous/anxious.          PHYSICAL EXAMINATION:   VITAL SIGNS:   Vitals:    21 1321   BP: 122/74   Pulse: 80   Resp: 20   Temp: 98 °F (36.7 °C)   SpO2: 95%   Weight: 103 kg (226 lb)       Physical Exam   Constitutional: She is oriented to person, place, and time. Vital signs are normal. She appears well-developed and well-nourished.   HENT:   Head: Normocephalic.   Right Ear: External ear normal.   Left Ear: External ear normal.   Nose: Nose normal.   Eyes: Conjunctivae are normal.   Cardiovascular: Normal rate, regular rhythm and normal heart sounds.   MIR hose in place   Pulmonary/Chest: Effort normal and breath sounds normal. No respiratory distress. She has no wheezes. She has no rales.   Abdominal: Soft. Bowel sounds are normal. She exhibits no distension and no mass. There is no abdominal tenderness. No hernia.   Musculoskeletal:      Right lower le+ Edema present.      Left lower le+ Edema present.      Comments: Weakness BLE   Neurological: She is alert and oriented to person, place, and time.   Oriented x 2-3   Skin: Skin is warm and dry. Turgor is normal. No rash noted.   Psychiatric: Her speech is normal and behavior is normal. Mood normal. Cognition and memory are impaired.   Nursing note and vitals reviewed.      RECORDS REVIEW:   I have reviewed and interpreted the following lab test results  obtained at the time of the visit today.     ASSESSMENT     Diagnoses and all orders for this visit:    1. Bilateral lower extremity edema    2. Wheezing    3. Impaired mobility and ADLs        PLAN      Bilateral LE edema  -BLE edema has improved with increasing diuretics and wearing MIR hose.  Will follow-up  and continue to monitor.    Wheezing  -Give DuoNebs every 6 hours as needed for any wheezing or cough but none noted at this time.  If patient does have any other noted wheezing or other respiratory symptoms nursing to obtain chest x-ray.  She has had no increased O2 demand or work of breathing.  Will follow-up and continue to monitor.    Nursing encouraged to keep me informed of any acute changes, lack of improvement, or any new concerning symptoms.    Nursing to continue supportive care for all ADLs.      [x]  Discussed Patient in detail with nursing/staff, addressed all needs today.     [x]  Plan of Care Reviewed   [x]  PT/OT Reviewed   [x]  Order Changes  []  Discharge Plans Reviewed  [x]  Advance Directive on file with Nursing Home.   [x]  POA on file with Nursing Home.   [x]  Code Status listed: []  Full Code   [x]  DNR         “I confirm accuracy of unchanged data/findings which have been carried forward from previous visit, as well as I have updated appropriately those that have changed.”                                              Mita Joyce, WILLIAM.

## 2021-09-23 ENCOUNTER — NURSING HOME (OUTPATIENT)
Dept: INTERNAL MEDICINE | Facility: CLINIC | Age: 81
End: 2021-09-23

## 2021-09-23 DIAGNOSIS — F02.818 LATE ONSET ALZHEIMER'S DISEASE WITH BEHAVIORAL DISTURBANCE (HCC): ICD-10-CM

## 2021-09-23 DIAGNOSIS — F32.89 OTHER DEPRESSION: ICD-10-CM

## 2021-09-23 DIAGNOSIS — F33.1 MODERATE EPISODE OF RECURRENT MAJOR DEPRESSIVE DISORDER (HCC): ICD-10-CM

## 2021-09-23 DIAGNOSIS — G89.29 CHRONIC LOW BACK PAIN WITH SCIATICA, SCIATICA LATERALITY UNSPECIFIED, UNSPECIFIED BACK PAIN LATERALITY: ICD-10-CM

## 2021-09-23 DIAGNOSIS — G30.1 LATE ONSET ALZHEIMER'S DISEASE WITH BEHAVIORAL DISTURBANCE (HCC): ICD-10-CM

## 2021-09-23 DIAGNOSIS — I10 BENIGN ESSENTIAL HYPERTENSION: ICD-10-CM

## 2021-09-23 DIAGNOSIS — E78.2 MIXED HYPERLIPIDEMIA: ICD-10-CM

## 2021-09-23 DIAGNOSIS — R53.83 OTHER FATIGUE: ICD-10-CM

## 2021-09-23 DIAGNOSIS — M54.40 CHRONIC LOW BACK PAIN WITH SCIATICA, SCIATICA LATERALITY UNSPECIFIED, UNSPECIFIED BACK PAIN LATERALITY: ICD-10-CM

## 2021-09-23 DIAGNOSIS — R60.0 LOWER EXTREMITY EDEMA: Primary | ICD-10-CM

## 2021-09-23 DIAGNOSIS — E11.9 TYPE 2 DIABETES MELLITUS WITHOUT COMPLICATION, WITHOUT LONG-TERM CURRENT USE OF INSULIN (HCC): ICD-10-CM

## 2021-09-23 PROCEDURE — 99308 SBSQ NF CARE LOW MDM 20: CPT | Performed by: INTERNAL MEDICINE

## 2021-09-27 VITALS
SYSTOLIC BLOOD PRESSURE: 130 MMHG | HEART RATE: 80 BPM | BODY MASS INDEX: 42.7 KG/M2 | TEMPERATURE: 97.8 F | RESPIRATION RATE: 20 BRPM | OXYGEN SATURATION: 95 % | WEIGHT: 226 LBS | DIASTOLIC BLOOD PRESSURE: 72 MMHG

## 2021-09-27 NOTE — PROGRESS NOTES
Nursing Home Progress Note        Layo Arango DO []  WILLIAM Alfaro []  [address] [place] [address]  Phone: [phone]  Fax: [phone] Dorcas Duncan MD []  Dc Fernandez DO [x]   [address] [place] [address]  Phone: [phone]  Fax: [phone]     PATIENT NAME: Danae Harmon                                                                          YOB: 1940           DATE OF SERVICE: 09/23/2021  FACILITY:  [] [place]   [] [place]   [] [facility]   [] [facility]  []  [facility]  [] Other ______________________________________________________________________     CHIEF COMPLAINT:  Chronic Medical Management      HISTORY OF PRESENT ILLNESS:   Patient was laying comfortably in her bed on exam today.  She was pleasant and content with her current care.  She continues to have concerns about lower extremity edema although her edema has improved significantly since her Bumex dose was increased 10 days ago.  She is doing better with using a MIR hose and with her current Bumex regimen.  Nurses do not report any new acute events.    PAST MEDICAL & SURGICAL HISTORY:   Past Medical History:   Diagnosis Date   • Colon polyp    • Osteoporosis    • Pain in thoracic spine    • Pneumonia    • UTI (urinary tract infection)       Past Surgical History:   Procedure Laterality Date   • BLADDER SURGERY  2000    bladder suspension   • CERVICAL LAMINECTOMY     • COLONOSCOPY     • COLOSTOMY  1979    temporary sigmoid   • HYSTERECTOMY           MEDICATIONS:  I have reviewed and reconciled the patients medication list in the patients chart at the skilled nursing facility today, 09/23/2021.      ALLERGIES:  Allergies   Allergen Reactions   • Lexapro [Escitalopram Oxalate] Nausea Only         SOCIAL HISTORY:  Social History     Socioeconomic History   • Marital status:      Spouse name: Not on file   • Number of children: Not on file   • Years of education:  Not on file   • Highest education level: Not on file   Tobacco Use   • Smoking status: Never Smoker   • Smokeless tobacco: Never Used   Substance and Sexual Activity   • Alcohol use: No   • Drug use: No   • Sexual activity: Defer       FAMILY HISTORY:  Family History   Problem Relation Age of Onset   • Blindness Mother    • Other Mother         arteriosclerotic cardiovascular disease    • Diabetes Mother    • Osteoporosis Mother    • Stroke Mother    • Cancer Brother    • Lung cancer Sister    • Osteoporosis Sister    • Stroke Sister    • Stroke Other        REVIEW OF SYSTEMS:  Review of Systems   Constitutional: Negative for chills, fatigue and fever.   HENT: Negative for congestion, ear pain, rhinorrhea, sinus pressure and sore throat.    Eyes: Negative for visual disturbance.   Respiratory: Negative for cough, chest tightness, shortness of breath and wheezing.    Cardiovascular: Negative for chest pain, palpitations and leg swelling.   Gastrointestinal: Negative for abdominal pain, blood in stool, constipation, diarrhea, nausea and vomiting.   Endocrine: Negative for polydipsia and polyuria.   Genitourinary: Negative for dysuria and hematuria.   Musculoskeletal: Negative for arthralgias and back pain.   Skin: Negative for rash.   Neurological: Negative for dizziness, light-headedness, numbness and headaches.   Psychiatric/Behavioral: Negative for dysphoric mood and sleep disturbance. The patient is not nervous/anxious.           PHYSICAL EXAMINATION:     VITAL SIGNS:  /72   Pulse 80   Temp 97.8 °F (36.6 °C)   Resp 20   Wt 103 kg (226 lb)   SpO2 95%   BMI 42.70 kg/m²     Physical Exam  Vitals and nursing note reviewed.   Constitutional:       Appearance: Normal appearance. She is well-developed. She is obese.      Comments: Frail elderly female   HENT:      Head: Normocephalic and atraumatic.   Eyes:      Extraocular Movements: Extraocular movements intact.      Conjunctiva/sclera: Conjunctivae normal.    Cardiovascular:      Rate and Rhythm: Normal rate and regular rhythm.   Pulmonary:      Effort: Pulmonary effort is normal.      Breath sounds: Normal breath sounds.   Abdominal:      General: Abdomen is flat.      Palpations: Abdomen is soft.   Musculoskeletal:      Cervical back: Normal range of motion and neck supple.      Right lower leg: Edema (Minimal) present.      Left lower leg: Edema (Minimal) present.      Comments: Bedridden   Skin:     General: Skin is warm and dry.      Findings: No rash.   Neurological:      General: No focal deficit present.      Mental Status: She is alert and oriented to person, place, and time. Mental status is at baseline.   Psychiatric:         Mood and Affect: Mood normal.         Behavior: Behavior normal.         RECORDS REVIEW:       ASSESSMENT     Diagnoses and all orders for this visit:    1. Lower extremity edema (Primary)    2. Late onset Alzheimer's disease with behavioral disturbance (HCC)    3. Benign essential hypertension    4. Moderate episode of recurrent major depressive disorder (HCC)    5. Mixed hyperlipidemia    6. Other fatigue    7. Type 2 diabetes mellitus without complication, without long-term current use of insulin (HCC)    8. Other depression    9. Chronic low back pain with sciatica, sciatica laterality unspecified, unspecified back pain laterality        PLAN  Lower Extremity Edema  -Good control with Bumex and especially with MIR hose.    She was reassured that her current dosing and regimen was appropriate.  She was also reminded to expect some mild edema for the long-term.     Alzheimer's disease with behavioral disturbances  -Mental status remains at baseline..  -Continue nightly Seroquel along with Depakote regimen     Major depressive disorder  -Stable on Seroquel, Remeron, and Depakote  -Psychiatry is following and adjusting as appropriate     Bladder spasms/urinary frequency  -Cont Myrbetriq, still has incontinence episodes     Mixed  hyperlipidemia  -Continue statin.     Essential hypertension  -Controlled on Coreg     Vitamin D and vitamin B12 deficiencies  -Stable on supplements.     Osteoporosis  -Stable on vitamins.  Bisphosphonate therapy is not necessary at this point.     Type 2 diabetes mellitus  -Fair glucose control.  Not currently on medications.     Chronic pain  -Controlled on Norco being filled monthly and this has been controlling her symptoms fairly well.     [x]  Discussed Patient in detail with nursing/staff, addressed all needs today.     [x]  Plan of Care Reviewed   []  PT/OT Reviewed   []  Order Changes  []  Discharge Plans Reviewed  [x]  Advance Directive on file with Nursing Home.   [x]  POA on file with Nursing Home.    [x]  Code Status listed and reviewed.     I confirm accuracy of unchanged data/findings including physical exam and plan which have been carried forward from previous visit, as well as I have updated appropriately those that have changed        Dc Fernandez DO.  9/27/2021

## 2021-10-11 ENCOUNTER — NURSING HOME (OUTPATIENT)
Dept: FAMILY MEDICINE CLINIC | Facility: CLINIC | Age: 81
End: 2021-10-11

## 2021-10-11 VITALS
OXYGEN SATURATION: 95 % | RESPIRATION RATE: 18 BRPM | DIASTOLIC BLOOD PRESSURE: 77 MMHG | BODY MASS INDEX: 43.08 KG/M2 | HEART RATE: 88 BPM | WEIGHT: 228 LBS | SYSTOLIC BLOOD PRESSURE: 138 MMHG | TEMPERATURE: 98.4 F

## 2021-10-11 DIAGNOSIS — Z87.898 HISTORY OF NAUSEA: ICD-10-CM

## 2021-10-11 DIAGNOSIS — Z87.898 HISTORY OF DIZZINESS: ICD-10-CM

## 2021-10-11 DIAGNOSIS — Z87.19 HISTORY OF CONSTIPATION: ICD-10-CM

## 2021-10-11 DIAGNOSIS — Z78.9 IMPAIRED MOBILITY AND ADLS: ICD-10-CM

## 2021-10-11 DIAGNOSIS — T50.Z95A SIDE EFFECTS OF VACCINATION, INITIAL ENCOUNTER: ICD-10-CM

## 2021-10-11 DIAGNOSIS — Z74.09 IMPAIRED MOBILITY AND ADLS: ICD-10-CM

## 2021-10-11 PROCEDURE — 99308 SBSQ NF CARE LOW MDM 20: CPT | Performed by: NURSE PRACTITIONER

## 2021-10-12 NOTE — PROGRESS NOTES
Nursing Home Follow Up Note      Layo Arango DO []   WILLIAM Alfaro [x]  [address] [place] [address]  Phone: [phone]  Fax: [phone] Dorcas Duncan MD []    Dc Fernandez DO []   [address] [place] [address]  Phone: [phone]  Fax: [phone]     PATIENT NAME: Danae Harmon                                                                          YOB: 1940           DATE OF SERVICE: 10/11/2021  FACILITY:  [][place] [facility]   [] [facility]   [] [facility]   [x] [facility]   [] Other ____________________________________________________________________      CHIEF COMPLAINT:    Nausea, dizziness, body aches and fatigue over the weekend.    HISTORY OF PRESENT ILLNESS:     Patient reports that she had nausea, fatigue, body aches and dizziness Thursday, Friday and Saturday and she feels this was related to the flu vaccination she received.  She reports she started feeling better yesterday and feels even better today.  She still has a little bit of upset stomach but feels this could be because she has not had a bowel movement for couple days.  She reports this is a chronic issue though and she will have the nurses give her something and it will help.    PAST MEDICAL & SURGICAL HISTORY:   Past Medical History:   Diagnosis Date   • Colon polyp    • Osteoporosis    • Pain in thoracic spine    • Pneumonia    • UTI (urinary tract infection)       Past Surgical History:   Procedure Laterality Date   • BLADDER SURGERY  2000    bladder suspension   • CERVICAL LAMINECTOMY     • COLONOSCOPY     • COLOSTOMY  1979    temporary sigmoid   • HYSTERECTOMY           MEDICATIONS:  I have reviewed and reconciled the patients medication list in the patients chart at the skilled nursing facility today.      ALLERGIES:    Allergies   Allergen Reactions   • Lexapro [Escitalopram Oxalate] Nausea Only         SOCIAL HISTORY:    Social History     Socioeconomic History   • Marital status:     Tobacco Use   • Smoking status: Never Smoker   • Smokeless tobacco: Never Used   Substance and Sexual Activity   • Alcohol use: No   • Drug use: No   • Sexual activity: Defer       FAMILY HISTORY:    Family History   Problem Relation Age of Onset   • Blindness Mother    • Other Mother         arteriosclerotic cardiovascular disease    • Diabetes Mother    • Osteoporosis Mother    • Stroke Mother    • Cancer Brother    • Lung cancer Sister    • Osteoporosis Sister    • Stroke Sister    • Stroke Other        REVIEW OF SYSTEMS:    Review of Systems   Reason unable to perform ROS: ROS per nursing and patient.   Constitutional: Negative for activity change, appetite change, chills, diaphoresis, fatigue, fever, unexpected weight gain and unexpected weight loss.   HENT: Negative for congestion, hearing loss, mouth sores, nosebleeds, postnasal drip, rhinorrhea, sneezing, sore throat and trouble swallowing.    Eyes: Negative for pain, discharge, redness, itching and visual disturbance.   Respiratory: Negative for apnea, cough, choking, shortness of breath, wheezing and stridor.    Cardiovascular: Positive for leg swelling (bilateral). Negative for palpitations.   Gastrointestinal: Positive for constipation, nausea and indigestion. Negative for abdominal distention, abdominal pain, blood in stool, diarrhea and vomiting.   Endocrine: Negative for polydipsia and polyuria.   Genitourinary: Positive for urinary incontinence. Negative for decreased urine volume, difficulty urinating, frequency, hematuria and urgency.   Musculoskeletal: Positive for arthralgias and gait problem. Negative for back pain, joint swelling, myalgias and neck pain.   Skin: Negative for color change, dry skin, rash and skin lesions.   Allergic/Immunologic: Negative for environmental allergies.   Neurological: Positive for weakness, memory problem and confusion (Intermittent). Negative for seizures and speech difficulty.   Psychiatric/Behavioral:  Negative for agitation, behavioral problems, dysphoric mood, hallucinations, self-injury, sleep disturbance, suicidal ideas, negative for hyperactivity, depressed mood and stress. The patient is not nervous/anxious.          PHYSICAL EXAMINATION:   VITAL SIGNS:   Vitals:    10/11/21 1418   BP: 138/77   Pulse: 88   Resp: 18   Temp: 98.4 °F (36.9 °C)   SpO2: 95%   Weight: 103 kg (228 lb)       Physical Exam   Constitutional: She is oriented to person, place, and time. Vital signs are normal. She appears well-developed and well-nourished.   HENT:   Head: Normocephalic.   Right Ear: External ear normal.   Left Ear: External ear normal.   Nose: Nose normal.   Eyes: Conjunctivae are normal.   Cardiovascular: Normal rate, regular rhythm and normal heart sounds.   Pulmonary/Chest: Effort normal and breath sounds normal. No respiratory distress. She has no wheezes. She has no rales.   Abdominal: Soft. Bowel sounds are normal. She exhibits no distension and no mass. There is no abdominal tenderness. No hernia.   Musculoskeletal:      Right lower le+ Edema present.      Left lower le+ Edema present.      Comments: Weakness BLE   Neurological: She is alert and oriented to person, place, and time.   Skin: Skin is warm and dry. Turgor is normal. No rash noted.   Psychiatric: Her speech is normal and behavior is normal. Mood normal. Cognition and memory are impaired.   Nursing note and vitals reviewed.      RECORDS REVIEW:   I have reviewed and interpreted the following lab test results  obtained at the time of the visit today.     ASSESSMENT     Diagnoses and all orders for this visit:    1. Side effects of vaccination, initial encounter    2. History of nausea    3. History of dizziness    4. History of constipation    5. Impaired mobility and ADLs        PLAN      SE of vaccination/history nausea and dizziness/history constipation  -Patient received flu vaccination on 10/5 and had body aches, dizziness and fatigue for a  few days after which are normal side effects of the vaccination.  Her symptoms resolved yesterday and she has none today except slight upset stomach that she feels is related to some mild constipation.  Patient refuses KUB, reports this happens chronically and the nurses will give her medication to help with this.  Nurses advised to follow-up with this.  Will follow-up and continue to monitor.    Nursing encouraged to keep me informed of any acute changes, lack of improvement, or any new concerning symptoms.    Nursing to continue supportive care for all ADLs.      [x]  Discussed Patient in detail with nursing/staff, addressed all needs today.     [x]  Plan of Care Reviewed   [x]  PT/OT Reviewed   [x]  Order Changes  []  Discharge Plans Reviewed  [x]  Advance Directive on file with Nursing Home.   [x]  POA on file with Nursing Home.   [x]  Code Status listed: []  Full Code   [x]  DNR         “I confirm accuracy of unchanged data/findings which have been carried forward from previous visit, as well as I have updated appropriately those that have changed.”                                              Mita Joyce, APRN.

## 2021-10-22 ENCOUNTER — NURSING HOME (OUTPATIENT)
Dept: FAMILY MEDICINE CLINIC | Facility: CLINIC | Age: 81
End: 2021-10-22

## 2021-10-22 VITALS
SYSTOLIC BLOOD PRESSURE: 136 MMHG | BODY MASS INDEX: 43.08 KG/M2 | OXYGEN SATURATION: 98 % | TEMPERATURE: 97.3 F | HEART RATE: 77 BPM | DIASTOLIC BLOOD PRESSURE: 76 MMHG | RESPIRATION RATE: 20 BRPM | WEIGHT: 228 LBS

## 2021-10-22 DIAGNOSIS — M54.6 CHRONIC MIDLINE THORACIC BACK PAIN: Primary | ICD-10-CM

## 2021-10-22 DIAGNOSIS — G89.29 CHRONIC MIDLINE THORACIC BACK PAIN: Primary | ICD-10-CM

## 2021-10-22 DIAGNOSIS — M25.50 ARTHRALGIA OF MULTIPLE JOINTS: ICD-10-CM

## 2021-10-22 DIAGNOSIS — Z78.9 IMPAIRED MOBILITY AND ADLS: ICD-10-CM

## 2021-10-22 DIAGNOSIS — Z74.09 IMPAIRED MOBILITY AND ADLS: ICD-10-CM

## 2021-10-22 PROCEDURE — 99308 SBSQ NF CARE LOW MDM 20: CPT | Performed by: NURSE PRACTITIONER

## 2021-10-23 NOTE — PROGRESS NOTES
Nursing Home Follow Up Note      Layo Arango DO []   WILLIAM Alfaro [x]  [address] [place] [address]  Phone: [phone]  Fax: [phone] Dorcas Duncan MD []    Dc Fernandez DO []   [address] [place] [address]  Phone: [phone]  Fax: [phone]     PATIENT NAME: Danae Harmon                                                                          YOB: 1940           DATE OF SERVICE: 10/22/2021  FACILITY:  [][place]   [] [facility]   [] [facility]   [x] [facility]   [] Other ____________________________________________________________________      CHIEF COMPLAINT:    Increased pain for the past several days    HISTORY OF PRESENT ILLNESS:      Patient has history of chronic leg and back pain secondary to arthritis, and takes scheduled and as needed Tylenol.  This has been effective for her for quite some time but patient reports she has had increased pain here recently.  Nurse reports that she had increased pain last night but has had no complaints of pain today.  She is resting in bed today and has no other complaints.  No signs and symptoms of distress.    PAST MEDICAL & SURGICAL HISTORY:   Past Medical History:   Diagnosis Date   • Colon polyp    • Osteoporosis    • Pain in thoracic spine    • Pneumonia    • UTI (urinary tract infection)       Past Surgical History:   Procedure Laterality Date   • BLADDER SURGERY  2000    bladder suspension   • CERVICAL LAMINECTOMY     • COLONOSCOPY     • COLOSTOMY  1979    temporary sigmoid   • HYSTERECTOMY           MEDICATIONS:  I have reviewed and reconciled the patients medication list in the patients chart at the skilled nursing facility today.      ALLERGIES:    Allergies   Allergen Reactions   • Lexapro [Escitalopram Oxalate] Nausea Only         SOCIAL HISTORY:    Social History     Socioeconomic History   • Marital status:    Tobacco Use   • Smoking status: Never Smoker   • Smokeless tobacco: Never  Used   Substance and Sexual Activity   • Alcohol use: No   • Drug use: No   • Sexual activity: Defer       FAMILY HISTORY:    Family History   Problem Relation Age of Onset   • Blindness Mother    • Other Mother         arteriosclerotic cardiovascular disease    • Diabetes Mother    • Osteoporosis Mother    • Stroke Mother    • Cancer Brother    • Lung cancer Sister    • Osteoporosis Sister    • Stroke Sister    • Stroke Other        REVIEW OF SYSTEMS:    Review of Systems   Reason unable to perform ROS: ROS per nursing and patient.   Constitutional: Negative for activity change, appetite change, chills, diaphoresis, fatigue, fever, unexpected weight gain and unexpected weight loss.   HENT: Negative for congestion, hearing loss, mouth sores, nosebleeds, postnasal drip, rhinorrhea, sneezing, sore throat and trouble swallowing.    Eyes: Negative for pain, discharge, redness, itching and visual disturbance.   Respiratory: Negative for apnea, cough, choking, shortness of breath, wheezing and stridor.    Cardiovascular: Positive for leg swelling (chronic). Negative for palpitations.   Gastrointestinal: Negative for abdominal distention, abdominal pain, blood in stool, constipation, diarrhea, nausea, vomiting and indigestion.   Endocrine: Negative for polydipsia and polyuria.   Genitourinary: Positive for urinary incontinence. Negative for decreased urine volume, difficulty urinating, frequency, hematuria and urgency.   Musculoskeletal: Positive for arthralgias, back pain and gait problem. Negative for joint swelling, myalgias and neck pain.   Skin: Negative for color change, dry skin, rash and skin lesions.   Allergic/Immunologic: Negative for environmental allergies.   Neurological: Positive for weakness, memory problem and confusion (Intermittent). Negative for seizures and speech difficulty.   Psychiatric/Behavioral: Negative for agitation, behavioral problems, dysphoric mood, hallucinations, self-injury, sleep  disturbance, suicidal ideas, negative for hyperactivity, depressed mood and stress. The patient is not nervous/anxious.          PHYSICAL EXAMINATION:   VITAL SIGNS:   Vitals:    10/22/21 1525   BP: 136/76   Pulse: 77   Resp: 20   Temp: 97.3 °F (36.3 °C)   SpO2: 98%   Weight: 103 kg (228 lb)       Physical Exam   Constitutional: She is oriented to person, place, and time. Vital signs are normal. She appears well-developed and well-nourished.   HENT:   Head: Normocephalic.   Right Ear: External ear normal.   Left Ear: External ear normal.   Nose: Nose normal.   Eyes: Conjunctivae are normal.   Cardiovascular: Normal rate, regular rhythm and normal heart sounds.   Pulmonary/Chest: Effort normal and breath sounds normal. No respiratory distress. She has no wheezes. She has no rales.   Abdominal: Soft. Bowel sounds are normal. She exhibits no distension. There is no abdominal tenderness.   Musculoskeletal:      Thoracic back: She exhibits decreased range of motion and pain.      Right lower le+ Edema present.      Left lower le+ Edema present.      Comments: Weakness BLE   Neurological: She is alert and oriented to person, place, and time.   Skin: Skin is warm and dry. Turgor is normal. No rash noted.   Psychiatric: Her speech is normal and behavior is normal. Mood normal. Cognition and memory are impaired.   Nursing note and vitals reviewed.      RECORDS REVIEW:   I have reviewed and interpreted the following lab test results  obtained at the time of the visit today.     ASSESSMENT     Diagnoses and all orders for this visit:    1. Chronic midline thoracic back pain (Primary)    2. Arthralgia of multiple joints    3. Impaired mobility and ADLs        PLAN    Midline thoracic pain/arthralgia multiple joints  -Continue scheduled Tylenol.  Give Norco 5/325 every 8 hours as needed.  Patient encouraged to get out of bed more and participate in more activities as she currently just wants to lay in bed all the time.   This will help with her joint pain.    Nursing encouraged to keep me informed of any acute changes, lack of improvement, or any new concerning symptoms.    Nursing to continue supportive care for all ADLs.      [x]  Discussed Patient in detail with nursing/staff, addressed all needs today.     [x]  Plan of Care Reviewed   [x]  PT/OT Reviewed   [x]  Order Changes  []  Discharge Plans Reviewed  [x]  Advance Directive on file with Nursing Home.   [x]  POA on file with Nursing Home.   [x]  Code Status listed: []  Full Code   [x]  DNR         “I confirm accuracy of unchanged data/findings which have been carried forward from previous visit, as well as I have updated appropriately those that have changed.”                                              Mita Joyce, APRN.

## 2021-10-28 ENCOUNTER — NURSING HOME (OUTPATIENT)
Dept: INTERNAL MEDICINE | Facility: CLINIC | Age: 81
End: 2021-10-28

## 2021-10-28 VITALS
DIASTOLIC BLOOD PRESSURE: 78 MMHG | HEART RATE: 80 BPM | TEMPERATURE: 97.4 F | SYSTOLIC BLOOD PRESSURE: 132 MMHG | RESPIRATION RATE: 20 BRPM | WEIGHT: 228 LBS | OXYGEN SATURATION: 98 % | BODY MASS INDEX: 43.08 KG/M2

## 2021-10-28 DIAGNOSIS — M54.40 CHRONIC LOW BACK PAIN WITH SCIATICA, SCIATICA LATERALITY UNSPECIFIED, UNSPECIFIED BACK PAIN LATERALITY: ICD-10-CM

## 2021-10-28 DIAGNOSIS — E11.9 TYPE 2 DIABETES MELLITUS WITHOUT COMPLICATION, WITHOUT LONG-TERM CURRENT USE OF INSULIN (HCC): ICD-10-CM

## 2021-10-28 DIAGNOSIS — F33.1 MODERATE EPISODE OF RECURRENT MAJOR DEPRESSIVE DISORDER (HCC): ICD-10-CM

## 2021-10-28 DIAGNOSIS — R60.0 LOWER EXTREMITY EDEMA: ICD-10-CM

## 2021-10-28 DIAGNOSIS — G89.29 CHRONIC LOW BACK PAIN WITH SCIATICA, SCIATICA LATERALITY UNSPECIFIED, UNSPECIFIED BACK PAIN LATERALITY: ICD-10-CM

## 2021-10-28 DIAGNOSIS — F32.89 OTHER DEPRESSION: ICD-10-CM

## 2021-10-28 DIAGNOSIS — G30.1 LATE ONSET ALZHEIMER'S DISEASE WITH BEHAVIORAL DISTURBANCE (HCC): Primary | ICD-10-CM

## 2021-10-28 DIAGNOSIS — R53.83 OTHER FATIGUE: ICD-10-CM

## 2021-10-28 DIAGNOSIS — I10 BENIGN ESSENTIAL HYPERTENSION: ICD-10-CM

## 2021-10-28 DIAGNOSIS — E78.2 MIXED HYPERLIPIDEMIA: ICD-10-CM

## 2021-10-28 DIAGNOSIS — F02.818 LATE ONSET ALZHEIMER'S DISEASE WITH BEHAVIORAL DISTURBANCE (HCC): Primary | ICD-10-CM

## 2021-10-28 PROCEDURE — 99309 SBSQ NF CARE MODERATE MDM 30: CPT | Performed by: INTERNAL MEDICINE

## 2021-11-07 NOTE — PROGRESS NOTES
Nursing Home Progress Note        Layo Arango DO []  WILLIAM Alfaro []  [address] [place] [address]  Phone: [phone]  Fax: [phone] Dorcas Duncan MD []  Dc Fernandez DO [x]   [address] [place] [address]  Phone: [phone]  Fax: [phone]     PATIENT NAME: Danae Harmon                                                                          YOB: 1940           DATE OF SERVICE: 10/28/2021  FACILITY:  [] [place]   [] [place]   [] [facility]   [x] [facility]  []  [facility]  [] Other ______________________________________________________________________     CHIEF COMPLAINT:  Chronic Medical Management      HISTORY OF PRESENT ILLNESS:   Patient remains in a pleasant mood and has been more oriented and appropriate over the last few months.  She does tend to foculs on her lower extremity edema but has been doing well since using her MIR hose more often.     PAST MEDICAL & SURGICAL HISTORY:   Past Medical History:   Diagnosis Date   • Colon polyp    • Osteoporosis    • Pain in thoracic spine    • Pneumonia    • UTI (urinary tract infection)       Past Surgical History:   Procedure Laterality Date   • BLADDER SURGERY  2000    bladder suspension   • CERVICAL LAMINECTOMY     • COLONOSCOPY     • COLOSTOMY  1979    temporary sigmoid   • HYSTERECTOMY           MEDICATIONS:  I have reviewed and reconciled the patients medication list in the patients chart at the skilled nursing facility today, 10/28/2021.      ALLERGIES:  Allergies   Allergen Reactions   • Lexapro [Escitalopram Oxalate] Nausea Only         SOCIAL HISTORY:  Social History     Socioeconomic History   • Marital status:    Tobacco Use   • Smoking status: Never Smoker   • Smokeless tobacco: Never Used   Substance and Sexual Activity   • Alcohol use: No   • Drug use: No   • Sexual activity: Defer       FAMILY HISTORY:  Family History   Problem Relation Age of Onset   •  Blindness Mother    • Other Mother         arteriosclerotic cardiovascular disease    • Diabetes Mother    • Osteoporosis Mother    • Stroke Mother    • Cancer Brother    • Lung cancer Sister    • Osteoporosis Sister    • Stroke Sister    • Stroke Other        REVIEW OF SYSTEMS:  Review of Systems   Constitutional: Negative for chills, fatigue and fever.   HENT: Negative for congestion, ear pain, rhinorrhea, sinus pressure and sore throat.    Eyes: Negative for visual disturbance.   Respiratory: Negative for cough, chest tightness, shortness of breath and wheezing.    Cardiovascular: Positive for leg swelling. Negative for chest pain and palpitations.   Gastrointestinal: Negative for abdominal pain, blood in stool, constipation, diarrhea, nausea and vomiting.   Endocrine: Negative for polydipsia and polyuria.   Genitourinary: Negative for dysuria and hematuria.   Musculoskeletal: Negative for arthralgias and back pain.   Skin: Negative for rash.   Neurological: Negative for dizziness, light-headedness, numbness and headaches.   Psychiatric/Behavioral: Negative for dysphoric mood and sleep disturbance. The patient is not nervous/anxious.           PHYSICAL EXAMINATION:     VITAL SIGNS:  /78   Pulse 80   Temp 97.4 °F (36.3 °C)   Resp 20   Wt 103 kg (228 lb)   SpO2 98%   BMI 43.08 kg/m²     Physical Exam  Vitals and nursing note reviewed.   Constitutional:       Appearance: Normal appearance. She is well-developed. She is obese.      Comments: Frail elderly female   HENT:      Head: Normocephalic and atraumatic.   Eyes:      Extraocular Movements: Extraocular movements intact.      Conjunctiva/sclera: Conjunctivae normal.   Cardiovascular:      Rate and Rhythm: Normal rate and regular rhythm.   Pulmonary:      Effort: Pulmonary effort is normal.      Breath sounds: Normal breath sounds.   Abdominal:      General: Abdomen is flat.      Palpations: Abdomen is soft.   Musculoskeletal:      Cervical back:  Normal range of motion and neck supple.      Right lower leg: Edema (Minimal) present.      Left lower leg: Edema (Minimal) present.      Comments: Bedridden   Skin:     General: Skin is warm and dry.      Findings: No rash.   Neurological:      General: No focal deficit present.      Mental Status: She is alert and oriented to person, place, and time. Mental status is at baseline.   Psychiatric:         Mood and Affect: Mood normal.         Behavior: Behavior normal.         RECORDS REVIEW:       ASSESSMENT     Diagnoses and all orders for this visit:    1. Late onset Alzheimer's disease with behavioral disturbance (HCC) (Primary)    2. Benign essential hypertension    3. Moderate episode of recurrent major depressive disorder (HCC)    4. Mixed hyperlipidemia    5. Other fatigue    6. Lower extremity edema    7. Type 2 diabetes mellitus without complication, without long-term current use of insulin (HCC)    8. Other depression    9. Chronic low back pain with sciatica, sciatica laterality unspecified, unspecified back pain laterality        PLAN    Major depressive disorder  -Stable on Seroquel, Remeron, and Depakote  - given stable mood for 3 months, we will try weaning off Remeron by reducing to 7.5mg for a week and then stopping medication.     Lower Extremity Edema  -Good control with Bumex and especially with MIR hose.      Alzheimer's disease with behavioral disturbances  -Mental status remains at baseline..  -Continue nightly Seroquel along with Depakote regimen     Bladder spasms/urinary frequency  -Cont Myrbetriq, still has incontinence episodes     Mixed hyperlipidemia  -Continue statin.     Essential hypertension  -Controlled on Coreg     Vitamin D and vitamin B12 deficiencies  -Stable on supplements.     Osteoporosis  -Stable on vitamins.  Bisphosphonate therapy is not necessary at this point.      Type 2 diabetes mellitus  -Fair glucose control.  Not currently on medications.     Chronic  pain  -Controlled on Norco being filled monthly and this has been controlling her symptoms fairly well.      [x]  Discussed Patient in detail with nursing/staff, addressed all needs today.     [x]  Plan of Care Reviewed   []  PT/OT Reviewed   [x]  Order Changes  []  Discharge Plans Reviewed  [x]  Advance Directive on file with Nursing Home.   [x]  POA on file with Nursing Home.    [x]  Code Status listed and reviewed.     I confirm accuracy of unchanged data/findings including physical exam and plan which have been carried forward from previous visit, as well as I have updated appropriately those that have changed        Dc Fernandez DO.  11/7/2021

## 2021-11-18 ENCOUNTER — NURSING HOME (OUTPATIENT)
Dept: FAMILY MEDICINE CLINIC | Facility: CLINIC | Age: 81
End: 2021-11-18

## 2021-11-18 VITALS
RESPIRATION RATE: 20 BRPM | SYSTOLIC BLOOD PRESSURE: 128 MMHG | DIASTOLIC BLOOD PRESSURE: 78 MMHG | TEMPERATURE: 97.1 F | WEIGHT: 233 LBS | HEART RATE: 70 BPM | BODY MASS INDEX: 44.02 KG/M2

## 2021-11-18 DIAGNOSIS — Z78.9 IMPAIRED MOBILITY AND ADLS: ICD-10-CM

## 2021-11-18 DIAGNOSIS — R41.82 ACUTE ON CHRONIC ALTERATION IN MENTAL STATUS: Primary | ICD-10-CM

## 2021-11-18 DIAGNOSIS — Z74.09 IMPAIRED MOBILITY AND ADLS: ICD-10-CM

## 2021-11-18 DIAGNOSIS — Z87.440 HISTORY OF UTI: ICD-10-CM

## 2021-11-18 PROCEDURE — 99308 SBSQ NF CARE LOW MDM 20: CPT | Performed by: NURSE PRACTITIONER

## 2021-11-20 NOTE — PROGRESS NOTES
Nursing Home Follow Up Note      Layo Arango DO []   WILLIAM Alfaro [x]  [address] [place] [address]  Phone: [phone]  Fax: [phone] Dorcas Duncan MD []    Dc Fernandez DO []   [address] [place] [address]  Phone: [phone]  Fax: [phone]     PATIENT NAME: Danae Harmon                                                                          YOB: 1940           DATE OF SERVICE: 11/18/2021  FACILITY:  [][place]   [] [place]   [] [facility]   [x] [facility]   [] Other ____________________________________________________________________      CHIEF COMPLAINT:     Intermittent increased confusion for the past several days.       HISTORY OF PRESENT ILLNESS:      Patient with increased intermittent confusion for the past several days.  Her increased confusion began on 11/15.  CBC, BMP and UA with culture obtained and sent to lab on 11/17 to assess for UTI.  Nursing reports she did not have increased confusion on Sunday or Monday but has intermittently the past several days.  Patient is resting in bed at this time without any complaints or signs and symptoms of distress.  Mental status appears to be at baseline today.    PAST MEDICAL & SURGICAL HISTORY:   Past Medical History:   Diagnosis Date   • Colon polyp    • Osteoporosis    • Pain in thoracic spine    • Pneumonia    • UTI (urinary tract infection)       Past Surgical History:   Procedure Laterality Date   • BLADDER SURGERY  2000    bladder suspension   • CERVICAL LAMINECTOMY     • COLONOSCOPY     • COLOSTOMY  1979    temporary sigmoid   • HYSTERECTOMY           MEDICATIONS:  I have reviewed and reconciled the patients medication list in the patients chart at the skilled nursing facility today.      ALLERGIES:    Allergies   Allergen Reactions   • Lexapro [Escitalopram Oxalate] Nausea Only         SOCIAL HISTORY:    Social History     Socioeconomic History   • Marital status:    Tobacco  Use   • Smoking status: Never Smoker   • Smokeless tobacco: Never Used   Substance and Sexual Activity   • Alcohol use: No   • Drug use: No   • Sexual activity: Defer       FAMILY HISTORY:    Family History   Problem Relation Age of Onset   • Blindness Mother    • Other Mother         arteriosclerotic cardiovascular disease    • Diabetes Mother    • Osteoporosis Mother    • Stroke Mother    • Cancer Brother    • Lung cancer Sister    • Osteoporosis Sister    • Stroke Sister    • Stroke Other        REVIEW OF SYSTEMS:    Review of Systems   Reason unable to perform ROS: ROS per nursing and patient.   Constitutional: Negative for activity change, appetite change, chills, diaphoresis, fatigue, fever, unexpected weight gain and unexpected weight loss.   HENT: Negative for congestion, hearing loss, mouth sores, nosebleeds, postnasal drip, rhinorrhea, sneezing, sore throat and trouble swallowing.    Eyes: Negative for pain, discharge, redness, itching and visual disturbance.   Respiratory: Negative for apnea, cough, choking, shortness of breath, wheezing and stridor.    Cardiovascular: Positive for leg swelling (chronic). Negative for palpitations.   Gastrointestinal: Negative for abdominal distention, abdominal pain, blood in stool, constipation, diarrhea, nausea, vomiting and indigestion.   Endocrine: Negative for polydipsia and polyuria.   Genitourinary: Positive for urinary incontinence. Negative for decreased urine volume, difficulty urinating, frequency, hematuria and urgency.   Musculoskeletal: Positive for arthralgias, back pain and gait problem. Negative for joint swelling, myalgias and neck pain.   Skin: Negative for color change, dry skin, rash and skin lesions.   Allergic/Immunologic: Negative for environmental allergies.   Neurological: Positive for weakness, memory problem and confusion (Intermittent). Negative for seizures and speech difficulty.   Psychiatric/Behavioral: Negative for agitation, behavioral  problems, dysphoric mood, hallucinations, self-injury, sleep disturbance, suicidal ideas, negative for hyperactivity, depressed mood and stress. The patient is not nervous/anxious.          PHYSICAL EXAMINATION:   VITAL SIGNS:   Vitals:    21 1301   BP: 128/78   Pulse: 70   Resp: 20   Temp: 97.1 °F (36.2 °C)   Weight: 106 kg (233 lb)       Physical Exam   Constitutional: Vital signs are normal. She appears well-developed and well-nourished.   HENT:   Head: Normocephalic.   Right Ear: External ear normal.   Left Ear: External ear normal.   Nose: Nose normal.   Eyes: Conjunctivae are normal.   Cardiovascular: Normal rate, regular rhythm and normal heart sounds.   Pulmonary/Chest: Effort normal and breath sounds normal. No respiratory distress. She has no wheezes. She has no rales.   Abdominal: Soft. Bowel sounds are normal. She exhibits no distension. There is no abdominal tenderness.   Musculoskeletal:      Thoracic back: She exhibits decreased range of motion and pain.      Right lower le+ Edema present.      Left lower le+ Edema present.      Comments: Weakness BLE   Neurological: She is alert.   Skin: Skin is warm and dry. Turgor is normal. No rash noted.   Psychiatric: Her speech is normal and behavior is normal. Mood normal. Cognition and memory are impaired.   Nursing note and vitals reviewed.      RECORDS REVIEW:   I have reviewed and interpreted the following lab test results  obtained at the time of the visit today.     ASSESSMENT     Diagnoses and all orders for this visit:    1. Acute on chronic alteration in mental status (Primary)    2. History of UTI    3. Impaired mobility and ADLs        PLAN    Acute on chronic mental status change/history UTI  -UA C&S, CBC and BMP pending.  Will follow-up with results and continue to monitor.    Nursing encouraged to keep me informed of any acute changes, lack of improvement, or any new concerning symptoms.    Nursing to continue supportive care for  all ADLs.      [x]  Discussed Patient in detail with nursing/staff, addressed all needs today.     [x]  Plan of Care Reviewed   [x]  PT/OT Reviewed   [x]  Order Changes  []  Discharge Plans Reviewed  [x]  Advance Directive on file with Nursing Home.   [x]  POA on file with Nursing Home.   [x]  Code Status listed: []  Full Code   [x]  DNR         “I confirm accuracy of unchanged data/findings which have been carried forward from previous visit, as well as I have updated appropriately those that have changed.”                                              Mita Joyce, APRN.

## 2021-11-22 ENCOUNTER — NURSING HOME (OUTPATIENT)
Dept: INTERNAL MEDICINE | Facility: CLINIC | Age: 81
End: 2021-11-22

## 2021-11-22 VITALS
SYSTOLIC BLOOD PRESSURE: 128 MMHG | HEART RATE: 70 BPM | TEMPERATURE: 97.1 F | OXYGEN SATURATION: 98 % | BODY MASS INDEX: 44.02 KG/M2 | WEIGHT: 233 LBS | RESPIRATION RATE: 20 BRPM | DIASTOLIC BLOOD PRESSURE: 78 MMHG

## 2021-11-22 DIAGNOSIS — I10 BENIGN ESSENTIAL HYPERTENSION: ICD-10-CM

## 2021-11-22 DIAGNOSIS — F33.1 MODERATE EPISODE OF RECURRENT MAJOR DEPRESSIVE DISORDER (HCC): ICD-10-CM

## 2021-11-22 DIAGNOSIS — F02.818 LATE ONSET ALZHEIMER'S DISEASE WITH BEHAVIORAL DISTURBANCE (HCC): Primary | ICD-10-CM

## 2021-11-22 DIAGNOSIS — F32.89 OTHER DEPRESSION: ICD-10-CM

## 2021-11-22 DIAGNOSIS — G30.1 LATE ONSET ALZHEIMER'S DISEASE WITH BEHAVIORAL DISTURBANCE (HCC): Primary | ICD-10-CM

## 2021-11-22 DIAGNOSIS — E78.2 MIXED HYPERLIPIDEMIA: ICD-10-CM

## 2021-11-22 DIAGNOSIS — R14.0 ABDOMINAL DISTENSION (GASEOUS): ICD-10-CM

## 2021-11-22 DIAGNOSIS — E11.9 TYPE 2 DIABETES MELLITUS WITHOUT COMPLICATION, WITHOUT LONG-TERM CURRENT USE OF INSULIN (HCC): ICD-10-CM

## 2021-11-22 DIAGNOSIS — R53.83 OTHER FATIGUE: ICD-10-CM

## 2021-11-22 DIAGNOSIS — G89.29 CHRONIC LOW BACK PAIN WITH SCIATICA, SCIATICA LATERALITY UNSPECIFIED, UNSPECIFIED BACK PAIN LATERALITY: ICD-10-CM

## 2021-11-22 DIAGNOSIS — R60.0 LOWER EXTREMITY EDEMA: ICD-10-CM

## 2021-11-22 DIAGNOSIS — M54.40 CHRONIC LOW BACK PAIN WITH SCIATICA, SCIATICA LATERALITY UNSPECIFIED, UNSPECIFIED BACK PAIN LATERALITY: ICD-10-CM

## 2021-11-22 PROCEDURE — 99308 SBSQ NF CARE LOW MDM 20: CPT | Performed by: INTERNAL MEDICINE

## 2021-11-27 NOTE — PROGRESS NOTES
Nursing Home Progress Note        Layo Arango DO []  WILLIAM Alfaro []  [address] [place] [address]  Phone: [phone]  Fax: [phone] Dorcas Duncan MD []  Dc Fernandez DO [x]   [address] [place] [address]  Phone: [phone]  Fax: [phone]     PATIENT NAME: Danae Harmon                                                                          YOB: 1940           DATE OF SERVICE: 11/22/2021  FACILITY:  [] [place]   [] [place]   [] [facility]   [x] [facility]  []  [facility]  [] Other ______________________________________________________________________     CHIEF COMPLAINT:  Chronic Medical Management      HISTORY OF PRESENT ILLNESS:   Patient has been started on treatment for UTI.  She shares she feels well and AMS has improved per nursing staff.  She did share concerns about her memory and an episode of incontinence.     PAST MEDICAL & SURGICAL HISTORY:   Past Medical History:   Diagnosis Date   • Colon polyp    • Osteoporosis    • Pain in thoracic spine    • Pneumonia    • UTI (urinary tract infection)       Past Surgical History:   Procedure Laterality Date   • BLADDER SURGERY  2000    bladder suspension   • CERVICAL LAMINECTOMY     • COLONOSCOPY     • COLOSTOMY  1979    temporary sigmoid   • HYSTERECTOMY           MEDICATIONS:  I have reviewed and reconciled the patients medication list in the patients chart at the skilled nursing facility today, 11/22/2021.      ALLERGIES:  Allergies   Allergen Reactions   • Lexapro [Escitalopram Oxalate] Nausea Only         SOCIAL HISTORY:  Social History     Socioeconomic History   • Marital status:    Tobacco Use   • Smoking status: Never Smoker   • Smokeless tobacco: Never Used   Substance and Sexual Activity   • Alcohol use: No   • Drug use: No   • Sexual activity: Defer       FAMILY HISTORY:  Family History   Problem Relation Age of Onset   • Blindness Mother    • Other Mother          arteriosclerotic cardiovascular disease    • Diabetes Mother    • Osteoporosis Mother    • Stroke Mother    • Cancer Brother    • Lung cancer Sister    • Osteoporosis Sister    • Stroke Sister    • Stroke Other        REVIEW OF SYSTEMS:  Review of Systems   Constitutional: Negative for chills, fatigue and fever.   HENT: Negative for congestion, ear pain, rhinorrhea, sinus pressure and sore throat.    Eyes: Negative for visual disturbance.   Respiratory: Negative for cough, chest tightness, shortness of breath and wheezing.    Cardiovascular: Negative for chest pain, palpitations and leg swelling.   Gastrointestinal: Negative for abdominal pain, blood in stool, constipation, diarrhea, nausea and vomiting.   Endocrine: Negative for polydipsia and polyuria.   Genitourinary: Negative for dysuria and hematuria.   Musculoskeletal: Negative for arthralgias and back pain.   Skin: Negative for rash.   Neurological: Negative for dizziness, light-headedness, numbness and headaches.   Psychiatric/Behavioral: Negative for dysphoric mood and sleep disturbance. The patient is not nervous/anxious.           PHYSICAL EXAMINATION:     VITAL SIGNS:  /78   Pulse 70   Temp 97.1 °F (36.2 °C)   Resp 20   Wt 106 kg (233 lb)   SpO2 98%   BMI 44.02 kg/m²     Physical Exam  Vitals and nursing note reviewed.   Constitutional:       Appearance: Normal appearance. She is well-developed. She is obese.      Comments: Frail elderly female   HENT:      Head: Normocephalic and atraumatic.   Eyes:      Extraocular Movements: Extraocular movements intact.      Conjunctiva/sclera: Conjunctivae normal.   Cardiovascular:      Rate and Rhythm: Normal rate and regular rhythm.   Pulmonary:      Effort: Pulmonary effort is normal.      Breath sounds: Normal breath sounds.   Abdominal:      General: Abdomen is flat.      Palpations: Abdomen is soft.   Musculoskeletal:      Cervical back: Normal range of motion and neck supple.      Right  lower leg: Edema (Minimal) present.      Left lower leg: Edema (Minimal) present.      Comments: Bedridden   Skin:     General: Skin is warm and dry.      Findings: No rash.   Neurological:      General: No focal deficit present.      Mental Status: She is alert and oriented to person, place, and time. Mental status is at baseline.   Psychiatric:         Mood and Affect: Mood normal.         Behavior: Behavior normal.         RECORDS REVIEW:       ASSESSMENT     Diagnoses and all orders for this visit:    1. Late onset Alzheimer's disease with behavioral disturbance (HCC) (Primary)    2. Benign essential hypertension    3. Moderate episode of recurrent major depressive disorder (HCC)    4. Mixed hyperlipidemia    5. Other fatigue    6. Lower extremity edema    7. Type 2 diabetes mellitus without complication, without long-term current use of insulin (HCC)    8. Other depression    9. Chronic low back pain with sciatica, sciatica laterality unspecified, unspecified back pain laterality    10. Abdominal distension (gaseous)        PLAN  UTI  - completing bactrim. Resolving.     Major depressive disorder  -Stable on Seroquel and Depakote     Lower Extremity Edema  -Good control with Bumex and especially with MIR hose.      Alzheimer's disease with behavioral disturbances  -Mental status remains at baseline..     Bladder spasms/urinary frequency  -Cont Myrbetriq, has had one episode of incontinence, possibly exacerbated by UTI.      Mixed hyperlipidemia  -Continue statin.     Essential hypertension  -Controlled on Coreg     Vitamin D and vitamin B12 deficiencies  -Stable on supplements.     Osteoporosis  -Stable on vitamins.  Bisphosphonate therapy is not necessary at this point.      Type 2 diabetes mellitus  -Fair glucose control.  Not currently on medications.     Chronic pain  -Controlled on Norco being filled monthly.    [x]  Discussed Patient in detail with nursing/staff, addressed all needs today.     [x]  Plan  of Care Reviewed   []  PT/OT Reviewed   []  Order Changes  []  Discharge Plans Reviewed  [x]  Advance Directive on file with Nursing Home.   [x]  POA on file with Nursing Home.    [x]  Code Status listed and reviewed.     I confirm accuracy of unchanged data/findings including physical exam and plan which have been carried forward from previous visit, as well as I have updated appropriately those that have changed        Dc Fernandez DO.  11/27/2021

## 2021-12-02 DIAGNOSIS — G89.29 CHRONIC LOW BACK PAIN WITH SCIATICA, SCIATICA LATERALITY UNSPECIFIED, UNSPECIFIED BACK PAIN LATERALITY: ICD-10-CM

## 2021-12-02 DIAGNOSIS — M54.40 CHRONIC LOW BACK PAIN WITH SCIATICA, SCIATICA LATERALITY UNSPECIFIED, UNSPECIFIED BACK PAIN LATERALITY: ICD-10-CM

## 2021-12-02 RX ORDER — HYDROCODONE BITARTRATE AND ACETAMINOPHEN 5; 325 MG/1; MG/1
1 TABLET ORAL EVERY 8 HOURS PRN
Qty: 90 TABLET | Refills: 0 | Status: SHIPPED | OUTPATIENT
Start: 2021-12-02

## 2021-12-12 ENCOUNTER — APPOINTMENT (OUTPATIENT)
Dept: CT IMAGING | Facility: HOSPITAL | Age: 81
End: 2021-12-12

## 2021-12-12 ENCOUNTER — APPOINTMENT (OUTPATIENT)
Dept: GENERAL RADIOLOGY | Facility: HOSPITAL | Age: 81
End: 2021-12-12

## 2021-12-12 ENCOUNTER — HOSPITAL ENCOUNTER (EMERGENCY)
Facility: HOSPITAL | Age: 81
Discharge: NURSING, REHAB OR LONG-TERM CARE | End: 2021-12-13
Attending: FAMILY MEDICINE | Admitting: FAMILY MEDICINE

## 2021-12-12 DIAGNOSIS — B96.89 ACUTE BACTERIAL BRONCHITIS: Primary | ICD-10-CM

## 2021-12-12 DIAGNOSIS — J20.8 ACUTE BACTERIAL BRONCHITIS: Primary | ICD-10-CM

## 2021-12-12 LAB
A-A DO2: 35.1 MMHG
ALBUMIN SERPL-MCNC: 3.5 G/DL (ref 3.5–5.2)
ALBUMIN/GLOB SERPL: 1.2 G/DL
ALP SERPL-CCNC: 104 U/L (ref 39–117)
ALT SERPL W P-5'-P-CCNC: 11 U/L (ref 1–33)
ANION GAP SERPL CALCULATED.3IONS-SCNC: 8.8 MMOL/L (ref 5–15)
ARTERIAL PATENCY WRIST A: POSITIVE
AST SERPL-CCNC: 16 U/L (ref 1–32)
ATMOSPHERIC PRESS: 742 MMHG
BACTERIA UR QL AUTO: ABNORMAL /HPF
BASE EXCESS BLDA CALC-SCNC: 8.6 MMOL/L (ref 0–2)
BASOPHILS # BLD AUTO: 0.03 10*3/MM3 (ref 0–0.2)
BASOPHILS NFR BLD AUTO: 0.6 % (ref 0–1.5)
BDY SITE: ABNORMAL
BILIRUB SERPL-MCNC: 0.4 MG/DL (ref 0–1.2)
BILIRUB UR QL STRIP: NEGATIVE
BUN SERPL-MCNC: 21 MG/DL (ref 8–23)
BUN/CREAT SERPL: 25.6 (ref 7–25)
CALCIUM SPEC-SCNC: 8.7 MG/DL (ref 8.6–10.5)
CHLORIDE SERPL-SCNC: 94 MMOL/L (ref 98–107)
CLARITY UR: CLEAR
CO2 SERPL-SCNC: 30.2 MMOL/L (ref 22–29)
COHGB MFR BLD: 1.3 % (ref 0–2)
COLOR UR: YELLOW
CREAT SERPL-MCNC: 0.82 MG/DL (ref 0.57–1)
CRP SERPL-MCNC: 0.55 MG/DL (ref 0–0.5)
D-LACTATE SERPL-SCNC: 1.8 MMOL/L (ref 0.5–2)
DEPRECATED RDW RBC AUTO: 50.2 FL (ref 37–54)
EOSINOPHIL # BLD AUTO: 0.1 10*3/MM3 (ref 0–0.4)
EOSINOPHIL NFR BLD AUTO: 2 % (ref 0.3–6.2)
ERYTHROCYTE [DISTWIDTH] IN BLOOD BY AUTOMATED COUNT: 14.6 % (ref 12.3–15.4)
FLUAV RNA RESP QL NAA+PROBE: NOT DETECTED
FLUBV RNA RESP QL NAA+PROBE: NOT DETECTED
GFR SERPL CREATININE-BSD FRML MDRD: 67 ML/MIN/1.73
GLOBULIN UR ELPH-MCNC: 2.9 GM/DL
GLUCOSE SERPL-MCNC: 114 MG/DL (ref 65–99)
GLUCOSE UR STRIP-MCNC: NEGATIVE MG/DL
HCO3 BLDA-SCNC: 32.8 MMOL/L (ref 22–28)
HCT VFR BLD AUTO: 35 % (ref 34–46.6)
HCT VFR BLD CALC: 36.6 %
HGB BLD-MCNC: 11.5 G/DL (ref 12–15.9)
HGB UR QL STRIP.AUTO: ABNORMAL
HOLD SPECIMEN: NORMAL
HOLD SPECIMEN: NORMAL
HYALINE CASTS UR QL AUTO: ABNORMAL /LPF
IMM GRANULOCYTES # BLD AUTO: 0.09 10*3/MM3 (ref 0–0.05)
IMM GRANULOCYTES NFR BLD AUTO: 1.8 % (ref 0–0.5)
INHALED O2 CONCENTRATION: 21 %
KETONES UR QL STRIP: NEGATIVE
LEUKOCYTE ESTERASE UR QL STRIP.AUTO: ABNORMAL
LYMPHOCYTES # BLD AUTO: 1.14 10*3/MM3 (ref 0.7–3.1)
LYMPHOCYTES NFR BLD AUTO: 23.1 % (ref 19.6–45.3)
MCH RBC QN AUTO: 30.9 PG (ref 26.6–33)
MCHC RBC AUTO-ENTMCNC: 32.9 G/DL (ref 31.5–35.7)
MCV RBC AUTO: 94.1 FL (ref 79–97)
METHGB BLD QL: 0.2 % (ref 0–1.5)
MODALITY: ABNORMAL
MONOCYTES # BLD AUTO: 0.38 10*3/MM3 (ref 0.1–0.9)
MONOCYTES NFR BLD AUTO: 7.7 % (ref 5–12)
NEUTROPHILS NFR BLD AUTO: 3.19 10*3/MM3 (ref 1.7–7)
NEUTROPHILS NFR BLD AUTO: 64.8 % (ref 42.7–76)
NITRITE UR QL STRIP: NEGATIVE
NOTE: ABNORMAL
NRBC BLD AUTO-RTO: 0 /100 WBC (ref 0–0.2)
NT-PROBNP SERPL-MCNC: 208.2 PG/ML (ref 0–1800)
OXYHGB MFR BLDV: 91.9 % (ref 94–99)
PCO2 BLDA: 43.1 MM HG (ref 35–45)
PCO2 TEMP ADJ BLD: ABNORMAL MM[HG]
PH BLDA: 7.49 PH UNITS (ref 7.35–7.45)
PH UR STRIP.AUTO: 8 [PH] (ref 5–8)
PH, TEMP CORRECTED: ABNORMAL
PLATELET # BLD AUTO: 127 10*3/MM3 (ref 140–450)
PMV BLD AUTO: 9.8 FL (ref 6–12)
PO2 BLDA: 62.1 MM HG (ref 75–100)
PO2 TEMP ADJ BLD: ABNORMAL MM[HG]
POTASSIUM SERPL-SCNC: 4.2 MMOL/L (ref 3.5–5.2)
PROCALCITONIN SERPL-MCNC: 0.06 NG/ML (ref 0–0.25)
PROT SERPL-MCNC: 6.4 G/DL (ref 6–8.5)
PROT UR QL STRIP: NEGATIVE
RBC # BLD AUTO: 3.72 10*6/MM3 (ref 3.77–5.28)
RBC # UR STRIP: ABNORMAL /HPF
REF LAB TEST METHOD: ABNORMAL
SAO2 % BLDCOA: 93.3 % (ref 94–100)
SARS-COV-2 RNA RESP QL NAA+PROBE: NOT DETECTED
SODIUM SERPL-SCNC: 133 MMOL/L (ref 136–145)
SP GR UR STRIP: 1.01 (ref 1–1.03)
SQUAMOUS #/AREA URNS HPF: ABNORMAL /HPF
TROPONIN T SERPL-MCNC: <0.01 NG/ML (ref 0–0.03)
TROPONIN T SERPL-MCNC: <0.01 NG/ML (ref 0–0.03)
UROBILINOGEN UR QL STRIP: ABNORMAL
VENTILATOR MODE: ABNORMAL
WBC # UR STRIP: ABNORMAL /HPF
WBC NRBC COR # BLD: 4.93 10*3/MM3 (ref 3.4–10.8)
WHOLE BLOOD HOLD SPECIMEN: NORMAL
WHOLE BLOOD HOLD SPECIMEN: NORMAL

## 2021-12-12 PROCEDURE — 83605 ASSAY OF LACTIC ACID: CPT | Performed by: FAMILY MEDICINE

## 2021-12-12 PROCEDURE — 85025 COMPLETE CBC W/AUTO DIFF WBC: CPT | Performed by: FAMILY MEDICINE

## 2021-12-12 PROCEDURE — 81001 URINALYSIS AUTO W/SCOPE: CPT | Performed by: FAMILY MEDICINE

## 2021-12-12 PROCEDURE — 99284 EMERGENCY DEPT VISIT MOD MDM: CPT

## 2021-12-12 PROCEDURE — 36600 WITHDRAWAL OF ARTERIAL BLOOD: CPT

## 2021-12-12 PROCEDURE — 70450 CT HEAD/BRAIN W/O DYE: CPT

## 2021-12-12 PROCEDURE — 36415 COLL VENOUS BLD VENIPUNCTURE: CPT

## 2021-12-12 PROCEDURE — 84145 PROCALCITONIN (PCT): CPT | Performed by: FAMILY MEDICINE

## 2021-12-12 PROCEDURE — 93005 ELECTROCARDIOGRAM TRACING: CPT | Performed by: FAMILY MEDICINE

## 2021-12-12 PROCEDURE — 82805 BLOOD GASES W/O2 SATURATION: CPT

## 2021-12-12 PROCEDURE — 87040 BLOOD CULTURE FOR BACTERIA: CPT | Performed by: FAMILY MEDICINE

## 2021-12-12 PROCEDURE — 80053 COMPREHEN METABOLIC PANEL: CPT | Performed by: FAMILY MEDICINE

## 2021-12-12 PROCEDURE — 71045 X-RAY EXAM CHEST 1 VIEW: CPT

## 2021-12-12 PROCEDURE — 87636 SARSCOV2 & INF A&B AMP PRB: CPT | Performed by: FAMILY MEDICINE

## 2021-12-12 PROCEDURE — 83880 ASSAY OF NATRIURETIC PEPTIDE: CPT | Performed by: FAMILY MEDICINE

## 2021-12-12 PROCEDURE — 86140 C-REACTIVE PROTEIN: CPT | Performed by: FAMILY MEDICINE

## 2021-12-12 PROCEDURE — 83050 HGB METHEMOGLOBIN QUAN: CPT

## 2021-12-12 PROCEDURE — 84484 ASSAY OF TROPONIN QUANT: CPT | Performed by: FAMILY MEDICINE

## 2021-12-12 PROCEDURE — 82375 ASSAY CARBOXYHB QUANT: CPT

## 2021-12-12 RX ORDER — SODIUM CHLORIDE 0.9 % (FLUSH) 0.9 %
10 SYRINGE (ML) INJECTION AS NEEDED
Status: DISCONTINUED | OUTPATIENT
Start: 2021-12-12 | End: 2021-12-13 | Stop reason: HOSPADM

## 2021-12-13 ENCOUNTER — NURSING HOME (OUTPATIENT)
Dept: FAMILY MEDICINE CLINIC | Facility: CLINIC | Age: 81
End: 2021-12-13

## 2021-12-13 ENCOUNTER — APPOINTMENT (OUTPATIENT)
Dept: CT IMAGING | Facility: HOSPITAL | Age: 81
End: 2021-12-13

## 2021-12-13 VITALS
HEART RATE: 70 BPM | BODY MASS INDEX: 44.02 KG/M2 | OXYGEN SATURATION: 92 % | SYSTOLIC BLOOD PRESSURE: 125 MMHG | DIASTOLIC BLOOD PRESSURE: 82 MMHG | RESPIRATION RATE: 16 BRPM | TEMPERATURE: 98.7 F | WEIGHT: 233 LBS

## 2021-12-13 VITALS
RESPIRATION RATE: 20 BRPM | SYSTOLIC BLOOD PRESSURE: 120 MMHG | TEMPERATURE: 97.8 F | HEART RATE: 72 BPM | DIASTOLIC BLOOD PRESSURE: 73 MMHG

## 2021-12-13 DIAGNOSIS — J20.9 ACUTE BRONCHITIS, UNSPECIFIED ORGANISM: Primary | ICD-10-CM

## 2021-12-13 DIAGNOSIS — Z78.9 IMPAIRED MOBILITY AND ADLS: ICD-10-CM

## 2021-12-13 DIAGNOSIS — Z74.09 IMPAIRED MOBILITY AND ADLS: ICD-10-CM

## 2021-12-13 PROCEDURE — 25010000002 IOPAMIDOL 61 % SOLUTION: Performed by: FAMILY MEDICINE

## 2021-12-13 PROCEDURE — 71275 CT ANGIOGRAPHY CHEST: CPT

## 2021-12-13 PROCEDURE — 96374 THER/PROPH/DIAG INJ IV PUSH: CPT

## 2021-12-13 PROCEDURE — 99309 SBSQ NF CARE MODERATE MDM 30: CPT | Performed by: NURSE PRACTITIONER

## 2021-12-13 PROCEDURE — 25010000002 METHYLPREDNISOLONE PER 125 MG: Performed by: FAMILY MEDICINE

## 2021-12-13 RX ORDER — METHYLPREDNISOLONE SODIUM SUCCINATE 125 MG/2ML
125 INJECTION, POWDER, LYOPHILIZED, FOR SOLUTION INTRAMUSCULAR; INTRAVENOUS ONCE
Status: COMPLETED | OUTPATIENT
Start: 2021-12-13 | End: 2021-12-13

## 2021-12-13 RX ORDER — CEFDINIR 300 MG/1
300 CAPSULE ORAL EVERY 12 HOURS SCHEDULED
Status: COMPLETED | OUTPATIENT
Start: 2021-12-13 | End: 2021-12-13

## 2021-12-13 RX ORDER — CEFDINIR 300 MG/1
300 CAPSULE ORAL 2 TIMES DAILY
Qty: 14 CAPSULE | Refills: 0 | Status: SHIPPED | OUTPATIENT
Start: 2021-12-13 | End: 2021-12-20

## 2021-12-13 RX ADMIN — CEFDINIR 300 MG: 300 CAPSULE ORAL at 03:24

## 2021-12-13 RX ADMIN — IOPAMIDOL 100 ML: 612 INJECTION, SOLUTION INTRAVENOUS at 01:07

## 2021-12-13 RX ADMIN — SODIUM CHLORIDE, PRESERVATIVE FREE 10 ML: 5 INJECTION INTRAVENOUS at 03:12

## 2021-12-13 RX ADMIN — METHYLPREDNISOLONE SODIUM SUCCINATE 125 MG: 125 INJECTION, POWDER, FOR SOLUTION INTRAMUSCULAR; INTRAVENOUS at 03:12

## 2021-12-13 NOTE — ED PROVIDER NOTES
Subjective   81-year-old female with past medical history of Alzheimer's dementia, hypertension, hyperlipidemia, osteoporosis presents the emergency department with upper respiratory symptoms.  Nursing home reports that she developed wheezing cough and congestion earlier today has had a chest x-ray ordered that had not been obtained as well as prednisone and azithromycin that have been prescribed.  They reported that family visited today and said that her symptoms were worsening and advised her to come to the ER so she could be checked out.  Patient has no complaints at this time.      History provided by:  EMS personnel and nursing home  Illness  Location:  Cough congestion upper respiratory symptoms  Severity:  Moderate  Onset quality:  Gradual  Timing:  Intermittent  Progression:  Waxing and waning  Chronicity:  New  Context:  The HPI  Relieved by:  Nothing  Associated symptoms: no abdominal pain, no chest pain and no fever        Review of Systems   Constitutional: Negative.  Negative for fever.   HENT: Negative.    Respiratory: Negative.    Cardiovascular: Negative.  Negative for chest pain.   Gastrointestinal: Negative.  Negative for abdominal pain.   Endocrine: Negative.    Genitourinary: Negative.  Negative for dysuria.   Skin: Negative.    Neurological: Negative.    Psychiatric/Behavioral: Negative.    All other systems reviewed and are negative.      Past Medical History:   Diagnosis Date   • Colon polyp    • Osteoporosis    • Pain in thoracic spine    • Pneumonia    • UTI (urinary tract infection)        Allergies   Allergen Reactions   • Lexapro [Escitalopram Oxalate] Nausea Only       Past Surgical History:   Procedure Laterality Date   • BLADDER SURGERY  2000    bladder suspension   • CERVICAL LAMINECTOMY     • COLONOSCOPY     • COLOSTOMY  1979    temporary sigmoid   • HYSTERECTOMY         Family History   Problem Relation Age of Onset   • Blindness Mother    • Other Mother         arteriosclerotic  cardiovascular disease    • Diabetes Mother    • Osteoporosis Mother    • Stroke Mother    • Cancer Brother    • Lung cancer Sister    • Osteoporosis Sister    • Stroke Sister    • Stroke Other        Social History     Socioeconomic History   • Marital status:    Tobacco Use   • Smoking status: Never Smoker   • Smokeless tobacco: Never Used   Substance and Sexual Activity   • Alcohol use: No   • Drug use: No   • Sexual activity: Defer           Objective   Physical Exam  Vitals and nursing note reviewed.   Constitutional:       Appearance: She is obese. She is ill-appearing.   HENT:      Head: Normocephalic and atraumatic.   Eyes:      Pupils: Pupils are equal, round, and reactive to light.   Cardiovascular:      Rate and Rhythm: Normal rate and regular rhythm.   Pulmonary:      Effort: Pulmonary effort is normal.      Breath sounds: Decreased breath sounds present.   Musculoskeletal:         General: Normal range of motion.      Cervical back: Normal range of motion.   Skin:     General: Skin is warm.      Capillary Refill: Capillary refill takes less than 2 seconds.   Neurological:      General: No focal deficit present.      Mental Status: She is alert.   Psychiatric:         Mood and Affect: Mood normal.         Behavior: Behavior normal.         Procedures           ED Course  ED Course as of 12/13/21 0311   Sun Dec 12, 2021   2159 EKG interpretation time is sinus rhythm 76 bpm QRS duration is 94 QT is 358 QTC is 389 there is Q waves noted in V3 and V4 there is no acute ST elevation. []   Mon Dec 13, 2021   0230 Patient's  is at bedside have discussed laboratory findings of being essentially unremarkable as well as chest x-ray not really revealing a pneumonia head CT was negative decided to CT of the chest PE protocol shows a small pericardial effusion but no definite pneumonia we will treat for acute bacterial bronchitis.   is in agreement for patient be sent back to the nursing home at  this time. [MH]      ED Course User Index  [MH] Noris Case DO                                                 MDM  Number of Diagnoses or Management Options  Acute bacterial bronchitis: new and requires workup     Amount and/or Complexity of Data Reviewed  Clinical lab tests: ordered and reviewed  Tests in the radiology section of CPT®: reviewed and ordered  Tests in the medicine section of CPT®: ordered and reviewed  Independent visualization of images, tracings, or specimens: yes    Risk of Complications, Morbidity, and/or Mortality  Presenting problems: high  Diagnostic procedures: high  Management options: high        Final diagnoses:   Acute bacterial bronchitis       ED Disposition  ED Disposition     ED Disposition Condition Comment    Discharge Stable           Dc Fernandez DO  [address] [facility] [address]  [place] [id number] [phone]    Schedule an appointment as soon as possible for a visit            Medication List      New Prescriptions    cefdinir 300 MG capsule  Commonly known as: OMNICEF  Take 1 capsule by mouth 2 (Two) Times a Day for 7 days.           Where to Get Your Medications      These medications were sent to [facility] - [place], [place] - [address] - [phone]  - [phone]   [address], [facility] [address]    Phone: [phone]   · cefdinir 300 MG capsule          Noris Case DO  12/13/21 5326

## 2021-12-13 NOTE — ED NOTES
Contacted [facility] to request EMS transport back to [facility].     Magdy April Lotus  12/13/21 3956

## 2021-12-14 NOTE — PROGRESS NOTES
Nursing Home Follow Up Note      Layo Arango DO []   WILLIAM Alfaro [x]  [address] [place] [address]  Phone: [phone]  Fax: [phone] Dorcas Duncan MD []    Dc Fernandez DO []   [address] [place] [address]  Phone: [phone]  Fax: [phone]     PATIENT NAME: Danae Harmon                                                                          YOB: 1940           DATE OF SERVICE: 12/13/2021  FACILITY:  [][place]   [] [place]   [] [facility]   [x] [facility]   [] Other ____________________________________________________________________      CHIEF COMPLAINT:     Follow-up visit to emergency department for upper respiratory symptoms.      HISTORY OF PRESENT ILLNESS:      Patient sent to [facility] yesterday for further evaluation of her upper respiratory symptoms.  Nursing had already notified Dr. Fernadnez who had ordered a chest x-ray, Zithromax and prednisone.  Apparently family came to visit and requested patient be sent to the hospital for further evaluation because her x-ray had not been performed yet.  Chest x-ray and CT chest performed in emergency department and reported no evidence of pneumonia.  She was started on cefdinir for treatment of bronchitis.  Her chest x-ray that was ordered yesterday evening was also performed this morning and reports no evidence of pneumonia or other acute findings.  She is also receiving prednisone and nebulizers.  She has no complaints today and reports she is feeling better.     PAST MEDICAL & SURGICAL HISTORY:   Past Medical History:   Diagnosis Date   • Colon polyp    • Osteoporosis    • Pain in thoracic spine    • Pneumonia    • UTI (urinary tract infection)       Past Surgical History:   Procedure Laterality Date   • BLADDER SURGERY  2000    bladder suspension   • CERVICAL LAMINECTOMY     • COLONOSCOPY     • COLOSTOMY  1979    temporary sigmoid   • HYSTERECTOMY           MEDICATIONS:  I  have reviewed and reconciled the patients medication list in the patients chart at the skilled nursing facility today.      ALLERGIES:    Allergies   Allergen Reactions   • Lexapro [Escitalopram Oxalate] Nausea Only         SOCIAL HISTORY:    Social History     Socioeconomic History   • Marital status:    Tobacco Use   • Smoking status: Never Smoker   • Smokeless tobacco: Never Used   Substance and Sexual Activity   • Alcohol use: No   • Drug use: No   • Sexual activity: Defer       FAMILY HISTORY:    Family History   Problem Relation Age of Onset   • Blindness Mother    • Other Mother         arteriosclerotic cardiovascular disease    • Diabetes Mother    • Osteoporosis Mother    • Stroke Mother    • Cancer Brother    • Lung cancer Sister    • Osteoporosis Sister    • Stroke Sister    • Stroke Other        REVIEW OF SYSTEMS:    Review of Systems   Reason unable to perform ROS: ROS per nursing and patient.   Constitutional: Negative for activity change, appetite change, chills, diaphoresis, fatigue, fever, unexpected weight gain and unexpected weight loss.   HENT: Negative for congestion, hearing loss, mouth sores, nosebleeds, postnasal drip, rhinorrhea, sneezing, sore throat and trouble swallowing.    Eyes: Negative for pain, discharge, redness, itching and visual disturbance.   Respiratory: Positive for cough, shortness of breath and wheezing. Negative for apnea, choking and stridor.         Improved today   Cardiovascular: Positive for leg swelling (chronic). Negative for palpitations.   Gastrointestinal: Negative for abdominal distention, abdominal pain, blood in stool, constipation, diarrhea, nausea, vomiting and indigestion.   Endocrine: Negative for polydipsia and polyuria.   Genitourinary: Positive for urinary incontinence. Negative for decreased urine volume, difficulty urinating, frequency, hematuria and urgency.   Musculoskeletal: Positive for arthralgias, back pain and gait problem. Negative for  joint swelling, myalgias and neck pain.   Skin: Negative for color change, dry skin, rash and skin lesions.   Allergic/Immunologic: Negative for environmental allergies.   Neurological: Positive for weakness, memory problem and confusion (Intermittent). Negative for seizures and speech difficulty.   Psychiatric/Behavioral: Negative for agitation, behavioral problems, dysphoric mood, hallucinations, self-injury, sleep disturbance, suicidal ideas, negative for hyperactivity, depressed mood and stress. The patient is not nervous/anxious.          PHYSICAL EXAMINATION:   VITAL SIGNS:   Vitals:    21 1553   BP: 120/73   Pulse: 72   Resp: 20   Temp: 97.8 °F (36.6 °C)       Physical Exam   Constitutional: Vital signs are normal. She appears well-developed and well-nourished.   HENT:   Head: Normocephalic.   Right Ear: External ear normal.   Left Ear: External ear normal.   Nose: Nose normal.   Eyes: Conjunctivae are normal.   Cardiovascular: Normal rate, regular rhythm and normal heart sounds.   Pulmonary/Chest: Effort normal and breath sounds normal. No respiratory distress. She has no wheezes. She has no rales.   Abdominal: Soft. Bowel sounds are normal. She exhibits no distension. There is no abdominal tenderness.   Musculoskeletal:      Thoracic back: She exhibits decreased range of motion and pain.      Right lower le+ Edema present.      Left lower le+ Edema present.      Comments: Weakness BLE   Neurological: She is alert.   Skin: Skin is warm and dry. Turgor is normal. No rash noted.   Psychiatric: Her speech is normal and behavior is normal. Mood normal. Cognition and memory are impaired.   Nursing note and vitals reviewed.      RECORDS REVIEW:   I have reviewed and interpreted the following lab test results  obtained at the time of the visit today.     ASSESSMENT     Diagnoses and all orders for this visit:    1. Acute bronchitis, unspecified organism (Primary)    2. Impaired mobility and  ADLs        PLAN    Acute bronchitis  -We will continue Ceftin, prednisone and DuoNebs as directed.  Will stop Zithromax since there is no reported pneumonia, patient has had no fever and no leukocytosis.  She has had no hypoxia since returning from emergency department.  Will follow-up and continue to monitor.    Nursing encouraged to keep me informed of any acute changes, lack of improvement, or any new concerning symptoms.    Nursing to continue supportive care for all ADLs.      [x]  Discussed Patient in detail with nursing/staff, addressed all needs today.     [x]  Plan of Care Reviewed   [x]  PT/OT Reviewed   [x]  Order Changes  []  Discharge Plans Reviewed  [x]  Advance Directive on file with Nursing Home.   [x]  POA on file with Nursing Home.   [x]  Code Status listed: []  Full Code   [x]  DNR         “I confirm accuracy of unchanged data/findings which have been carried forward from previous visit, as well as I have updated appropriately those that have changed.”                                                Mita Joyce, APRN.

## 2021-12-17 LAB
BACTERIA SPEC AEROBE CULT: NORMAL
BACTERIA SPEC AEROBE CULT: NORMAL

## 2021-12-29 ENCOUNTER — NURSING HOME (OUTPATIENT)
Dept: FAMILY MEDICINE CLINIC | Facility: CLINIC | Age: 81
End: 2021-12-29

## 2021-12-29 VITALS
WEIGHT: 236 LBS | DIASTOLIC BLOOD PRESSURE: 78 MMHG | OXYGEN SATURATION: 96 % | BODY MASS INDEX: 44.59 KG/M2 | RESPIRATION RATE: 18 BRPM | TEMPERATURE: 97.4 F | HEART RATE: 72 BPM | SYSTOLIC BLOOD PRESSURE: 126 MMHG

## 2021-12-29 DIAGNOSIS — Z78.9 IMPAIRED MOBILITY AND ADLS: ICD-10-CM

## 2021-12-29 DIAGNOSIS — Z74.09 IMPAIRED MOBILITY AND ADLS: ICD-10-CM

## 2021-12-29 DIAGNOSIS — Z87.440 HISTORY OF UTI: ICD-10-CM

## 2021-12-29 DIAGNOSIS — R41.82 ACUTE ON CHRONIC ALTERATION IN MENTAL STATUS: ICD-10-CM

## 2021-12-29 PROCEDURE — 99309 SBSQ NF CARE MODERATE MDM 30: CPT | Performed by: NURSE PRACTITIONER

## 2021-12-30 ENCOUNTER — NURSING HOME (OUTPATIENT)
Dept: INTERNAL MEDICINE | Facility: CLINIC | Age: 81
End: 2021-12-30

## 2021-12-30 VITALS
HEART RATE: 72 BPM | RESPIRATION RATE: 18 BRPM | SYSTOLIC BLOOD PRESSURE: 126 MMHG | OXYGEN SATURATION: 96 % | DIASTOLIC BLOOD PRESSURE: 78 MMHG | TEMPERATURE: 97.4 F | WEIGHT: 236 LBS | BODY MASS INDEX: 44.59 KG/M2

## 2021-12-30 DIAGNOSIS — R60.0 LOWER EXTREMITY EDEMA: ICD-10-CM

## 2021-12-30 DIAGNOSIS — I10 BENIGN ESSENTIAL HYPERTENSION: ICD-10-CM

## 2021-12-30 DIAGNOSIS — R53.83 OTHER FATIGUE: ICD-10-CM

## 2021-12-30 DIAGNOSIS — E11.9 TYPE 2 DIABETES MELLITUS WITHOUT COMPLICATION, WITHOUT LONG-TERM CURRENT USE OF INSULIN: ICD-10-CM

## 2021-12-30 DIAGNOSIS — M54.40 CHRONIC LOW BACK PAIN WITH SCIATICA, SCIATICA LATERALITY UNSPECIFIED, UNSPECIFIED BACK PAIN LATERALITY: ICD-10-CM

## 2021-12-30 DIAGNOSIS — G89.29 CHRONIC LOW BACK PAIN WITH SCIATICA, SCIATICA LATERALITY UNSPECIFIED, UNSPECIFIED BACK PAIN LATERALITY: ICD-10-CM

## 2021-12-30 DIAGNOSIS — E78.2 MIXED HYPERLIPIDEMIA: ICD-10-CM

## 2021-12-30 DIAGNOSIS — G30.1 LATE ONSET ALZHEIMER'S DISEASE WITH BEHAVIORAL DISTURBANCE: Primary | ICD-10-CM

## 2021-12-30 DIAGNOSIS — F33.1 MODERATE EPISODE OF RECURRENT MAJOR DEPRESSIVE DISORDER: ICD-10-CM

## 2021-12-30 DIAGNOSIS — F02.818 LATE ONSET ALZHEIMER'S DISEASE WITH BEHAVIORAL DISTURBANCE: Primary | ICD-10-CM

## 2021-12-30 PROCEDURE — 99309 SBSQ NF CARE MODERATE MDM 30: CPT | Performed by: INTERNAL MEDICINE

## 2021-12-30 NOTE — PROGRESS NOTES
Nursing Home Follow Up Note      Layo Arango DO []   WILLIAM Alfaro [x]  [address] [place] [address]  Phone: [phone]  Fax: [phone] Dorcas Duncan MD []    Dc Fernandez DO []   [address] [place] [address]  Phone: [phone]  Fax: [phone]     PATIENT NAME: Danae Harmon                                                                          YOB: 1940           DATE OF SERVICE: 12/29/2021  FACILITY:  [][place]   [] [place]   [] [facility]   [x] [facility]   [] Other ____________________________________________________________________      CHIEF COMPLAINT:     Increased confusion today.        HISTORY OF PRESENT ILLNESS:      Nursing reports that patient has increased confusion since this morning. She is having confused conversation and visual hallucinations. She has had this in the past with UTIs. She has also had intermittent increased confusion however related to her dementia. She does have some slight confusion during visit today. She has no complaints today during visit and no signs and symptoms of any distress. She is very pleasant, resting in bed.      PAST MEDICAL & SURGICAL HISTORY:   Past Medical History:   Diagnosis Date   • Colon polyp    • Osteoporosis    • Pain in thoracic spine    • Pneumonia    • UTI (urinary tract infection)       Past Surgical History:   Procedure Laterality Date   • BLADDER SURGERY  2000    bladder suspension   • CERVICAL LAMINECTOMY     • COLONOSCOPY     • COLOSTOMY  1979    temporary sigmoid   • HYSTERECTOMY           MEDICATIONS:  I have reviewed and reconciled the patients medication list in the patients chart at the skilled nursing facility today.      ALLERGIES:    Allergies   Allergen Reactions   • Lexapro [Escitalopram Oxalate] Nausea Only         SOCIAL HISTORY:    Social History     Socioeconomic History   • Marital status:    Tobacco Use   • Smoking status: Never Smoker   • Smokeless  tobacco: Never Used   Substance and Sexual Activity   • Alcohol use: No   • Drug use: No   • Sexual activity: Defer       FAMILY HISTORY:    Family History   Problem Relation Age of Onset   • Blindness Mother    • Other Mother         arteriosclerotic cardiovascular disease    • Diabetes Mother    • Osteoporosis Mother    • Stroke Mother    • Cancer Brother    • Lung cancer Sister    • Osteoporosis Sister    • Stroke Sister    • Stroke Other        REVIEW OF SYSTEMS:    Review of Systems   Reason unable to perform ROS: ROS per nursing and patient.   Constitutional: Negative for activity change, appetite change, chills, diaphoresis, fatigue, fever, unexpected weight gain and unexpected weight loss.   HENT: Negative for congestion, hearing loss, mouth sores, nosebleeds, postnasal drip, rhinorrhea, sneezing, sore throat and trouble swallowing.    Eyes: Negative for pain, discharge, redness, itching and visual disturbance.   Respiratory: Negative for cough, choking, shortness of breath and wheezing.    Cardiovascular: Positive for leg swelling (chronic). Negative for palpitations.   Gastrointestinal: Negative for abdominal distention, abdominal pain, blood in stool, constipation, diarrhea, nausea, vomiting and indigestion.   Endocrine: Negative for polydipsia and polyuria.   Genitourinary: Positive for urinary incontinence. Negative for decreased urine volume, difficulty urinating, frequency, hematuria and urgency.   Musculoskeletal: Positive for arthralgias, back pain and gait problem. Negative for joint swelling, myalgias and neck pain.   Skin: Negative for color change, dry skin, rash and skin lesions.   Allergic/Immunologic: Negative for environmental allergies.   Neurological: Positive for weakness, memory problem and confusion. Negative for seizures and speech difficulty.   Psychiatric/Behavioral: Positive for positive for hyperactivity. Negative for agitation, behavioral problems, dysphoric mood, hallucinations,  self-injury, sleep disturbance, suicidal ideas, depressed mood and stress. The patient is not nervous/anxious.          PHYSICAL EXAMINATION:   VITAL SIGNS:   Vitals:    21 1020   BP: 126/78   Pulse: 72   Resp: 18   Temp: 97.4 °F (36.3 °C)   SpO2: 96%   Weight: 107 kg (236 lb)       Physical Exam   Constitutional: Vital signs are normal. She appears well-developed and well-nourished.   HENT:   Head: Normocephalic.   Right Ear: External ear normal.   Left Ear: External ear normal.   Nose: Nose normal.   Eyes: Conjunctivae are normal.   Cardiovascular: Normal rate, regular rhythm and normal heart sounds.   Pulmonary/Chest: Effort normal and breath sounds normal. No respiratory distress. She has no wheezes. She has no rales.   Abdominal: Soft. Bowel sounds are normal. She exhibits no distension. There is no abdominal tenderness.   Musculoskeletal:      Thoracic back: She exhibits decreased range of motion and pain.      Right lower le+ Edema present.      Left lower le+ Edema present.      Comments: Weakness BLE   Neurological: She is alert.   Skin: Skin is warm and dry. Turgor is normal. No rash noted.   Psychiatric: Her speech is normal and behavior is normal. Mood normal. Cognition and memory are impaired.   Nursing note and vitals reviewed.      RECORDS REVIEW:   I have reviewed and interpreted the following lab test results  obtained at the time of the visit today.     ASSESSMENT     Diagnoses and all orders for this visit:    1. Acute on chronic alteration in mental status    2. History of UTI    3. Impaired mobility and ADLs        PLAN    Acute on chronic mental status change/history UTI  -Get UA C&S, CBC and CMP in am. Will follow up with results.     Nursing encouraged to keep me informed of any acute changes, lack of improvement, or any new concerning symptoms.    Nursing to continue supportive care for all ADLs.      [x]  Discussed Patient in detail with nursing/staff, addressed all needs  today.     [x]  Plan of Care Reviewed   [x]  PT/OT Reviewed   [x]  Order Changes  []  Discharge Plans Reviewed  [x]  Advance Directive on file with Nursing Home.   [x]  POA on file with Nursing Home.   [x]  Code Status listed: []  Full Code   [x]  DNR         “I confirm accuracy of unchanged data/findings which have been carried forward from previous visit, as well as I have updated appropriately those that have changed.”                                                Mita Joyce, APRN.

## 2022-01-03 NOTE — PROGRESS NOTES
Nursing Home Progress Note        Layo Arango DO []  WILLIAM Alfaro []  [address] [place] [address]  Phone: [phone]  Fax: [phone] Dorcas Duncan MD []  Dc Fernandez DO [x]   [address] [place] [address]  Phone: [phone]  Fax: [phone]     PATIENT NAME: Danae Harmon                                                                          YOB: 1940           DATE OF SERVICE: 12/30/2021  FACILITY:  [] [place]   [] [place]   [] [facility]   [x] [facility]  []  [facility]  [] Other ______________________________________________________________________     CHIEF COMPLAINT:  Chronic Medical Management      HISTORY OF PRESENT ILLNESS:   On exam today, patient was complaining of having shortness of breath and wheezing.  Nurses had noticed that the patient has been having some increased incontinence episodes as well as confusion.  A urinalysis is pending      PAST MEDICAL & SURGICAL HISTORY:   Past Medical History:   Diagnosis Date   • Colon polyp    • Osteoporosis    • Pain in thoracic spine    • Pneumonia    • UTI (urinary tract infection)       Past Surgical History:   Procedure Laterality Date   • BLADDER SURGERY  2000    bladder suspension   • CERVICAL LAMINECTOMY     • COLONOSCOPY     • COLOSTOMY  1979    temporary sigmoid   • HYSTERECTOMY           MEDICATIONS:  I have reviewed and reconciled the patients medication list in the patients chart at the skilled nursing facility today, 12/30/2021.      ALLERGIES:  Allergies   Allergen Reactions   • Lexapro [Escitalopram Oxalate] Nausea Only         SOCIAL HISTORY:  Social History     Socioeconomic History   • Marital status:    Tobacco Use   • Smoking status: Never Smoker   • Smokeless tobacco: Never Used   Substance and Sexual Activity   • Alcohol use: No   • Drug use: No   • Sexual activity: Defer       FAMILY HISTORY:  Family History   Problem Relation Age of Onset   •  Blindness Mother    • Other Mother         arteriosclerotic cardiovascular disease    • Diabetes Mother    • Osteoporosis Mother    • Stroke Mother    • Cancer Brother    • Lung cancer Sister    • Osteoporosis Sister    • Stroke Sister    • Stroke Other        REVIEW OF SYSTEMS:  Review of Systems   Constitutional: Negative for chills, fatigue and fever.   HENT: Negative for congestion, ear pain, rhinorrhea, sinus pressure and sore throat.    Eyes: Negative for visual disturbance.   Respiratory: Negative for cough, chest tightness, shortness of breath and wheezing.    Cardiovascular: Negative for chest pain, palpitations and leg swelling.   Gastrointestinal: Negative for abdominal pain, blood in stool, constipation, diarrhea, nausea and vomiting.   Endocrine: Negative for polydipsia and polyuria.   Genitourinary: Negative for dysuria and hematuria.   Musculoskeletal: Negative for arthralgias and back pain.   Skin: Negative for rash.   Neurological: Negative for dizziness, light-headedness, numbness and headaches.   Psychiatric/Behavioral: Negative for dysphoric mood and sleep disturbance. The patient is not nervous/anxious.           PHYSICAL EXAMINATION:     VITAL SIGNS:  /78   Pulse 72   Temp 97.4 °F (36.3 °C)   Resp 18   Wt 107 kg (236 lb)   SpO2 96%   BMI 44.59 kg/m²     Physical Exam  Vitals and nursing note reviewed.   Constitutional:       Appearance: Normal appearance. She is well-developed. She is obese.      Comments: Frail elderly female   HENT:      Head: Normocephalic and atraumatic.   Eyes:      Extraocular Movements: Extraocular movements intact.      Conjunctiva/sclera: Conjunctivae normal.   Cardiovascular:      Rate and Rhythm: Normal rate and regular rhythm.   Pulmonary:      Effort: Pulmonary effort is normal.      Breath sounds: Wheezing present.   Abdominal:      General: Abdomen is flat.      Palpations: Abdomen is soft.   Musculoskeletal:      Cervical back: Normal range of motion  and neck supple.      Right lower leg: Edema (Minimal) present.      Left lower leg: Edema (Minimal) present.      Comments: Bedridden   Skin:     General: Skin is warm and dry.      Findings: No rash.   Neurological:      General: No focal deficit present.      Mental Status: She is alert and oriented to person, place, and time. Mental status is at baseline.   Psychiatric:         Mood and Affect: Mood normal.         Behavior: Behavior normal.         RECORDS REVIEW:       ASSESSMENT     Diagnoses and all orders for this visit:    1. Late onset Alzheimer's disease with behavioral disturbance (HCC) (Primary)    2. Benign essential hypertension    3. Moderate episode of recurrent major depressive disorder (HCC)    4. Chronic low back pain with sciatica, sciatica laterality unspecified, unspecified back pain laterality    5. Mixed hyperlipidemia    6. Other fatigue    7. Lower extremity edema    8. Type 2 diabetes mellitus without complication, without long-term current use of insulin (HCC)        PLAN  Acute Bronchitis  - Recently treated, obtain follow-up chest x-ray and consider treatment adjustment based on results.     Major depressive disorder  -Stable on Seroquel and Depakote     Lower Extremity Edema  -Good control with Bumex and especially with MIR hose.      Alzheimer's disease with behavioral disturbances  -Mental status remains at baseline..     Bladder spasms/urinary frequency  -Cont Myrbetriq, has had one episode of incontinence, possibly exacerbated by UTI.      Mixed hyperlipidemia  -Continue statin.     Essential hypertension  -Controlled on Coreg     Vitamin D and vitamin B12 deficiencies  -Stable on supplements.     Osteoporosis  -Stable on vitamins.  Bisphosphonate therapy is not necessary at this point.      Type 2 diabetes mellitus  -Fair glucose control.  Not currently on medications.     Chronic pain  -Controlled on Norco being filled monthly.    [x]  Discussed Patient in detail with  nursing/staff, addressed all needs today.     [x]  Plan of Care Reviewed   []  PT/OT Reviewed   [x]  Order Changes  []  Discharge Plans Reviewed  [x]  Advance Directive on file with Nursing Home.   [x]  POA on file with Nursing Home.    [x]  Code Status listed and reviewed.     I confirm accuracy of unchanged data/findings including physical exam and plan which have been carried forward from previous visit, as well as I have updated appropriately those that have changed        Dc Fernandez DO.  1/3/2022

## 2022-01-05 ENCOUNTER — NURSING HOME (OUTPATIENT)
Dept: FAMILY MEDICINE CLINIC | Facility: CLINIC | Age: 82
End: 2022-01-05

## 2022-01-05 VITALS
SYSTOLIC BLOOD PRESSURE: 116 MMHG | WEIGHT: 233 LBS | DIASTOLIC BLOOD PRESSURE: 64 MMHG | HEART RATE: 76 BPM | OXYGEN SATURATION: 94 % | RESPIRATION RATE: 18 BRPM | TEMPERATURE: 98.2 F | BODY MASS INDEX: 44.02 KG/M2

## 2022-01-05 DIAGNOSIS — J20.9 ACUTE BRONCHITIS, UNSPECIFIED ORGANISM: Primary | ICD-10-CM

## 2022-01-05 DIAGNOSIS — Z74.09 IMPAIRED MOBILITY AND ADLS: ICD-10-CM

## 2022-01-05 DIAGNOSIS — Z78.9 IMPAIRED MOBILITY AND ADLS: ICD-10-CM

## 2022-01-05 DIAGNOSIS — R05.9 COUGH: ICD-10-CM

## 2022-01-05 PROCEDURE — 99309 SBSQ NF CARE MODERATE MDM 30: CPT | Performed by: NURSE PRACTITIONER

## 2022-01-06 PROBLEM — R29.6 RECURRENT FALLS: Status: RESOLVED | Noted: 2020-05-26 | Resolved: 2022-01-06

## 2022-01-06 NOTE — PROGRESS NOTES
Nursing Home Follow Up Note      Layo Arango DO []   WILLIAM Alfaro [x]  [address] [place] [address]  Phone: [phone]  Fax: [phone] Dorcas Duncan MD []    Dc Fernandez DO []   [address] [place] [address]  Phone: [phone]  Fax: [phone]     PATIENT NAME: Danae Harmon                                                                          YOB: 1940           DATE OF SERVICE: 1/5/2022  FACILITY:  [][facility]   [] [facility]   [] [facility]   [x] [facility]   [] Other ____________________________________________________________________      CHIEF COMPLAINT:     Continued cough and congestion, concerns bronchitis has not resolved.     HISTO RY OF PRESENT ILLNESS:      Nursing reports that family reported to them yesterday that patient coughed very frequently and sounded very congested during their visit with her. She was treated for bronchitis and treatment completed last week but signs and symptoms have not improved. She is resting in bed at this time without any complaints, eating lunch. She does have obvious cough and congestion during visit today.      PAST MEDICAL & SURGICAL HISTORY:   Past Medical History:   Diagnosis Date   • Colon polyp    • Osteoporosis    • Pain in thoracic spine    • Pneumonia    • UTI (urinary tract infection)       Past Surgical History:   Procedure Laterality Date   • BLADDER SURGERY  2000    bladder suspension   • CERVICAL LAMINECTOMY     • COLONOSCOPY     • COLOSTOMY  1979    temporary sigmoid   • HYSTERECTOMY           MEDICATIONS:  I have reviewed and reconciled the patients medication list in the patients chart at the skilled nursing facility today.      ALLERGIES:    Allergies   Allergen Reactions   • Lexapro [Escitalopram Oxalate] Nausea Only         SOCIAL HISTORY:    Social History     Socioeconomic History   • Marital status:    Tobacco Use   • Smoking status: Never Smoker   • Smokeless tobacco:  Never Used   Substance and Sexual Activity   • Alcohol use: No   • Drug use: No   • Sexual activity: Defer       FAMILY HISTORY:    Family History   Problem Relation Age of Onset   • Blindness Mother    • Other Mother         arteriosclerotic cardiovascular disease    • Diabetes Mother    • Osteoporosis Mother    • Stroke Mother    • Cancer Brother    • Lung cancer Sister    • Osteoporosis Sister    • Stroke Sister    • Stroke Other        REVIEW OF SYSTEMS:    Review of Systems   Reason unable to perform ROS: ROS per nursing and patient.   Constitutional: Negative for activity change, appetite change, chills, diaphoresis, fatigue, fever, unexpected weight gain and unexpected weight loss.   HENT: Positive for congestion. Negative for hearing loss, mouth sores, nosebleeds, postnasal drip, rhinorrhea, sneezing, sore throat and trouble swallowing.    Eyes: Negative for pain, discharge, redness, itching and visual disturbance.   Respiratory: Positive for cough and wheezing. Negative for choking and shortness of breath.    Cardiovascular: Positive for leg swelling (chronic). Negative for palpitations.   Gastrointestinal: Negative for abdominal distention, abdominal pain, blood in stool, constipation, diarrhea, nausea, vomiting and indigestion.   Endocrine: Negative for polydipsia and polyuria.   Genitourinary: Positive for urinary incontinence. Negative for decreased urine volume, difficulty urinating, frequency, hematuria and urgency.   Musculoskeletal: Positive for arthralgias, back pain and gait problem. Negative for joint swelling, myalgias and neck pain.   Skin: Negative for color change, dry skin, rash and skin lesions.   Allergic/Immunologic: Negative for environmental allergies.   Neurological: Positive for weakness, memory problem and confusion. Negative for seizures and speech difficulty.   Psychiatric/Behavioral: Positive for positive for hyperactivity. Negative for agitation, behavioral problems, dysphoric  mood, hallucinations, self-injury, sleep disturbance, suicidal ideas, depressed mood and stress. The patient is not nervous/anxious.          PHYSICAL EXAMINATION:   VITAL SIGNS:   Vitals:    22 1104   BP: 116/64   Pulse: 76   Resp: 18   Temp: 98.2 °F (36.8 °C)   SpO2: 94%   Weight: 106 kg (233 lb)       Physical Exam   Constitutional: Vital signs are normal. She appears well-developed and well-nourished.   HENT:   Head: Normocephalic.   Right Ear: External ear normal.   Left Ear: External ear normal.   Nose: Nose normal.   Eyes: Conjunctivae are normal.   Cardiovascular: Normal rate, regular rhythm and normal heart sounds.   Pulmonary/Chest: Effort normal. No respiratory distress. She has wheezes in the right upper field, the right middle field, the left upper field and the left middle field. She has rhonchi in the right upper field and the left upper field. She has no rales.   Abdominal: Soft. Bowel sounds are normal. She exhibits no distension. There is no abdominal tenderness.   Musculoskeletal:      Thoracic back: She exhibits decreased range of motion and pain.      Right lower le+ Edema present.      Left lower le+ Edema present.      Comments: Weakness BLE   Neurological: She is alert.   Skin: Skin is warm and dry. Turgor is normal. No rash noted.   Psychiatric: Her speech is normal and behavior is normal. Mood normal. Cognition and memory are impaired.   Nursing note and vitals reviewed.      RECORDS REVIEW:   I have reviewed and interpreted the following lab test results  obtained at the time of the visit today.     ASSESSMENT     Diagnoses and all orders for this visit:    1. Acute bronchitis, unspecified organism (Primary)    2. Cough    3. Impaired mobility and ADLs        PLAN    Acute bronchitis/cough  -Patient has been treated with cefdinir and Zithromax but no improvement in symptoms. Chest x-ray performed but reports no acute or abnormal findings. Will give Solu-Medrol 125 mg IM x1  now. Give Levaquin 500 mg p.o. daily x5 days and DuoNeb every 6 hours x1 week. Will follow-up and continue to monitor.    Nursing encouraged to keep me informed of any acute changes, lack of improvement, or any new concerning symptoms.    Nursing to continue supportive care for all ADLs.      [x]  Discussed Patient in detail with nursing/staff, addressed all needs today.     [x]  Plan of Care Reviewed   [x]  PT/OT Reviewed   [x]  Order Changes  []  Discharge Plans Reviewed  [x]  Advance Directive on file with Nursing Home.   [x]  POA on file with Nursing Home.   [x]  Code Status listed: []  Full Code   [x]  DNR         “I confirm accuracy of unchanged data/findings which have been carried forward from previous visit, as well as I have updated appropriately those that have changed.”                                                Mita Joyce, APRN.

## 2022-02-03 ENCOUNTER — NURSING HOME (OUTPATIENT)
Dept: FAMILY MEDICINE CLINIC | Facility: CLINIC | Age: 82
End: 2022-02-03

## 2022-02-03 VITALS
BODY MASS INDEX: 46.1 KG/M2 | SYSTOLIC BLOOD PRESSURE: 134 MMHG | OXYGEN SATURATION: 97 % | HEART RATE: 78 BPM | DIASTOLIC BLOOD PRESSURE: 74 MMHG | TEMPERATURE: 97.8 F | WEIGHT: 244 LBS | RESPIRATION RATE: 20 BRPM

## 2022-02-03 DIAGNOSIS — Z78.9 IMPAIRED MOBILITY AND ADLS: ICD-10-CM

## 2022-02-03 DIAGNOSIS — R41.82 ACUTE ON CHRONIC ALTERATION IN MENTAL STATUS: ICD-10-CM

## 2022-02-03 DIAGNOSIS — Z74.09 IMPAIRED MOBILITY AND ADLS: ICD-10-CM

## 2022-02-03 DIAGNOSIS — R60.0 EDEMA OF ALL FOUR EXTREMITIES: ICD-10-CM

## 2022-02-03 DIAGNOSIS — Z87.440 HISTORY OF UTI: ICD-10-CM

## 2022-02-03 DIAGNOSIS — I50.9 CHRONIC CONGESTIVE HEART FAILURE, UNSPECIFIED HEART FAILURE TYPE: ICD-10-CM

## 2022-02-03 PROBLEM — F01.50 VASCULAR DEMENTIA WITHOUT BEHAVIORAL DISTURBANCE: Status: ACTIVE | Noted: 2020-05-13

## 2022-02-03 PROCEDURE — 99309 SBSQ NF CARE MODERATE MDM 30: CPT | Performed by: NURSE PRACTITIONER

## 2022-02-04 NOTE — PROGRESS NOTES
Nursing Home Follow Up Note      Layo Arango DO []   WILLIAM Alfaro [x]  [address] [place] [address]  Phone: [phone]  Fax: [phone] Dorcas Duncan MD []    Dc Fernandez DO []   [address] [place] [address]  Phone: [phone]  Fax: [phone]     PATIENT NAME: Danae Harmon                                                                          YOB: 1940           DATE OF SERVICE: 2/3/2022  FACILITY:  [][facility]   [] [facility]   [] [facility]   [x] [facility]   [] Other ____________________________________________________________________      CHIEF COMPLAINT:     Increased confusion the past 2 days.     11 pound weight gain the past month, increased edema.      HISTORY OF PRESENT ILLNESS:      Nursing reports that patient has had increased confusion since yesterday. They collected a UA C&S and sent it to lab this morning, due to history of UTI. They report her confusion does not seem as bad today as yesterday and patient does not appear more confused than usual during visit today. She has no complaints today and reports feeling well. Denies dysuria. Reports she did have some constipation but that has resolved.    Nursing reports that patient has had an 11 pound weight gain in the past month and noticeably has increased edema. She has not had any increased work of breathing or O2 demand but does have increased edema. Has history of CHF.      PAST MEDICAL & SURGICAL HISTORY:   Past Medical History:   Diagnosis Date   • Colon polyp    • Osteoporosis    • Pain in thoracic spine    • Pneumonia    • UTI (urinary tract infection)       Past Surgical History:   Procedure Laterality Date   • BLADDER SURGERY  2000    bladder suspension   • CERVICAL LAMINECTOMY     • COLONOSCOPY     • COLOSTOMY  1979    temporary sigmoid   • HYSTERECTOMY           MEDICATIONS:  I have reviewed and reconciled the patients medication list in the patients chart at the [facility]  nursing facility today.      ALLERGIES:    Allergies   Allergen Reactions   • Lexapro [Escitalopram Oxalate] Nausea Only         SOCIAL HISTORY:    Social History     Socioeconomic History   • Marital status:    Tobacco Use   • Smoking status: Never Smoker   • Smokeless tobacco: Never Used   Substance and Sexual Activity   • Alcohol use: No   • Drug use: No   • Sexual activity: Defer       FAMILY HISTORY:    Family History   Problem Relation Age of Onset   • Blindness Mother    • Other Mother         arteriosclerotic cardiovascular disease    • Diabetes Mother    • Osteoporosis Mother    • Stroke Mother    • Cancer Brother    • Lung cancer Sister    • Osteoporosis Sister    • Stroke Sister    • Stroke Other        REVIEW OF SYSTEMS:    Review of Systems   Reason unable to perform ROS: ROS per nursing and patient.   Constitutional: Negative for activity change, appetite change, chills, diaphoresis, fatigue, fever, unexpected weight gain and unexpected weight loss.   HENT: Negative for congestion, hearing loss, mouth sores, nosebleeds, postnasal drip, rhinorrhea, sneezing, sore throat and trouble swallowing.    Eyes: Negative for pain, discharge, redness, itching and visual disturbance.   Respiratory: Negative for cough, choking, shortness of breath and wheezing.    Cardiovascular: Positive for leg swelling (increased). Negative for palpitations.        Gen edema   Gastrointestinal: Negative for abdominal distention, abdominal pain, blood in stool, constipation, diarrhea, nausea, vomiting and indigestion.   Endocrine: Negative for polydipsia and polyuria.   Genitourinary: Positive for urinary incontinence. Negative for decreased urine volume, difficulty urinating, frequency, hematuria and urgency.   Musculoskeletal: Positive for arthralgias, back pain and gait problem. Negative for joint swelling, myalgias and neck pain.   Skin: Negative for color change, dry skin, rash and skin lesions.   Allergic/Immunologic:  Negative for environmental allergies.   Neurological: Positive for weakness, memory problem and confusion. Negative for seizures and speech difficulty.   Psychiatric/Behavioral: Negative for agitation, behavioral problems, dysphoric mood, hallucinations, self-injury, sleep disturbance, suicidal ideas, negative for hyperactivity, depressed mood and stress. The patient is not nervous/anxious.          PHYSICAL EXAMINATION:   VITAL SIGNS:   Vitals:    22 0848   BP: 134/74   Pulse: 78   Resp: 20   Temp: 97.8 °F (36.6 °C)   SpO2: 97%   Weight: 111 kg (244 lb)       Physical Exam   Constitutional: Vital signs are normal. She appears well-developed and well-nourished.   HENT:   Head: Normocephalic.   Right Ear: External ear normal.   Left Ear: External ear normal.   Nose: Nose normal.   Eyes: Conjunctivae are normal.   Cardiovascular: Normal rate, regular rhythm and normal heart sounds.   Edema BUE   Pulmonary/Chest: Effort normal. No respiratory distress. She has no wheezes. She has no rhonchi. She has no rales.   Abdominal: Soft. Bowel sounds are normal. She exhibits no distension. There is no abdominal tenderness.   Musculoskeletal:      Thoracic back: She exhibits decreased range of motion and pain.      Right lower le+ Edema present.      Left lower le+ Edema present.      Comments: Weakness BLE   Neurological: She is alert.   Skin: Skin is warm and dry. Turgor is normal. No rash noted.   Psychiatric: Her speech is normal and behavior is normal. Mood normal. Cognition and memory are impaired.   Nursing note and vitals reviewed.      RECORDS REVIEW:   I have reviewed and interpreted the following lab test results  obtained at the time of the visit today.     ASSESSMENT     Diagnoses and all orders for this visit:    1. Acute on chronic alteration in mental status    2. History of UTI    3. Edema of all four extremities    4. Chronic congestive heart failure, unspecified heart failure type (HCC)    5.  Impaired mobility and ADLs        PLAN    Acute on chronic mental alteration/history of UTI  -UA C&S is pending.  Will follow-up with results.    Edema all extremities/CHF  -Will increase Bumex to 1 mg p.o. twice daily x3 days and nursing to weigh Monday.  Will follow-up and continue to monitor.    Nursing encouraged to keep me informed of any acute changes, lack of improvement, or any new concerning symptoms.    Nursing to continue supportive care for all ADLs.      [x]  Discussed Patient in detail with nursing/staff, addressed all needs today.     [x]  Plan of Care Reviewed   [x]  PT/OT Reviewed   [x]  Order Changes  []  Discharge Plans Reviewed  [x]  Advance Directive on file with Nursing Home.   [x]  POA on file with Nursing Home.   [x]  Code Status listed: []  Full Code   [x]  DNR         “I confirm accuracy of unchanged data/findings which have been carried forward from previous visit, as well as I have updated appropriately those that have changed.”                                                  Mita Joyce, APRN.

## 2022-03-04 ENCOUNTER — NURSING HOME (OUTPATIENT)
Dept: FAMILY MEDICINE CLINIC | Facility: CLINIC | Age: 82
End: 2022-03-04

## 2022-03-04 VITALS
SYSTOLIC BLOOD PRESSURE: 138 MMHG | OXYGEN SATURATION: 97 % | TEMPERATURE: 98.2 F | RESPIRATION RATE: 18 BRPM | DIASTOLIC BLOOD PRESSURE: 72 MMHG | HEART RATE: 78 BPM

## 2022-03-04 DIAGNOSIS — R33.9 URINARY RETENTION: Primary | ICD-10-CM

## 2022-03-04 DIAGNOSIS — Z74.09 IMPAIRED MOBILITY AND ADLS: ICD-10-CM

## 2022-03-04 DIAGNOSIS — Z78.9 IMPAIRED MOBILITY AND ADLS: ICD-10-CM

## 2022-03-04 PROCEDURE — 99308 SBSQ NF CARE LOW MDM 20: CPT | Performed by: NURSE PRACTITIONER

## 2022-03-06 NOTE — PROGRESS NOTES
Nursing Home Follow Up Note      Layo Arango DO []   WILLIAM Alfaro [x]  [address] [place] [address]  Phone: [phone]  Fax: [phone] Dorcas Duncan MD []    Dc Fernandez DO []   [address] [place] [address]  Phone: [phone]  Fax: [phone]     PATIENT NAME: Danae Harmon                                                                          YOB: 1940           DATE OF SERVICE: 3/4/2022  FACILITY:  [][facility]   [] [facility]   [] [facility]   [x] [facility]   [] Other ____________________________________________________________________      CHIEF COMPLAINT:     Follow up urinary retention during the night.       HISTORY OF PRESENT ILLNESS:      Nursing reports that patient had no urinary out put yesterday and evening and into the night so I&O cath performed. Nursing reports large amount of urine drained but no specific amount given. UA C&S performed and sent to lab today. She has been urinating well since per nursing.  She is resting in bed without complaints or signs and symptoms of distress.      PAST MEDICAL & SURGICAL HISTORY:   Past Medical History:   Diagnosis Date   • Colon polyp    • Osteoporosis    • Pain in thoracic spine    • Pneumonia    • UTI (urinary tract infection)       Past Surgical History:   Procedure Laterality Date   • BLADDER SURGERY  2000    bladder suspension   • CERVICAL LAMINECTOMY     • COLONOSCOPY     • COLOSTOMY  1979    temporary sigmoid   • HYSTERECTOMY           MEDICATIONS:  I have reviewed and reconciled the patients medication list in the patients chart at the skilled nursing facility today.      ALLERGIES:    Allergies   Allergen Reactions   • Lexapro [Escitalopram Oxalate] Nausea Only         SOCIAL HISTORY:    Social History     Socioeconomic History   • Marital status:    Tobacco Use   • Smoking status: Never Smoker   • Smokeless tobacco: Never Used   Substance and Sexual Activity   • Alcohol use:  No   • Drug use: No   • Sexual activity: Defer       FAMILY HISTORY:    Family History   Problem Relation Age of Onset   • Blindness Mother    • Other Mother         arteriosclerotic cardiovascular disease    • Diabetes Mother    • Osteoporosis Mother    • Stroke Mother    • Cancer Brother    • Lung cancer Sister    • Osteoporosis Sister    • Stroke Sister    • Stroke Other        REVIEW OF SYSTEMS:    Review of Systems   Reason unable to perform ROS: ROS per nursing and patient.   Constitutional: Negative for activity change, appetite change, chills, diaphoresis, fatigue, fever, unexpected weight gain and unexpected weight loss.   HENT: Negative for congestion, hearing loss, mouth sores, nosebleeds, postnasal drip, rhinorrhea, sneezing, sore throat and trouble swallowing.    Eyes: Negative for pain, discharge, redness, itching and visual disturbance.   Respiratory: Negative for cough, choking, shortness of breath and wheezing.    Cardiovascular: Positive for leg swelling. Negative for palpitations.        Gen edema   Gastrointestinal: Negative for abdominal distention, abdominal pain, blood in stool, constipation, diarrhea, nausea, vomiting and indigestion.   Endocrine: Negative for polydipsia and polyuria.   Genitourinary: Positive for urinary incontinence. Negative for decreased urine volume, difficulty urinating, frequency, hematuria and urgency.   Musculoskeletal: Positive for arthralgias, back pain and gait problem. Negative for joint swelling, myalgias and neck pain.   Skin: Negative for color change, dry skin, rash and skin lesions.   Allergic/Immunologic: Negative for environmental allergies.   Neurological: Positive for weakness, memory problem and confusion. Negative for seizures and speech difficulty.   Psychiatric/Behavioral: Negative for agitation, behavioral problems, dysphoric mood, hallucinations, self-injury, sleep disturbance, suicidal ideas, negative for hyperactivity, depressed mood and stress.  The patient is not nervous/anxious.          PHYSICAL EXAMINATION:   VITAL SIGNS:   Vitals:    22 1059   BP: 138/72   Pulse: 78   Resp: 18   Temp: 98.2 °F (36.8 °C)   SpO2: 97%       Physical Exam   Constitutional: Vital signs are normal. She appears well-developed and well-nourished.   HENT:   Head: Normocephalic.   Right Ear: External ear normal.   Left Ear: External ear normal.   Nose: Nose normal.   Eyes: Conjunctivae are normal.   Cardiovascular: Normal rate, regular rhythm and normal heart sounds.   Edema BUE   Pulmonary/Chest: Effort normal. No respiratory distress. She has no wheezes. She has no rhonchi. She has no rales.   Abdominal: Soft. Bowel sounds are normal. She exhibits no distension. There is no abdominal tenderness.   Musculoskeletal:      Thoracic back: She exhibits decreased range of motion and pain.      Right lower le+ Edema present.      Left lower le+ Edema present.      Comments: Weakness BLE   Neurological: She is alert.   Skin: Skin is warm and dry. Turgor is normal. No rash noted.   Psychiatric: Her speech is normal and behavior is normal. Mood normal. Cognition and memory are impaired.   Nursing note and vitals reviewed.      RECORDS REVIEW:   I have reviewed and interpreted the following lab test results  obtained at the time of the visit today.     ASSESSMENT     Diagnoses and all orders for this visit:    1. Urinary retention (Primary)    2. Impaired mobility and ADLs        PLAN    Urinary retention  -UA C&S pending. Will follow up with results and monitor. If patient experiences urinary retention again, nursing to anchor clark.  Will follow-up and continue to monitor.    Nursing encouraged to keep me informed of any acute changes, lack of improvement, or any new concerning symptoms.    Nursing to continue supportive care for all ADLs.      [x]  Discussed Patient in detail with nursing/staff, addressed all needs today.     [x]  Plan of Care Reviewed   [x]  PT/OT  Reviewed   [x]  Order Changes  []  Discharge Plans Reviewed  [x]  Advance Directive on file with Nursing Home.   [x]  POA on file with Nursing Home.   [x]  Code Status listed: []  Full Code   [x]  DNR         “I confirm accuracy of unchanged data/findings which have been carried forward from previous visit, as well as I have updated appropriately those that have changed.”                                                  Mita Joyce, APRN.

## 2022-03-08 ENCOUNTER — NURSING HOME (OUTPATIENT)
Dept: FAMILY MEDICINE CLINIC | Facility: CLINIC | Age: 82
End: 2022-03-08

## 2022-03-08 VITALS
DIASTOLIC BLOOD PRESSURE: 74 MMHG | RESPIRATION RATE: 20 BRPM | HEART RATE: 81 BPM | BODY MASS INDEX: 46.1 KG/M2 | SYSTOLIC BLOOD PRESSURE: 134 MMHG | WEIGHT: 244 LBS | TEMPERATURE: 97.4 F

## 2022-03-08 DIAGNOSIS — B96.29 UTI DUE TO EXTENDED-SPECTRUM BETA LACTAMASE (ESBL) PRODUCING ESCHERICHIA COLI: ICD-10-CM

## 2022-03-08 DIAGNOSIS — R60.0 BILATERAL LOWER EXTREMITY EDEMA: Primary | ICD-10-CM

## 2022-03-08 DIAGNOSIS — Z16.12 UTI DUE TO EXTENDED-SPECTRUM BETA LACTAMASE (ESBL) PRODUCING ESCHERICHIA COLI: ICD-10-CM

## 2022-03-08 DIAGNOSIS — Z74.09 IMPAIRED MOBILITY AND ADLS: ICD-10-CM

## 2022-03-08 DIAGNOSIS — Z78.9 IMPAIRED MOBILITY AND ADLS: ICD-10-CM

## 2022-03-08 DIAGNOSIS — N39.0 UTI DUE TO EXTENDED-SPECTRUM BETA LACTAMASE (ESBL) PRODUCING ESCHERICHIA COLI: ICD-10-CM

## 2022-03-08 DIAGNOSIS — I50.43 ACUTE ON CHRONIC COMBINED SYSTOLIC AND DIASTOLIC CHF (CONGESTIVE HEART FAILURE): ICD-10-CM

## 2022-03-08 PROCEDURE — 99309 SBSQ NF CARE MODERATE MDM 30: CPT | Performed by: NURSE PRACTITIONER

## 2022-03-08 NOTE — PROGRESS NOTES
Nursing Home Follow Up Note      Layo Arango DO []   WILLIAM Alfaro [x]  [address] [place] [address]  Phone: [phone]  Fax: [phone] Dorcas Duncan MD []    Dc Fernandez DO []   [address] [place] [address]  Phone: [phone]  Fax: [phone]     PATIENT NAME: Danae Harmon                                                                          YOB: 1940           DATE OF SERVICE: 3/8/2022  FACILITY:  [][place]   [] [facility]   [] [facility]   [x] [facility]   [] Other ____________________________________________________________________      CHIEF COMPLAINT:     Increased edema and weight gain the past couple weeks.    Follow up UTI      HISTORY OF PRESENT ILLNESS:      Patient with increased LE edema and 6 pound weight gain last week. She has CHF and history of increased edema in the past. She is on diuretic. She has not had any increased work of breathing or 02 demand. She is resting in bed at this time without complaints or s/s of any distress.     She is currently being treated for E coli ESBL UTI. She was started on Augmentin yesterday per C&S. She is tolerating well with no SE.      PAST MEDICAL & SURGICAL HISTORY:   Past Medical History:   Diagnosis Date   • Colon polyp    • Osteoporosis    • Pain in thoracic spine    • Pneumonia    • UTI (urinary tract infection)       Past Surgical History:   Procedure Laterality Date   • BLADDER SURGERY  2000    bladder suspension   • CERVICAL LAMINECTOMY     • COLONOSCOPY     • COLOSTOMY  1979    temporary sigmoid   • HYSTERECTOMY           MEDICATIONS:  I have reviewed and reconciled the patients medication list in the patients chart at the skilled nursing facility today.      ALLERGIES:    Allergies   Allergen Reactions   • Lexapro [Escitalopram Oxalate] Nausea Only         SOCIAL HISTORY:    Social History     Socioeconomic History   • Marital status:    Tobacco Use   • Smoking status: Never  Smoker   • Smokeless tobacco: Never Used   Substance and Sexual Activity   • Alcohol use: No   • Drug use: No   • Sexual activity: Defer       FAMILY HISTORY:    Family History   Problem Relation Age of Onset   • Blindness Mother    • Other Mother         arteriosclerotic cardiovascular disease    • Diabetes Mother    • Osteoporosis Mother    • Stroke Mother    • Cancer Brother    • Lung cancer Sister    • Osteoporosis Sister    • Stroke Sister    • Stroke Other        REVIEW OF SYSTEMS:    Review of Systems   Reason unable to perform ROS: ROS per nursing and patient.   Constitutional: Positive for unexpected weight gain. Negative for activity change, appetite change, chills, diaphoresis, fatigue, fever and unexpected weight loss.   HENT: Negative for congestion, hearing loss, mouth sores, nosebleeds, postnasal drip, rhinorrhea, sneezing, sore throat and trouble swallowing.    Eyes: Negative for pain, discharge, redness, itching and visual disturbance.   Respiratory: Negative for cough, choking, shortness of breath and wheezing.    Cardiovascular: Positive for leg swelling. Negative for palpitations.        Gen edema, greater LE   Gastrointestinal: Negative for abdominal distention, abdominal pain, blood in stool, constipation, diarrhea, nausea, vomiting and indigestion.   Endocrine: Negative for polydipsia and polyuria.   Genitourinary: Positive for urinary incontinence. Negative for decreased urine volume, difficulty urinating, frequency, hematuria and urgency.   Musculoskeletal: Positive for arthralgias (chronic) and gait problem. Negative for joint swelling, myalgias and neck pain.   Skin: Negative for color change, dry skin, rash and skin lesions.   Allergic/Immunologic: Negative for environmental allergies.   Neurological: Positive for weakness, memory problem and confusion. Negative for seizures and speech difficulty.   Psychiatric/Behavioral: Negative for agitation, behavioral problems, dysphoric mood,  hallucinations, self-injury, sleep disturbance, suicidal ideas, negative for hyperactivity, depressed mood and stress. The patient is not nervous/anxious.          PHYSICAL EXAMINATION:   VITAL SIGNS:   Vitals:    22 1401   BP: 134/74   Pulse: 81   Resp: 20   Temp: 97.4 °F (36.3 °C)   Weight: 111 kg (244 lb)       Physical Exam   Constitutional: Vital signs are normal. She appears well-developed and well-nourished.   HENT:   Head: Normocephalic.   Right Ear: External ear normal.   Left Ear: External ear normal.   Nose: Nose normal.   Eyes: Conjunctivae are normal.   Cardiovascular: Normal rate, regular rhythm and normal heart sounds.   Edema BUE   Pulmonary/Chest: Effort normal. No respiratory distress. She has no wheezes. She has no rhonchi. She has no rales.   Abdominal: Soft. Bowel sounds are normal. She exhibits no distension. There is no abdominal tenderness.   Musculoskeletal:      Thoracic back: She exhibits decreased range of motion and pain.      Right lower le+ Edema present.      Left lower le+ Edema present.      Comments: Weakness BLE   Neurological: She is alert.   Skin: Skin is warm and dry. Turgor is normal. No rash noted.   Psychiatric: Her speech is normal and behavior is normal. Mood normal. Cognition and memory are impaired.   Nursing note and vitals reviewed.      RECORDS REVIEW:   I have reviewed and interpreted the following lab test results  obtained at the time of the visit today.     ASSESSMENT     Diagnoses and all orders for this visit:    1. Bilateral lower extremity edema (Primary)    2. Acute on chronic combined systolic and diastolic CHF (congestive heart failure) (HCC)    3. UTI due to extended-spectrum beta lactamase (ESBL) producing Escherichia coli    4. Impaired mobility and ADLs        PLAN    BLE edema/acute on chronic CHF  -Give Bumex 2 mg now then bid x 2 days. Stop Bumex 0.5 mg bid and start Bumex 1 mg bid. Give potassium 10 meq daily. Will weigh again Friday  and follow up. Get BMP Monday.    ESBL UTI  -Continue Augmentin bid until completion. Will follow up and monitor.     Nursing encouraged to keep me informed of any acute changes, lack of improvement, or any new concerning symptoms.    Nursing to continue supportive care for all ADLs.      [x]  Discussed Patient in detail with nursing/staff, addressed all needs today.     [x]  Plan of Care Reviewed   [x]  PT/OT Reviewed   [x]  Order Changes  []  Discharge Plans Reviewed  [x]  Advance Directive on file with Nursing Home.   [x]  POA on file with Nursing Home.   [x]  Code Status listed: []  Full Code   [x]  DNR         “I confirm accuracy of unchanged data/findings which have been carried forward from previous visit, as well as I have updated appropriately those that have changed.”                                                    Mita Joyce, APRN.

## 2022-03-11 ENCOUNTER — NURSING HOME (OUTPATIENT)
Dept: FAMILY MEDICINE CLINIC | Facility: CLINIC | Age: 82
End: 2022-03-11

## 2022-03-11 VITALS
RESPIRATION RATE: 20 BRPM | SYSTOLIC BLOOD PRESSURE: 134 MMHG | OXYGEN SATURATION: 97 % | WEIGHT: 244 LBS | DIASTOLIC BLOOD PRESSURE: 74 MMHG | BODY MASS INDEX: 46.1 KG/M2 | TEMPERATURE: 98 F | HEART RATE: 78 BPM

## 2022-03-11 DIAGNOSIS — I50.33 ACUTE ON CHRONIC DIASTOLIC CHF (CONGESTIVE HEART FAILURE): Primary | ICD-10-CM

## 2022-03-11 DIAGNOSIS — Z78.9 IMPAIRED MOBILITY AND ADLS: ICD-10-CM

## 2022-03-11 DIAGNOSIS — Z74.09 IMPAIRED MOBILITY AND ADLS: ICD-10-CM

## 2022-03-11 DIAGNOSIS — R60.1 GENERALIZED EDEMA: ICD-10-CM

## 2022-03-11 PROCEDURE — 99308 SBSQ NF CARE LOW MDM 20: CPT | Performed by: NURSE PRACTITIONER

## 2022-03-14 NOTE — PROGRESS NOTES
Nursing Home Follow Up Note      Layo Arango DO []   WILLIAM Alfaro [x]  [address] [place] [address]  Phone: [phone]  Fax: [phone] Dorcas Duncan MD []    Dc Fernandez DO []   [address] [place] [address]  Phone: [phone]  Fax: [phone]     PATIENT NAME: Danae Harmon                                                                          YOB: 1940           DATE OF SERVICE: 3/11/2022  FACILITY:  [][place]   [] [place]   [] [facility]   [x] [facility]   [] Other ____________________________________________________________________      CHIEF COMPLAINT:     Follow up increased edema and weight gain.    HISTORY OF PRESENT ILLNESS:      Patient with increased LE edema and weight gain last week and this week. Bumex was increased on 3/8. She does not have any increased edema, no shortness of breath or increased 02 demand. She has not had a significant improvement of edema either. She will be weighed later today.      PAST MEDICAL & SURGICAL HISTORY:   Past Medical History:   Diagnosis Date   • Colon polyp    • Osteoporosis    • Pain in thoracic spine    • Pneumonia    • UTI (urinary tract infection)       Past Surgical History:   Procedure Laterality Date   • BLADDER SURGERY  2000    bladder suspension   • CERVICAL LAMINECTOMY     • COLONOSCOPY     • COLOSTOMY  1979    temporary sigmoid   • HYSTERECTOMY           MEDICATIONS:  I have reviewed and reconciled the patients medication list in the patients chart at the skilled nursing facility today.      ALLERGIES:    Allergies   Allergen Reactions   • Lexapro [Escitalopram Oxalate] Nausea Only         SOCIAL HISTORY:    Social History     Socioeconomic History   • Marital status:    Tobacco Use   • Smoking status: Never Smoker   • Smokeless tobacco: Never Used   Substance and Sexual Activity   • Alcohol use: No   • Drug use: No   • Sexual activity: Defer       FAMILY HISTORY:    Family  History   Problem Relation Age of Onset   • Blindness Mother    • Other Mother         arteriosclerotic cardiovascular disease    • Diabetes Mother    • Osteoporosis Mother    • Stroke Mother    • Cancer Brother    • Lung cancer Sister    • Osteoporosis Sister    • Stroke Sister    • Stroke Other        REVIEW OF SYSTEMS:    Review of Systems   Reason unable to perform ROS: ROS per nursing and patient.   Constitutional: Negative for activity change, appetite change, chills, diaphoresis, fatigue and fever.   HENT: Negative for congestion, hearing loss, mouth sores, nosebleeds, postnasal drip, rhinorrhea, sneezing, sore throat and trouble swallowing.    Eyes: Negative for pain, discharge, redness, itching and visual disturbance.   Respiratory: Negative for cough, choking, shortness of breath and wheezing.    Cardiovascular: Positive for leg swelling. Negative for palpitations.        Gen edema   Gastrointestinal: Negative for abdominal distention, abdominal pain, blood in stool, constipation, diarrhea, nausea, vomiting and indigestion.   Endocrine: Negative for polydipsia and polyuria.   Genitourinary: Positive for urinary incontinence. Negative for decreased urine volume, difficulty urinating, frequency, hematuria and urgency.   Musculoskeletal: Positive for arthralgias (chronic) and gait problem. Negative for joint swelling, myalgias and neck pain.   Skin: Negative for color change, dry skin, rash and skin lesions.   Allergic/Immunologic: Negative for environmental allergies.   Neurological: Positive for weakness, memory problem and confusion. Negative for seizures and speech difficulty.   Psychiatric/Behavioral: Negative for agitation, behavioral problems, dysphoric mood, hallucinations, self-injury, sleep disturbance, suicidal ideas, negative for hyperactivity, depressed mood and stress. The patient is not nervous/anxious.          PHYSICAL EXAMINATION:   VITAL SIGNS:   Vitals:    03/11/22 0919   BP: 134/74    Pulse: 78   Resp: 20   Temp: 98 °F (36.7 °C)   SpO2: 97%   Weight: 111 kg (244 lb)       Physical Exam   Constitutional: Vital signs are normal. She appears well-developed and well-nourished.   HENT:   Head: Normocephalic.   Right Ear: External ear normal.   Left Ear: External ear normal.   Nose: Nose normal.   Eyes: Conjunctivae are normal.   Cardiovascular: Normal rate, regular rhythm and normal heart sounds.   Pulmonary/Chest: Effort normal. No respiratory distress. She has no wheezes. She has no rhonchi. She has no rales.   Abdominal: Soft. Bowel sounds are normal. She exhibits no distension. There is no abdominal tenderness.   Musculoskeletal:      Thoracic back: She exhibits decreased range of motion and pain.      Right lower le+ Edema present.      Left lower le+ Edema present.      Comments: Weakness BLE   Neurological: She is alert.   Skin: Skin is warm and dry. Turgor is normal. No rash noted.   Psychiatric: Her speech is normal and behavior is normal. Mood normal. Cognition and memory are impaired.   Nursing note and vitals reviewed.      RECORDS REVIEW:   I have reviewed and interpreted the following lab test results  obtained at the time of the visit today.     ASSESSMENT     Diagnoses and all orders for this visit:    1. Acute on chronic diastolic CHF (congestive heart failure) (HCC) (Primary)    2. Generalized edema    3. Impaired mobility and ADLs        PLAN    BLE edema/acute on chronic CHF  -Will have nursing weight patient today and notify me of weight. Continue Bumex as directed. Will follow up and continue to monitor.     Nursing encouraged to keep me informed of any acute changes, lack of improvement, or any new concerning symptoms.    Nursing to continue supportive care for all ADLs.      [x]  Discussed Patient in detail with nursing/staff, addressed all needs today.     [x]  Plan of Care Reviewed   [x]  PT/OT Reviewed   [x]  Order Changes  []  Discharge Plans Reviewed  [x]   Advance Directive on file with Nursing Home.   [x]  POA on file with Nursing Home.   [x]  Code Status listed: []  Full Code   [x]  DNR         “I confirm accuracy of unchanged data/findings which have been carried forward from previous visit, as well as I have updated appropriately those that have changed.”                                                    Mita Joyce, APRN.

## 2022-03-28 ENCOUNTER — NURSING HOME (OUTPATIENT)
Dept: FAMILY MEDICINE CLINIC | Facility: CLINIC | Age: 82
End: 2022-03-28

## 2022-03-28 VITALS
OXYGEN SATURATION: 93 % | DIASTOLIC BLOOD PRESSURE: 78 MMHG | HEART RATE: 70 BPM | TEMPERATURE: 97.9 F | RESPIRATION RATE: 20 BRPM | SYSTOLIC BLOOD PRESSURE: 148 MMHG

## 2022-03-28 DIAGNOSIS — I50.9 ACUTE ON CHRONIC HEART FAILURE, UNSPECIFIED HEART FAILURE TYPE: ICD-10-CM

## 2022-03-28 DIAGNOSIS — Z78.9 IMPAIRED MOBILITY AND ADLS: ICD-10-CM

## 2022-03-28 DIAGNOSIS — R60.0 BILATERAL LOWER EXTREMITY EDEMA: Primary | ICD-10-CM

## 2022-03-28 DIAGNOSIS — Z74.09 IMPAIRED MOBILITY AND ADLS: ICD-10-CM

## 2022-03-28 PROCEDURE — 99309 SBSQ NF CARE MODERATE MDM 30: CPT | Performed by: NURSE PRACTITIONER

## 2022-03-28 NOTE — PROGRESS NOTES
Nursing Home Follow Up Note      Layo Arango DO []   WILLIAM Alfaro [x]  [address] [place] [address]  Phone: [phone]  Fax: [phone] Dorcas Duncan MD []    Dc Fernandez DO []   [address] [place] [address]  Phone: [phone]  Fax: [phone]     PATIENT NAME: Danae Harmon                                                                          YOB: 1940           DATE OF SERVICE: 3/28/2022  FACILITY:  [][place]   [] [place]   [] [facility]   [x] [facility]   [] Other ____________________________________________________________________      CHIEF COMPLAINT:     Follow up CHF/edema since increasing Bumex.    HISTORY OF PRESENT ILLNESS:      Edema improved overall generalized since increasing Bumex to 1 mg twice daily but she continues to have edema BLE.  She has no increase work of breathing or O2 demand.  She is sitting in bed eating lunch with no signs and symptoms of any distress.  She has no complaints today.  She is pleasantly confused.    PAST MEDICAL & SURGICAL HISTORY:   Past Medical History:   Diagnosis Date   • Colon polyp    • Osteoporosis    • Pain in thoracic spine    • Pneumonia    • UTI (urinary tract infection)       Past Surgical History:   Procedure Laterality Date   • BLADDER SURGERY  2000    bladder suspension   • CERVICAL LAMINECTOMY     • COLONOSCOPY     • COLOSTOMY  1979    temporary sigmoid   • HYSTERECTOMY           MEDICATIONS:  I have reviewed and reconciled the patients medication list in the patients chart at the skilled nursing facility today.      ALLERGIES:    Allergies   Allergen Reactions   • Lexapro [Escitalopram Oxalate] Nausea Only         SOCIAL HISTORY:    Social History     Socioeconomic History   • Marital status:    Tobacco Use   • Smoking status: Never Smoker   • Smokeless tobacco: Never Used   Substance and Sexual Activity   • Alcohol use: No   • Drug use: No   • Sexual activity: Defer        FAMILY HISTORY:    Family History   Problem Relation Age of Onset   • Blindness Mother    • Other Mother         arteriosclerotic cardiovascular disease    • Diabetes Mother    • Osteoporosis Mother    • Stroke Mother    • Cancer Brother    • Lung cancer Sister    • Osteoporosis Sister    • Stroke Sister    • Stroke Other        REVIEW OF SYSTEMS:    Review of Systems   Reason unable to perform ROS: ROS per nursing and patient.   Constitutional: Negative for activity change, appetite change, chills, diaphoresis, fatigue and fever.   HENT: Negative for congestion, hearing loss, mouth sores, nosebleeds, postnasal drip, rhinorrhea, sneezing, sore throat and trouble swallowing.    Eyes: Negative for pain, discharge, redness, itching and visual disturbance.   Respiratory: Negative for cough, choking, shortness of breath and wheezing.    Cardiovascular: Positive for leg swelling. Negative for palpitations.   Gastrointestinal: Negative for abdominal distention, abdominal pain, blood in stool, constipation, diarrhea, nausea, vomiting and indigestion.   Endocrine: Negative for polydipsia and polyuria.   Genitourinary: Positive for urinary incontinence. Negative for decreased urine volume, difficulty urinating, frequency, hematuria and urgency.   Musculoskeletal: Positive for arthralgias (chronic) and gait problem. Negative for joint swelling, myalgias and neck pain.   Skin: Negative for color change, dry skin, rash and skin lesions.   Allergic/Immunologic: Negative for environmental allergies.   Neurological: Positive for weakness, memory problem and confusion. Negative for seizures and speech difficulty.   Psychiatric/Behavioral: Negative for agitation, behavioral problems, dysphoric mood, hallucinations, self-injury, sleep disturbance, suicidal ideas, negative for hyperactivity, depressed mood and stress. The patient is not nervous/anxious.          PHYSICAL EXAMINATION:   VITAL SIGNS:   Vitals:    03/28/22 1328    BP: 148/78   Pulse: 70   Resp: 20   Temp: 97.9 °F (36.6 °C)   SpO2: 93%       Physical Exam   Constitutional: Vital signs are normal. She appears well-developed and well-nourished.   HENT:   Head: Normocephalic.   Right Ear: External ear normal.   Left Ear: External ear normal.   Nose: Nose normal.   Eyes: Conjunctivae are normal.   Cardiovascular: Normal rate, regular rhythm and normal heart sounds.   Pulmonary/Chest: Effort normal. No respiratory distress. She has no wheezes. She has no rhonchi. She has no rales.   Abdominal: Soft. Bowel sounds are normal. She exhibits no distension. There is no abdominal tenderness.   Musculoskeletal:      Thoracic back: She exhibits decreased range of motion and pain.      Right lower le+ Edema present.      Left lower le+ Edema present.      Comments: Weakness BLE   Neurological: She is alert.   Skin: Skin is warm and dry. Turgor is normal. No rash noted.   Psychiatric: Her speech is normal and behavior is normal. Mood normal. Cognition and memory are impaired.   Nursing note and vitals reviewed.      RECORDS REVIEW:   I have reviewed and interpreted the following lab test results  obtained at the time of the visit today.     ASSESSMENT     Diagnoses and all orders for this visit:    1. Bilateral lower extremity edema (Primary)    2. Acute on chronic heart failure, unspecified heart failure type (HCC)    3. Impaired mobility and ADLs        PLAN    BLE edema/acute on chronic CHF  -Start Aldactone 25 mg daily. Stop potassium. Continue Bumex as directed. Weigh patient in am and weekly. Will follow up and continue monitor.     Nursing encouraged to keep me informed of any acute changes, lack of improvement, or any new concerning symptoms.    Nursing to continue supportive care for all ADLs.      [x]  Discussed Patient in detail with nursing/staff, addressed all needs today.     [x]  Plan of Care Reviewed   [x]  PT/OT Reviewed   [x]  Order Changes  []  Discharge Plans  Reviewed  [x]  Advance Directive on file with Nursing Home.   [x]  POA on file with Nursing Home.   [x]  Code Status listed: []  Full Code   [x]  DNR         “I confirm accuracy of unchanged data/findings which have been carried forward from previous visit, as well as I have updated appropriately those that have changed.”                                                      Mita Joyce, APRN.

## 2022-03-31 ENCOUNTER — NURSING HOME (OUTPATIENT)
Dept: INTERNAL MEDICINE | Facility: CLINIC | Age: 82
End: 2022-03-31

## 2022-03-31 VITALS
SYSTOLIC BLOOD PRESSURE: 129 MMHG | BODY MASS INDEX: 47.61 KG/M2 | OXYGEN SATURATION: 91 % | TEMPERATURE: 97.2 F | HEART RATE: 65 BPM | RESPIRATION RATE: 18 BRPM | DIASTOLIC BLOOD PRESSURE: 82 MMHG | WEIGHT: 252 LBS

## 2022-03-31 DIAGNOSIS — R60.0 LOWER EXTREMITY EDEMA: ICD-10-CM

## 2022-03-31 DIAGNOSIS — F02.818 LATE ONSET ALZHEIMER'S DISEASE WITH BEHAVIORAL DISTURBANCE: Primary | ICD-10-CM

## 2022-03-31 DIAGNOSIS — R53.83 OTHER FATIGUE: ICD-10-CM

## 2022-03-31 DIAGNOSIS — I10 BENIGN ESSENTIAL HYPERTENSION: ICD-10-CM

## 2022-03-31 DIAGNOSIS — E78.2 MIXED HYPERLIPIDEMIA: ICD-10-CM

## 2022-03-31 DIAGNOSIS — F33.1 MODERATE EPISODE OF RECURRENT MAJOR DEPRESSIVE DISORDER: ICD-10-CM

## 2022-03-31 DIAGNOSIS — G30.1 LATE ONSET ALZHEIMER'S DISEASE WITH BEHAVIORAL DISTURBANCE: Primary | ICD-10-CM

## 2022-03-31 DIAGNOSIS — R14.0 ABDOMINAL DISTENSION (GASEOUS): ICD-10-CM

## 2022-03-31 DIAGNOSIS — M54.40 CHRONIC LOW BACK PAIN WITH SCIATICA, SCIATICA LATERALITY UNSPECIFIED, UNSPECIFIED BACK PAIN LATERALITY: ICD-10-CM

## 2022-03-31 DIAGNOSIS — E11.9 TYPE 2 DIABETES MELLITUS WITHOUT COMPLICATION, WITHOUT LONG-TERM CURRENT USE OF INSULIN: ICD-10-CM

## 2022-03-31 DIAGNOSIS — G89.29 CHRONIC LOW BACK PAIN WITH SCIATICA, SCIATICA LATERALITY UNSPECIFIED, UNSPECIFIED BACK PAIN LATERALITY: ICD-10-CM

## 2022-03-31 DIAGNOSIS — F32.89 OTHER DEPRESSION: ICD-10-CM

## 2022-03-31 PROCEDURE — 99308 SBSQ NF CARE LOW MDM 20: CPT | Performed by: INTERNAL MEDICINE

## 2022-04-01 ENCOUNTER — APPOINTMENT (OUTPATIENT)
Dept: CT IMAGING | Facility: HOSPITAL | Age: 82
End: 2022-04-01

## 2022-04-01 ENCOUNTER — HOSPITAL ENCOUNTER (EMERGENCY)
Facility: HOSPITAL | Age: 82
Discharge: HOME | End: 2022-04-02
Attending: EMERGENCY MEDICINE | Admitting: EMERGENCY MEDICINE

## 2022-04-01 ENCOUNTER — APPOINTMENT (OUTPATIENT)
Dept: GENERAL RADIOLOGY | Facility: HOSPITAL | Age: 82
End: 2022-04-01

## 2022-04-01 DIAGNOSIS — R09.02 HYPOXIA: ICD-10-CM

## 2022-04-01 DIAGNOSIS — R06.02 SHORTNESS OF BREATH: Primary | ICD-10-CM

## 2022-04-01 LAB
A-A DO2: 89.4 MMHG
ALBUMIN SERPL-MCNC: 3.4 G/DL (ref 3.5–5.2)
ALBUMIN/GLOB SERPL: 1.4 G/DL
ALP SERPL-CCNC: 95 U/L (ref 39–117)
ALT SERPL W P-5'-P-CCNC: 9 U/L (ref 1–33)
ANION GAP SERPL CALCULATED.3IONS-SCNC: 10.5 MMOL/L (ref 5–15)
ARTERIAL PATENCY WRIST A: POSITIVE
AST SERPL-CCNC: 17 U/L (ref 1–32)
ATMOSPHERIC PRESS: 738 MMHG
B PARAPERT DNA SPEC QL NAA+PROBE: NOT DETECTED
B PERT DNA SPEC QL NAA+PROBE: NOT DETECTED
BASE EXCESS BLDA CALC-SCNC: 5.5 MMOL/L (ref 0–2)
BASOPHILS # BLD AUTO: 0.01 10*3/MM3 (ref 0–0.2)
BASOPHILS NFR BLD AUTO: 0.2 % (ref 0–1.5)
BDY SITE: ABNORMAL
BILIRUB SERPL-MCNC: 0.4 MG/DL (ref 0–1.2)
BUN SERPL-MCNC: 22 MG/DL (ref 8–23)
BUN/CREAT SERPL: 31 (ref 7–25)
C PNEUM DNA NPH QL NAA+NON-PROBE: NOT DETECTED
CALCIUM SPEC-SCNC: 8.7 MG/DL (ref 8.6–10.5)
CHLORIDE SERPL-SCNC: 103 MMOL/L (ref 98–107)
CO2 SERPL-SCNC: 27.5 MMOL/L (ref 22–29)
COHGB MFR BLD: 1.2 % (ref 0–2)
CREAT SERPL-MCNC: 0.71 MG/DL (ref 0.57–1)
D-LACTATE SERPL-SCNC: 2.1 MMOL/L (ref 0.5–2)
DEPRECATED RDW RBC AUTO: 47.8 FL (ref 37–54)
EGFRCR SERPLBLD CKD-EPI 2021: 86.6 ML/MIN/1.73
EOSINOPHIL # BLD AUTO: 0.03 10*3/MM3 (ref 0–0.4)
EOSINOPHIL NFR BLD AUTO: 0.7 % (ref 0.3–6.2)
ERYTHROCYTE [DISTWIDTH] IN BLOOD BY AUTOMATED COUNT: 13.4 % (ref 12.3–15.4)
FLUAV SUBTYP SPEC NAA+PROBE: NOT DETECTED
FLUBV RNA ISLT QL NAA+PROBE: NOT DETECTED
GAS FLOW AIRWAY: 2.5 LPM
GLOBULIN UR ELPH-MCNC: 2.5 GM/DL
GLUCOSE SERPL-MCNC: 116 MG/DL (ref 65–99)
HADV DNA SPEC NAA+PROBE: NOT DETECTED
HCO3 BLDA-SCNC: 29.8 MMOL/L (ref 22–28)
HCOV 229E RNA SPEC QL NAA+PROBE: NOT DETECTED
HCOV HKU1 RNA SPEC QL NAA+PROBE: NOT DETECTED
HCOV NL63 RNA SPEC QL NAA+PROBE: NOT DETECTED
HCOV OC43 RNA SPEC QL NAA+PROBE: NOT DETECTED
HCT VFR BLD AUTO: 35.5 % (ref 34–46.6)
HCT VFR BLD CALC: 37.4 %
HGB BLD-MCNC: 11.7 G/DL (ref 12–15.9)
HMPV RNA NPH QL NAA+NON-PROBE: NOT DETECTED
HOLD SPECIMEN: NORMAL
HOLD SPECIMEN: NORMAL
HPIV1 RNA ISLT QL NAA+PROBE: NOT DETECTED
HPIV2 RNA SPEC QL NAA+PROBE: NOT DETECTED
HPIV3 RNA NPH QL NAA+PROBE: NOT DETECTED
HPIV4 P GENE NPH QL NAA+PROBE: NOT DETECTED
IMM GRANULOCYTES # BLD AUTO: 0.03 10*3/MM3 (ref 0–0.05)
IMM GRANULOCYTES NFR BLD AUTO: 0.7 % (ref 0–0.5)
INHALED O2 CONCENTRATION: 30 %
LYMPHOCYTES # BLD AUTO: 1.22 10*3/MM3 (ref 0.7–3.1)
LYMPHOCYTES NFR BLD AUTO: 27.5 % (ref 19.6–45.3)
M PNEUMO IGG SER IA-ACNC: NOT DETECTED
MCH RBC QN AUTO: 31.6 PG (ref 26.6–33)
MCHC RBC AUTO-ENTMCNC: 33 G/DL (ref 31.5–35.7)
MCV RBC AUTO: 95.9 FL (ref 79–97)
METHGB BLD QL: 0.4 % (ref 0–1.5)
MODALITY: ABNORMAL
MONOCYTES # BLD AUTO: 0.71 10*3/MM3 (ref 0.1–0.9)
MONOCYTES NFR BLD AUTO: 16 % (ref 5–12)
NEUTROPHILS NFR BLD AUTO: 2.44 10*3/MM3 (ref 1.7–7)
NEUTROPHILS NFR BLD AUTO: 54.9 % (ref 42.7–76)
NOTE: ABNORMAL
NRBC BLD AUTO-RTO: 0 /100 WBC (ref 0–0.2)
NT-PROBNP SERPL-MCNC: 234.1 PG/ML (ref 0–1800)
OXYHGB MFR BLDV: 94 % (ref 94–99)
PCO2 BLDA: 41.2 MM HG (ref 35–45)
PCO2 TEMP ADJ BLD: ABNORMAL MM[HG]
PH BLDA: 7.47 PH UNITS (ref 7.35–7.45)
PH, TEMP CORRECTED: ABNORMAL
PLATELET # BLD AUTO: 155 10*3/MM3 (ref 140–450)
PMV BLD AUTO: 10.5 FL (ref 6–12)
PO2 BLDA: 72 MM HG (ref 75–100)
PO2 TEMP ADJ BLD: ABNORMAL MM[HG]
POTASSIUM SERPL-SCNC: 3.8 MMOL/L (ref 3.5–5.2)
PROCALCITONIN SERPL-MCNC: 0.1 NG/ML (ref 0–0.25)
PROT SERPL-MCNC: 5.9 G/DL (ref 6–8.5)
RBC # BLD AUTO: 3.7 10*6/MM3 (ref 3.77–5.28)
RHINOVIRUS RNA SPEC NAA+PROBE: NOT DETECTED
RSV RNA NPH QL NAA+NON-PROBE: NOT DETECTED
SAO2 % BLDCOA: 95.5 % (ref 94–100)
SARS-COV-2 RNA NPH QL NAA+NON-PROBE: NOT DETECTED
SODIUM SERPL-SCNC: 141 MMOL/L (ref 136–145)
TROPONIN T SERPL-MCNC: <0.01 NG/ML (ref 0–0.03)
VENTILATOR MODE: ABNORMAL
WBC NRBC COR # BLD: 4.44 10*3/MM3 (ref 3.4–10.8)
WHOLE BLOOD HOLD SPECIMEN: NORMAL
WHOLE BLOOD HOLD SPECIMEN: NORMAL

## 2022-04-01 PROCEDURE — 87150 DNA/RNA AMPLIFIED PROBE: CPT | Performed by: EMERGENCY MEDICINE

## 2022-04-01 PROCEDURE — 0202U NFCT DS 22 TRGT SARS-COV-2: CPT | Performed by: EMERGENCY MEDICINE

## 2022-04-01 PROCEDURE — 93005 ELECTROCARDIOGRAM TRACING: CPT | Performed by: EMERGENCY MEDICINE

## 2022-04-01 PROCEDURE — 71275 CT ANGIOGRAPHY CHEST: CPT

## 2022-04-01 PROCEDURE — 36600 WITHDRAWAL OF ARTERIAL BLOOD: CPT

## 2022-04-01 PROCEDURE — 71045 X-RAY EXAM CHEST 1 VIEW: CPT

## 2022-04-01 PROCEDURE — 99284 EMERGENCY DEPT VISIT MOD MDM: CPT

## 2022-04-01 PROCEDURE — 85025 COMPLETE CBC W/AUTO DIFF WBC: CPT | Performed by: EMERGENCY MEDICINE

## 2022-04-01 PROCEDURE — 96374 THER/PROPH/DIAG INJ IV PUSH: CPT

## 2022-04-01 PROCEDURE — 83880 ASSAY OF NATRIURETIC PEPTIDE: CPT | Performed by: EMERGENCY MEDICINE

## 2022-04-01 PROCEDURE — 84145 PROCALCITONIN (PCT): CPT | Performed by: EMERGENCY MEDICINE

## 2022-04-01 PROCEDURE — 87040 BLOOD CULTURE FOR BACTERIA: CPT | Performed by: EMERGENCY MEDICINE

## 2022-04-01 PROCEDURE — 80053 COMPREHEN METABOLIC PANEL: CPT | Performed by: EMERGENCY MEDICINE

## 2022-04-01 PROCEDURE — 82805 BLOOD GASES W/O2 SATURATION: CPT

## 2022-04-01 PROCEDURE — 25010000002 METHYLPREDNISOLONE PER 125 MG: Performed by: EMERGENCY MEDICINE

## 2022-04-01 PROCEDURE — 87147 CULTURE TYPE IMMUNOLOGIC: CPT | Performed by: EMERGENCY MEDICINE

## 2022-04-01 PROCEDURE — 83605 ASSAY OF LACTIC ACID: CPT | Performed by: EMERGENCY MEDICINE

## 2022-04-01 PROCEDURE — 84484 ASSAY OF TROPONIN QUANT: CPT | Performed by: EMERGENCY MEDICINE

## 2022-04-01 PROCEDURE — 83050 HGB METHEMOGLOBIN QUAN: CPT

## 2022-04-01 PROCEDURE — 25010000002 IOPAMIDOL 61 % SOLUTION: Performed by: EMERGENCY MEDICINE

## 2022-04-01 PROCEDURE — 82375 ASSAY CARBOXYHB QUANT: CPT

## 2022-04-01 RX ORDER — ALBUTEROL SULFATE 90 UG/1
2 AEROSOL, METERED RESPIRATORY (INHALATION) ONCE
Status: COMPLETED | OUTPATIENT
Start: 2022-04-01 | End: 2022-04-01

## 2022-04-01 RX ORDER — SODIUM CHLORIDE 0.9 % (FLUSH) 0.9 %
10 SYRINGE (ML) INJECTION AS NEEDED
Status: DISCONTINUED | OUTPATIENT
Start: 2022-04-01 | End: 2022-04-02 | Stop reason: HOSPADM

## 2022-04-01 RX ORDER — METHYLPREDNISOLONE SODIUM SUCCINATE 125 MG/2ML
125 INJECTION, POWDER, LYOPHILIZED, FOR SOLUTION INTRAMUSCULAR; INTRAVENOUS ONCE
Status: COMPLETED | OUTPATIENT
Start: 2022-04-01 | End: 2022-04-01

## 2022-04-01 RX ADMIN — IOPAMIDOL 100 ML: 612 INJECTION, SOLUTION INTRAVENOUS at 23:52

## 2022-04-01 RX ADMIN — METHYLPREDNISOLONE SODIUM SUCCINATE 125 MG: 125 INJECTION, POWDER, FOR SOLUTION INTRAMUSCULAR; INTRAVENOUS at 23:07

## 2022-04-01 RX ADMIN — ALBUTEROL SULFATE 2 PUFF: 90 AEROSOL, METERED RESPIRATORY (INHALATION) at 23:06

## 2022-04-02 VITALS
HEART RATE: 79 BPM | RESPIRATION RATE: 20 BRPM | DIASTOLIC BLOOD PRESSURE: 83 MMHG | TEMPERATURE: 99.2 F | OXYGEN SATURATION: 91 % | SYSTOLIC BLOOD PRESSURE: 140 MMHG

## 2022-04-02 LAB
BILIRUB UR QL STRIP: NEGATIVE
CLARITY UR: CLEAR
COLOR UR: YELLOW
GLUCOSE UR STRIP-MCNC: NEGATIVE MG/DL
HGB UR QL STRIP.AUTO: NEGATIVE
KETONES UR QL STRIP: NEGATIVE
LEUKOCYTE ESTERASE UR QL STRIP.AUTO: NEGATIVE
NITRITE UR QL STRIP: NEGATIVE
PH UR STRIP.AUTO: <=5 [PH] (ref 5–8)
PROT UR QL STRIP: NEGATIVE
SP GR UR STRIP: 1.02 (ref 1–1.03)
UROBILINOGEN UR QL STRIP: NORMAL

## 2022-04-02 PROCEDURE — 81003 URINALYSIS AUTO W/O SCOPE: CPT | Performed by: EMERGENCY MEDICINE

## 2022-04-02 NOTE — ED PROVIDER NOTES
Subjective   81-year-old female presenting with ported shortness of breath.  Patient has dementia and is not providing any history at this time.  Her  is present and states that he noticed today she looked like she was more short of breath and less responsive.  She was reportedly febrile.  EMS arrived and found patient hypoxic in the 80s, she was placed on nasal cannula.  No reported fevers, vomiting or other issues.          Review of Systems   Unable to perform ROS: Dementia       Past Medical History:   Diagnosis Date   • Colon polyp    • Osteoporosis    • Pain in thoracic spine    • Pneumonia    • UTI (urinary tract infection)        Allergies   Allergen Reactions   • Lexapro [Escitalopram Oxalate] Nausea Only       Past Surgical History:   Procedure Laterality Date   • BLADDER SURGERY  2000    bladder suspension   • CERVICAL LAMINECTOMY     • COLONOSCOPY     • COLOSTOMY  1979    temporary sigmoid   • HYSTERECTOMY         Family History   Problem Relation Age of Onset   • Blindness Mother    • Other Mother         arteriosclerotic cardiovascular disease    • Diabetes Mother    • Osteoporosis Mother    • Stroke Mother    • Cancer Brother    • Lung cancer Sister    • Osteoporosis Sister    • Stroke Sister    • Stroke Other        Social History     Socioeconomic History   • Marital status:    Tobacco Use   • Smoking status: Never Smoker   • Smokeless tobacco: Never Used   Substance and Sexual Activity   • Alcohol use: No   • Drug use: No   • Sexual activity: Defer           Objective   Physical Exam  Constitutional:       General: She is not in acute distress.     Appearance: She is not toxic-appearing or diaphoretic.      Comments: Chronically ill-appearing   HENT:      Head: Normocephalic and atraumatic.      Right Ear: External ear normal.      Left Ear: External ear normal.      Nose: Nose normal.      Mouth/Throat:      Mouth: Mucous membranes are moist.      Pharynx: Oropharynx is clear.    Eyes:      Extraocular Movements: Extraocular movements intact.      Conjunctiva/sclera: Conjunctivae normal.      Pupils: Pupils are equal, round, and reactive to light.   Cardiovascular:      Rate and Rhythm: Normal rate and regular rhythm.      Pulses: Normal pulses.      Heart sounds: Normal heart sounds.   Pulmonary:      Effort: Pulmonary effort is normal. No respiratory distress.      Comments: Faint scattered wheeze  Abdominal:      General: Bowel sounds are normal. There is no distension.      Tenderness: There is no abdominal tenderness.   Musculoskeletal:         General: No tenderness or deformity. Normal range of motion.      Cervical back: Normal range of motion.      Comments: Pitting edema to the knees bilaterally   Skin:     General: Skin is warm and dry.      Capillary Refill: Capillary refill takes less than 2 seconds.      Findings: No rash.   Neurological:      Mental Status: She is alert.      Comments: Patient will answer simple yes/no questions, does not follow commands   Psychiatric:      Comments: Unable to assess         Procedures           ED Course                                                 MDM  Number of Diagnoses or Management Options  Hypoxia  Shortness of breath  Diagnosis management comments: 81-year-old female with shortness of breath, hypoxia.  Chronically ill-appearing obese elderly lady in no distress with exam as above.  She is requiring a couple liters nasal cannula here to maintain her saturations.  Will obtain labs and imaging.  Will get Covid swab.  Will treat with steroids and albuterol at this time.  Disposition pending.    DDx: Pneumonia, CHF, COPD, electrolyte abnormality, UTI    EKG interpreted by me: Sinus rhythm, normal rate, no acute ST/T changes, low-voltage QRS complexes throughout, poor R wave progression, this is an abnormal EKG    Patient's work-up is overall reassuring and without any significant or acute appearing abnormalities.  Patient's   notes that she has had a decline over the last several weeks with decreased activity and awareness.  She is resting comfortably, saturating well on a couple liters nasal cannula.  Discussed all findings with patient's , we discussed admission.  He would prefer to take patient back to nursing home if they are able to administer oxygen.  Nursing home should be able to administer oxygen with no issue.  Advise close follow-up with primary.  Patient has been is happy with this plan.      Final diagnoses:   Shortness of breath   Hypoxia        Neel Kat MD  04/02/22 0146

## 2022-04-03 LAB — BACTERIA BLD CULT: ABNORMAL

## 2022-04-03 NOTE — PROGRESS NOTES
Nursing Home Progress Note        Layo Arango DO []  WILLIAM Alfaro []  [address] [place] [address]  Phone: [phone]  Fax: [phone] Dorcas Duncan MD []  Dc Fernandez DO [x]   [address] [place] [address]  Phone: [phone]  Fax: [phone]     PATIENT NAME: Danae Harmon                                                                          YOB: 1940           DATE OF SERVICE: 03/31/2022  FACILITY:  [] [facility]   [] [facility]   [] [facility]   [x] [facility]  []  [facility]  [] Other ______________________________________________________________________     CHIEF COMPLAINT:  Chronic Medical Management      HISTORY OF PRESENT ILLNESS:   Patient was resting comfortably in her bed with no new complaints or concerns.  Her lower extremity pedal edema persists despite being on a regimen of Bumex twice daily as well as recently being started on spironolactone.  He mentions she was feeling constipated today.  Nurses did note that the patient has been having a bowel movement daily.    PAST MEDICAL & SURGICAL HISTORY:   Past Medical History:   Diagnosis Date   • Colon polyp    • Osteoporosis    • Pain in thoracic spine    • Pneumonia    • UTI (urinary tract infection)       Past Surgical History:   Procedure Laterality Date   • BLADDER SURGERY  2000    bladder suspension   • CERVICAL LAMINECTOMY     • COLONOSCOPY     • COLOSTOMY  1979    temporary sigmoid   • HYSTERECTOMY           MEDICATIONS:  I have reviewed and reconciled the patients medication list in the patients chart at the skilled nursing facility today, 03/31/2022.      ALLERGIES:  Allergies   Allergen Reactions   • Lexapro [Escitalopram Oxalate] Nausea Only         SOCIAL HISTORY:  Social History     Socioeconomic History   • Marital status:    Tobacco Use   • Smoking status: Never Smoker   • Smokeless tobacco: Never Used   Substance and Sexual Activity   • Alcohol use:  No   • Drug use: No   • Sexual activity: Defer       FAMILY HISTORY:  Family History   Problem Relation Age of Onset   • Blindness Mother    • Other Mother         arteriosclerotic cardiovascular disease    • Diabetes Mother    • Osteoporosis Mother    • Stroke Mother    • Cancer Brother    • Lung cancer Sister    • Osteoporosis Sister    • Stroke Sister    • Stroke Other        REVIEW OF SYSTEMS:  Review of Systems   Constitutional: Negative for chills, fatigue and fever.   HENT: Negative for congestion, ear pain, rhinorrhea, sinus pressure and sore throat.    Eyes: Negative for visual disturbance.   Respiratory: Negative for cough, chest tightness, shortness of breath and wheezing.    Cardiovascular: Negative for chest pain, palpitations and leg swelling.   Gastrointestinal: Positive for constipation. Negative for abdominal pain, blood in stool, diarrhea, nausea and vomiting.   Endocrine: Negative for polydipsia and polyuria.   Genitourinary: Negative for dysuria and hematuria.   Musculoskeletal: Negative for arthralgias and back pain.   Skin: Negative for rash.   Neurological: Negative for dizziness, light-headedness, numbness and headaches.   Psychiatric/Behavioral: Negative for dysphoric mood and sleep disturbance. The patient is not nervous/anxious.           PHYSICAL EXAMINATION:     VITAL SIGNS:  /82   Pulse 65   Temp 97.2 °F (36.2 °C)   Resp 18   Wt 114 kg (252 lb)   SpO2 91%   BMI 47.61 kg/m²     Physical Exam  Vitals and nursing note reviewed.   Constitutional:       Appearance: Normal appearance. She is well-developed. She is obese.      Comments: Frail elderly female   HENT:      Head: Normocephalic and atraumatic.   Eyes:      Extraocular Movements: Extraocular movements intact.      Conjunctiva/sclera: Conjunctivae normal.   Cardiovascular:      Rate and Rhythm: Normal rate and regular rhythm.   Pulmonary:      Effort: Pulmonary effort is normal.      Breath sounds: Wheezing present.    Abdominal:      General: Abdomen is flat.      Palpations: Abdomen is soft.   Musculoskeletal:      Cervical back: Normal range of motion and neck supple.      Right lower leg: Edema (Minimal) present.      Left lower leg: Edema (Minimal) present.      Comments: Bedridden   Skin:     General: Skin is warm and dry.      Findings: No rash.   Neurological:      General: No focal deficit present.      Mental Status: She is alert and oriented to person, place, and time. Mental status is at baseline.   Psychiatric:         Mood and Affect: Mood normal.         Behavior: Behavior normal.         RECORDS REVIEW:       ASSESSMENT     Diagnoses and all orders for this visit:    1. Late onset Alzheimer's disease with behavioral disturbance (HCC) (Primary)    2. Benign essential hypertension    3. Moderate episode of recurrent major depressive disorder (HCC)    4. Chronic low back pain with sciatica, sciatica laterality unspecified, unspecified back pain laterality    5. Mixed hyperlipidemia    6. Other fatigue    7. Lower extremity edema    8. Type 2 diabetes mellitus without complication, without long-term current use of insulin (HCC)    9. Other depression    10. Abdominal distension (gaseous)        PLAN  Constipation  - reported by patient but she has been having good BMs recently.     Major depressive disorder  -Stable on Seroquel and Depakote     Lower Extremity Edema  -Good control with Bumex and spironolactone.  Edema seems to be a result of lack of use of feet.  I would not further increase diuretics.       Alzheimer's disease with behavioral disturbances  -Mental status remains at baseline..     Bladder spasms/urinary frequency  -Cont Myrbetriq. Stable control.      Mixed hyperlipidemia   -Continue statin.     Essential hypertension  -Controlled on Coreg     Vitamin D and vitamin B12 deficiencies  -Stable on supplements.     Osteoporosis  -Stable on vitamins.  Bisphosphonate therapy is not necessary at this  point.      Type 2 diabetes mellitus  -Fair glucose control.  Diet controlled, Not currently on medications.     Chronic pain  -Controlled on Norco being filled when needed.     [x]  Discussed Patient in detail with nursing/staff, addressed all needs today.     [x]  Plan of Care Reviewed   []  PT/OT Reviewed   []  Order Changes  []  Discharge Plans Reviewed  [x]  Advance Directive on file with Nursing Home.   [x]  POA on file with Nursing Home.    [x]  Code Status listed and reviewed.     I confirm accuracy of unchanged data/findings including physical exam and plan which have been carried forward from previous visit, as well as I have updated appropriately those that have changed        Dc Fernandez DO.  4/3/2022

## 2022-04-04 ENCOUNTER — TELEPHONE (OUTPATIENT)
Dept: EMERGENCY DEPT | Facility: HOSPITAL | Age: 82
End: 2022-04-04

## 2022-04-04 ENCOUNTER — NURSING HOME (OUTPATIENT)
Dept: FAMILY MEDICINE CLINIC | Facility: CLINIC | Age: 82
End: 2022-04-04

## 2022-04-04 VITALS
SYSTOLIC BLOOD PRESSURE: 129 MMHG | WEIGHT: 252 LBS | BODY MASS INDEX: 47.61 KG/M2 | DIASTOLIC BLOOD PRESSURE: 82 MMHG | HEART RATE: 74 BPM | OXYGEN SATURATION: 98 % | TEMPERATURE: 97.4 F | RESPIRATION RATE: 18 BRPM

## 2022-04-04 DIAGNOSIS — R09.02 HYPOXIA: Primary | ICD-10-CM

## 2022-04-04 DIAGNOSIS — Z78.9 IMPAIRED MOBILITY AND ADLS: ICD-10-CM

## 2022-04-04 DIAGNOSIS — R78.81 POSITIVE BLOOD CULTURE: ICD-10-CM

## 2022-04-04 DIAGNOSIS — Z87.898 HISTORY OF FEVER: ICD-10-CM

## 2022-04-04 DIAGNOSIS — Z74.09 IMPAIRED MOBILITY AND ADLS: ICD-10-CM

## 2022-04-04 LAB
BACTERIA SPEC AEROBE CULT: ABNORMAL
GRAM STN SPEC: ABNORMAL
ISOLATED FROM: ABNORMAL

## 2022-04-04 PROCEDURE — 99309 SBSQ NF CARE MODERATE MDM 30: CPT | Performed by: NURSE PRACTITIONER

## 2022-04-05 NOTE — PROGRESS NOTES
Nursing Home Follow Up Note      Layo Arango DO []   WILLIAM Alfaro [x]  [address] [place] [address]  Phone: [phone]  Fax: [phone] Dorcas Duncan MD []    Dc Fernandez DO []   [address] [place] [address]  Phone: [phone]  Fax: [phone]     PATIENT NAME: Danae Harmon                                                                          YOB: 1940           DATE OF SERVICE: 4/4/2022  FACILITY:  [][facility]   [] [facility]   [] [facility]   [x] [facility]   [] Other ____________________________________________________________________      CHIEF COMPLAINT:     Follow up ED visit for fever, SOA and hypoxia.     HISTORY OF PRESENT ILLNESS:      Patient was sent to the emergency department on the night of 4/1 for shortness of breath, fever and hypoxia.  Work-up in the emergency department was essentially negative.  She did have some hypoxia but chest x-ray and CT scan negative.  UA and lab work-up negative.  She did not have any fever at the emergency department.  She was discharged back to the facility with orders for O2 at 2 L.  She has had no other episodes.  She reports feeling good at this time and is eating lunch.  Nursing reports that she is frequently noncompliant with wearing the O2 but O2 sats have remained fairly stable even without oxygen from high 80s to low 90s.  She has no complaints or signs and symptoms of distress.    PAST MEDICAL & SURGICAL HISTORY:   Past Medical History:   Diagnosis Date   • Colon polyp    • Osteoporosis    • Pain in thoracic spine    • Pneumonia    • UTI (urinary tract infection)       Past Surgical History:   Procedure Laterality Date   • BLADDER SURGERY  2000    bladder suspension   • CERVICAL LAMINECTOMY     • COLONOSCOPY     • COLOSTOMY  1979    temporary sigmoid   • HYSTERECTOMY           MEDICATIONS:  I have reviewed and reconciled the patients medication list in the patients chart at the [facility]  nursing facility today.      ALLERGIES:    Allergies   Allergen Reactions   • Lexapro [Escitalopram Oxalate] Nausea Only         SOCIAL HISTORY:    Social History     Socioeconomic History   • Marital status:    Tobacco Use   • Smoking status: Never Smoker   • Smokeless tobacco: Never Used   Substance and Sexual Activity   • Alcohol use: No   • Drug use: No   • Sexual activity: Defer       FAMILY HISTORY:    Family History   Problem Relation Age of Onset   • Blindness Mother    • Other Mother         arteriosclerotic cardiovascular disease    • Diabetes Mother    • Osteoporosis Mother    • Stroke Mother    • Cancer Brother    • Lung cancer Sister    • Osteoporosis Sister    • Stroke Sister    • Stroke Other        REVIEW OF SYSTEMS:    Review of Systems   Reason unable to perform ROS: ROS per nursing and patient.   Constitutional: Negative for activity change, appetite change, chills, diaphoresis, fatigue and fever.   HENT: Negative for congestion, hearing loss, mouth sores, nosebleeds, postnasal drip, rhinorrhea, sneezing, sore throat and trouble swallowing.    Eyes: Negative for pain, discharge, redness, itching and visual disturbance.   Respiratory: Negative for cough, choking, shortness of breath and wheezing.    Cardiovascular: Positive for leg swelling (chronic). Negative for palpitations.   Gastrointestinal: Negative for abdominal distention, abdominal pain, blood in stool, constipation, diarrhea, nausea, vomiting and indigestion.   Endocrine: Negative for polydipsia and polyuria.   Genitourinary: Positive for urinary incontinence. Negative for decreased urine volume, difficulty urinating, frequency, hematuria and urgency.   Musculoskeletal: Positive for arthralgias (chronic) and gait problem. Negative for joint swelling, myalgias and neck pain.   Skin: Negative for color change, dry skin, rash and skin lesions.   Allergic/Immunologic: Negative for environmental allergies.   Neurological: Positive for  weakness, memory problem and confusion. Negative for seizures and speech difficulty.   Psychiatric/Behavioral: Negative for agitation, behavioral problems, dysphoric mood, hallucinations, self-injury, sleep disturbance, suicidal ideas, negative for hyperactivity, depressed mood and stress. The patient is not nervous/anxious.          PHYSICAL EXAMINATION:   VITAL SIGNS:   Vitals:    22 1052   BP: 129/82   Pulse: 74   Resp: 18   Temp: 97.4 °F (36.3 °C)   SpO2: 98%   Weight: 114 kg (252 lb)       Physical Exam   Constitutional: Vital signs are normal. She appears well-developed and well-nourished.   HENT:   Head: Normocephalic.   Right Ear: External ear normal.   Left Ear: External ear normal.   Nose: Nose normal.   Eyes: Conjunctivae are normal.   Cardiovascular: Normal rate, regular rhythm and normal heart sounds.   Pulmonary/Chest: Effort normal. No respiratory distress. She has no wheezes. She has no rhonchi. She has no rales.   Abdominal: Soft. Bowel sounds are normal. She exhibits no distension. There is no abdominal tenderness.   Musculoskeletal:      Thoracic back: She exhibits decreased range of motion and pain.      Right lower le+ Edema present.      Left lower le+ Edema present.      Comments: Weakness BLE   Neurological: She is alert.   Skin: Skin is warm and dry. Turgor is normal. No rash noted.   Psychiatric: Her speech is normal and behavior is normal. Mood normal. Cognition and memory are impaired.   Nursing note and vitals reviewed.      RECORDS REVIEW:   I have reviewed and interpreted the records in [facility] and [facility].    ASSESSMENT     Diagnoses and all orders for this visit:    1. Hypoxia (Primary)    2. History of fever    3. Positive blood culture    4. Impaired mobility and ADLs        PLAN    Hypoxia/history fever/positive blood culture  -Continue O2 at 2 L to keep sats greater than 90%.  Patient has been encouraged to use incentive spirometer but cannot figure out the technique due  to her dementia.  Work-up was negative at emergency department but did receive call from [facility] today that one of her cultures was negative but one was positive and they do suspect contamination from skin as it cultured staph. Will repeat cultures. She has had no fever. Will follow up and continue to monitor.     Nursing encouraged to keep me informed of any acute changes, lack of improvement, or any new concerning symptoms.    Nursing to continue supportive care for all ADLs.      [x]  Discussed Patient in detail with nursing/staff, addressed all needs today.     [x]  Plan of Care Reviewed   [x]  PT/OT Reviewed   [x]  Order Changes  []  Discharge Plans Reviewed  [x]  Advance Directive on file with Nursing Home.   [x]  POA on file with Nursing Home.   [x]  Code Status listed: []  Full Code   [x]  DNR         “I confirm accuracy of unchanged data/findings which have been carried forward from previous visit, as well as I have updated appropriately those that have changed.”                                                        Mita Joyce, APRN.

## 2022-04-07 LAB — BACTERIA SPEC AEROBE CULT: NORMAL

## 2022-04-28 ENCOUNTER — NURSING HOME (OUTPATIENT)
Dept: INTERNAL MEDICINE | Facility: CLINIC | Age: 82
End: 2022-04-28

## 2022-04-28 VITALS
OXYGEN SATURATION: 96 % | SYSTOLIC BLOOD PRESSURE: 124 MMHG | HEART RATE: 74 BPM | BODY MASS INDEX: 46.67 KG/M2 | RESPIRATION RATE: 18 BRPM | TEMPERATURE: 98.1 F | DIASTOLIC BLOOD PRESSURE: 82 MMHG | WEIGHT: 247 LBS

## 2022-04-28 DIAGNOSIS — E11.9 TYPE 2 DIABETES MELLITUS WITHOUT COMPLICATION, WITHOUT LONG-TERM CURRENT USE OF INSULIN: ICD-10-CM

## 2022-04-28 DIAGNOSIS — F32.89 OTHER DEPRESSION: ICD-10-CM

## 2022-04-28 DIAGNOSIS — R60.0 LOWER EXTREMITY EDEMA: ICD-10-CM

## 2022-04-28 DIAGNOSIS — M54.40 CHRONIC LOW BACK PAIN WITH SCIATICA, SCIATICA LATERALITY UNSPECIFIED, UNSPECIFIED BACK PAIN LATERALITY: ICD-10-CM

## 2022-04-28 DIAGNOSIS — F33.1 MODERATE EPISODE OF RECURRENT MAJOR DEPRESSIVE DISORDER: ICD-10-CM

## 2022-04-28 DIAGNOSIS — G30.1 LATE ONSET ALZHEIMER'S DISEASE WITH BEHAVIORAL DISTURBANCE: Primary | ICD-10-CM

## 2022-04-28 DIAGNOSIS — I10 BENIGN ESSENTIAL HYPERTENSION: ICD-10-CM

## 2022-04-28 DIAGNOSIS — F02.818 LATE ONSET ALZHEIMER'S DISEASE WITH BEHAVIORAL DISTURBANCE: Primary | ICD-10-CM

## 2022-04-28 DIAGNOSIS — E78.2 MIXED HYPERLIPIDEMIA: ICD-10-CM

## 2022-04-28 DIAGNOSIS — G89.29 CHRONIC LOW BACK PAIN WITH SCIATICA, SCIATICA LATERALITY UNSPECIFIED, UNSPECIFIED BACK PAIN LATERALITY: ICD-10-CM

## 2022-04-28 DIAGNOSIS — R53.83 OTHER FATIGUE: ICD-10-CM

## 2022-04-28 PROCEDURE — 99308 SBSQ NF CARE LOW MDM 20: CPT | Performed by: INTERNAL MEDICINE

## 2022-05-02 NOTE — PROGRESS NOTES
Nursing Home Progress Note        Layo Aranog DO []  WILLIAM Alfaro []  [address] [place] [address]  Phone: [phone]  Fax: [phone] Dorcas Duncan MD []  Dc Fernandez DO [x]   [address] [place] [address]  Phone: [phone]  Fax: [phone]     PATIENT NAME: Danae Harmon                                                                          YOB: 1940           DATE OF SERVICE: 04/28/2022  FACILITY:  [] [facility]   [] [facility]   [] [facility]   [] [facility]  []  [facility]  [] Other ______________________________________________________________________     CHIEF COMPLAINT:  Chronic Medical Management      HISTORY OF PRESENT ILLNESS:   Patient was lying comfortably in her bed with no specific complaints or concerns.  Her lower extremity edema has been a concern with her and the family her Bumex doses have been adjusted intermittently when needed.  She did have an episode of in the ER visit earlier in the month for shortness of breath however this is now resolved.  Nurses do not report any acute events since her ER visit.    PAST MEDICAL & SURGICAL HISTORY:   Past Medical History:   Diagnosis Date   • Colon polyp    • Osteoporosis    • Pain in thoracic spine    • Pneumonia    • UTI (urinary tract infection)       Past Surgical History:   Procedure Laterality Date   • BLADDER SURGERY  2000    bladder suspension   • CERVICAL LAMINECTOMY     • COLONOSCOPY     • COLOSTOMY  1979    temporary sigmoid   • HYSTERECTOMY           MEDICATIONS:  I have reviewed and reconciled the patients medication list in the patients chart at the skilled nursing facility today, 04/28/2022.      ALLERGIES:  Allergies   Allergen Reactions   • Lexapro [Escitalopram Oxalate] Nausea Only         SOCIAL HISTORY:  Social History     Socioeconomic History   • Marital status:    Tobacco Use   • Smoking status: Never Smoker   • Smokeless tobacco: Never Used    Substance and Sexual Activity   • Alcohol use: No   • Drug use: No   • Sexual activity: Defer       FAMILY HISTORY:  Family History   Problem Relation Age of Onset   • Blindness Mother    • Other Mother         arteriosclerotic cardiovascular disease    • Diabetes Mother    • Osteoporosis Mother    • Stroke Mother    • Cancer Brother    • Lung cancer Sister    • Osteoporosis Sister    • Stroke Sister    • Stroke Other        REVIEW OF SYSTEMS:  Review of Systems   Constitutional: Negative for chills, fatigue and fever.   HENT: Negative for congestion, ear pain, rhinorrhea, sinus pressure and sore throat.    Eyes: Negative for visual disturbance.   Respiratory: Negative for cough, chest tightness, shortness of breath and wheezing.    Cardiovascular: Negative for chest pain, palpitations and leg swelling.   Gastrointestinal: Negative for abdominal pain, blood in stool, constipation, diarrhea, nausea and vomiting.   Endocrine: Negative for polydipsia and polyuria.   Genitourinary: Negative for dysuria and hematuria.   Musculoskeletal: Negative for arthralgias and back pain.   Skin: Negative for rash.   Neurological: Negative for dizziness, light-headedness, numbness and headaches.   Psychiatric/Behavioral: Negative for dysphoric mood and sleep disturbance. The patient is not nervous/anxious.           PHYSICAL EXAMINATION:     VITAL SIGNS:  /82   Pulse 74   Temp 98.1 °F (36.7 °C)   Resp 18   Wt 112 kg (247 lb)   SpO2 96%   BMI 46.67 kg/m²     Physical Exam  Vitals and nursing note reviewed.   Constitutional:       Appearance: Normal appearance. She is well-developed. She is obese.      Comments: Frail elderly female   HENT:      Head: Normocephalic and atraumatic.   Eyes:      Extraocular Movements: Extraocular movements intact.      Conjunctiva/sclera: Conjunctivae normal.   Cardiovascular:      Rate and Rhythm: Normal rate and regular rhythm.   Pulmonary:      Effort: Pulmonary effort is normal.       Breath sounds: Wheezing present.   Abdominal:      General: Abdomen is flat.      Palpations: Abdomen is soft.   Musculoskeletal:      Cervical back: Normal range of motion and neck supple.      Right lower leg: Edema (Minimal) present.      Left lower leg: Edema (Minimal) present.      Comments: Bedridden   Skin:     General: Skin is warm and dry.      Findings: No rash.   Neurological:      General: No focal deficit present.      Mental Status: She is alert and oriented to person, place, and time. Mental status is at baseline.   Psychiatric:         Mood and Affect: Mood normal.         Behavior: Behavior normal.         RECORDS REVIEW:   [facility] 4/1/22    ASSESSMENT     Diagnoses and all orders for this visit:    1. Late onset Alzheimer's disease with behavioral disturbance (HCC) (Primary)    2. Benign essential hypertension    3. Moderate episode of recurrent major depressive disorder (HCC)    4. Chronic low back pain with sciatica, sciatica laterality unspecified, unspecified back pain laterality    5. Mixed hyperlipidemia    6. Other fatigue    7. Lower extremity edema    8. Type 2 diabetes mellitus without complication, without long-term current use of insulin ([facility])    9. Other depression        PLAN  Alzheimer's disease with behavioral disturbances  -Mental status remains at baseline..    Constipation  -Remains regular with bowel movements recently.     Major depressive disorder  -Stable on Seroquel and Depakote     Lower Extremity Edema  -Good control with Bumex and spironolactone.    Bumex dose being adjusted as needed.      Bladder spasms/urinary frequency  -Cont Myrbetriq. Stable control.      Mixed hyperlipidemia   -Continue statin.     Essential hypertension  -Controlled on Coreg     Vitamin D and vitamin B12 deficiencies  -Stable on supplements.     Osteoporosis  -Stable on vitamins.  Bisphosphonate therapy is not necessary at this point.      Type 2 diabetes mellitus  -Fair glucose control.  Diet  controlled, Not currently on medications.     Chronic pain  -Controlled on Norco being filled when needed.        [x]  Discussed Patient in detail with nursing/staff, addressed all needs today.     [x]  Plan of Care Reviewed   []  PT/OT Reviewed   []  Order Changes  []  Discharge Plans Reviewed  [x]  Advance Directive on file with Nursing Home.   [x]  POA on file with Nursing Home.    [x]  Code Status listed and reviewed.     I confirm accuracy of unchanged data/findings including physical exam and plan which have been carried forward from previous visit, as well as I have updated appropriately those that have changed        Dc Fernandez DO.  5/2/2022

## 2022-05-19 ENCOUNTER — NURSING HOME (OUTPATIENT)
Dept: INTERNAL MEDICINE | Facility: CLINIC | Age: 82
End: 2022-05-19

## 2022-05-19 VITALS
HEART RATE: 70 BPM | WEIGHT: 245 LBS | OXYGEN SATURATION: 95 % | BODY MASS INDEX: 46.29 KG/M2 | SYSTOLIC BLOOD PRESSURE: 130 MMHG | DIASTOLIC BLOOD PRESSURE: 70 MMHG | TEMPERATURE: 97.4 F | RESPIRATION RATE: 18 BRPM

## 2022-05-19 DIAGNOSIS — R53.83 OTHER FATIGUE: ICD-10-CM

## 2022-05-19 DIAGNOSIS — F02.818 LATE ONSET ALZHEIMER'S DISEASE WITH BEHAVIORAL DISTURBANCE: Primary | ICD-10-CM

## 2022-05-19 DIAGNOSIS — I10 BENIGN ESSENTIAL HYPERTENSION: ICD-10-CM

## 2022-05-19 DIAGNOSIS — G30.1 LATE ONSET ALZHEIMER'S DISEASE WITH BEHAVIORAL DISTURBANCE: Primary | ICD-10-CM

## 2022-05-19 DIAGNOSIS — M54.40 CHRONIC LOW BACK PAIN WITH SCIATICA, SCIATICA LATERALITY UNSPECIFIED, UNSPECIFIED BACK PAIN LATERALITY: ICD-10-CM

## 2022-05-19 DIAGNOSIS — R14.0 ABDOMINAL DISTENSION (GASEOUS): ICD-10-CM

## 2022-05-19 DIAGNOSIS — E11.9 TYPE 2 DIABETES MELLITUS WITHOUT COMPLICATION, WITHOUT LONG-TERM CURRENT USE OF INSULIN: ICD-10-CM

## 2022-05-19 DIAGNOSIS — F33.1 MODERATE EPISODE OF RECURRENT MAJOR DEPRESSIVE DISORDER: ICD-10-CM

## 2022-05-19 DIAGNOSIS — E78.2 MIXED HYPERLIPIDEMIA: ICD-10-CM

## 2022-05-19 DIAGNOSIS — R60.0 LOWER EXTREMITY EDEMA: ICD-10-CM

## 2022-05-19 DIAGNOSIS — F32.89 OTHER DEPRESSION: ICD-10-CM

## 2022-05-19 DIAGNOSIS — G89.29 CHRONIC LOW BACK PAIN WITH SCIATICA, SCIATICA LATERALITY UNSPECIFIED, UNSPECIFIED BACK PAIN LATERALITY: ICD-10-CM

## 2022-05-19 PROCEDURE — 99308 SBSQ NF CARE LOW MDM 20: CPT | Performed by: INTERNAL MEDICINE

## 2022-05-29 NOTE — PROGRESS NOTES
Nursing Home Progress Note        Layo Arango DO []  WILLIAM Alfaro []  [address] [place] [address]  Phone: [phone]  Fax: [phone] Dorcas Duncan MD []  Dc Fernandez DO [x]   [address] [place] [address]  Phone: [phone]  Fax: [phone]     PATIENT NAME: Danae Harmon                                                                          YOB: 1940           DATE OF SERVICE: 05/19/2022  FACILITY:  [] [facility]   [] [facility]   [] [facility] [place]   [x] [facility]  []  [facility]  [] Other ______________________________________________________________________     CHIEF COMPLAINT:  Chronic Medical Management      HISTORY OF PRESENT ILLNESS:   Patient seems to be in good spirits on exam and morning pain her lower extremity edema has been very well controlled since her spironolactone dose was recently increased.  Weights remained stable as well.  She remains primarily bedridden and continues to benefit from assistance for ADLs.    PAST MEDICAL & SURGICAL HISTORY:   Past Medical History:   Diagnosis Date   • Colon polyp    • Osteoporosis    • Pain in thoracic spine    • Pneumonia    • UTI (urinary tract infection)       Past Surgical History:   Procedure Laterality Date   • BLADDER SURGERY  2000    bladder suspension   • CERVICAL LAMINECTOMY     • COLONOSCOPY     • COLOSTOMY  1979    temporary sigmoid   • HYSTERECTOMY           MEDICATIONS:  I have reviewed and reconciled the patients medication list in the patients chart at the skilled nursing facility today, 05/19/2022.      ALLERGIES:  Allergies   Allergen Reactions   • Lexapro [Escitalopram Oxalate] Nausea Only         SOCIAL HISTORY:  Social History     Socioeconomic History   • Marital status:    Tobacco Use   • Smoking status: Never Smoker   • Smokeless tobacco: Never Used   Substance and Sexual Activity   • Alcohol use: No   • Drug use: No   • Sexual activity: Defer        FAMILY HISTORY:  Family History   Problem Relation Age of Onset   • Blindness Mother    • Other Mother         arteriosclerotic cardiovascular disease    • Diabetes Mother    • Osteoporosis Mother    • Stroke Mother    • Cancer Brother    • Lung cancer Sister    • Osteoporosis Sister    • Stroke Sister    • Stroke Other        REVIEW OF SYSTEMS:  Review of Systems   Constitutional: Negative for chills, fatigue and fever.   HENT: Negative for congestion, ear pain, rhinorrhea, sinus pressure and sore throat.    Eyes: Negative for visual disturbance.   Respiratory: Negative for cough, chest tightness, shortness of breath and wheezing.    Cardiovascular: Negative for chest pain, palpitations and leg swelling.   Gastrointestinal: Negative for abdominal pain, blood in stool, constipation, diarrhea, nausea and vomiting.   Endocrine: Negative for polydipsia and polyuria.   Genitourinary: Negative for dysuria and hematuria.   Musculoskeletal: Negative for arthralgias and back pain.   Skin: Negative for rash.   Neurological: Negative for dizziness, light-headedness, numbness and headaches.   Psychiatric/Behavioral: Negative for dysphoric mood and sleep disturbance. The patient is not nervous/anxious.           PHYSICAL EXAMINATION:     VITAL SIGNS:  /70   Pulse 70   Temp 97.4 °F (36.3 °C)   Resp 18   Wt 111 kg (245 lb)   SpO2 95%   BMI 46.29 kg/m²     Physical Exam  Vitals and nursing note reviewed.   Constitutional:       Appearance: Normal appearance. She is well-developed. She is obese.      Comments: Frail elderly female   HENT:      Head: Normocephalic and atraumatic.   Eyes:      Extraocular Movements: Extraocular movements intact.      Conjunctiva/sclera: Conjunctivae normal.   Cardiovascular:      Rate and Rhythm: Normal rate and regular rhythm.   Pulmonary:      Effort: Pulmonary effort is normal.      Breath sounds: Wheezing present.   Abdominal:      General: Abdomen is flat.      Palpations:  Abdomen is soft.   Musculoskeletal:      Cervical back: Normal range of motion and neck supple.      Right lower leg: Edema (Minimal) present.      Left lower leg: Edema (Minimal) present.      Comments: Bedridden   Skin:     General: Skin is warm and dry.      Findings: No rash.   Neurological:      General: No focal deficit present.      Mental Status: She is alert and oriented to person, place, and time. Mental status is at baseline.   Psychiatric:         Mood and Affect: Mood normal.         Behavior: Behavior normal.         RECORDS REVIEW:       ASSESSMENT     Diagnoses and all orders for this visit:    1. Late onset Alzheimer's disease with behavioral disturbance (HCC) (Primary)    2. Benign essential hypertension    3. Chronic low back pain with sciatica, sciatica laterality unspecified, unspecified back pain laterality    4. Moderate episode of recurrent major depressive disorder (HCC)    5. Mixed hyperlipidemia    6. Other fatigue    7. Lower extremity edema    8. Other depression    9. Abdominal distension (gaseous)    10. Type 2 diabetes mellitus without complication, without long-term current use of insulin (HCC)        PLAN  Alzheimer's disease with behavioral disturbances  -Mental status remains at baseline..     Constipation  -Remains regular with bowel movements recently.     Major depressive disorder  -Stable on Seroquel and Depakote     Lower Extremity Edema  -Good control with Bumex and spironolactone (which was recently increased).       Bladder spasms/urinary frequency  -Cont Myrbetriq. Stable control.      Mixed hyperlipidemia   -Continue statin.     Essential hypertension  -Controlled on Coreg     Vitamin D and vitamin B12 deficiencies  -Stable on supplements.     Osteoporosis  -Stable on vitamins.  Bisphosphonate therapy is not necessary at this point.      Type 2 diabetes mellitus  -Fair glucose control. Remains diet controlled.      Chronic pain  -Controlled on Norco being filled when  needed.       [x]  Discussed Patient in detail with nursing/staff, addressed all needs today.     [x]  Plan of Care Reviewed   []  PT/OT Reviewed   []  Order Changes  []  Discharge Plans Reviewed  [x]  Advance Directive on file with Nursing Home.   [x]  POA on file with Nursing Home.    [x]  Code Status listed and reviewed.     I confirm accuracy of unchanged data/findings including physical exam and plan which have been carried forward from previous visit, as well as I have updated appropriately those that have changed        Dc Fernandez DO.  5/28/2022

## 2022-06-09 ENCOUNTER — NURSING HOME (OUTPATIENT)
Dept: FAMILY MEDICINE CLINIC | Facility: CLINIC | Age: 82
End: 2022-06-09

## 2022-06-09 VITALS
DIASTOLIC BLOOD PRESSURE: 74 MMHG | RESPIRATION RATE: 18 BRPM | TEMPERATURE: 98.1 F | WEIGHT: 253 LBS | SYSTOLIC BLOOD PRESSURE: 126 MMHG | BODY MASS INDEX: 47.8 KG/M2 | HEART RATE: 72 BPM

## 2022-06-09 DIAGNOSIS — F01.50 VASCULAR DEMENTIA WITHOUT BEHAVIORAL DISTURBANCE: ICD-10-CM

## 2022-06-09 DIAGNOSIS — R60.1 GENERALIZED EDEMA: Primary | ICD-10-CM

## 2022-06-09 DIAGNOSIS — Z74.09 IMPAIRED MOBILITY AND ADLS: ICD-10-CM

## 2022-06-09 DIAGNOSIS — R63.5 WEIGHT GAIN: ICD-10-CM

## 2022-06-09 DIAGNOSIS — Z78.9 IMPAIRED MOBILITY AND ADLS: ICD-10-CM

## 2022-06-09 PROCEDURE — 99309 SBSQ NF CARE MODERATE MDM 30: CPT | Performed by: NURSE PRACTITIONER

## 2022-06-11 NOTE — PROGRESS NOTES
Nursing Home Follow Up Note      Layo Arango DO []   WILLIAM Alfaro [x]  [address] [place] [address]  Phone: [phone]  Fax: [phone] Dorcas Duncan MD []    Dc Fernandez DO []   [address] [place] [address]  Phone: [phone]  Fax: [phone]     PATIENT NAME: Danae Harmon                                                                          YOB: 1940           DATE OF SERVICE: 6/9/2022  FACILITY:  [][facility]   [] [facility]   [] [facility]   [x] [facility]   [] Other ____________________________________________________________________      CHIEF COMPLAINT:     Continued weight gain over the past couple weeks.    HISTORY OF PRESENT ILLNESS:      Patient has had a 9 pound weight gain over the past couple weeks.  She has history of this but had been doing well since Bumex increased and Aldactone started.  She has no complaints of increased work of breathing or O2 demand.  No known history of CHF.  She has no complaints today and no signs and symptoms of any distress.  She is a poor historian however related to her dementia.  She is resting in bed today, very comfortable.  She has very sedentary lifestyle and will not get out of her bed into her chair except a couple times a month typically.    PAST MEDICAL & SURGICAL HISTORY:   Past Medical History:   Diagnosis Date   • Colon polyp    • Osteoporosis    • Pain in thoracic spine    • Pneumonia    • UTI (urinary tract infection)       Past Surgical History:   Procedure Laterality Date   • BLADDER SURGERY  2000    bladder suspension   • CERVICAL LAMINECTOMY     • COLONOSCOPY     • COLOSTOMY  1979    temporary sigmoid   • HYSTERECTOMY           MEDICATIONS:  I have reviewed and reconciled the patients medication list in the patients chart at the skilled nursing facility today.      ALLERGIES:    Allergies   Allergen Reactions   • Lexapro [Escitalopram Oxalate] Nausea Only         SOCIAL  HISTORY:    Social History     Socioeconomic History   • Marital status:    Tobacco Use   • Smoking status: Never Smoker   • Smokeless tobacco: Never Used   Substance and Sexual Activity   • Alcohol use: No   • Drug use: No   • Sexual activity: Defer       FAMILY HISTORY:    Family History   Problem Relation Age of Onset   • Blindness Mother    • Other Mother         arteriosclerotic cardiovascular disease    • Diabetes Mother    • Osteoporosis Mother    • Stroke Mother    • Cancer Brother    • Lung cancer Sister    • Osteoporosis Sister    • Stroke Sister    • Stroke Other        REVIEW OF SYSTEMS:    Review of Systems   Reason unable to perform ROS: ROS per nursing and patient.   Constitutional: Negative for activity change, appetite change, chills, diaphoresis, fatigue and fever.   HENT: Negative for congestion, hearing loss, mouth sores, nosebleeds, postnasal drip, rhinorrhea, sneezing, sore throat and trouble swallowing.    Eyes: Negative for pain, discharge, redness, itching and visual disturbance.   Respiratory: Negative for cough, choking, shortness of breath and wheezing.    Cardiovascular: Positive for leg swelling (chronic). Negative for palpitations.   Gastrointestinal: Negative for abdominal distention, abdominal pain, blood in stool, constipation, diarrhea, nausea, vomiting and indigestion.   Endocrine: Negative for polydipsia and polyuria.   Genitourinary: Positive for urinary incontinence. Negative for decreased urine volume, difficulty urinating, frequency, hematuria and urgency.   Musculoskeletal: Positive for arthralgias (chronic) and gait problem. Negative for joint swelling, myalgias and neck pain.   Skin: Negative for color change, dry skin, rash and skin lesions.   Allergic/Immunologic: Negative for environmental allergies.   Neurological: Positive for weakness, memory problem and confusion. Negative for seizures and speech difficulty.   Psychiatric/Behavioral: Negative for agitation,  behavioral problems, dysphoric mood, hallucinations, self-injury, sleep disturbance, suicidal ideas, negative for hyperactivity, depressed mood and stress. The patient is not nervous/anxious.          PHYSICAL EXAMINATION:   VITAL SIGNS:   Vitals:    22 1124   BP: 126/74   Pulse: 72   Resp: 18   Temp: 98.1 °F (36.7 °C)   Weight: 115 kg (253 lb)       Physical Exam   Constitutional: Vital signs are normal. She appears well-developed and well-nourished.   HENT:   Head: Normocephalic.   Right Ear: External ear normal.   Left Ear: External ear normal.   Nose: Nose normal.   Eyes: Conjunctivae are normal.   Cardiovascular: Normal rate, regular rhythm and normal heart sounds.   Trace edema to upper extremities as well   Pulmonary/Chest: Effort normal. No respiratory distress. She has no wheezes. She has no rhonchi. She has no rales.   Abdominal: Soft. Bowel sounds are normal. She exhibits no distension. There is no abdominal tenderness.   Musculoskeletal:      Thoracic back: She exhibits decreased range of motion and pain.      Right lower le+ Edema present.      Left lower le+ Edema present.      Comments: Weakness BLE   Neurological: She is alert.   Skin: Skin is warm and dry. Turgor is normal. No rash noted.   Psychiatric: Her speech is normal and behavior is normal. Mood normal. Cognition and memory are impaired.   Nursing note and vitals reviewed.      RECORDS REVIEW:   I have reviewed and interpreted the records in [facility] and [facility].    ASSESSMENT     Diagnoses and all orders for this visit:    1. Generalized edema (Primary)    2. Weight gain    3. Vascular dementia without behavioral disturbance (HCC)    4. Impaired mobility and ADLs        PLAN    Generalized edema/weight gain  -Increase Bumex to 2 mg twice daily x5 days.  Patient encouraged to be more active, getting out of bed more and participating more in activities.  Get BMP Monday.  Will follow-up and continue to monitor.    Nursing encouraged to  keep me informed of any acute changes, lack of improvement, or any new concerning symptoms.    Nursing to continue supportive care for all ADLs.      [x]  Discussed Patient in detail with nursing/staff, addressed all needs today.     [x]  Plan of Care Reviewed   [x]  PT/OT Reviewed   [x]  Order Changes  []  Discharge Plans Reviewed  [x]  Advance Directive on file with Nursing Home.   [x]  POA on file with Nursing Home.   [x]  Code Status listed: []  Full Code   [x]  DNR         “I confirm accuracy of unchanged data/findings which have been carried forward from previous visit, as well as I have updated appropriately those that have changed.”                                                        Mita Joyce, APRN.      chest pain

## 2022-07-19 ENCOUNTER — NURSING HOME (OUTPATIENT)
Dept: FAMILY MEDICINE CLINIC | Facility: CLINIC | Age: 82
End: 2022-07-19

## 2022-07-19 VITALS
WEIGHT: 253 LBS | RESPIRATION RATE: 18 BRPM | SYSTOLIC BLOOD PRESSURE: 118 MMHG | BODY MASS INDEX: 47.8 KG/M2 | OXYGEN SATURATION: 94 % | TEMPERATURE: 97.8 F | HEART RATE: 77 BPM | DIASTOLIC BLOOD PRESSURE: 74 MMHG

## 2022-07-19 DIAGNOSIS — Z78.9 IMPAIRED MOBILITY AND ADLS: ICD-10-CM

## 2022-07-19 DIAGNOSIS — F01.50 VASCULAR DEMENTIA WITHOUT BEHAVIORAL DISTURBANCE: ICD-10-CM

## 2022-07-19 DIAGNOSIS — Z74.09 IMPAIRED MOBILITY AND ADLS: ICD-10-CM

## 2022-07-19 DIAGNOSIS — M24.571 ANKLE CONTRACTURE, RIGHT: Primary | ICD-10-CM

## 2022-07-19 PROCEDURE — 99309 SBSQ NF CARE MODERATE MDM 30: CPT | Performed by: NURSE PRACTITIONER

## 2022-07-20 NOTE — PROGRESS NOTES
Nursing Home Follow Up Note      Layo Arango DO []   WILLIAM Alfaro [x]  [address] [place] [address]  Phone: [phone]  Fax: [phone] Dorcas Duncan MD []    Dc Fernandez DO []   [address] [place] [address]  Phone: [phone]  Fax: [phone]     PATIENT NAME: Danae Harmon                                                                          YOB: 1940           DATE OF SERVICE: 7/19/2022  FACILITY:  [][facility]   [] [facility]   [] [facility]   [x] [facility]   [] Other ____________________________________________________________________      CHIEF COMPLAINT:     PT requesting to change orthotic device.     HISTORY OF PRESENT ILLNESS:      PT continues to work with patient for right ankle contracture. Most recent plans are for new dynamic ankle foot orthosis to help correct contracture with will help with ROM and discomfort. She is not ambulatory but correcting contracture will significantly improve functional mobility of that ankle. She is resting in bed today without any complaints or s/s of any distress. She is very pleasant today, conversing.     PAST MEDICAL & SURGICAL HISTORY:   Past Medical History:   Diagnosis Date   • Colon polyp    • Osteoporosis    • Pain in thoracic spine    • Pneumonia    • UTI (urinary tract infection)       Past Surgical History:   Procedure Laterality Date   • BLADDER SURGERY  2000    bladder suspension   • CERVICAL LAMINECTOMY     • COLONOSCOPY     • COLOSTOMY  1979    temporary sigmoid   • HYSTERECTOMY           MEDICATIONS:  I have reviewed and reconciled the patients medication list in the patients chart at the skilled nursing facility today.      ALLERGIES:    Allergies   Allergen Reactions   • Lexapro [Escitalopram Oxalate] Nausea Only         SOCIAL HISTORY:    Social History     Socioeconomic History   • Marital status:    Tobacco Use   • Smoking status: Never Smoker   • Smokeless tobacco: Never  Used   Substance and Sexual Activity   • Alcohol use: No   • Drug use: No   • Sexual activity: Defer       FAMILY HISTORY:    Family History   Problem Relation Age of Onset   • Blindness Mother    • Other Mother         arteriosclerotic cardiovascular disease    • Diabetes Mother    • Osteoporosis Mother    • Stroke Mother    • Cancer Brother    • Lung cancer Sister    • Osteoporosis Sister    • Stroke Sister    • Stroke Other        REVIEW OF SYSTEMS:    Review of Systems   Reason unable to perform ROS: ROS per nursing and patient.   Constitutional: Negative for activity change, appetite change, chills, diaphoresis, fatigue and fever.   HENT: Negative for congestion, hearing loss, mouth sores, nosebleeds, postnasal drip, rhinorrhea, sneezing, sore throat and trouble swallowing.    Eyes: Negative for pain, discharge, redness, itching and visual disturbance.   Respiratory: Negative for cough, choking, shortness of breath and wheezing.    Cardiovascular: Positive for leg swelling (chronic). Negative for palpitations.   Gastrointestinal: Negative for abdominal distention, abdominal pain, blood in stool, constipation, diarrhea, nausea, vomiting and indigestion.   Endocrine: Negative for polydipsia and polyuria.   Genitourinary: Positive for urinary incontinence. Negative for decreased urine volume, difficulty urinating, frequency, hematuria and urgency.   Musculoskeletal: Positive for arthralgias (chronic). Negative for joint swelling, myalgias and neck pain.        Right ankle contracture   Skin: Negative for color change, dry skin, rash and skin lesions.   Allergic/Immunologic: Negative for environmental allergies.   Neurological: Positive for weakness, memory problem and confusion. Negative for seizures and speech difficulty.   Psychiatric/Behavioral: Negative for agitation, behavioral problems, dysphoric mood, hallucinations, self-injury, sleep disturbance, suicidal ideas, negative for hyperactivity, depressed mood  and stress. The patient is not nervous/anxious.          PHYSICAL EXAMINATION:   VITAL SIGNS:   Vitals:    07/19/22 0839   BP: 118/74   Pulse: 77   Resp: 18   Temp: 97.8 °F (36.6 °C)   SpO2: 94%   Weight: 115 kg (253 lb)       Physical Exam   Constitutional: Vital signs are normal. She appears well-developed and well-nourished.   HENT:   Head: Normocephalic.   Right Ear: External ear normal.   Left Ear: External ear normal.   Nose: Nose normal.   Eyes: Conjunctivae are normal.   Cardiovascular: Normal rate, regular rhythm and normal heart sounds.   Pulmonary/Chest: Effort normal. No respiratory distress. She has no wheezes. She has no rhonchi. She has no rales.   Abdominal: Soft. Bowel sounds are normal. She exhibits no distension. There is no abdominal tenderness.   Musculoskeletal:      Right ankle: She exhibits decreased range of motion (right ankle contracture).      Right lower leg: Edema present.      Left lower leg: Edema present.      Comments: Weakness BLE   Neurological: She is alert.   Skin: Skin is warm and dry. Turgor is normal. No rash noted.   Psychiatric: Her speech is normal and behavior is normal. Mood normal. Cognition and memory are impaired.   Nursing note and vitals reviewed.      RECORDS REVIEW:   I have reviewed and interpreted the records in [facility] and [facility].    ASSESSMENT     Diagnoses and all orders for this visit:    1. Ankle contracture, right (Primary)    2. Vascular dementia without behavioral disturbance (HCC)    3. Impaired mobility and ADLs        PLAN    Ankle contracture  -PT to continue to work with patient and apply new dynamic ankle foot orthosis for improvement of contractures. Will follow up and continue to monitor.    Nursing encouraged to keep me informed of any acute changes, lack of improvement, or any new concerning symptoms.    Nursing to continue supportive care for all ADLs.      [x]  Discussed Patient in detail with nursing/staff, addressed all needs today.     [x]  Plan  of Care Reviewed   [x]  PT/OT Reviewed   [x]  Order Changes  []  Discharge Plans Reviewed  [x]  Advance Directive on file with Nursing Home.   [x]  POA on file with Nursing Home.   [x]  Code Status listed: []  Full Code   [x]  DNR         “I confirm accuracy of unchanged data/findings which have been carried forward from previous visit, as well as I have updated appropriately those that have changed.”                                                      Mita Joyce, APRN.

## 2022-07-21 ENCOUNTER — NURSING HOME (OUTPATIENT)
Dept: INTERNAL MEDICINE | Facility: CLINIC | Age: 82
End: 2022-07-21

## 2022-07-21 VITALS
TEMPERATURE: 98.6 F | DIASTOLIC BLOOD PRESSURE: 84 MMHG | SYSTOLIC BLOOD PRESSURE: 130 MMHG | RESPIRATION RATE: 18 BRPM | HEART RATE: 72 BPM | WEIGHT: 257 LBS | OXYGEN SATURATION: 94 % | BODY MASS INDEX: 48.56 KG/M2

## 2022-07-21 DIAGNOSIS — G89.29 CHRONIC LOW BACK PAIN WITH SCIATICA, SCIATICA LATERALITY UNSPECIFIED, UNSPECIFIED BACK PAIN LATERALITY: ICD-10-CM

## 2022-07-21 DIAGNOSIS — E78.2 MIXED HYPERLIPIDEMIA: ICD-10-CM

## 2022-07-21 DIAGNOSIS — F32.89 OTHER DEPRESSION: ICD-10-CM

## 2022-07-21 DIAGNOSIS — M54.40 CHRONIC LOW BACK PAIN WITH SCIATICA, SCIATICA LATERALITY UNSPECIFIED, UNSPECIFIED BACK PAIN LATERALITY: ICD-10-CM

## 2022-07-21 DIAGNOSIS — E11.9 TYPE 2 DIABETES MELLITUS WITHOUT COMPLICATION, WITHOUT LONG-TERM CURRENT USE OF INSULIN: ICD-10-CM

## 2022-07-21 DIAGNOSIS — F02.818 LATE ONSET ALZHEIMER'S DISEASE WITH BEHAVIORAL DISTURBANCE: Primary | ICD-10-CM

## 2022-07-21 DIAGNOSIS — R53.83 OTHER FATIGUE: ICD-10-CM

## 2022-07-21 DIAGNOSIS — I10 BENIGN ESSENTIAL HYPERTENSION: ICD-10-CM

## 2022-07-21 DIAGNOSIS — G30.1 LATE ONSET ALZHEIMER'S DISEASE WITH BEHAVIORAL DISTURBANCE: Primary | ICD-10-CM

## 2022-07-21 DIAGNOSIS — R60.0 LOWER EXTREMITY EDEMA: ICD-10-CM

## 2022-07-21 DIAGNOSIS — F33.1 MODERATE EPISODE OF RECURRENT MAJOR DEPRESSIVE DISORDER: ICD-10-CM

## 2022-07-21 PROCEDURE — 99308 SBSQ NF CARE LOW MDM 20: CPT | Performed by: INTERNAL MEDICINE

## 2022-08-03 NOTE — PROGRESS NOTES
Nursing Home Progress Note        Layo Arango DO []  WILLIAM Alfaro []  [address] [place] [address]  Phone: [phone]  Fax: [phone] Dorcas Duncan MD []  Dc Fernandez DO [x]   [address] [place] [address]  Phone: [phone]  Fax: [phone]     PATIENT NAME: Danae Harmon                                                                          YOB: 1940           DATE OF SERVICE: 07/21/2022  FACILITY:  [] [facility]   [] [facility]   [] [facility]   [x] [facility]  []  [facility]  [] Other ______________________________________________________________________     CHIEF COMPLAINT:  Chronic Medical Management      HISTORY OF PRESENT ILLNESS:   Patient was lying comfortably in her bed with no new complaints or concerns.  She does have right ankle contractures for which physical therapy has been following and assisting for exercises.  Patient remains pleasantly confused and unable to provide much of any other additional history today    PAST MEDICAL & SURGICAL HISTORY:   Past Medical History:   Diagnosis Date   • Colon polyp    • Osteoporosis    • Pain in thoracic spine    • Pneumonia    • UTI (urinary tract infection)       Past Surgical History:   Procedure Laterality Date   • BLADDER SURGERY  2000    bladder suspension   • CERVICAL LAMINECTOMY     • COLONOSCOPY     • COLOSTOMY  1979    temporary sigmoid   • HYSTERECTOMY           MEDICATIONS:  I have reviewed and reconciled the patients medication list in the patients chart at the skilled nursing facility today, 07/21/2022.      ALLERGIES:  Allergies   Allergen Reactions   • Lexapro [Escitalopram Oxalate] Nausea Only         SOCIAL HISTORY:  Social History     Socioeconomic History   • Marital status:    Tobacco Use   • Smoking status: Never Smoker   • Smokeless tobacco: Never Used   Substance and Sexual Activity   • Alcohol use: No   • Drug use: No   • Sexual activity: Defer        FAMILY HISTORY:  Family History   Problem Relation Age of Onset   • Blindness Mother    • Other Mother         arteriosclerotic cardiovascular disease    • Diabetes Mother    • Osteoporosis Mother    • Stroke Mother    • Cancer Brother    • Lung cancer Sister    • Osteoporosis Sister    • Stroke Sister    • Stroke Other        REVIEW OF SYSTEMS:  Review of Systems   Unable to perform ROS: Dementia          PHYSICAL EXAMINATION:     VITAL SIGNS:  /84   Pulse 72   Temp 98.6 °F (37 °C)   Resp 18   Wt 117 kg (257 lb)   SpO2 94%   BMI 48.56 kg/m²     Physical Exam  Vitals and nursing note reviewed.   Constitutional:       Appearance: Normal appearance. She is well-developed. She is obese.      Comments: Frail elderly female   HENT:      Head: Normocephalic and atraumatic.   Eyes:      Extraocular Movements: Extraocular movements intact.      Conjunctiva/sclera: Conjunctivae normal.   Cardiovascular:      Rate and Rhythm: Normal rate and regular rhythm.   Pulmonary:      Effort: Pulmonary effort is normal.      Breath sounds: No wheezing.   Abdominal:      General: Abdomen is flat.      Palpations: Abdomen is soft.   Musculoskeletal:      Cervical back: Normal range of motion and neck supple.      Right lower leg: Edema (Minimal) present.      Left lower leg: Edema (Minimal) present.      Comments: Bedridden   Skin:     General: Skin is warm and dry.      Findings: No rash.   Neurological:      General: No focal deficit present.      Mental Status: She is alert and oriented to person, place, and time. Mental status is at baseline.   Psychiatric:         Mood and Affect: Mood normal.         Behavior: Behavior normal.         RECORDS REVIEW:       ASSESSMENT     Diagnoses and all orders for this visit:    1. Late onset Alzheimer's disease with behavioral disturbance (HCC) (Primary)    2. Benign essential hypertension    3. Moderate episode of recurrent major depressive disorder (HCC)    4. Chronic low  back pain with sciatica, sciatica laterality unspecified, unspecified back pain laterality    5. Mixed hyperlipidemia    6. Other fatigue    7. Lower extremity edema    8. Other depression    9. Type 2 diabetes mellitus without complication, without long-term current use of insulin (HCC)        PLAN  Alzheimer's disease with behavioral disturbances  -Mental status remains at baseline.  Continue nursing care for all ADLs.     Right ankle contracture  - Physical therapy following for strengthening and exercise.    Constipation  -Good control with current bowel regimen.     Major depressive disorder  -Stable on Seroquel and Depakote     Lower Extremity Edema  -Good control with Bumex and spironolactone (which was recently increased).       Bladder spasms/urinary frequency  -Cont Myrbetriq. Stable control.      Mixed hyperlipidemia   -Continue statin.     Essential hypertension  -Controlled on Coreg     Vitamin D and vitamin B12 deficiencies  -Stable on supplements.     Osteoporosis  -Stable on vitamins.  Bisphosphonate therapy is not necessary at this point.      Type 2 diabetes mellitus  -Fair glucose control. Remains diet controlled.      Chronic pain  -Controlled on Norco being filled when needed.       [x]  Discussed Patient in detail with nursing/staff, addressed all needs today.     [x]  Plan of Care Reviewed   []  PT/OT Reviewed   []  Order Changes  []  Discharge Plans Reviewed  [x]  Advance Directive on file with Nursing Home.   [x]  POA on file with Nursing Home.    [x]  Code Status listed and reviewed.     I confirm accuracy of unchanged data/findings including physical exam and plan which have been carried forward from previous visit, as well as I have updated appropriately those that have changed        Dc Fernandez DO.  8/2/2022

## 2022-08-08 ENCOUNTER — NURSING HOME (OUTPATIENT)
Dept: FAMILY MEDICINE CLINIC | Facility: CLINIC | Age: 82
End: 2022-08-08

## 2022-08-08 VITALS
RESPIRATION RATE: 18 BRPM | SYSTOLIC BLOOD PRESSURE: 126 MMHG | HEIGHT: 61 IN | DIASTOLIC BLOOD PRESSURE: 78 MMHG | BODY MASS INDEX: 48.9 KG/M2 | HEART RATE: 68 BPM | WEIGHT: 259 LBS | OXYGEN SATURATION: 96 % | TEMPERATURE: 97.8 F

## 2022-08-08 DIAGNOSIS — E53.8 VITAMIN B 12 DEFICIENCY: ICD-10-CM

## 2022-08-08 DIAGNOSIS — I50.9 CHRONIC CONGESTIVE HEART FAILURE, UNSPECIFIED HEART FAILURE TYPE: ICD-10-CM

## 2022-08-08 DIAGNOSIS — E55.9 VITAMIN D DEFICIENCY: ICD-10-CM

## 2022-08-08 DIAGNOSIS — M25.50 ARTHRALGIA OF MULTIPLE JOINTS: ICD-10-CM

## 2022-08-08 DIAGNOSIS — E11.9 TYPE 2 DIABETES MELLITUS WITHOUT COMPLICATION, WITHOUT LONG-TERM CURRENT USE OF INSULIN: ICD-10-CM

## 2022-08-08 DIAGNOSIS — F01.50 VASCULAR DEMENTIA WITHOUT BEHAVIORAL DISTURBANCE: ICD-10-CM

## 2022-08-08 DIAGNOSIS — Z74.09 IMPAIRED MOBILITY AND ADLS: ICD-10-CM

## 2022-08-08 DIAGNOSIS — R41.82 ACUTE ON CHRONIC ALTERATION IN MENTAL STATUS: Primary | ICD-10-CM

## 2022-08-08 DIAGNOSIS — M81.8 OTHER OSTEOPOROSIS WITHOUT CURRENT PATHOLOGICAL FRACTURE: ICD-10-CM

## 2022-08-08 DIAGNOSIS — E66.01 CLASS 3 SEVERE OBESITY DUE TO EXCESS CALORIES WITH SERIOUS COMORBIDITY AND BODY MASS INDEX (BMI) OF 45.0 TO 49.9 IN ADULT: ICD-10-CM

## 2022-08-08 DIAGNOSIS — R60.0 LOWER EXTREMITY EDEMA: ICD-10-CM

## 2022-08-08 DIAGNOSIS — Z78.9 IMPAIRED MOBILITY AND ADLS: ICD-10-CM

## 2022-08-08 PROBLEM — E66.9 OBESITY (BMI 30.0-34.9): Status: RESOLVED | Noted: 2020-09-26 | Resolved: 2022-08-08

## 2022-08-08 PROCEDURE — 99309 SBSQ NF CARE MODERATE MDM 30: CPT | Performed by: NURSE PRACTITIONER

## 2022-08-09 NOTE — PROGRESS NOTES
Nursing Home Follow Up Note      Layo Arango DO []   WILLIAM Alfaro [x]  [address] [place] [address]  Phone: [phone]  Fax: [phone] Dorcas Duncan MD []    Dc Fernandez DO []   [address] [place] [address]  Phone: [phone]  Fax: [phone]     PATIENT NAME: Danae Harmon                                                                          YOB: 1940           DATE OF SERVICE: 8/8/2022  FACILITY:  [][facility]   [] [place]   [] [facility]   [x] [facility]   [] Other ____________________________________________________________________      CHIEF COMPLAINT:     Compliance visit for management of long-term care needs and chronic conditions.    Increased confusion, edema and weight gain the past couple days.    HISTORY OF PRESENT ILLNESS:      Compliance visit for management of long-term care needs and chronic conditions.  She has a past medical history of CHF, hypertension, diabetes, dementia, obesity, vitamin D and B12 deficiency and osteoporosis.    Nursing reports that patient has had increased confusion for the past couple days.  She has also had increased edema and weight gain.  She does have history of CHF.  She is resting in the bed during visit today, pleasantly confused.  She is eating lunch.  She has no complaints or signs or symptoms of any distress but is a poor historian related to her dementia.    PAST MEDICAL & SURGICAL HISTORY:   Past Medical History:   Diagnosis Date   • Colon polyp    • Osteoporosis    • Pain in thoracic spine    • Pneumonia    • UTI (urinary tract infection)       Past Surgical History:   Procedure Laterality Date   • BLADDER SURGERY  2000    bladder suspension   • CERVICAL LAMINECTOMY     • COLONOSCOPY     • COLOSTOMY  1979    temporary sigmoid   • HYSTERECTOMY           MEDICATIONS:  I have reviewed and reconciled the patients medication list in the patients chart at the skilled nursing facility today.       ALLERGIES:    Allergies   Allergen Reactions   • Lexapro [Escitalopram Oxalate] Nausea Only         SOCIAL HISTORY:    Social History     Socioeconomic History   • Marital status:    Tobacco Use   • Smoking status: Never Smoker   • Smokeless tobacco: Never Used   Substance and Sexual Activity   • Alcohol use: No   • Drug use: No   • Sexual activity: Defer       FAMILY HISTORY:    Family History   Problem Relation Age of Onset   • Blindness Mother    • Other Mother         arteriosclerotic cardiovascular disease    • Diabetes Mother    • Osteoporosis Mother    • Stroke Mother    • Cancer Brother    • Lung cancer Sister    • Osteoporosis Sister    • Stroke Sister    • Stroke Other        REVIEW OF SYSTEMS:    Review of Systems   Reason unable to perform ROS: ROS per nursing and patient.   Constitutional: Positive for unexpected weight gain. Negative for activity change, appetite change, chills, diaphoresis, fatigue and fever.   HENT: Negative for congestion, hearing loss, mouth sores, nosebleeds, postnasal drip, rhinorrhea, sneezing, sore throat and trouble swallowing.    Eyes: Negative for pain, discharge, redness and itching.   Respiratory: Negative for cough, choking, shortness of breath and wheezing.    Cardiovascular: Positive for leg swelling (increased). Negative for palpitations.   Gastrointestinal: Negative for abdominal distention, abdominal pain, blood in stool, constipation, diarrhea, nausea, vomiting and indigestion.   Endocrine: Negative for polydipsia and polyuria.   Genitourinary: Positive for urinary incontinence. Negative for decreased urine volume, difficulty urinating, frequency, hematuria and urgency.   Musculoskeletal: Positive for arthralgias (chronic). Negative for joint swelling, myalgias and neck pain.   Skin: Negative for color change, dry skin, rash and skin lesions.   Allergic/Immunologic: Negative for environmental allergies.   Neurological: Positive for weakness, memory  "problem and confusion (increased). Negative for seizures and speech difficulty.   Psychiatric/Behavioral: Negative for agitation, behavioral problems, dysphoric mood, hallucinations, self-injury, sleep disturbance, suicidal ideas, negative for hyperactivity, depressed mood and stress. The patient is not nervous/anxious.          PHYSICAL EXAMINATION:   VITAL SIGNS:   Vitals:    08/08/22 0905   BP: 126/78   Pulse: 68   Resp: 18   Temp: 97.8 °F (36.6 °C)   SpO2: 96%   Weight: 117 kg (259 lb)   Height: 154.9 cm (61\")       Physical Exam   Constitutional: Vital signs are normal. She appears well-developed and well-nourished.   HENT:   Head: Normocephalic.   Right Ear: External ear normal.   Left Ear: External ear normal.   Nose: Nose normal.   Eyes: Conjunctivae are normal.   Cardiovascular: Normal rate, regular rhythm and normal heart sounds.   Pulmonary/Chest: Effort normal. No respiratory distress. She has no wheezes. She has no rhonchi. She has no rales.   Abdominal: Soft. Bowel sounds are normal. She exhibits no distension. There is no abdominal tenderness.   Musculoskeletal:      Right ankle: She exhibits decreased range of motion (right ankle contracture).      Right lower leg: Edema present.      Left lower leg: Edema present.      Comments: Weakness BLE   Neurological: She is alert.   Skin: Skin is warm and dry. Turgor is normal. No rash noted.   Psychiatric: Her speech is normal and behavior is normal. Mood normal. Cognition and memory are impaired.   Nursing note and vitals reviewed.      RECORDS REVIEW:   I have reviewed and interpreted the records in [facility] and [facility].    ASSESSMENT     Diagnoses and all orders for this visit:    1. Acute on chronic alteration in mental status (Primary)    2. Vascular dementia without behavioral disturbance (HCC)    3. Lower extremity edema    4. Chronic congestive heart failure, unspecified heart failure type (HCC)    5. Vitamin B 12 deficiency    6. Vitamin D deficiency    7. " Type 2 diabetes mellitus without complication, without long-term current use of insulin ([facility])    8. Class 3 severe obesity due to excess calories with serious comorbidity and body mass index (BMI) of 45.0 to 49.9 in adult ([facility])    9. Other osteoporosis without current pathological fracture    10. Arthralgia of multiple joints    11. Impaired mobility and ADLs        PLAN    Acute on chronic mental status change/dementia  -Will obtain UA C&S, CBC in 1 BMP.  Will follow-up with results.    CHF/LE edema  -Will increase Bumex to 2 mg p.o. twice daily x3 days then back to 1 mg p.o. twice daily.  Increase spironolactone to 25 p.o. twice daily.  Will follow-up and continue to monito.    Type II DM  -Stable on current medications.  A1c was 5.3 in July.  She receives labs she receives every 3 months.    Vitamin B12 and D deficiency  -Stable with current medications.  She receives routine lab monitoring.    Obesity  -BMI 48.9 today.  She continues to be noncompliant with diet.    Osteoporosis/osteoarthritis  -Stable on chronic conditions.     Nursing encouraged to keep me informed of any acute changes, or any new concerning symptoms.    Nursing to continue supportive care for all ADLs.      [x]  Discussed Patient in detail with nursing/staff, addressed all needs today.     [x]  Plan of Care Reviewed   [x]  PT/OT Reviewed   [x]  Order Changes  []  Discharge Plans Reviewed  [x]  Advance Directive on file with Nursing Home.   [x]  POA on file with Nursing Home.   [x]  Code Status listed: []  Full Code   [x]  DNR         “I confirm accuracy of unchanged data/findings which have been carried forward from previous visit, as well as I have updated appropriately those that have changed.”                                                        Mita Joyce, APRN.

## 2022-09-20 ENCOUNTER — NURSING HOME (OUTPATIENT)
Dept: FAMILY MEDICINE CLINIC | Facility: CLINIC | Age: 82
End: 2022-09-20

## 2022-09-20 VITALS
TEMPERATURE: 97.8 F | RESPIRATION RATE: 20 BRPM | WEIGHT: 260 LBS | HEART RATE: 80 BPM | BODY MASS INDEX: 49.13 KG/M2 | SYSTOLIC BLOOD PRESSURE: 128 MMHG | DIASTOLIC BLOOD PRESSURE: 80 MMHG

## 2022-09-20 DIAGNOSIS — Z74.09 IMPAIRED MOBILITY AND ADLS: ICD-10-CM

## 2022-09-20 DIAGNOSIS — Z20.822 EXPOSURE TO COVID-19 VIRUS: Primary | ICD-10-CM

## 2022-09-20 DIAGNOSIS — F01.50 VASCULAR DEMENTIA WITHOUT BEHAVIORAL DISTURBANCE: ICD-10-CM

## 2022-09-20 DIAGNOSIS — Z78.9 IMPAIRED MOBILITY AND ADLS: ICD-10-CM

## 2022-09-20 PROCEDURE — 99309 SBSQ NF CARE MODERATE MDM 30: CPT | Performed by: NURSE PRACTITIONER

## 2022-09-21 NOTE — PROGRESS NOTES
Nursing Home Follow Up Note      Layo Arango DO []   WILLIAM Alfaro [x]  [address] [place] [address]  Phone: [phone]  Fax: [phone] Dorcas Duncan MD []    Dc Fernandez DO []   [address] [place] [address]  Phone: [phone]  Fax: [phone]     PATIENT NAME: Danae Harmon                                                                          YOB: 1940           DATE OF SERVICE: 9/20/2022  FACILITY:  [][place]   [] [place]   [] [facility]   [x] [facility]   [] Other ____________________________________________________________________      CHIEF COMPLAINT:     Exposure to COVID.     HISTORY OF PRESENT ILLNESS:      Patients roommate has Covid and patient has been exposed. She is resting in bed at this time without any complaints or signs and symptoms of any distress.  She is very pleasant with baseline confusion.  She has no respiratory or GI signs and symptoms.  No fever, cough, shortness of breath, hypoxia, etc.  She has not had any change in appetite or weight loss.  She has been vaccinated and has had COVID in the past.    PAST MEDICAL & SURGICAL HISTORY:   Past Medical History:   Diagnosis Date   • Colon polyp    • Osteoporosis    • Pain in thoracic spine    • Pneumonia    • UTI (urinary tract infection)       Past Surgical History:   Procedure Laterality Date   • BLADDER SURGERY  2000    bladder suspension   • CERVICAL LAMINECTOMY     • COLONOSCOPY     • COLOSTOMY  1979    temporary sigmoid   • HYSTERECTOMY           MEDICATIONS:  I have reviewed and reconciled the patients medication list in the patients chart at the skilled nursing facility today.      ALLERGIES:    Allergies   Allergen Reactions   • Lexapro [Escitalopram Oxalate] Nausea Only         SOCIAL HISTORY:    Social History     Socioeconomic History   • Marital status:    Tobacco Use   • Smoking status: Never Smoker   • Smokeless tobacco: Never Used   Substance and  Sexual Activity   • Alcohol use: No   • Drug use: No   • Sexual activity: Defer       FAMILY HISTORY:    Family History   Problem Relation Age of Onset   • Blindness Mother    • Other Mother         arteriosclerotic cardiovascular disease    • Diabetes Mother    • Osteoporosis Mother    • Stroke Mother    • Cancer Brother    • Lung cancer Sister    • Osteoporosis Sister    • Stroke Sister    • Stroke Other        REVIEW OF SYSTEMS:    Review of Systems   Reason unable to perform ROS: ROS per nursing and patient.   Constitutional: Negative for activity change, appetite change, chills, diaphoresis, fatigue, fever and unexpected weight gain.   HENT: Negative for congestion, hearing loss, mouth sores, nosebleeds, postnasal drip, rhinorrhea, sneezing, sore throat and trouble swallowing.    Eyes: Negative for pain, discharge, redness and itching.   Respiratory: Negative for cough, choking, shortness of breath and wheezing.    Cardiovascular: Positive for leg swelling. Negative for palpitations.   Gastrointestinal: Negative for abdominal distention, abdominal pain, blood in stool, constipation, diarrhea, nausea, vomiting and indigestion.   Endocrine: Negative for polydipsia and polyuria.   Genitourinary: Positive for urinary incontinence. Negative for decreased urine volume, difficulty urinating, dysuria, frequency, hematuria and urgency.   Musculoskeletal: Positive for arthralgias (chronic). Negative for joint swelling, myalgias and neck pain.   Skin: Negative for color change, dry skin, rash and skin lesions.   Neurological: Positive for weakness, memory problem and confusion. Negative for seizures and speech difficulty.   Psychiatric/Behavioral: Negative for agitation, behavioral problems, dysphoric mood, hallucinations, self-injury, sleep disturbance, suicidal ideas, negative for hyperactivity, depressed mood and stress. The patient is not nervous/anxious.          PHYSICAL EXAMINATION:   VITAL SIGNS:   Vitals:     09/20/22 1530   BP: 128/80   Pulse: 80   Resp: 20   Temp: 97.8 °F (36.6 °C)   Weight: 118 kg (260 lb)       Physical Exam   Constitutional: Vital signs are normal. She appears well-developed and well-nourished.   HENT:   Head: Normocephalic.   Right Ear: External ear normal.   Left Ear: External ear normal.   Nose: Nose normal.   Eyes: Conjunctivae are normal.   Cardiovascular: Normal rate, regular rhythm and normal heart sounds.   Pulmonary/Chest: Effort normal. No respiratory distress. She has no wheezes. She has no rhonchi. She has no rales.   Abdominal: Soft. Bowel sounds are normal. She exhibits no distension. There is no abdominal tenderness.   Musculoskeletal:      Right ankle: She exhibits decreased range of motion (right ankle contracture).      Right lower leg: Edema present.      Left lower leg: Edema present.      Comments: Weakness BLE   Neurological: She is alert.   Skin: Skin is warm and dry. Turgor is normal. No rash noted.   Psychiatric: Her speech is normal and behavior is normal. Mood normal. Cognition and memory are impaired.   Nursing note and vitals reviewed.      RECORDS REVIEW:   I have reviewed and interpreted the records in [facility] and [facility].    ASSESSMENT     Diagnoses and all orders for this visit:    1. Exposure to COVID-19 virus (Primary)    2. Vascular dementia without behavioral disturbance (HCC)    3. Impaired mobility and ADLs        PLAN    Exposure Covid/dementia  -Nursing to continue to monitor for any s/s Covid since she has Dementia and is a poor historian. Will follow up and monitor.    Nursing encouraged to keep me informed of any acute changes, or any new concerning symptoms.    Nursing to continue supportive care for all ADLs.      [x]  Discussed Patient in detail with nursing/staff, addressed all needs today.     [x]  Plan of Care Reviewed   [x]  PT/OT Reviewed   [x]  Order Changes  []  Discharge Plans Reviewed  [x]  Advance Directive on file with Nursing Home.   [x]  POA on  file with Nursing Home.   [x]  Code Status listed: []  Full Code   [x]  DNR         “I confirm accuracy of unchanged data/findings which have been carried forward from previous visit, as well as I have updated appropriately those that have changed.”                                                      Mita Joyce, APRN.

## 2022-09-22 ENCOUNTER — NURSING HOME (OUTPATIENT)
Dept: INTERNAL MEDICINE | Facility: CLINIC | Age: 82
End: 2022-09-22

## 2022-09-22 VITALS
RESPIRATION RATE: 16 BRPM | SYSTOLIC BLOOD PRESSURE: 120 MMHG | OXYGEN SATURATION: 94 % | TEMPERATURE: 98 F | BODY MASS INDEX: 49.32 KG/M2 | HEART RATE: 70 BPM | WEIGHT: 261 LBS | DIASTOLIC BLOOD PRESSURE: 76 MMHG

## 2022-09-22 DIAGNOSIS — R60.0 LOWER EXTREMITY EDEMA: ICD-10-CM

## 2022-09-22 DIAGNOSIS — E78.2 MIXED HYPERLIPIDEMIA: ICD-10-CM

## 2022-09-22 DIAGNOSIS — G89.29 CHRONIC LOW BACK PAIN WITH SCIATICA, SCIATICA LATERALITY UNSPECIFIED, UNSPECIFIED BACK PAIN LATERALITY: ICD-10-CM

## 2022-09-22 DIAGNOSIS — R53.83 OTHER FATIGUE: ICD-10-CM

## 2022-09-22 DIAGNOSIS — F32.89 OTHER DEPRESSION: ICD-10-CM

## 2022-09-22 DIAGNOSIS — M54.40 CHRONIC LOW BACK PAIN WITH SCIATICA, SCIATICA LATERALITY UNSPECIFIED, UNSPECIFIED BACK PAIN LATERALITY: ICD-10-CM

## 2022-09-22 DIAGNOSIS — E11.9 TYPE 2 DIABETES MELLITUS WITHOUT COMPLICATION, WITHOUT LONG-TERM CURRENT USE OF INSULIN: ICD-10-CM

## 2022-09-22 DIAGNOSIS — F02.818 LATE ONSET ALZHEIMER'S DISEASE WITH BEHAVIORAL DISTURBANCE: Primary | ICD-10-CM

## 2022-09-22 DIAGNOSIS — F33.1 MODERATE EPISODE OF RECURRENT MAJOR DEPRESSIVE DISORDER: ICD-10-CM

## 2022-09-22 DIAGNOSIS — I10 BENIGN ESSENTIAL HYPERTENSION: ICD-10-CM

## 2022-09-22 DIAGNOSIS — G30.1 LATE ONSET ALZHEIMER'S DISEASE WITH BEHAVIORAL DISTURBANCE: Primary | ICD-10-CM

## 2022-09-22 PROCEDURE — 99308 SBSQ NF CARE LOW MDM 20: CPT | Performed by: INTERNAL MEDICINE

## 2022-09-26 NOTE — PROGRESS NOTES
Nursing Home Progress Note        Layo Arango DO []  WILLIAM Alfaro []  [address] [place] [address]  Phone: [phone]  Fax: [phone] Dorcas Duncan MD []  cD Fernandez DO [x]   [address] [place] [address]  Phone: [phone]  Fax: [phone]     PATIENT NAME: Danae Harmon                                                                          YOB: 1940           DATE OF SERVICE: 09/22/2022  FACILITY:  [] [facility]   [] [facility]   [] [facility]   [x] [facility]  []  [facility]  [] Other ______________________________________________________________________     CHIEF COMPLAINT:  Chronic Medical Management      HISTORY OF PRESENT ILLNESS:   Patient was resting comfortably in her room with no new complaints or concerns.  Her mood and behaviors have been stable.  Her roommate has tested positive for COVID-19 and she has been quarantined into this room as well.  Fortunately, she has not developed any symptoms.  Over the last month, she is intermittently diuresed when her lower extremities swell up more.  She has unfortunately been gradually gaining weight and her feet are developing contractures.    PAST MEDICAL & SURGICAL HISTORY:   Past Medical History:   Diagnosis Date   • Colon polyp    • Osteoporosis    • Pain in thoracic spine    • Pneumonia    • UTI (urinary tract infection)       Past Surgical History:   Procedure Laterality Date   • BLADDER SURGERY  2000    bladder suspension   • CERVICAL LAMINECTOMY     • COLONOSCOPY     • COLOSTOMY  1979    temporary sigmoid   • HYSTERECTOMY           MEDICATIONS:  I have reviewed and reconciled the patients medication list in the patients chart at the skilled nursing facility today, 09/22/2022.      ALLERGIES:  Allergies   Allergen Reactions   • Lexapro [Escitalopram Oxalate] Nausea Only         SOCIAL HISTORY:  Social History     Socioeconomic History   • Marital status:    Tobacco  Use   • Smoking status: Never Smoker   • Smokeless tobacco: Never Used   Substance and Sexual Activity   • Alcohol use: No   • Drug use: No   • Sexual activity: Defer       FAMILY HISTORY:  Family History   Problem Relation Age of Onset   • Blindness Mother    • Other Mother         arteriosclerotic cardiovascular disease    • Diabetes Mother    • Osteoporosis Mother    • Stroke Mother    • Cancer Brother    • Lung cancer Sister    • Osteoporosis Sister    • Stroke Sister    • Stroke Other        REVIEW OF SYSTEMS:  Review of Systems   Constitutional: Negative for chills, fatigue and fever.   HENT: Negative for congestion, ear pain, rhinorrhea, sinus pressure and sore throat.    Eyes: Negative for visual disturbance.   Respiratory: Negative for cough, chest tightness, shortness of breath and wheezing.    Cardiovascular: Negative for chest pain, palpitations and leg swelling.   Gastrointestinal: Negative for abdominal pain, blood in stool, constipation, diarrhea, nausea and vomiting.   Endocrine: Negative for polydipsia and polyuria.   Genitourinary: Negative for dysuria and hematuria.   Musculoskeletal: Negative for arthralgias and back pain.   Skin: Negative for rash.   Neurological: Negative for dizziness, light-headedness, numbness and headaches.   Psychiatric/Behavioral: Negative for dysphoric mood and sleep disturbance. The patient is not nervous/anxious.           PHYSICAL EXAMINATION:     VITAL SIGNS:  /76   Pulse 70   Temp 98 °F (36.7 °C)   Resp 16   Wt 118 kg (261 lb)   SpO2 94%   BMI 49.32 kg/m²     Physical Exam  Vitals and nursing note reviewed.   Constitutional:       Appearance: Normal appearance. She is well-developed. She is obese.      Comments: Frail elderly female   HENT:      Head: Normocephalic and atraumatic.   Eyes:      Extraocular Movements: Extraocular movements intact.      Conjunctiva/sclera: Conjunctivae normal.   Cardiovascular:      Rate and Rhythm: Normal rate and regular  rhythm.   Pulmonary:      Effort: Pulmonary effort is normal.      Breath sounds: No wheezing.   Abdominal:      General: Abdomen is flat.      Palpations: Abdomen is soft.   Musculoskeletal:      Cervical back: Normal range of motion and neck supple.      Right lower leg: Edema (Minimal) present.      Left lower leg: Edema (Minimal) present.      Comments: Bedridden   Skin:     General: Skin is warm and dry.      Findings: No rash.   Neurological:      General: No focal deficit present.      Mental Status: She is alert and oriented to person, place, and time. Mental status is at baseline.   Psychiatric:         Mood and Affect: Mood normal.         Behavior: Behavior normal.      Comments: Confused         RECORDS REVIEW:       ASSESSMENT     Diagnoses and all orders for this visit:    1. Late onset Alzheimer's disease with behavioral disturbance (HCC) (Primary)    2. Benign essential hypertension    3. Moderate episode of recurrent major depressive disorder (HCC)    4. Chronic low back pain with sciatica, sciatica laterality unspecified, unspecified back pain laterality    5. Mixed hyperlipidemia    6. Other fatigue    7. Lower extremity edema    8. Other depression    9. Type 2 diabetes mellitus without complication, without long-term current use of insulin (HCC)        PLAN  Alzheimer's disease with behavioral disturbances  -Mental status remains at baseline.  Continue nursing care for all ADLs.     Right ankle contracture  -  Continue supportive care.  Unfortunately patient at this point has become functionally paraplegic.     Constipation  -Good control with current bowel regimen.     Major depressive disorder  -Stable on Seroquel and Depakote     Lower Extremity Edema  -Persists but diuretics are being adjusted as needed.      Bladder spasms/urinary frequency  -Cont Myrbetriq. Stable control.      Mixed hyperlipidemia   -Continue statin.     Essential hypertension  -Controlled on Coreg     Vitamin D and  vitamin B12 deficiencies  -Stable on supplements.     Osteoporosis  -Stable on vitamins.  Bisphosphonate therapy is not necessary at this point.      Type 2 diabetes mellitus  -Fair glucose control. Remains diet controlled.      Chronic pain  -Controlled on Norco being filled when needed.        [x]  Discussed Patient in detail with nursing/staff, addressed all needs today.     [x]  Plan of Care Reviewed   []  PT/OT Reviewed   []  Order Changes  []  Discharge Plans Reviewed  [x]  Advance Directive on file with Nursing Home.   [x]  POA on file with Nursing Home.    [x]  Code Status listed and reviewed.     I confirm accuracy of unchanged data/findings including physical exam and plan which have been carried forward from previous visit, as well as I have updated appropriately those that have changed        Dc Fernandez DO.  9/26/2022

## 2022-10-12 ENCOUNTER — NURSING HOME (OUTPATIENT)
Dept: FAMILY MEDICINE CLINIC | Facility: CLINIC | Age: 82
End: 2022-10-12

## 2022-10-12 VITALS
HEART RATE: 74 BPM | DIASTOLIC BLOOD PRESSURE: 78 MMHG | WEIGHT: 262 LBS | SYSTOLIC BLOOD PRESSURE: 123 MMHG | BODY MASS INDEX: 49.5 KG/M2 | RESPIRATION RATE: 20 BRPM | TEMPERATURE: 96.7 F

## 2022-10-12 DIAGNOSIS — Z78.9 IMPAIRED MOBILITY AND ADLS: ICD-10-CM

## 2022-10-12 DIAGNOSIS — L53.9 REDNESS OF SKIN: Primary | ICD-10-CM

## 2022-10-12 DIAGNOSIS — Z74.09 IMPAIRED MOBILITY AND ADLS: ICD-10-CM

## 2022-10-12 PROCEDURE — 99308 SBSQ NF CARE LOW MDM 20: CPT | Performed by: NURSE PRACTITIONER

## 2022-10-12 NOTE — PROGRESS NOTES
Nursing Home Follow Up Note      Layo Arango DO []   WILLIAM Alfaro [x]  [address] [place] [address]  Phone: [phone]  Fax: [phone] Dorcas Duncan MD []    Dc Fernandez DO []   [address] [place] [address]  Phone: [phone]  Fax: [phone]     PATIENT NAME: Danae Harmon                                                                          YOB: 1940           DATE OF SERVICE: 10/12/2022  FACILITY:  [][facility]   [] [facility]   [] [facility]   [x] [facility]   [] Other ____________________________________________________________________      CHIEF COMPLAINT:     Family concerned about left neck rash.    HISTORY OF PRESENT ILLNESS:     Family visiting earlier today and reporting concerns about left neck rash. Nursing did not notice any rash earlier and no rash noted at this time. Patient does have necklace that rests in her skin folds when she sleeps with her head to the left, an 02 tubing does at times as well. She denies any itching or discomfort. No rash noted anywhere at this time. Does not have any history of eczema or any other type of dermatitis. She is resting in bed with no complaints or s/s of any distress. Conversing at baseline.     PAST MEDICAL & SURGICAL HISTORY:   Past Medical History:   Diagnosis Date   • Colon polyp    • Osteoporosis    • Pain in thoracic spine    • Pneumonia    • UTI (urinary tract infection)       Past Surgical History:   Procedure Laterality Date   • BLADDER SURGERY  2000    bladder suspension   • CERVICAL LAMINECTOMY     • COLONOSCOPY     • COLOSTOMY  1979    temporary sigmoid   • HYSTERECTOMY           MEDICATIONS:  I have reviewed and reconciled the patients medication list in the patients chart at the skilled nursing facility today.      ALLERGIES:    Allergies   Allergen Reactions   • Lexapro [Escitalopram Oxalate] Nausea Only         SOCIAL HISTORY:    Social History     Socioeconomic History   •  Marital status:    Tobacco Use   • Smoking status: Never   • Smokeless tobacco: Never   Substance and Sexual Activity   • Alcohol use: No   • Drug use: No   • Sexual activity: Defer       FAMILY HISTORY:    Family History   Problem Relation Age of Onset   • Blindness Mother    • Other Mother         arteriosclerotic cardiovascular disease    • Diabetes Mother    • Osteoporosis Mother    • Stroke Mother    • Cancer Brother    • Lung cancer Sister    • Osteoporosis Sister    • Stroke Sister    • Stroke Other        REVIEW OF SYSTEMS:    Review of Systems   Reason unable to perform ROS: ROS per nursing and patient.   Constitutional: Negative for activity change, appetite change, chills, diaphoresis, fatigue, fever and unexpected weight gain.   HENT: Negative for congestion, hearing loss, mouth sores, nosebleeds, postnasal drip, rhinorrhea, sneezing, sore throat and trouble swallowing.    Eyes: Negative for pain, discharge, redness and itching.   Respiratory: Negative for cough, choking, shortness of breath and wheezing.    Cardiovascular: Positive for leg swelling (chronic). Negative for palpitations.   Gastrointestinal: Negative for abdominal distention, abdominal pain, blood in stool, constipation, diarrhea, nausea, vomiting and indigestion.   Endocrine: Negative for polydipsia and polyuria.   Genitourinary: Positive for urinary incontinence. Negative for decreased urine volume, difficulty urinating, dysuria, frequency, hematuria and urgency.   Musculoskeletal: Positive for arthralgias (chronic). Negative for joint swelling, myalgias and neck pain.   Skin: Negative for color change, dry skin, rash and skin lesions.   Neurological: Positive for weakness, memory problem and confusion. Negative for seizures and speech difficulty.   Psychiatric/Behavioral: Negative for agitation, behavioral problems, dysphoric mood, hallucinations, self-injury, sleep disturbance, suicidal ideas, negative for hyperactivity,  depressed mood and stress. The patient is not nervous/anxious.          PHYSICAL EXAMINATION:   VITAL SIGNS:   Vitals:    10/12/22 1151   BP: 123/78   Pulse: 74   Resp: 20   Temp: 96.7 °F (35.9 °C)   Weight: 119 kg (262 lb)       Physical Exam   Constitutional: Vital signs are normal. She appears well-developed and well-nourished.   HENT:   Head: Normocephalic.   Right Ear: External ear normal.   Left Ear: External ear normal.   Nose: Nose normal.   Eyes: Conjunctivae are normal.   Cardiovascular: Normal rate, regular rhythm and normal heart sounds.   Pulmonary/Chest: Effort normal. No respiratory distress. She has no wheezes. She has no rhonchi. She has no rales.   Abdominal: Soft. Bowel sounds are normal. She exhibits no distension. There is no abdominal tenderness.   Musculoskeletal:      Right ankle: She exhibits decreased range of motion (right ankle contracture).      Right lower leg: Edema present.      Left lower leg: Edema present.      Comments: Weakness BLE   Neurological: She is alert.   Skin: Skin is warm and dry. Turgor is normal. No rash noted.   No rashes noted today   Psychiatric: Her speech is normal and behavior is normal. Mood normal. Cognition and memory are impaired.   Nursing note and vitals reviewed.      RECORDS REVIEW:   I have reviewed and interpreted the records in [facility] and [facility].    ASSESSMENT     Diagnoses and all orders for this visit:    1. Redness of skin (Primary)    2. Impaired mobility and ADLs        PLAN    Redness skin left neck  -Family noticed redness to left neck skin fold secondary to necklace and 02 tubing and concerned it was a rash, but it was not. Nursing encouraged to ensure necklace and 02 tubing is not resting in skin fold of neck to cause redness. Will follow up and continue to monitor.     Nursing encouraged to keep me informed of any acute changes, or any new concerning symptoms.    Nursing to continue supportive care for all ADLs.      [x]  Discussed Patient in  detail with nursing/staff, addressed all needs today.     [x]  Plan of Care Reviewed   [x]  PT/OT Reviewed   [x]  Order Changes  []  Discharge Plans Reviewed  [x]  Advance Directive on file with Nursing Home.   [x]  POA on file with Nursing Home.   [x]  Code Status listed: []  Full Code   [x]  DNR         “I confirm accuracy of unchanged data/findings which have been carried forward from previous visit, as well as I have updated appropriately those that have changed.”                                                      Mita Joyce, APRN.

## 2022-10-13 ENCOUNTER — TELEPHONE (OUTPATIENT)
Dept: FAMILY MEDICINE CLINIC | Facility: CLINIC | Age: 82
End: 2022-10-13

## 2022-11-15 ENCOUNTER — NURSING HOME (OUTPATIENT)
Dept: INTERNAL MEDICINE | Facility: CLINIC | Age: 82
End: 2022-11-15

## 2022-11-15 VITALS
HEART RATE: 88 BPM | SYSTOLIC BLOOD PRESSURE: 138 MMHG | OXYGEN SATURATION: 95 % | WEIGHT: 261 LBS | DIASTOLIC BLOOD PRESSURE: 82 MMHG | TEMPERATURE: 97.7 F | BODY MASS INDEX: 49.32 KG/M2 | RESPIRATION RATE: 20 BRPM

## 2022-11-15 DIAGNOSIS — F32.89 OTHER DEPRESSION: ICD-10-CM

## 2022-11-15 DIAGNOSIS — I10 BENIGN ESSENTIAL HYPERTENSION: ICD-10-CM

## 2022-11-15 DIAGNOSIS — R60.0 LOWER EXTREMITY EDEMA: ICD-10-CM

## 2022-11-15 DIAGNOSIS — R53.83 OTHER FATIGUE: ICD-10-CM

## 2022-11-15 DIAGNOSIS — F02.818 LATE ONSET ALZHEIMER'S DISEASE WITH BEHAVIORAL DISTURBANCE: Primary | ICD-10-CM

## 2022-11-15 DIAGNOSIS — G89.29 CHRONIC LOW BACK PAIN WITH SCIATICA, SCIATICA LATERALITY UNSPECIFIED, UNSPECIFIED BACK PAIN LATERALITY: ICD-10-CM

## 2022-11-15 DIAGNOSIS — E78.2 MIXED HYPERLIPIDEMIA: ICD-10-CM

## 2022-11-15 DIAGNOSIS — F33.1 MODERATE EPISODE OF RECURRENT MAJOR DEPRESSIVE DISORDER: ICD-10-CM

## 2022-11-15 DIAGNOSIS — G30.1 LATE ONSET ALZHEIMER'S DISEASE WITH BEHAVIORAL DISTURBANCE: Primary | ICD-10-CM

## 2022-11-15 DIAGNOSIS — M54.40 CHRONIC LOW BACK PAIN WITH SCIATICA, SCIATICA LATERALITY UNSPECIFIED, UNSPECIFIED BACK PAIN LATERALITY: ICD-10-CM

## 2022-11-15 PROCEDURE — 99308 SBSQ NF CARE LOW MDM 20: CPT | Performed by: INTERNAL MEDICINE

## 2022-11-16 ENCOUNTER — NURSING HOME (OUTPATIENT)
Dept: FAMILY MEDICINE CLINIC | Facility: CLINIC | Age: 82
End: 2022-11-16

## 2022-11-16 VITALS
WEIGHT: 256 LBS | TEMPERATURE: 97.8 F | SYSTOLIC BLOOD PRESSURE: 130 MMHG | BODY MASS INDEX: 48.37 KG/M2 | DIASTOLIC BLOOD PRESSURE: 70 MMHG | HEART RATE: 70 BPM

## 2022-11-16 DIAGNOSIS — R63.4 WEIGHT LOSS: Primary | ICD-10-CM

## 2022-11-16 DIAGNOSIS — I50.9 CHRONIC CONGESTIVE HEART FAILURE, UNSPECIFIED HEART FAILURE TYPE: ICD-10-CM

## 2022-11-16 DIAGNOSIS — Z74.09 IMPAIRED MOBILITY AND ADLS: ICD-10-CM

## 2022-11-16 DIAGNOSIS — E66.01 CLASS 3 SEVERE OBESITY DUE TO EXCESS CALORIES WITH SERIOUS COMORBIDITY AND BODY MASS INDEX (BMI) OF 45.0 TO 49.9 IN ADULT: ICD-10-CM

## 2022-11-16 DIAGNOSIS — Z78.9 IMPAIRED MOBILITY AND ADLS: ICD-10-CM

## 2022-11-16 PROCEDURE — 99309 SBSQ NF CARE MODERATE MDM 30: CPT | Performed by: NURSE PRACTITIONER

## 2022-11-20 NOTE — PROGRESS NOTES
Nursing Home Follow Up Note      Layo Arango DO []   WILLIAM Alfaro [x]  [address] [place] [address]  Phone: [phone]  Fax: [phone] Dorcas Duncan MD []    Dc Fernandez DO []   [address] [place] [address]  Phone: [phone]  Fax: [phone]     PATIENT NAME: Danae Harmon                                                                          YOB: 1940           DATE OF SERVICE: 11/16/2022  FACILITY:  [][facility]   [] [facility]   [] [facility]   [x] [facility]   [] Other ____________________________________________________________________      CHIEF COMPLAINT:     5 pound weight loss in the past week.    HISTORY OF PRESENT ILLNESS:     Nursing reports that patient has had a 5 pound weight loss in the past week.  She has not had any decreased appetite or meal intake, continues to eat very well but is on diuretics for her CHF that were increased 4 weeks ago so has fluctuating weights.  Her BMI is 48.3 so there is no concerns about weight loss. She is currently eating lunch during visit today and has no complaints or concerns.  She has no signs and symptoms of any distress.    PAST MEDICAL & SURGICAL HISTORY:   Past Medical History:   Diagnosis Date   • Colon polyp    • Osteoporosis    • Pain in thoracic spine    • Pneumonia    • UTI (urinary tract infection)       Past Surgical History:   Procedure Laterality Date   • BLADDER SURGERY  2000    bladder suspension   • CERVICAL LAMINECTOMY     • COLONOSCOPY     • COLOSTOMY  1979    temporary sigmoid   • HYSTERECTOMY           MEDICATIONS:  I have reviewed and reconciled the patients medication list in the patients chart at the skilled nursing facility today.      ALLERGIES:    Allergies   Allergen Reactions   • Lexapro [Escitalopram Oxalate] Nausea Only         SOCIAL HISTORY:    Social History     Socioeconomic History   • Marital status:    Tobacco Use   • Smoking status: Never   • Smokeless  tobacco: Never   Substance and Sexual Activity   • Alcohol use: No   • Drug use: No   • Sexual activity: Defer       FAMILY HISTORY:    Family History   Problem Relation Age of Onset   • Blindness Mother    • Other Mother         arteriosclerotic cardiovascular disease    • Diabetes Mother    • Osteoporosis Mother    • Stroke Mother    • Cancer Brother    • Lung cancer Sister    • Osteoporosis Sister    • Stroke Sister    • Stroke Other        REVIEW OF SYSTEMS:    Review of Systems   Reason unable to perform ROS: ROS per nursing and patient.   Constitutional: Negative for activity change, appetite change, chills, diaphoresis, fatigue, fever and unexpected weight gain.   HENT: Negative for congestion, mouth sores, nosebleeds, rhinorrhea, sore throat and trouble swallowing.    Eyes: Negative for pain, discharge, redness and itching.   Respiratory: Negative for cough, choking, shortness of breath and wheezing.    Cardiovascular: Positive for leg swelling (improved). Negative for palpitations.   Gastrointestinal: Negative for abdominal distention, abdominal pain, blood in stool, constipation, diarrhea, nausea, vomiting and indigestion.   Endocrine: Negative for polydipsia and polyuria.   Genitourinary: Positive for urinary incontinence. Negative for decreased urine volume, difficulty urinating, dysuria, frequency, hematuria and urgency.   Musculoskeletal: Positive for arthralgias (chronic). Negative for joint swelling, myalgias and neck pain.   Skin: Negative for color change, dry skin, rash and skin lesions.   Neurological: Positive for weakness, memory problem and confusion. Negative for seizures and speech difficulty.   Psychiatric/Behavioral: Negative for agitation, behavioral problems, dysphoric mood, hallucinations, self-injury, sleep disturbance, suicidal ideas, negative for hyperactivity, depressed mood and stress. The patient is not nervous/anxious.          PHYSICAL EXAMINATION:   VITAL SIGNS:   Vitals:     11/16/22 1307   BP: 130/70   Pulse: 70   Temp: 97.8 °F (36.6 °C)   Weight: 116 kg (256 lb)       Physical Exam   Constitutional: Vital signs are normal. She appears well-developed and well-nourished.   HENT:   Head: Normocephalic.   Right Ear: External ear normal.   Left Ear: External ear normal.   Nose: Nose normal.   Eyes: Conjunctivae are normal.   Cardiovascular: Normal rate, regular rhythm and normal heart sounds.   Pulmonary/Chest: Effort normal. No respiratory distress. She has no wheezes. She has no rhonchi. She has no rales.   Abdominal: Soft. Bowel sounds are normal. She exhibits no distension. There is no abdominal tenderness.   Musculoskeletal:      Right ankle: She exhibits decreased range of motion (right ankle contracture).      Right lower leg: Edema present.      Left lower leg: Edema present.      Comments: Weakness BLE   Neurological: She is alert.   Skin: Skin is warm and dry. Turgor is normal. No rash noted.   No rashes noted today   Psychiatric: Her speech is normal and behavior is normal. Mood normal. Cognition and memory are impaired.   Nursing note and vitals reviewed.      RECORDS REVIEW:   I have reviewed and interpreted the records in [facility] and [facility].    ASSESSMENT     Diagnoses and all orders for this visit:    1. Weight loss (Primary)    2. Class 3 severe obesity due to excess calories with serious comorbidity and body mass index (BMI) of 45.0 to 49.9 in adult (HCC)    3. Chronic congestive heart failure, unspecified heart failure type (HCC)    4. Impaired mobility and ADLs        PLAN    . Weight loss/obesity/CHF  -5 pound weight loss in the past week but diuretics were increased 4 weeks ago so this is expected. Her BMI is 48.3 so not concerned with 5 pound weight loss. Her meal intake has not decreased. Will follow up and continue to monitor.     Nursing encouraged to keep me informed of any acute changes, or any new concerning symptoms.    Nursing to continue supportive care for all  ADLs.      [x]  Discussed Patient in detail with nursing/staff, addressed all needs today.     [x]  Plan of Care Reviewed   [x]  PT/OT Reviewed   [x]  Order Changes  []  Discharge Plans Reviewed  [x]  Advance Directive on file with Nursing Home.   [x]  POA on file with Nursing Home.   [x]  Code Status listed: []  Full Code   [x]  DNR         “I confirm accuracy of unchanged data/findings which have been carried forward from previous visit, as well as I have updated appropriately those that have changed.”                                                      Mita Joyce, APRN.

## 2022-11-21 NOTE — PROGRESS NOTES
Nursing Home Progress Note        Dc Fernandez DO   [address] [place] [address] Phone: [phone]  Fax: [phone]     PATIENT NAME: Danae Harmon                                                                          YOB: 1940           DATE OF SERVICE: 11/15/2022  FACILITY:  [facility]   ______________________________________________________________________     CHIEF COMPLAINT:  Chronic Medical Management      HISTORY OF PRESENT ILLNESS:   Patient was resting comfortably in her bed.  She was sleepy and did not have much to share upon questioning.  Nurses report that the patient has been doing fairly well.  She remains very edematous but denied any significant shortness of breath.  She does remain on 2 L nasal cannula.    PAST MEDICAL & SURGICAL HISTORY:   Past Medical History:   Diagnosis Date   • Colon polyp    • Osteoporosis    • Pain in thoracic spine    • Pneumonia    • UTI (urinary tract infection)       Past Surgical History:   Procedure Laterality Date   • BLADDER SURGERY  2000    bladder suspension   • CERVICAL LAMINECTOMY     • COLONOSCOPY     • COLOSTOMY  1979    temporary sigmoid   • HYSTERECTOMY           MEDICATIONS:  I have reviewed and reconciled the patients medication list in the patients chart at the skilled nursing facility today, 11/15/2022.      ALLERGIES:  Allergies   Allergen Reactions   • Lexapro [Escitalopram Oxalate] Nausea Only         SOCIAL HISTORY:  Social History     Socioeconomic History   • Marital status:    Tobacco Use   • Smoking status: Never   • Smokeless tobacco: Never   Substance and Sexual Activity   • Alcohol use: No   • Drug use: No   • Sexual activity: Defer       FAMILY HISTORY:  Family History   Problem Relation Age of Onset   • Blindness Mother    • Other Mother         arteriosclerotic cardiovascular disease    • Diabetes Mother    • Osteoporosis Mother    • Stroke Mother    • Cancer Brother    •  Lung cancer Sister    • Osteoporosis Sister    • Stroke Sister    • Stroke Other        REVIEW OF SYSTEMS:  Review of Systems   Unable to perform ROS: Dementia          PHYSICAL EXAMINATION:     VITAL SIGNS:  /82   Pulse 88   Temp 97.7 °F (36.5 °C)   Resp 20   Wt 118 kg (261 lb)   SpO2 95%   BMI 49.32 kg/m²     Physical Exam  Vitals and nursing note reviewed.   Constitutional:       Appearance: Normal appearance. She is well-developed. She is obese.      Comments: Frail elderly female   HENT:      Head: Normocephalic and atraumatic.   Eyes:      Extraocular Movements: Extraocular movements intact.      Conjunctiva/sclera: Conjunctivae normal.   Cardiovascular:      Rate and Rhythm: Normal rate and regular rhythm.   Pulmonary:      Effort: Pulmonary effort is normal.      Breath sounds: No wheezing.   Abdominal:      General: Abdomen is flat.      Palpations: Abdomen is soft.   Musculoskeletal:      Cervical back: Normal range of motion and neck supple.      Right lower leg: Edema (Minimal) present.      Left lower leg: Edema (Minimal) present.      Comments: Bedridden   Skin:     General: Skin is warm and dry.      Findings: No rash.   Neurological:      General: No focal deficit present.      Mental Status: She is alert and oriented to person, place, and time. Mental status is at baseline.   Psychiatric:         Mood and Affect: Mood normal.         Behavior: Behavior normal.      Comments: Confused         RECORDS REVIEW:       ASSESSMENT     Diagnoses and all orders for this visit:    1. Late onset Alzheimer's disease with behavioral disturbance (HCC) (Primary)    2. Benign essential hypertension    3. Moderate episode of recurrent major depressive disorder (HCC)    4. Chronic low back pain with sciatica, sciatica laterality unspecified, unspecified back pain laterality    5. Mixed hyperlipidemia    6. Other fatigue    7. Lower extremity edema    8. Other depression        PLAN  Alzheimer's disease  with behavioral disturbances  -Mental status remains at baseline.  Continue nursing care for all ADLs.     Right ankle contracture  -  Continue supportive care.  Unfortunately patient at this point has become functionally paraplegic.     Constipation  -Good control with current bowel regimen.     Major depressive disorder  -Stable on Seroquel and Depakote     Lower Extremity Edema  -Continue diuretics as needed.      Bladder spasms/urinary frequency  -Cont Myrbetriq. Stable control.      Mixed hyperlipidemia   -Continue statin.     Essential hypertension  -Controlled on Coreg     Vitamin D and vitamin B12 deficiencies  -Stable on supplements.     Osteoporosis  -Stable on vitamins.  Bisphosphonate therapy is not necessary at this point.      Type 2 diabetes mellitus  -Fair glucose control. Remains diet controlled.      Chronic pain  -Controlled on Norco being filled when needed.            [x]  Discussed Patient in detail with nursing/staff, addressed all needs today.     [x]  Plan of Care Reviewed   []  PT/OT Reviewed   []  Order Changes  []  Discharge Plans Reviewed  [x]  Advance Directive on file with Nursing Home.   [x]  POA on file with Nursing Home.    [x]  Code Status listed and reviewed.     I confirm accuracy of unchanged data/findings including physical exam and plan which have been carried forward from previous visit, as well as I have updated appropriately those that have changed        Dc Fernandez DO.  11/21/2022

## 2022-12-21 ENCOUNTER — NURSING HOME (OUTPATIENT)
Dept: FAMILY MEDICINE CLINIC | Facility: CLINIC | Age: 82
End: 2022-12-21

## 2022-12-21 VITALS
WEIGHT: 285 LBS | HEART RATE: 90 BPM | TEMPERATURE: 97.8 F | BODY MASS INDEX: 53.85 KG/M2 | SYSTOLIC BLOOD PRESSURE: 138 MMHG | RESPIRATION RATE: 20 BRPM | OXYGEN SATURATION: 94 % | DIASTOLIC BLOOD PRESSURE: 78 MMHG

## 2022-12-21 DIAGNOSIS — Z74.09 IMPAIRED MOBILITY AND ADLS: ICD-10-CM

## 2022-12-21 DIAGNOSIS — Z78.9 IMPAIRED MOBILITY AND ADLS: ICD-10-CM

## 2022-12-21 DIAGNOSIS — Z91.14 NON COMPLIANCE W MEDICATION REGIMEN: Primary | ICD-10-CM

## 2022-12-21 DIAGNOSIS — F01.50 VASCULAR DEMENTIA WITHOUT BEHAVIORAL DISTURBANCE: ICD-10-CM

## 2022-12-21 PROCEDURE — 99308 SBSQ NF CARE LOW MDM 20: CPT | Performed by: NURSE PRACTITIONER

## 2022-12-22 NOTE — PROGRESS NOTES
Nursing Home Follow Up Note      Layo Arango DO []   WILLIAM Alfaro [x]  [address] [place] [address]  Phone: [phone]  Fax: [phone] Dorcas Duncan MD []    Dc Fernandez DO []   [address] [place] [address]  Phone: [phone]  Fax: [phone]     PATIENT NAME: Danae Harmon                                                                          YOB: 1940           DATE OF SERVICE: 12/21/2022  FACILITY:  [][facility]   [] [place]   [] [facility]   [x] [facility]   [] Other ____________________________________________________________________      CHIEF COMPLAINT:     Frequent non compliance with medications.    HISTORY OF PRESENT ILLNESS:     Nursing reports that patient refused to take her medications last night and she frequently does this.  She does this more often at nighttime but does refuse during the day at times as well.  She is resting in bed today during visit with no complaints or signs and symptoms of any distress.  When asked about refusing medications she just reports that sometimes she just does not feel like taking them.  She has been educated multiple times on importance of compliance with medications and the risks of not taking these as directed.  She is very pleasant and well in mental status does appear at baseline.      PAST MEDICAL & SURGICAL HISTORY:   Past Medical History:   Diagnosis Date   • Colon polyp    • Osteoporosis    • Pain in thoracic spine    • Pneumonia    • UTI (urinary tract infection)       Past Surgical History:   Procedure Laterality Date   • BLADDER SURGERY  2000    bladder suspension   • CERVICAL LAMINECTOMY     • COLONOSCOPY     • COLOSTOMY  1979    temporary sigmoid   • HYSTERECTOMY           MEDICATIONS:  I have reviewed and reconciled the patients medication list in the patients chart at the skilled nursing facility today.      ALLERGIES:    Allergies   Allergen Reactions   • Lexapro [Escitalopram  Oxalate] Nausea Only         SOCIAL HISTORY:    Social History     Socioeconomic History   • Marital status:    Tobacco Use   • Smoking status: Never   • Smokeless tobacco: Never   Substance and Sexual Activity   • Alcohol use: No   • Drug use: No   • Sexual activity: Defer       FAMILY HISTORY:    Family History   Problem Relation Age of Onset   • Blindness Mother    • Other Mother         arteriosclerotic cardiovascular disease    • Diabetes Mother    • Osteoporosis Mother    • Stroke Mother    • Cancer Brother    • Lung cancer Sister    • Osteoporosis Sister    • Stroke Sister    • Stroke Other        REVIEW OF SYSTEMS:    Review of Systems   Reason unable to perform ROS: ROS per nursing and patient.   Constitutional: Negative for activity change, appetite change, chills, diaphoresis, fatigue, fever and unexpected weight gain.   HENT: Negative for congestion, mouth sores, nosebleeds, rhinorrhea, sore throat and trouble swallowing.    Eyes: Negative for pain, discharge, redness and itching.   Respiratory: Negative for cough, choking, shortness of breath and wheezing.    Cardiovascular: Positive for leg swelling (improved). Negative for palpitations.   Gastrointestinal: Negative for abdominal distention, abdominal pain, blood in stool, constipation, diarrhea, nausea, vomiting and indigestion.   Endocrine: Negative for polydipsia and polyuria.   Genitourinary: Positive for urinary incontinence. Negative for decreased urine volume, difficulty urinating, dysuria, frequency, hematuria and urgency.   Musculoskeletal: Positive for arthralgias (chronic). Negative for joint swelling, myalgias and neck pain.   Skin: Negative for color change, dry skin, rash and skin lesions.   Neurological: Positive for weakness, memory problem and confusion (intermittent). Negative for seizures and speech difficulty.   Psychiatric/Behavioral: Positive for behavioral problems. Negative for agitation, dysphoric mood, hallucinations,  self-injury, sleep disturbance, suicidal ideas, negative for hyperactivity, depressed mood and stress. The patient is not nervous/anxious.          PHYSICAL EXAMINATION:   VITAL SIGNS:   Vitals:    12/21/22 0955   BP: 138/78   Pulse: 90   Resp: 20   Temp: 97.8 °F (36.6 °C)   SpO2: 94%   Weight: 129 kg (285 lb)       Physical Exam   Constitutional: Vital signs are normal. She appears well-developed and well-nourished.   HENT:   Head: Normocephalic.   Right Ear: External ear normal.   Left Ear: External ear normal.   Nose: Nose normal.   Eyes: Conjunctivae are normal.   Cardiovascular: Normal rate, regular rhythm and normal heart sounds.   LE edema but improved    Pulmonary/Chest: Effort normal. No respiratory distress. She has no wheezes. She has no rhonchi. She has no rales.   Abdominal: Soft. Bowel sounds are normal. She exhibits no distension. There is no abdominal tenderness.   Musculoskeletal:      Right ankle: She exhibits decreased range of motion (right ankle contracture).      Right lower leg: Edema present.      Left lower leg: Edema present.      Comments: Weakness BLE   Neurological: She is alert.   Skin: Skin is warm and dry. Turgor is normal. No rash noted.   No rashes noted today   Psychiatric: Her speech is normal and behavior is normal. Mood normal. Cognition and memory are impaired.   Nursing note and vitals reviewed.      RECORDS REVIEW:   I have reviewed and interpreted the records in [facility].    ASSESSMENT     Diagnoses and all orders for this visit:    1. Non compliance w medication regimen (Primary)    2. Vascular dementia without behavioral disturbance (HCC)    3. Impaired mobility and ADLs        PLAN    Non compliance with medication regimen/vascular dementia  -Educated about importance of compliance with medications and risks of not. She has also been educated by nursing.     Nursing encouraged to keep me informed of any acute changes, or any new concerning symptoms.    Nursing to  continue supportive care for all ADLs.      [x]  Discussed Patient in detail with nursing/staff, addressed all needs today.     [x]  Plan of Care Reviewed   [x]  PT/OT Reviewed   [x]  Order Changes  []  Discharge Plans Reviewed  [x]  Advance Directive on file with Nursing Home.   [x]  POA on file with Nursing Home.   [x]  Code Status listed: []  Full Code   [x]  DNR         “I confirm accuracy of unchanged data/findings which have been carried forward from previous visit, as well as I have updated appropriately those that have changed.”                                                        Mita Joyce, APRN.

## 2023-01-19 ENCOUNTER — NURSING HOME (OUTPATIENT)
Dept: FAMILY MEDICINE CLINIC | Facility: CLINIC | Age: 83
End: 2023-01-19
Payer: MEDICARE

## 2023-01-19 VITALS
BODY MASS INDEX: 48.75 KG/M2 | DIASTOLIC BLOOD PRESSURE: 70 MMHG | OXYGEN SATURATION: 96 % | TEMPERATURE: 97.8 F | RESPIRATION RATE: 18 BRPM | HEART RATE: 70 BPM | WEIGHT: 258 LBS | SYSTOLIC BLOOD PRESSURE: 130 MMHG

## 2023-01-19 DIAGNOSIS — Z74.09 IMPAIRED MOBILITY AND ADLS: ICD-10-CM

## 2023-01-19 DIAGNOSIS — R63.5 WEIGHT GAIN: Primary | ICD-10-CM

## 2023-01-19 DIAGNOSIS — Z78.9 IMPAIRED MOBILITY AND ADLS: ICD-10-CM

## 2023-01-19 DIAGNOSIS — F01.50 VASCULAR DEMENTIA WITHOUT BEHAVIORAL DISTURBANCE: ICD-10-CM

## 2023-01-19 DIAGNOSIS — R60.0 BILATERAL LOWER EXTREMITY EDEMA: ICD-10-CM

## 2023-01-19 PROCEDURE — 99309 SBSQ NF CARE MODERATE MDM 30: CPT | Performed by: NURSE PRACTITIONER

## 2023-01-19 NOTE — LETTER
Nursing Home Follow Up Note      Layo Arango DO []   WILLIAM Alfaro [x]  [address] [place] [address]  Phone: [phone]  Fax: [phone] Dorcas Duncan MD []    Dc Fernandez DO []   [address] [place] [address]  Phone: [phone]  Fax: [phone]     PATIENT NAME: Danae Harmon                                                                          YOB: 1940           DATE OF SERVICE: 1/19/2023  FACILITY:  [][facility]   [] [facility]   [] [facility]   [x] [facility]   [] Other ____________________________________________________________________      CHIEF COMPLAINT:      Weight gain in the past 2 weeks.     HISTORY OF PRESENT ILLNESS:     Patient with 6 pound weight gain over the past 2 weeks.  Per nursing she also has some increased edema to her lower extremities.  Patient also eats snacks frequently during the day and eats 100% of all of her meals.  She is also very sedentary and does not ever want to get out of bed to her chair or for any type of activity.  She is resting in bed today with no complaints or signs and symptoms of any distress.  There is some increased edema to lower extremities but no edema noted elsewhere.  She has not had any increased work of breathing or O2 demand.  She is pleasantly confused today at baseline.  She does refuse medications at times so refusing her diuretic contributes to this as well.      PAST MEDICAL & SURGICAL HISTORY:   Past Medical History:   Diagnosis Date   • Colon polyp    • Osteoporosis    • Pain in thoracic spine    • Pneumonia    • UTI (urinary tract infection)       Past Surgical History:   Procedure Laterality Date   • BLADDER SURGERY  2000    bladder suspension   • CERVICAL LAMINECTOMY     • COLONOSCOPY     • COLOSTOMY  1979    temporary sigmoid   • HYSTERECTOMY           MEDICATIONS:  I have reviewed and reconciled the patients medication list in the patients chart at the [facility] nursing facility  today.      ALLERGIES:    Allergies   Allergen Reactions   • Lexapro [Escitalopram Oxalate] Nausea Only         SOCIAL HISTORY:    Social History     Socioeconomic History   • Marital status:    Tobacco Use   • Smoking status: Never   • Smokeless tobacco: Never   Substance and Sexual Activity   • Alcohol use: No   • Drug use: No   • Sexual activity: Defer       FAMILY HISTORY:    Family History   Problem Relation Age of Onset   • Blindness Mother    • Other Mother         arteriosclerotic cardiovascular disease    • Diabetes Mother    • Osteoporosis Mother    • Stroke Mother    • Cancer Brother    • Lung cancer Sister    • Osteoporosis Sister    • Stroke Sister    • Stroke Other        REVIEW OF SYSTEMS:    Review of Systems   Reason unable to perform ROS: ROS per nursing and patient.   Constitutional: Positive for unexpected weight gain. Negative for activity change, appetite change, chills, diaphoresis, fatigue and fever.   HENT: Negative for congestion, mouth sores, nosebleeds, rhinorrhea, sore throat and trouble swallowing.    Eyes: Negative for pain, discharge, redness and itching.   Respiratory: Negative for cough, choking, shortness of breath and wheezing.    Cardiovascular: Positive for leg swelling (increased). Negative for palpitations.   Gastrointestinal: Negative for abdominal distention, abdominal pain, blood in stool, constipation, diarrhea, nausea, vomiting and indigestion.   Endocrine: Negative for polydipsia and polyuria.   Genitourinary: Positive for urinary incontinence. Negative for decreased urine volume, difficulty urinating, dysuria, frequency, hematuria and urgency.   Musculoskeletal: Positive for arthralgias (chronic). Negative for joint swelling, myalgias and neck pain.   Skin: Negative for color change, dry skin, rash and skin lesions.   Neurological: Positive for weakness, memory problem and confusion (intermittent). Negative for seizures and speech difficulty.    Psychiatric/Behavioral: Positive for behavioral problems. Negative for agitation, dysphoric mood, hallucinations, self-injury, sleep disturbance, suicidal ideas, negative for hyperactivity, depressed mood and stress. The patient is not nervous/anxious.          PHYSICAL EXAMINATION:   VITAL SIGNS:   Vitals:    23 0907   BP: 130/70   Pulse: 70   Resp: 18   Temp: 97.8 °F (36.6 °C)   SpO2: 96%   Weight: 117 kg (258 lb)       Physical Exam   Constitutional: Vital signs are normal. She appears well-developed and well-nourished.   HENT:   Head: Normocephalic.   Right Ear: External ear normal.   Left Ear: External ear normal.   Nose: Nose normal.   Eyes: Conjunctivae are normal.   Cardiovascular: Normal rate, regular rhythm and normal heart sounds.   LE edema but improved    Pulmonary/Chest: Effort normal. No respiratory distress. She has no wheezes. She has no rhonchi. She has no rales.   Abdominal: Soft. Bowel sounds are normal. She exhibits no distension. There is no abdominal tenderness.   Musculoskeletal:      Right ankle: She exhibits decreased range of motion (right ankle contracture).      Right lower le+ Edema present.      Left lower le+ Edema present.      Comments: Weakness BLE   Neurological: She is alert.   Skin: Skin is warm and dry. Turgor is normal. No rash noted.   No rashes noted today   Psychiatric: Her speech is normal and behavior is normal. Mood normal. Cognition and memory are impaired.   Nursing note and vitals reviewed.      RECORDS REVIEW:   I have reviewed and interpreted the records in [facility] and [facility].    ASSESSMENT     Diagnoses and all orders for this visit:    1. Weight gain (Primary)    2. Bilateral lower extremity edema    3. Vascular dementia without behavioral disturbance (HCC)    4. Impaired mobility and ADLs        PLAN    Weight gain/LE edema/Non compliance with medication regimen/vascular dementia  -Will increase Bumex to 4 mg po bid x 5 days. Check BMP Monday. No  shortness of breath or increased work of breathing. Educated about importance of compliance with medications and risks of not. She has also been educated by nursing.     Nursing encouraged to keep me informed of any acute changes, or any new concerning symptoms.    Nursing to continue supportive care for all ADLs.      [x]  Discussed Patient in detail with nursing/staff, addressed all needs today.     [x]  Plan of Care Reviewed   [x]  PT/OT Reviewed   [x]  Order Changes  []  Discharge Plans Reviewed  [x]  Advance Directive on file with Nursing Home.   [x]  POA on file with Nursing Home.   [x]  Code Status listed: []  Full Code   [x]  DNR         “I confirm accuracy of unchanged data/findings which have been carried forward from previous visit, as well as I have updated appropriately those that have changed.”                                                          Mita Joyce, APRN.

## 2023-01-21 NOTE — PROGRESS NOTES
Nursing Home Follow Up Note      Layo Arango DO []   WILLIAM Alfaro [x]  [address] [place] [address]  Phone: [phone]  Fax: [phone] Dorcas Duncan MD []    Dc Fernandez DO []   [address] [place] [address]  Phone: [phone]  Fax: [phone]     PATIENT NAME: Danae Harmon                                                                          YOB: 1940           DATE OF SERVICE: 1/19/2023  FACILITY:  [][place]   [] [place]   [] [facility]   [x] [facility]   [] Other ____________________________________________________________________      CHIEF COMPLAINT:      Weight gain in the past 2 weeks.     HISTORY OF PRESENT ILLNESS:     Patient with 6 pound weight gain over the past 2 weeks.  Per nursing she also has some increased edema to her lower extremities.  Patient also eats snacks frequently during the day and eats 100% of all of her meals.  She is also very sedentary and does not ever want to get out of bed to her chair or for any type of activity.  She is resting in bed today with no complaints or signs and symptoms of any distress.  There is some increased edema to lower extremities but no edema noted elsewhere.  She has not had any increased work of breathing or O2 demand.  She is pleasantly confused today at baseline.  She does refuse medications at times so refusing her diuretic contributes to this as well.      PAST MEDICAL & SURGICAL HISTORY:   Past Medical History:   Diagnosis Date   • Colon polyp    • Osteoporosis    • Pain in thoracic spine    • Pneumonia    • UTI (urinary tract infection)       Past Surgical History:   Procedure Laterality Date   • BLADDER SURGERY  2000    bladder suspension   • CERVICAL LAMINECTOMY     • COLONOSCOPY     • COLOSTOMY  1979    temporary sigmoid   • HYSTERECTOMY           MEDICATIONS:  I have reviewed and reconciled the patients medication list in the patients chart at the [facility] nursing facility  today.      ALLERGIES:    Allergies   Allergen Reactions   • Lexapro [Escitalopram Oxalate] Nausea Only         SOCIAL HISTORY:    Social History     Socioeconomic History   • Marital status:    Tobacco Use   • Smoking status: Never   • Smokeless tobacco: Never   Substance and Sexual Activity   • Alcohol use: No   • Drug use: No   • Sexual activity: Defer       FAMILY HISTORY:    Family History   Problem Relation Age of Onset   • Blindness Mother    • Other Mother         arteriosclerotic cardiovascular disease    • Diabetes Mother    • Osteoporosis Mother    • Stroke Mother    • Cancer Brother    • Lung cancer Sister    • Osteoporosis Sister    • Stroke Sister    • Stroke Other        REVIEW OF SYSTEMS:    Review of Systems   Reason unable to perform ROS: ROS per nursing and patient.   Constitutional: Positive for unexpected weight gain. Negative for activity change, appetite change, chills, diaphoresis, fatigue and fever.   HENT: Negative for congestion, mouth sores, nosebleeds, rhinorrhea, sore throat and trouble swallowing.    Eyes: Negative for pain, discharge, redness and itching.   Respiratory: Negative for cough, choking, shortness of breath and wheezing.    Cardiovascular: Positive for leg swelling (increased). Negative for palpitations.   Gastrointestinal: Negative for abdominal distention, abdominal pain, blood in stool, constipation, diarrhea, nausea, vomiting and indigestion.   Endocrine: Negative for polydipsia and polyuria.   Genitourinary: Positive for urinary incontinence. Negative for decreased urine volume, difficulty urinating, dysuria, frequency, hematuria and urgency.   Musculoskeletal: Positive for arthralgias (chronic). Negative for joint swelling, myalgias and neck pain.   Skin: Negative for color change, dry skin, rash and skin lesions.   Neurological: Positive for weakness, memory problem and confusion (intermittent). Negative for seizures and speech difficulty.    Psychiatric/Behavioral: Positive for behavioral problems. Negative for agitation, dysphoric mood, hallucinations, self-injury, sleep disturbance, suicidal ideas, negative for hyperactivity, depressed mood and stress. The patient is not nervous/anxious.          PHYSICAL EXAMINATION:   VITAL SIGNS:   Vitals:    23 0907   BP: 130/70   Pulse: 70   Resp: 18   Temp: 97.8 °F (36.6 °C)   SpO2: 96%   Weight: 117 kg (258 lb)       Physical Exam   Constitutional: Vital signs are normal. She appears well-developed and well-nourished.   HENT:   Head: Normocephalic.   Right Ear: External ear normal.   Left Ear: External ear normal.   Nose: Nose normal.   Eyes: Conjunctivae are normal.   Cardiovascular: Normal rate, regular rhythm and normal heart sounds.   LE edema but improved    Pulmonary/Chest: Effort normal. No respiratory distress. She has no wheezes. She has no rhonchi. She has no rales.   Abdominal: Soft. Bowel sounds are normal. She exhibits no distension. There is no abdominal tenderness.   Musculoskeletal:      Right ankle: She exhibits decreased range of motion (right ankle contracture).      Right lower le+ Edema present.      Left lower le+ Edema present.      Comments: Weakness BLE   Neurological: She is alert.   Skin: Skin is warm and dry. Turgor is normal. No rash noted.   No rashes noted today   Psychiatric: Her speech is normal and behavior is normal. Mood normal. Cognition and memory are impaired.   Nursing note and vitals reviewed.      RECORDS REVIEW:   I have reviewed and interpreted the records in [facility] and [facility].    ASSESSMENT     Diagnoses and all orders for this visit:    1. Weight gain (Primary)    2. Bilateral lower extremity edema    3. Vascular dementia without behavioral disturbance (HCC)    4. Impaired mobility and ADLs        PLAN    Weight gain/LE edema/Non compliance with medication regimen/vascular dementia  -Will increase Bumex to 4 mg po bid x 5 days. Check BMP Monday. No  shortness of breath or increased work of breathing. Educated about importance of compliance with medications and risks of not. She has also been educated by nursing.     Nursing encouraged to keep me informed of any acute changes, or any new concerning symptoms.    Nursing to continue supportive care for all ADLs.      [x]  Discussed Patient in detail with nursing/staff, addressed all needs today.     [x]  Plan of Care Reviewed   [x]  PT/OT Reviewed   [x]  Order Changes  []  Discharge Plans Reviewed  [x]  Advance Directive on file with Nursing Home.   [x]  POA on file with Nursing Home.   [x]  Code Status listed: []  Full Code   [x]  DNR         “I confirm accuracy of unchanged data/findings which have been carried forward from previous visit, as well as I have updated appropriately those that have changed.”                                                          Mita Joyce, APRN.

## 2023-01-24 ENCOUNTER — NURSING HOME (OUTPATIENT)
Dept: INTERNAL MEDICINE | Facility: CLINIC | Age: 83
End: 2023-01-24
Payer: MEDICARE

## 2023-01-24 VITALS
SYSTOLIC BLOOD PRESSURE: 130 MMHG | DIASTOLIC BLOOD PRESSURE: 70 MMHG | HEART RATE: 70 BPM | TEMPERATURE: 97.8 F | OXYGEN SATURATION: 95 % | WEIGHT: 258 LBS | BODY MASS INDEX: 48.75 KG/M2 | RESPIRATION RATE: 18 BRPM

## 2023-01-24 DIAGNOSIS — E78.2 MIXED HYPERLIPIDEMIA: ICD-10-CM

## 2023-01-24 DIAGNOSIS — R53.83 OTHER FATIGUE: ICD-10-CM

## 2023-01-24 DIAGNOSIS — I10 BENIGN ESSENTIAL HYPERTENSION: ICD-10-CM

## 2023-01-24 DIAGNOSIS — G30.1 LATE ONSET ALZHEIMER'S DISEASE WITH BEHAVIORAL DISTURBANCE: Primary | ICD-10-CM

## 2023-01-24 DIAGNOSIS — G89.29 CHRONIC LOW BACK PAIN WITH SCIATICA, SCIATICA LATERALITY UNSPECIFIED, UNSPECIFIED BACK PAIN LATERALITY: ICD-10-CM

## 2023-01-24 DIAGNOSIS — E11.9 TYPE 2 DIABETES MELLITUS WITHOUT COMPLICATION, WITHOUT LONG-TERM CURRENT USE OF INSULIN: ICD-10-CM

## 2023-01-24 DIAGNOSIS — R26.81 GAIT INSTABILITY: ICD-10-CM

## 2023-01-24 DIAGNOSIS — F33.1 MODERATE EPISODE OF RECURRENT MAJOR DEPRESSIVE DISORDER: ICD-10-CM

## 2023-01-24 DIAGNOSIS — F02.818 LATE ONSET ALZHEIMER'S DISEASE WITH BEHAVIORAL DISTURBANCE: Primary | ICD-10-CM

## 2023-01-24 DIAGNOSIS — M54.40 CHRONIC LOW BACK PAIN WITH SCIATICA, SCIATICA LATERALITY UNSPECIFIED, UNSPECIFIED BACK PAIN LATERALITY: ICD-10-CM

## 2023-01-24 DIAGNOSIS — R60.0 LOWER EXTREMITY EDEMA: ICD-10-CM

## 2023-01-24 PROCEDURE — 99308 SBSQ NF CARE LOW MDM 20: CPT | Performed by: INTERNAL MEDICINE

## 2023-02-05 NOTE — PROGRESS NOTES
Nursing Home Progress Note        Dc Fernandez DO   [address] [place] [address] Phone: [phone]  Fax: [phone]     PATIENT NAME: Danae Harmon                                                                          YOB: 1940           DATE OF SERVICE: 01/24/2023  FACILITY:  [facility]   ______________________________________________________________________     CHIEF COMPLAINT:  Chronic Medical Management      HISTORY OF PRESENT ILLNESS:   Patient remains bedridden and unable to stand or transfer.  She requires a lift to be transferred from the bed when necessary.  Due to lack of lower extremity use, edema has been frequently a problem for her.  She continues to complain of aching pains particularly due to the swelling in the legs.  Mood and behaviors remain stable.  She remains compliant with taking her medications.  Glucose has been in reasonable control in the facility.    PAST MEDICAL & SURGICAL HISTORY:   Past Medical History:   Diagnosis Date   • Colon polyp    • Osteoporosis    • Pain in thoracic spine    • Pneumonia    • UTI (urinary tract infection)       Past Surgical History:   Procedure Laterality Date   • BLADDER SURGERY  2000    bladder suspension   • CERVICAL LAMINECTOMY     • COLONOSCOPY     • COLOSTOMY  1979    temporary sigmoid   • HYSTERECTOMY           MEDICATIONS:  I have reviewed and reconciled the patients medication list in the patients chart at the skilled nursing facility today, 01/24/2023.      ALLERGIES:  Allergies   Allergen Reactions   • Lexapro [Escitalopram Oxalate] Nausea Only         SOCIAL HISTORY:  Social History     Socioeconomic History   • Marital status:    Tobacco Use   • Smoking status: Never   • Smokeless tobacco: Never   Substance and Sexual Activity   • Alcohol use: No   • Drug use: No   • Sexual activity: Defer       FAMILY HISTORY:  Family History   Problem Relation Age of Onset   • Blindness Mother     • Other Mother         arteriosclerotic cardiovascular disease    • Diabetes Mother    • Osteoporosis Mother    • Stroke Mother    • Cancer Brother    • Lung cancer Sister    • Osteoporosis Sister    • Stroke Sister    • Stroke Other        REVIEW OF SYSTEMS:  Review of Systems   Constitutional: Negative for chills, fatigue and fever.   HENT: Negative for congestion, ear pain, rhinorrhea, sinus pressure and sore throat.    Eyes: Negative for visual disturbance.   Respiratory: Negative for cough, chest tightness, shortness of breath and wheezing.    Cardiovascular: Negative for chest pain, palpitations and leg swelling.   Gastrointestinal: Negative for abdominal pain, blood in stool, constipation, diarrhea, nausea and vomiting.   Endocrine: Negative for polydipsia and polyuria.   Genitourinary: Negative for dysuria and hematuria.   Musculoskeletal: Negative for arthralgias and back pain.   Skin: Negative for rash.   Neurological: Negative for dizziness, light-headedness, numbness and headaches.   Psychiatric/Behavioral: Negative for dysphoric mood and sleep disturbance. The patient is not nervous/anxious.           PHYSICAL EXAMINATION:     VITAL SIGNS:  /70   Pulse 70   Temp 97.8 °F (36.6 °C)   Resp 18   Wt 117 kg (258 lb)   SpO2 95%   BMI 48.75 kg/m²     Physical Exam  Vitals and nursing note reviewed.   Constitutional:       Appearance: Normal appearance. She is well-developed. She is obese.      Comments: Frail elderly female   HENT:      Head: Normocephalic and atraumatic.   Eyes:      Extraocular Movements: Extraocular movements intact.      Conjunctiva/sclera: Conjunctivae normal.   Cardiovascular:      Rate and Rhythm: Normal rate and regular rhythm.   Pulmonary:      Effort: Pulmonary effort is normal.      Breath sounds: No wheezing.   Abdominal:      General: Abdomen is flat.      Palpations: Abdomen is soft.   Musculoskeletal:      Cervical back: Normal range of motion and neck supple.       Right lower leg: Edema (Minimal) present.      Left lower leg: Edema (Minimal) present.      Comments: Bedridden   Skin:     General: Skin is warm and dry.      Findings: No rash.   Neurological:      General: No focal deficit present.      Mental Status: She is alert and oriented to person, place, and time. Mental status is at baseline.   Psychiatric:         Mood and Affect: Mood normal.         Behavior: Behavior normal.      Comments: Confused         RECORDS REVIEW:        ASSESSMENT     Diagnoses and all orders for this visit:    1. Late onset Alzheimer's disease with behavioral disturbance (HCC) (Primary)    2. Benign essential hypertension    3. Chronic low back pain with sciatica, sciatica laterality unspecified, unspecified back pain laterality    4. Moderate episode of recurrent major depressive disorder (HCC)    5. Mixed hyperlipidemia    6. Other fatigue    7. Lower extremity edema    8. Type 2 diabetes mellitus without complication, without long-term current use of insulin (HCC)    9. Gait instability        PLAN  Alzheimer's disease with behavioral disturbances  -Mental status remains at baseline.  Continue nursing care for all ADLs.     Right ankle contracture  -  Continue supportive care.  Unfortunately patient at this point has become functionally paraplegic.     Constipation  -Good control with current bowel regimen.     Major depressive disorder  -Stable on Seroquel and Depakote     Lower Extremity Edema  -Continue diuretics as needed.  Was just diuresed last week but swelling persists.      Bladder spasms/urinary frequency  -Cont Myrbetriq. Stable control.      Mixed hyperlipidemia   -Continue statin.     Essential hypertension  -Controlled on Coreg     Vitamin D and vitamin B12 deficiencies  -Stable on supplements.     Osteoporosis  -Stable on vitamins.  Bisphosphonate therapy is not necessary at this point.      Type 2 diabetes mellitus  -Fair glucose control. Remains diet  controlled.      Chronic pain  -Continues to benefit from Norco which is being filled when needed.            [x]  Discussed Patient in detail with nursing/staff, addressed all needs today.     [x]  Plan of Care Reviewed   []  PT/OT Reviewed   []  Order Changes  []  Discharge Plans Reviewed  [x]  Advance Directive on file with Nursing Home.   [x]  POA on file with Nursing Home.    [x]  Code Status listed and reviewed.     I confirm accuracy of unchanged data/findings including physical exam and plan which have been carried forward from previous visit, as well as I have updated appropriately those that have changed        Dc Fernandez DO.  2/5/2023

## 2023-02-27 ENCOUNTER — NURSING HOME (OUTPATIENT)
Dept: FAMILY MEDICINE CLINIC | Facility: CLINIC | Age: 83
End: 2023-02-27
Payer: MEDICARE

## 2023-02-27 VITALS
BODY MASS INDEX: 47.24 KG/M2 | HEART RATE: 70 BPM | RESPIRATION RATE: 18 BRPM | WEIGHT: 250 LBS | DIASTOLIC BLOOD PRESSURE: 70 MMHG | OXYGEN SATURATION: 96 % | SYSTOLIC BLOOD PRESSURE: 138 MMHG | TEMPERATURE: 97.8 F

## 2023-02-27 DIAGNOSIS — F01.50 VASCULAR DEMENTIA WITHOUT BEHAVIORAL DISTURBANCE: ICD-10-CM

## 2023-02-27 DIAGNOSIS — R60.0 BILATERAL LOWER EXTREMITY EDEMA: Primary | ICD-10-CM

## 2023-02-27 DIAGNOSIS — Z74.09 IMPAIRED MOBILITY AND ADLS: ICD-10-CM

## 2023-02-27 DIAGNOSIS — Z78.9 IMPAIRED MOBILITY AND ADLS: ICD-10-CM

## 2023-02-27 PROCEDURE — 99309 SBSQ NF CARE MODERATE MDM 30: CPT | Performed by: NURSE PRACTITIONER

## 2023-03-02 NOTE — PROGRESS NOTES
Nursing Home Follow Up Note      Layo Arango DO []   WILLIAM Alfaro [x]  [address] [place] [address]  Phone: [phone]  Fax: [phone] Dorcas Duncan MD []    Dc Fernandez DO []   [address] [place] [address]  Phone: [phone]  Fax: [phone]     PATIENT NAME: Danae Harmon                                                                          YOB: 1940           DATE OF SERVICE: 2/27/2023  FACILITY:  [][place]   [] [place]   [] [facility]   [x] [facility]   [] Other ____________________________________________________________________      CHIEF COMPLAINT:      Increased edema the past several days.     HISTORY OF PRESENT ILLNESS:      Per nursing, patient has some increased edema to her lower extremities for the past couple days.  She has not been weighed yet this week, will get weighed tomorrow. She continues to be very sedentary and does not ever want to get out of bed to her chair or for any type of activity.  She is resting in bed today with no complaints or signs and symptoms of any distress.  There is some increased edema to lower extremities but no edema noted elsewhere.  She has not had any increased work of breathing or O2 demand.  She is pleasantly confused today at baseline.  She does refuse medications at times so refusing her diuretic contributes to this as well.      PAST MEDICAL & SURGICAL HISTORY:   Past Medical History:   Diagnosis Date   • Colon polyp    • Osteoporosis    • Pain in thoracic spine    • Pneumonia    • UTI (urinary tract infection)       Past Surgical History:   Procedure Laterality Date   • BLADDER SURGERY  2000    bladder suspension   • CERVICAL LAMINECTOMY     • COLONOSCOPY     • COLOSTOMY  1979    temporary sigmoid   • HYSTERECTOMY           MEDICATIONS:  I have reviewed and reconciled the patients medication list in the patients chart at the skilled nursing facility today.      ALLERGIES:    Allergies    Allergen Reactions   • Lexapro [Escitalopram Oxalate] Nausea Only         SOCIAL HISTORY:    Social History     Socioeconomic History   • Marital status:    Tobacco Use   • Smoking status: Never   • Smokeless tobacco: Never   Substance and Sexual Activity   • Alcohol use: No   • Drug use: No   • Sexual activity: Defer       FAMILY HISTORY:    Family History   Problem Relation Age of Onset   • Blindness Mother    • Other Mother         arteriosclerotic cardiovascular disease    • Diabetes Mother    • Osteoporosis Mother    • Stroke Mother    • Cancer Brother    • Lung cancer Sister    • Osteoporosis Sister    • Stroke Sister    • Stroke Other        REVIEW OF SYSTEMS:    Review of Systems   Reason unable to perform ROS: ROS per nursing and patient.   Constitutional: Negative for activity change, appetite change, chills, diaphoresis, fatigue and fever.   HENT: Negative for congestion, mouth sores, nosebleeds, rhinorrhea, sore throat and trouble swallowing.    Eyes: Negative for discharge, redness and itching.   Respiratory: Negative for cough, choking, shortness of breath and wheezing.    Cardiovascular: Negative for palpitations. Leg swelling: increased.   Gastrointestinal: Negative for abdominal distention, abdominal pain, constipation, diarrhea, nausea, vomiting and indigestion.   Genitourinary: Positive for urinary incontinence. Negative for decreased urine volume, difficulty urinating, dysuria, frequency, hematuria and urgency.   Musculoskeletal: Positive for arthralgias (chronic). Negative for joint swelling and myalgias.   Skin: Negative for color change, dry skin, rash and skin lesions.   Neurological: Positive for weakness, memory problem and confusion (intermittent). Negative for seizures and speech difficulty.   Psychiatric/Behavioral: Positive for behavioral problems. Negative for agitation, dysphoric mood, hallucinations, self-injury, sleep disturbance, suicidal ideas, negative for hyperactivity,  depressed mood and stress. The patient is not nervous/anxious.          PHYSICAL EXAMINATION:   VITAL SIGNS:   Vitals:    23 1147   BP: 138/70   Pulse: 70   Resp: 18   Temp: 97.8 °F (36.6 °C)   SpO2: 96%   Weight: 113 kg (250 lb)       Physical Exam   Constitutional: Vital signs are normal. She appears well-developed and well-nourished.   HENT:   Head: Normocephalic.   Right Ear: External ear normal.   Left Ear: External ear normal.   Nose: Nose normal.   Eyes: Conjunctivae are normal.   Cardiovascular: Normal rate, regular rhythm and normal heart sounds.   BLE edema   Pulmonary/Chest: Effort normal. No respiratory distress. She has no wheezes. She has no rhonchi. She has no rales.   Abdominal: Soft. Bowel sounds are normal. She exhibits no distension. There is no abdominal tenderness.   Musculoskeletal:      Right ankle: She exhibits decreased range of motion (right ankle contracture).      Right lower le+ Edema present.      Left lower le+ Edema present.      Comments: Weakness BLE   Neurological: She is alert.   Skin: Skin is warm and dry. Turgor is normal. No rash noted.   No rashes noted today   Psychiatric: Her speech is normal and behavior is normal. Mood normal. Cognition and memory are impaired.   Nursing note and vitals reviewed.      RECORDS REVIEW:   I have reviewed and interpreted the records in [facility] and [facility].    ASSESSMENT     Diagnoses and all orders for this visit:    1. Bilateral lower extremity edema (Primary)    2. Vascular dementia without behavioral disturbance (HCC)    3. Impaired mobility and ADLs        PLAN    LE edema/Non compliance with medication regimen/vascular dementia  -Will increase Bumex to 4 mg po bid x 5 days. Check BMP Monday. Will be weight tomorrow. No shortness of breath or increased work of breathing. Educated about importance of compliance with medications and risks of not. She has also been educated by nursing.     Nursing encouraged to keep me informed of any  acute changes, or any new concerning symptoms.    Nursing to continue supportive care for all ADLs.      [x]  Discussed Patient in detail with nursing/staff, addressed all needs today.     [x]  Plan of Care Reviewed   [x]  PT/OT Reviewed   [x]  Order Changes  []  Discharge Plans Reviewed  [x]  Advance Directive on file with Nursing Home.   [x]  POA on file with Nursing Home.   [x]  Code Status listed: []  Full Code   [x]  DNR         “I confirm accuracy of unchanged data/findings which have been carried forward from previous visit, as well as I have updated appropriately those that have changed.”                                                            Mita Joyce, APRN.

## 2023-03-10 ENCOUNTER — NURSING HOME (OUTPATIENT)
Dept: FAMILY MEDICINE CLINIC | Facility: CLINIC | Age: 83
End: 2023-03-10
Payer: MEDICARE

## 2023-03-10 VITALS
TEMPERATURE: 97.9 F | OXYGEN SATURATION: 95 % | DIASTOLIC BLOOD PRESSURE: 85 MMHG | WEIGHT: 253 LBS | RESPIRATION RATE: 18 BRPM | HEART RATE: 74 BPM | SYSTOLIC BLOOD PRESSURE: 137 MMHG | BODY MASS INDEX: 47.8 KG/M2

## 2023-03-10 DIAGNOSIS — Z87.898 HISTORY OF ABNORMAL BREATHING PATTERN: Primary | ICD-10-CM

## 2023-03-10 DIAGNOSIS — Z78.9 IMPAIRED MOBILITY AND ADLS: ICD-10-CM

## 2023-03-10 DIAGNOSIS — F01.50 VASCULAR DEMENTIA WITHOUT BEHAVIORAL DISTURBANCE: ICD-10-CM

## 2023-03-10 DIAGNOSIS — Z74.09 IMPAIRED MOBILITY AND ADLS: ICD-10-CM

## 2023-03-10 PROCEDURE — 99308 SBSQ NF CARE LOW MDM 20: CPT | Performed by: NURSE PRACTITIONER

## 2023-03-12 NOTE — PROGRESS NOTES
Nursing Home Follow Up Note      Layo Arango DO []   WILLIAM Alfaro [x]  [address] [place] [address]  Phone: [phone]  Fax: [phone] Dorcas Duncan MD []    Dc Fernandez DO []   [address] [place] [address]  Phone: [phone]  Fax: [phone]     PATIENT NAME: Danae Harmon                                                                          YOB: 1940           DATE OF SERVICE: 3/10/2023  FACILITY:  [][place]   [] [place]   [] [facility]   [x] [facility]   [] Other ____________________________________________________________________      CHIEF COMPLAINT:      Concerns from family about respiratory distress earlier today.    HISTORY OF PRESENT ILLNESS:      Per nursing, patient's  called a few minutes ago to report that his daughter had been there visiting the patient earlier and felt that she was having some respiratory distress.  Nursing went to assess and did not notice any respiratory distress and all vital signs were normal.  She has not had any increased edema or weight gain, weight has remained stable the past several weeks.  She has no complaints today or signs and symptoms of any distress.  She is not having any respiratory distress during visit today.    PAST MEDICAL & SURGICAL HISTORY:   Past Medical History:   Diagnosis Date   • Colon polyp    • Osteoporosis    • Pain in thoracic spine    • Pneumonia    • UTI (urinary tract infection)       Past Surgical History:   Procedure Laterality Date   • BLADDER SURGERY  2000    bladder suspension   • CERVICAL LAMINECTOMY     • COLONOSCOPY     • COLOSTOMY  1979    temporary sigmoid   • HYSTERECTOMY           MEDICATIONS:  I have reviewed and reconciled the patients medication list in the patients chart at the skilled nursing facility today.      ALLERGIES:    Allergies   Allergen Reactions   • Lexapro [Escitalopram Oxalate] Nausea Only         SOCIAL HISTORY:    Social History      Socioeconomic History   • Marital status:    Tobacco Use   • Smoking status: Never   • Smokeless tobacco: Never   Substance and Sexual Activity   • Alcohol use: No   • Drug use: No   • Sexual activity: Defer       FAMILY HISTORY:    Family History   Problem Relation Age of Onset   • Blindness Mother    • Other Mother         arteriosclerotic cardiovascular disease    • Diabetes Mother    • Osteoporosis Mother    • Stroke Mother    • Cancer Brother    • Lung cancer Sister    • Osteoporosis Sister    • Stroke Sister    • Stroke Other        REVIEW OF SYSTEMS:    Review of Systems   Reason unable to perform ROS: ROS per nursing and patient.   Constitutional: Negative for activity change, appetite change, chills, diaphoresis, fatigue and fever.   HENT: Negative for congestion, mouth sores, nosebleeds, rhinorrhea, sore throat and trouble swallowing.    Eyes: Negative for discharge, redness and itching.   Respiratory: Negative for cough, choking, shortness of breath and wheezing.    Cardiovascular: Positive for leg swelling (chronic). Negative for palpitations.   Gastrointestinal: Negative for abdominal distention, abdominal pain, constipation, diarrhea, nausea, vomiting and indigestion.   Genitourinary: Positive for urinary incontinence. Negative for decreased urine volume, difficulty urinating, dysuria, frequency, hematuria and urgency.   Musculoskeletal: Positive for arthralgias (chronic). Negative for joint swelling and myalgias.   Skin: Negative for color change, dry skin, rash and skin lesions.   Neurological: Positive for weakness, memory problem and confusion (intermittent). Negative for seizures and speech difficulty.   Psychiatric/Behavioral: Positive for behavioral problems. Negative for agitation, dysphoric mood, hallucinations, self-injury, sleep disturbance, suicidal ideas, negative for hyperactivity, depressed mood and stress. The patient is not nervous/anxious.          PHYSICAL EXAMINATION:    VITAL SIGNS:   Vitals:    03/10/23 1445   BP: 137/85   Pulse: 74   Resp: 18   Temp: 97.9 °F (36.6 °C)   SpO2: 95%   Weight: 115 kg (253 lb)       Physical Exam   Constitutional: Vital signs are normal. She appears well-developed and well-nourished.   HENT:   Head: Normocephalic.   Right Ear: External ear normal.   Left Ear: External ear normal.   Nose: Nose normal.   Eyes: Conjunctivae are normal.   Cardiovascular: Normal rate, regular rhythm and normal heart sounds.   BLE edema   Pulmonary/Chest: Effort normal. No respiratory distress. She has no wheezes. She has no rhonchi. She has no rales.   Abdominal: Soft. Bowel sounds are normal. She exhibits no distension. There is no abdominal tenderness.   Musculoskeletal:      Right ankle: She exhibits decreased range of motion (right ankle contracture).      Right lower le+ Edema present.      Left lower le+ Edema present.      Comments: Weakness BLE   Neurological: She is alert.   Skin: Skin is warm and dry. Turgor is normal. No rash noted.   No rashes noted today   Psychiatric: Her speech is normal and behavior is normal. Mood normal. Cognition and memory are impaired.   Nursing note and vitals reviewed.      RECORDS REVIEW:   I have reviewed and interpreted the records in [facility] and [facility].    ASSESSMENT     Diagnoses and all orders for this visit:    1. History of abnormal breathing pattern (Primary)    2. Vascular dementia without behavioral disturbance (HCC)    3. Impaired mobility and ADLs        PLAN    History abnormal breathing  -Family felt patient had abnormal breathing earlier today but nursing have not assessed this and she does not have any abnormal breathing or respiratory distress at this time.  Nursing to continue frequent monitoring.    Nursing encouraged to keep me informed of any acute changes, or any new concerning symptoms.    Nursing to continue supportive care for all ADLs.      [x]  Discussed Patient in detail with nursing/staff, addressed  all needs today.     [x]  Plan of Care Reviewed   [x]  PT/OT Reviewed   [x]  Order Changes  []  Discharge Plans Reviewed  [x]  Advance Directive on file with Nursing Home.   [x]  POA on file with Nursing Home.   [x]  Code Status listed: []  Full Code   [x]  DNR         “I confirm accuracy of unchanged data/findings which have been carried forward from previous visit, as well as I have updated appropriately those that have changed.”                                                              Mita Joyce, APRN.

## 2023-03-21 ENCOUNTER — NURSING HOME (OUTPATIENT)
Dept: INTERNAL MEDICINE | Facility: CLINIC | Age: 83
End: 2023-03-21
Payer: MEDICARE

## 2023-03-21 VITALS
HEART RATE: 72 BPM | SYSTOLIC BLOOD PRESSURE: 120 MMHG | WEIGHT: 253 LBS | BODY MASS INDEX: 47.8 KG/M2 | TEMPERATURE: 97.6 F | DIASTOLIC BLOOD PRESSURE: 68 MMHG | RESPIRATION RATE: 18 BRPM | OXYGEN SATURATION: 94 %

## 2023-03-21 DIAGNOSIS — I10 BENIGN ESSENTIAL HYPERTENSION: ICD-10-CM

## 2023-03-21 DIAGNOSIS — R60.0 LOWER EXTREMITY EDEMA: ICD-10-CM

## 2023-03-21 DIAGNOSIS — F02.818 LATE ONSET ALZHEIMER'S DISEASE WITH BEHAVIORAL DISTURBANCE: Primary | ICD-10-CM

## 2023-03-21 DIAGNOSIS — E78.2 MIXED HYPERLIPIDEMIA: ICD-10-CM

## 2023-03-21 DIAGNOSIS — R53.83 OTHER FATIGUE: ICD-10-CM

## 2023-03-21 DIAGNOSIS — G30.1 LATE ONSET ALZHEIMER'S DISEASE WITH BEHAVIORAL DISTURBANCE: Primary | ICD-10-CM

## 2023-03-21 DIAGNOSIS — F33.1 MODERATE EPISODE OF RECURRENT MAJOR DEPRESSIVE DISORDER: ICD-10-CM

## 2023-03-21 DIAGNOSIS — G89.29 CHRONIC LOW BACK PAIN WITH SCIATICA, SCIATICA LATERALITY UNSPECIFIED, UNSPECIFIED BACK PAIN LATERALITY: ICD-10-CM

## 2023-03-21 DIAGNOSIS — E11.9 TYPE 2 DIABETES MELLITUS WITHOUT COMPLICATION, WITHOUT LONG-TERM CURRENT USE OF INSULIN: ICD-10-CM

## 2023-03-21 DIAGNOSIS — M54.40 CHRONIC LOW BACK PAIN WITH SCIATICA, SCIATICA LATERALITY UNSPECIFIED, UNSPECIFIED BACK PAIN LATERALITY: ICD-10-CM

## 2023-03-21 PROCEDURE — 99308 SBSQ NF CARE LOW MDM 20: CPT | Performed by: INTERNAL MEDICINE

## 2023-03-21 NOTE — LETTER
Nursing Home Progress Note        Dc Fernandez DO   [address] [place] [address] Phone: [phone]  Fax: [phone]     PATIENT NAME: Danae Harmon                                                                          YOB: 1940           DATE OF SERVICE: 03/21/2023  FACILITY:  [facility]   ______________________________________________________________________     CHIEF COMPLAINT:  Chronic Medical Management      HISTORY OF PRESENT ILLNESS:   Patient was seen for routine care and management.  She remains bedbound with chronic lower extremity edema.  Appetite has been stable.  On exam today, patient was not very verbal with me.  She smiled and seemed comfortable.  Nurses do not report any acute events.    PAST MEDICAL & SURGICAL HISTORY:   Past Medical History:   Diagnosis Date    Colon polyp     Osteoporosis     Pain in thoracic spine     Pneumonia     UTI (urinary tract infection)       Past Surgical History:   Procedure Laterality Date    BLADDER SURGERY  2000    bladder suspension    CERVICAL LAMINECTOMY      COLONOSCOPY      COLOSTOMY  1979    temporary sigmoid    HYSTERECTOMY           MEDICATIONS:  I have reviewed and reconciled the patients medication list in the patients chart at the skilled nursing facility today, 03/21/2023.      ALLERGIES:  Allergies   Allergen Reactions    Lexapro [Escitalopram Oxalate] Nausea Only         SOCIAL HISTORY:  Social History     Socioeconomic History    Marital status:    Tobacco Use    Smoking status: Never    Smokeless tobacco: Never   Substance and Sexual Activity    Alcohol use: No    Drug use: No    Sexual activity: Defer       FAMILY HISTORY:  Family History   Problem Relation Age of Onset    Blindness Mother     Other Mother         arteriosclerotic cardiovascular disease     Diabetes Mother     Osteoporosis Mother     Stroke Mother     Cancer Brother     Lung cancer Sister     Osteoporosis Sister      Stroke Sister     Stroke Other        REVIEW OF SYSTEMS:  Review of Systems   Unable to perform ROS: Dementia          PHYSICAL EXAMINATION:     VITAL SIGNS:  /68   Pulse 72   Temp 97.6 °F (36.4 °C)   Resp 18   Wt 115 kg (253 lb)   SpO2 94%   BMI 47.80 kg/m²     Physical Exam  Vitals and nursing note reviewed.   Constitutional:       Appearance: Normal appearance. She is well-developed. She is obese.      Comments: Frail elderly female   HENT:      Head: Normocephalic and atraumatic.   Eyes:      Extraocular Movements: Extraocular movements intact.      Conjunctiva/sclera: Conjunctivae normal.   Cardiovascular:      Rate and Rhythm: Normal rate and regular rhythm.   Pulmonary:      Effort: Pulmonary effort is normal.      Breath sounds: No wheezing.   Abdominal:      General: Abdomen is flat.      Palpations: Abdomen is soft.   Musculoskeletal:      Cervical back: Normal range of motion and neck supple.      Right lower leg: Edema (Minimal) present.      Left lower leg: Edema (Minimal) present.      Comments: Bedridden   Skin:     General: Skin is warm and dry.      Findings: No rash.   Neurological:      General: No focal deficit present.      Mental Status: She is alert and oriented to person, place, and time. Mental status is at baseline.   Psychiatric:         Mood and Affect: Mood normal.         Behavior: Behavior normal.      Comments: Confused         RECORDS REVIEW:        ASSESSMENT     Diagnoses and all orders for this visit:    1. Late onset Alzheimer's disease with behavioral disturbance (Primary)    2. Benign essential hypertension    3. Chronic low back pain with sciatica, sciatica laterality unspecified, unspecified back pain laterality    4. Other fatigue    5. Mixed hyperlipidemia    6. Lower extremity edema    7. Moderate episode of recurrent major depressive disorder    8. Type 2 diabetes mellitus without complication, without long-term current use of insulin        PLAN  Alzheimer's  disease with behavioral disturbances  -Mental status remains at baseline.  Continue nursing care for all ADLs.     Right ankle contracture  -  Continue supportive care.  Patient remains become functionally paraplegic.     Constipation  -Good control with current bowel regimen.     Major depressive disorder  -Stable on Seroquel and Depakote     Lower Extremity Edema  -Continue diuretics as needed.  Was just diuresed last week but swelling persists.      Bladder spasms/urinary frequency  -Cont Myrbetriq. Stable control.      Mixed hyperlipidemia   -Continue statin.     Essential hypertension  -Controlled on Coreg     Vitamin D and vitamin B12 deficiencies  -Stable on supplements.     Osteoporosis  -Stable on vitamins.  Remains off of bisphosphonates.     Type 2 diabetes mellitus  -Fair glucose control. Remains diet controlled.      Chronic pain  -Continues to benefit from Norco which is being filled when needed.            [x]  Discussed Patient in detail with nursing/staff, addressed all needs today.     [x]  Plan of Care Reviewed   []  PT/OT Reviewed   []  Order Changes  []  Discharge Plans Reviewed  [x]  Advance Directive on file with Nursing Home.   [x]  POA on file with Nursing Home.    [x]  Code Status listed and reviewed.     I confirm accuracy of unchanged data/findings including physical exam and plan which have been carried forward from previous visit, as well as I have updated appropriately those that have changed        Dc Fernandez DO.  4/3/2023

## 2023-03-29 ENCOUNTER — NURSING HOME (OUTPATIENT)
Dept: FAMILY MEDICINE CLINIC | Facility: CLINIC | Age: 83
End: 2023-03-29
Payer: MEDICARE

## 2023-03-29 VITALS
SYSTOLIC BLOOD PRESSURE: 120 MMHG | HEART RATE: 72 BPM | TEMPERATURE: 97.6 F | WEIGHT: 253 LBS | BODY MASS INDEX: 47.8 KG/M2 | RESPIRATION RATE: 18 BRPM | DIASTOLIC BLOOD PRESSURE: 68 MMHG

## 2023-03-29 DIAGNOSIS — F01.50 VASCULAR DEMENTIA WITHOUT BEHAVIORAL DISTURBANCE: ICD-10-CM

## 2023-03-29 DIAGNOSIS — Z78.9 IMPAIRED MOBILITY AND ADLS: ICD-10-CM

## 2023-03-29 DIAGNOSIS — Z74.09 IMPAIRED MOBILITY AND ADLS: ICD-10-CM

## 2023-03-29 DIAGNOSIS — R60.0 BILATERAL LOWER EXTREMITY EDEMA: Primary | ICD-10-CM

## 2023-03-29 NOTE — LETTER
Nursing Home Follow Up Note      Layo Arango DO []   WILLIAM Alfaro [x]  [address] [place] [address]  Phone: [phone]  Fax: [phone] Dorcas Duncan MD []    Dc Fernandez DO []   [address] [place] [address]  Phone: [phone]  Fax: [phone]     PATIENT NAME: Danae Harmon                                                                          YOB: 1940           DATE OF SERVICE: 3/29/2023  FACILITY:  [][facility]   [] [place]   [] [facility]   [x] [facility]   [] Other ____________________________________________________________________      CHIEF COMPLAINT:      Increased edema the past several days.     HISTORY OF PRESENT ILLNESS:      Per nursing, patient has some increased edema to her lower extremities for the past couple days.  She has also gained 2 pounds in the past week.  She continues to be very sedentary and does not ever want to get out of bed to her chair or for any type of activity.  She is resting in bed today with no complaints or signs and symptoms of any distress.  There is some increased edema to lower extremities but no edema noted elsewhere.  She has not had any increased work of breathing or O2 demand.  She is pleasantly confused today at baseline.  She does refuse medications at times so refusing her diuretic contributes to this as well.      PAST MEDICAL & SURGICAL HISTORY:   Past Medical History:   Diagnosis Date   • Colon polyp    • Osteoporosis    • Pain in thoracic spine    • Pneumonia    • UTI (urinary tract infection)       Past Surgical History:   Procedure Laterality Date   • BLADDER SURGERY  2000    bladder suspension   • CERVICAL LAMINECTOMY     • COLONOSCOPY     • COLOSTOMY  1979    temporary sigmoid   • HYSTERECTOMY           MEDICATIONS:  I have reviewed and reconciled the patients medication list in the patients chart at the skilled nursing facility today.      ALLERGIES:    Allergies   Allergen Reactions    • Lexapro [Escitalopram Oxalate] Nausea Only         SOCIAL HISTORY:    Social History     Socioeconomic History   • Marital status:    Tobacco Use   • Smoking status: Never   • Smokeless tobacco: Never   Substance and Sexual Activity   • Alcohol use: No   • Drug use: No   • Sexual activity: Defer       FAMILY HISTORY:    Family History   Problem Relation Age of Onset   • Blindness Mother    • Other Mother         arteriosclerotic cardiovascular disease    • Diabetes Mother    • Osteoporosis Mother    • Stroke Mother    • Cancer Brother    • Lung cancer Sister    • Osteoporosis Sister    • Stroke Sister    • Stroke Other        REVIEW OF SYSTEMS:    Review of Systems   Reason unable to perform ROS: ROS per nursing and patient.   Constitutional: Negative for activity change, appetite change, chills, diaphoresis, fatigue and fever.   HENT: Negative for congestion, mouth sores, nosebleeds, rhinorrhea, sore throat and trouble swallowing.    Eyes: Negative for discharge, redness and itching.   Respiratory: Negative for cough, choking, shortness of breath and wheezing.    Cardiovascular: Negative for palpitations. Leg swelling: increased.   Gastrointestinal: Negative for abdominal distention, abdominal pain, constipation, diarrhea, nausea, vomiting and indigestion.   Genitourinary: Positive for urinary incontinence. Negative for decreased urine volume, difficulty urinating, dysuria, frequency, hematuria and urgency.   Musculoskeletal: Positive for arthralgias (chronic). Negative for joint swelling and myalgias.   Skin: Negative for color change, dry skin, rash and skin lesions.   Neurological: Positive for weakness, memory problem and confusion (intermittent). Negative for seizures and speech difficulty.   Psychiatric/Behavioral: Positive for behavioral problems. Negative for agitation, dysphoric mood, hallucinations, self-injury, sleep disturbance, suicidal ideas, negative for hyperactivity, depressed mood and  stress. The patient is not nervous/anxious.          PHYSICAL EXAMINATION:   VITAL SIGNS:   Vitals:    23 1212   BP: 120/68   Pulse: 72   Resp: 18   Temp: 97.6 °F (36.4 °C)   Weight: 115 kg (253 lb)       Physical Exam   Constitutional: Vital signs are normal. She appears well-developed and well-nourished.   HENT:   Head: Normocephalic.   Right Ear: External ear normal.   Left Ear: External ear normal.   Nose: Nose normal.   Eyes: Conjunctivae are normal.   Cardiovascular: Normal rate, regular rhythm and normal heart sounds.   BLE edema   Pulmonary/Chest: Effort normal. No respiratory distress. She has no wheezes. She has no rhonchi. She has no rales.   Abdominal: Soft. Bowel sounds are normal. She exhibits no distension. There is no abdominal tenderness.   Musculoskeletal:      Right ankle: She exhibits decreased range of motion (right ankle contracture).      Right lower le+ Edema present.      Left lower le+ Edema present.      Comments: Weakness BLE   Neurological: She is alert.   Skin: Skin is warm and dry. Turgor is normal. No rash noted.   No rashes noted today   Psychiatric: Her speech is normal and behavior is normal. Mood normal. Cognition and memory are impaired.   Nursing note and vitals reviewed.      RECORDS REVIEW:   I have reviewed and interpreted the records in [facility].    ASSESSMENT     Diagnoses and all orders for this visit:    1. Bilateral lower extremity edema (Primary)    2. Vascular dementia without behavioral disturbance (HCC)    3. Impaired mobility and ADLs        PLAN    LE edema/Non compliance with medication regimen/vascular dementia  -Will increase Bumex to 4 mg po bid x 5 days. Will continue to weight weekly. No shortness of breath or increased work of breathing. Educated about importance of compliance with medications and risks of not. She has also been educated by nursing.     Nursing encouraged to keep me informed of any acute changes, or any new concerning  symptoms.    Nursing to continue supportive care for all ADLs.      [x]  Discussed Patient in detail with nursing/staff, addressed all needs today.     [x]  Plan of Care Reviewed   [x]  PT/OT Reviewed   [x]  Order Changes  []  Discharge Plans Reviewed  [x]  Advance Directive on file with Nursing Home.   [x]  POA on file with Nursing Home.   [x]  Code Status listed: []  Full Code   [x]  DNR       “I confirm accuracy of unchanged data/findings which have been carried forward from previous visit, as well as I have updated appropriately those that have changed.”                                                            Mita Joyce, APRN.

## 2023-04-01 NOTE — PROGRESS NOTES
Nursing Home Follow Up Note      Layo Arango DO []   WILLIAM Alfaro [x]  [address] [place] [address]  Phone: [phone]  Fax: [phone] Dorcas Duncan MD []    Dc Fernandez DO []   [address] [place] [address]  Phone: [phone]  Fax: [phone]     PATIENT NAME: Danae Harmon                                                                          YOB: 1940           DATE OF SERVICE: 3/29/2023  FACILITY:  [][facility]   [] [facility]   [] [facility]   [x] [facility]   [] Other ____________________________________________________________________      CHIEF COMPLAINT:      Increased edema the past several days.     HISTORY OF PRESENT ILLNESS:      Per nursing, patient has some increased edema to her lower extremities for the past couple days.  She has also gained 2 pounds in the past week.  She continues to be very sedentary and does not ever want to get out of bed to her chair or for any type of activity.  She is resting in bed today with no complaints or signs and symptoms of any distress.  There is some increased edema to lower extremities but no edema noted elsewhere.  She has not had any increased work of breathing or O2 demand.  She is pleasantly confused today at baseline.  She does refuse medications at times so refusing her diuretic contributes to this as well.      PAST MEDICAL & SURGICAL HISTORY:   Past Medical History:   Diagnosis Date   • Colon polyp    • Osteoporosis    • Pain in thoracic spine    • Pneumonia    • UTI (urinary tract infection)       Past Surgical History:   Procedure Laterality Date   • BLADDER SURGERY  2000    bladder suspension   • CERVICAL LAMINECTOMY     • COLONOSCOPY     • COLOSTOMY  1979    temporary sigmoid   • HYSTERECTOMY           MEDICATIONS:  I have reviewed and reconciled the patients medication list in the patients chart at the skilled nursing facility today.      ALLERGIES:    Allergies   Allergen Reactions    • Lexapro [Escitalopram Oxalate] Nausea Only         SOCIAL HISTORY:    Social History     Socioeconomic History   • Marital status:    Tobacco Use   • Smoking status: Never   • Smokeless tobacco: Never   Substance and Sexual Activity   • Alcohol use: No   • Drug use: No   • Sexual activity: Defer       FAMILY HISTORY:    Family History   Problem Relation Age of Onset   • Blindness Mother    • Other Mother         arteriosclerotic cardiovascular disease    • Diabetes Mother    • Osteoporosis Mother    • Stroke Mother    • Cancer Brother    • Lung cancer Sister    • Osteoporosis Sister    • Stroke Sister    • Stroke Other        REVIEW OF SYSTEMS:    Review of Systems   Reason unable to perform ROS: ROS per nursing and patient.   Constitutional: Negative for activity change, appetite change, chills, diaphoresis, fatigue and fever.   HENT: Negative for congestion, mouth sores, nosebleeds, rhinorrhea, sore throat and trouble swallowing.    Eyes: Negative for discharge, redness and itching.   Respiratory: Negative for cough, choking, shortness of breath and wheezing.    Cardiovascular: Negative for palpitations. Leg swelling: increased.   Gastrointestinal: Negative for abdominal distention, abdominal pain, constipation, diarrhea, nausea, vomiting and indigestion.   Genitourinary: Positive for urinary incontinence. Negative for decreased urine volume, difficulty urinating, dysuria, frequency, hematuria and urgency.   Musculoskeletal: Positive for arthralgias (chronic). Negative for joint swelling and myalgias.   Skin: Negative for color change, dry skin, rash and skin lesions.   Neurological: Positive for weakness, memory problem and confusion (intermittent). Negative for seizures and speech difficulty.   Psychiatric/Behavioral: Positive for behavioral problems. Negative for agitation, dysphoric mood, hallucinations, self-injury, sleep disturbance, suicidal ideas, negative for hyperactivity, depressed mood and  stress. The patient is not nervous/anxious.          PHYSICAL EXAMINATION:   VITAL SIGNS:   Vitals:    23 1212   BP: 120/68   Pulse: 72   Resp: 18   Temp: 97.6 °F (36.4 °C)   Weight: 115 kg (253 lb)       Physical Exam   Constitutional: Vital signs are normal. She appears well-developed and well-nourished.   HENT:   Head: Normocephalic.   Right Ear: External ear normal.   Left Ear: External ear normal.   Nose: Nose normal.   Eyes: Conjunctivae are normal.   Cardiovascular: Normal rate, regular rhythm and normal heart sounds.   BLE edema   Pulmonary/Chest: Effort normal. No respiratory distress. She has no wheezes. She has no rhonchi. She has no rales.   Abdominal: Soft. Bowel sounds are normal. She exhibits no distension. There is no abdominal tenderness.   Musculoskeletal:      Right ankle: She exhibits decreased range of motion (right ankle contracture).      Right lower le+ Edema present.      Left lower le+ Edema present.      Comments: Weakness BLE   Neurological: She is alert.   Skin: Skin is warm and dry. Turgor is normal. No rash noted.   No rashes noted today   Psychiatric: Her speech is normal and behavior is normal. Mood normal. Cognition and memory are impaired.   Nursing note and vitals reviewed.      RECORDS REVIEW:   I have reviewed and interpreted the records in [facility] and [facility].    ASSESSMENT     Diagnoses and all orders for this visit:    1. Bilateral lower extremity edema (Primary)    2. Vascular dementia without behavioral disturbance (HCC)    3. Impaired mobility and ADLs        PLAN    LE edema/Non compliance with medication regimen/vascular dementia  -Will increase Bumex to 4 mg po bid x 5 days. Will continue to weight weekly. No shortness of breath or increased work of breathing. Educated about importance of compliance with medications and risks of not. She has also been educated by nursing.     Nursing encouraged to keep me informed of any acute changes, or any new concerning  symptoms.    Nursing to continue supportive care for all ADLs.      [x]  Discussed Patient in detail with nursing/staff, addressed all needs today.     [x]  Plan of Care Reviewed   [x]  PT/OT Reviewed   [x]  Order Changes  []  Discharge Plans Reviewed  [x]  Advance Directive on file with Nursing Home.   [x]  POA on file with Nursing Home.   [x]  Code Status listed: []  Full Code   [x]  DNR       “I confirm accuracy of unchanged data/findings which have been carried forward from previous visit, as well as I have updated appropriately those that have changed.”                                                            Mita Joyce, APRN.

## 2023-04-03 NOTE — PROGRESS NOTES
Nursing Home Progress Note        Dc Fernandez DO   [address] [place] [address] Phone: [phone]  Fax: [phone]     PATIENT NAME: Danae Harmon                                                                          YOB: 1940           DATE OF SERVICE: 03/21/2023  FACILITY:  [facility]   ______________________________________________________________________     CHIEF COMPLAINT:  Chronic Medical Management      HISTORY OF PRESENT ILLNESS:   Patient was seen for routine care and management.  She remains bedbound with chronic lower extremity edema.  Appetite has been stable.  On exam today, patient was not very verbal with me.  She smiled and seemed comfortable.  Nurses do not report any acute events.    PAST MEDICAL & SURGICAL HISTORY:   Past Medical History:   Diagnosis Date   • Colon polyp    • Osteoporosis    • Pain in thoracic spine    • Pneumonia    • UTI (urinary tract infection)       Past Surgical History:   Procedure Laterality Date   • BLADDER SURGERY  2000    bladder suspension   • CERVICAL LAMINECTOMY     • COLONOSCOPY     • COLOSTOMY  1979    temporary sigmoid   • HYSTERECTOMY           MEDICATIONS:  I have reviewed and reconciled the patients medication list in the patients chart at the skilled nursing facility today, 03/21/2023.      ALLERGIES:  Allergies   Allergen Reactions   • Lexapro [Escitalopram Oxalate] Nausea Only         SOCIAL HISTORY:  Social History     Socioeconomic History   • Marital status:    Tobacco Use   • Smoking status: Never   • Smokeless tobacco: Never   Substance and Sexual Activity   • Alcohol use: No   • Drug use: No   • Sexual activity: Defer       FAMILY HISTORY:  Family History   Problem Relation Age of Onset   • Blindness Mother    • Other Mother         arteriosclerotic cardiovascular disease    • Diabetes Mother    • Osteoporosis Mother    • Stroke Mother    • Cancer Brother    • Lung cancer Sister    •  Osteoporosis Sister    • Stroke Sister    • Stroke Other        REVIEW OF SYSTEMS:  Review of Systems   Unable to perform ROS: Dementia          PHYSICAL EXAMINATION:     VITAL SIGNS:  /68   Pulse 72   Temp 97.6 °F (36.4 °C)   Resp 18   Wt 115 kg (253 lb)   SpO2 94%   BMI 47.80 kg/m²     Physical Exam  Vitals and nursing note reviewed.   Constitutional:       Appearance: Normal appearance. She is well-developed. She is obese.      Comments: Frail elderly female   HENT:      Head: Normocephalic and atraumatic.   Eyes:      Extraocular Movements: Extraocular movements intact.      Conjunctiva/sclera: Conjunctivae normal.   Cardiovascular:      Rate and Rhythm: Normal rate and regular rhythm.   Pulmonary:      Effort: Pulmonary effort is normal.      Breath sounds: No wheezing.   Abdominal:      General: Abdomen is flat.      Palpations: Abdomen is soft.   Musculoskeletal:      Cervical back: Normal range of motion and neck supple.      Right lower leg: Edema (Minimal) present.      Left lower leg: Edema (Minimal) present.      Comments: Bedridden   Skin:     General: Skin is warm and dry.      Findings: No rash.   Neurological:      General: No focal deficit present.      Mental Status: She is alert and oriented to person, place, and time. Mental status is at baseline.   Psychiatric:         Mood and Affect: Mood normal.         Behavior: Behavior normal.      Comments: Confused         RECORDS REVIEW:        ASSESSMENT     Diagnoses and all orders for this visit:    1. Late onset Alzheimer's disease with behavioral disturbance (Primary)    2. Benign essential hypertension    3. Chronic low back pain with sciatica, sciatica laterality unspecified, unspecified back pain laterality    4. Other fatigue    5. Mixed hyperlipidemia    6. Lower extremity edema    7. Moderate episode of recurrent major depressive disorder    8. Type 2 diabetes mellitus without complication, without long-term current use of  insulin        PLAN  Alzheimer's disease with behavioral disturbances  -Mental status remains at baseline.  Continue nursing care for all ADLs.     Right ankle contracture  -  Continue supportive care.  Patient remains become functionally paraplegic.     Constipation  -Good control with current bowel regimen.     Major depressive disorder  -Stable on Seroquel and Depakote     Lower Extremity Edema  -Continue diuretics as needed.  Was just diuresed last week but swelling persists.      Bladder spasms/urinary frequency  -Cont Myrbetriq. Stable control.      Mixed hyperlipidemia   -Continue statin.     Essential hypertension  -Controlled on Coreg     Vitamin D and vitamin B12 deficiencies  -Stable on supplements.     Osteoporosis  -Stable on vitamins.  Remains off of bisphosphonates.     Type 2 diabetes mellitus  -Fair glucose control. Remains diet controlled.      Chronic pain  -Continues to benefit from Norco which is being filled when needed.            [x]  Discussed Patient in detail with nursing/staff, addressed all needs today.     [x]  Plan of Care Reviewed   []  PT/OT Reviewed   []  Order Changes  []  Discharge Plans Reviewed  [x]  Advance Directive on file with Nursing Home.   [x]  POA on file with Nursing Home.    [x]  Code Status listed and reviewed.     I confirm accuracy of unchanged data/findings including physical exam and plan which have been carried forward from previous visit, as well as I have updated appropriately those that have changed        Dc Fernandez DO.  4/3/2023

## 2023-04-12 ENCOUNTER — NURSING HOME (OUTPATIENT)
Dept: FAMILY MEDICINE CLINIC | Facility: CLINIC | Age: 83
End: 2023-04-12
Payer: MEDICARE

## 2023-04-12 VITALS
BODY MASS INDEX: 45.73 KG/M2 | RESPIRATION RATE: 20 BRPM | SYSTOLIC BLOOD PRESSURE: 134 MMHG | HEART RATE: 77 BPM | DIASTOLIC BLOOD PRESSURE: 72 MMHG | WEIGHT: 242 LBS | TEMPERATURE: 97.8 F

## 2023-04-12 DIAGNOSIS — F01.50 VASCULAR DEMENTIA WITHOUT BEHAVIORAL DISTURBANCE: ICD-10-CM

## 2023-04-12 DIAGNOSIS — Z74.09 IMPAIRED MOBILITY AND ADLS: ICD-10-CM

## 2023-04-12 DIAGNOSIS — R13.10 SWALLOWING DISORDER: ICD-10-CM

## 2023-04-12 DIAGNOSIS — E66.01 CLASS 3 SEVERE OBESITY DUE TO EXCESS CALORIES WITH SERIOUS COMORBIDITY AND BODY MASS INDEX (BMI) OF 45.0 TO 49.9 IN ADULT: ICD-10-CM

## 2023-04-12 DIAGNOSIS — R63.4 WEIGHT LOSS: ICD-10-CM

## 2023-04-12 DIAGNOSIS — Z78.9 IMPAIRED MOBILITY AND ADLS: ICD-10-CM

## 2023-04-12 DIAGNOSIS — R63.8 LIMITED FOOD ACCEPTANCE: Primary | ICD-10-CM

## 2023-04-12 NOTE — LETTER
Nursing Home Follow Up Note      Layo Arango DO []   WILLIAM Alfaro [x]  [address] [place] [address]  Phone: [phone]  Fax: [phone] Dorcas Duncan MD []    Dc Fernandez DO []   [address] [place] [address]  Phone: [phone]  Fax: [phone]     PATIENT NAME: Danae Harmon                                                                          YOB: 1940           DATE OF SERVICE: 4/12/2023  FACILITY:  [][facility]   [] [facility]   [] [facility]   [x] [facility]   [] Other ____________________________________________________________________      CHIEF COMPLAINT:      Pocket food and refusing some meals for the past 5 days.     11 pound weight loss in the past month.    HISTORY OF PRESENT ILLNESS:     Nursing reports that for the past 5 days patient has been pocketing food in her mouth and has refused at least 1 meal per day which is not like her.  She typically eats every meal and extra food that her family has provided.  Nursing has not noticed any increased confusion otherwise.  She was weighed on 4 6 and noted to have an 11 pound weight loss but her diuretic was just increased a few weeks ago for weight gain.  Her BMI is still 45.7.  She is resting in bed today any complaints or signs and symptoms of any distress.  She is a poor historian however related to her dementia.  Nursing has no other voiced concerns.      PAST MEDICAL & SURGICAL HISTORY:   Past Medical History:   Diagnosis Date   • Colon polyp    • Osteoporosis    • Pain in thoracic spine    • Pneumonia    • UTI (urinary tract infection)       Past Surgical History:   Procedure Laterality Date   • BLADDER SURGERY  2000    bladder suspension   • CERVICAL LAMINECTOMY     • COLONOSCOPY     • COLOSTOMY  1979    temporary sigmoid   • HYSTERECTOMY           MEDICATIONS:  I have reviewed and reconciled the patients medication list in the patients chart at the skilled nursing facility today.       ALLERGIES:    Allergies   Allergen Reactions   • Lexapro [Escitalopram Oxalate] Nausea Only         SOCIAL HISTORY:    Social History     Socioeconomic History   • Marital status:    Tobacco Use   • Smoking status: Never   • Smokeless tobacco: Never   Substance and Sexual Activity   • Alcohol use: No   • Drug use: No   • Sexual activity: Defer       FAMILY HISTORY:    Family History   Problem Relation Age of Onset   • Blindness Mother    • Other Mother         arteriosclerotic cardiovascular disease    • Diabetes Mother    • Osteoporosis Mother    • Stroke Mother    • Cancer Brother    • Lung cancer Sister    • Osteoporosis Sister    • Stroke Sister    • Stroke Other        REVIEW OF SYSTEMS:    Review of Systems   Reason unable to perform ROS: ROS per nursing and patient.   Constitutional: Positive for appetite change and unexpected weight loss. Negative for activity change, chills, diaphoresis, fatigue and fever.   HENT: Negative for congestion, mouth sores, nosebleeds, sore throat and trouble swallowing.    Respiratory: Negative for cough, choking, shortness of breath and wheezing.    Cardiovascular: Negative for palpitations. Leg swelling: increased.   Gastrointestinal: Negative for abdominal distention, abdominal pain, constipation, diarrhea, nausea, vomiting and indigestion.        Pocketing food   Genitourinary: Positive for urinary incontinence. Negative for decreased urine volume, difficulty urinating, dysuria, frequency, hematuria and urgency.   Musculoskeletal: Positive for arthralgias (chronic). Negative for joint swelling and myalgias.   Skin: Negative for color change, dry skin, rash and skin lesions.   Neurological: Positive for weakness, memory problem and confusion. Negative for speech difficulty.   Psychiatric/Behavioral: Positive for behavioral problems. Negative for agitation, dysphoric mood, hallucinations, self-injury, sleep disturbance, suicidal ideas, negative for hyperactivity,  depressed mood and stress. The patient is not nervous/anxious.          PHYSICAL EXAMINATION:   VITAL SIGNS:   Vitals:    23 0953   BP: 134/72   Pulse: 77   Resp: 20   Temp: 97.8 °F (36.6 °C)   Weight: 110 kg (242 lb)       Physical Exam   Constitutional: Vital signs are normal. She appears well-developed and well-nourished.   HENT:   Head: Normocephalic.   Right Ear: External ear normal.   Left Ear: External ear normal.   Nose: Nose normal.   Eyes: Conjunctivae are normal.   Cardiovascular: Normal rate, regular rhythm and normal heart sounds.   BLE edema   Pulmonary/Chest: Effort normal. No respiratory distress. She has no wheezes. She has no rhonchi. She has no rales.   Abdominal: Soft. Bowel sounds are normal. She exhibits no distension. There is no abdominal tenderness.   Musculoskeletal:      Right ankle: She exhibits decreased range of motion (right ankle contracture).      Right lower le+ Edema present.      Left lower le+ Edema present.      Comments: Weakness BLE   Neurological: She is alert. She is disoriented.   Skin: Skin is warm and dry. Turgor is normal. No rash noted.   No rashes noted today   Psychiatric: Her speech is normal and behavior is normal. Mood normal. Cognition and memory are impaired.   Nursing note and vitals reviewed.      RECORDS REVIEW:   I have reviewed and interpreted the records in [facility] and [facility].    ASSESSMENT     Diagnoses and all orders for this visit:    1. Limited food acceptance (Primary)    2. Swallowing disorder    3. Vascular dementia without behavioral disturbance (HCC)    4. Weight loss    5. Class 3 severe obesity due to excess calories with serious comorbidity and body mass index (BMI) of 45.0 to 49.9 in adult    6. Impaired mobility and ADLs        PLAN    Limited food acceptance /swallowing disorder/dementia/weight loss/obesity  -Will have speech therapy consult on patient due to pocketing her food to.  Weight loss is not a concern at this time due to  diuretic use and BMI of 45.7.  Will follow-up and continue to monitor this and she will be weighed weekly.    Nursing encouraged to keep me informed of any acute changes, or any new concerning symptoms.    Nursing to continue supportive care for all ADLs.      [x]  Discussed Patient in detail with nursing/staff, addressed all needs today.     [x]  Plan of Care Reviewed   [x]  PT/OT Reviewed   [x]  Order Changes  []  Discharge Plans Reviewed  [x]  Advance Directive on file with Nursing Home.   [x]  POA on file with Nursing Home.   [x]  Code Status listed: []  Full Code   [x]  DNR         “I confirm accuracy of unchanged data/findings which have been carried forward from previous visit, as well as I have updated appropriately those that have changed.”                                                        Mita Joyce, APRN.

## 2023-04-12 NOTE — PROGRESS NOTES
Nursing Home Follow Up Note      Layo Arango DO []   WILLIAM Alfaro [x]  [address] [place] [address]  Phone: [phone]  Fax: [phone] Dorcas Duncan MD []    Dc Fernandez DO []   [address] [place] [address]  Phone: [phone]  Fax: [phone]     PATIENT NAME: Danae Harmon                                                                          YOB: 1940           DATE OF SERVICE: 4/12/2023  FACILITY:  [][place]   [] [place]   [] [facility]   [x] [facility]   [] Other ____________________________________________________________________      CHIEF COMPLAINT:      Pocket food and refusing some meals for the past 5 days.     11 pound weight loss in the past month.    HISTORY OF PRESENT ILLNESS:     Nursing reports that for the past 5 days patient has been pocketing food in her mouth and has refused at least 1 meal per day which is not like her.  She typically eats every meal and extra food that her family has provided.  Nursing has not noticed any increased confusion otherwise.  She was weighed on 4 6 and noted to have an 11 pound weight loss but her diuretic was just increased a few weeks ago for weight gain.  Her BMI is still 45.7.  She is resting in bed today any complaints or signs and symptoms of any distress.  She is a poor historian however related to her dementia.  Nursing has no other voiced concerns.      PAST MEDICAL & SURGICAL HISTORY:   Past Medical History:   Diagnosis Date   • Colon polyp    • Osteoporosis    • Pain in thoracic spine    • Pneumonia    • UTI (urinary tract infection)       Past Surgical History:   Procedure Laterality Date   • BLADDER SURGERY  2000    bladder suspension   • CERVICAL LAMINECTOMY     • COLONOSCOPY     • COLOSTOMY  1979    temporary sigmoid   • HYSTERECTOMY           MEDICATIONS:  I have reviewed and reconciled the patients medication list in the patients chart at the skilled nursing facility today.       ALLERGIES:    Allergies   Allergen Reactions   • Lexapro [Escitalopram Oxalate] Nausea Only         SOCIAL HISTORY:    Social History     Socioeconomic History   • Marital status:    Tobacco Use   • Smoking status: Never   • Smokeless tobacco: Never   Substance and Sexual Activity   • Alcohol use: No   • Drug use: No   • Sexual activity: Defer       FAMILY HISTORY:    Family History   Problem Relation Age of Onset   • Blindness Mother    • Other Mother         arteriosclerotic cardiovascular disease    • Diabetes Mother    • Osteoporosis Mother    • Stroke Mother    • Cancer Brother    • Lung cancer Sister    • Osteoporosis Sister    • Stroke Sister    • Stroke Other        REVIEW OF SYSTEMS:    Review of Systems   Reason unable to perform ROS: ROS per nursing and patient.   Constitutional: Positive for appetite change and unexpected weight loss. Negative for activity change, chills, diaphoresis, fatigue and fever.   HENT: Negative for congestion, mouth sores, nosebleeds, sore throat and trouble swallowing.    Respiratory: Negative for cough, choking, shortness of breath and wheezing.    Cardiovascular: Negative for palpitations. Leg swelling: increased.   Gastrointestinal: Negative for abdominal distention, abdominal pain, constipation, diarrhea, nausea, vomiting and indigestion.        Pocketing food   Genitourinary: Positive for urinary incontinence. Negative for decreased urine volume, difficulty urinating, dysuria, frequency, hematuria and urgency.   Musculoskeletal: Positive for arthralgias (chronic). Negative for joint swelling and myalgias.   Skin: Negative for color change, dry skin, rash and skin lesions.   Neurological: Positive for weakness, memory problem and confusion. Negative for speech difficulty.   Psychiatric/Behavioral: Positive for behavioral problems. Negative for agitation, dysphoric mood, hallucinations, self-injury, sleep disturbance, suicidal ideas, negative for hyperactivity,  depressed mood and stress. The patient is not nervous/anxious.          PHYSICAL EXAMINATION:   VITAL SIGNS:   Vitals:    23 0953   BP: 134/72   Pulse: 77   Resp: 20   Temp: 97.8 °F (36.6 °C)   Weight: 110 kg (242 lb)       Physical Exam   Constitutional: Vital signs are normal. She appears well-developed and well-nourished.   HENT:   Head: Normocephalic.   Right Ear: External ear normal.   Left Ear: External ear normal.   Nose: Nose normal.   Eyes: Conjunctivae are normal.   Cardiovascular: Normal rate, regular rhythm and normal heart sounds.   BLE edema   Pulmonary/Chest: Effort normal. No respiratory distress. She has no wheezes. She has no rhonchi. She has no rales.   Abdominal: Soft. Bowel sounds are normal. She exhibits no distension. There is no abdominal tenderness.   Musculoskeletal:      Right ankle: She exhibits decreased range of motion (right ankle contracture).      Right lower le+ Edema present.      Left lower le+ Edema present.      Comments: Weakness BLE   Neurological: She is alert. She is disoriented.   Skin: Skin is warm and dry. Turgor is normal. No rash noted.   No rashes noted today   Psychiatric: Her speech is normal and behavior is normal. Mood normal. Cognition and memory are impaired.   Nursing note and vitals reviewed.      RECORDS REVIEW:   I have reviewed and interpreted the records in [facility] and [facility].    ASSESSMENT     Diagnoses and all orders for this visit:    1. Limited food acceptance (Primary)    2. Swallowing disorder    3. Vascular dementia without behavioral disturbance (HCC)    4. Weight loss    5. Class 3 severe obesity due to excess calories with serious comorbidity and body mass index (BMI) of 45.0 to 49.9 in adult    6. Impaired mobility and ADLs        PLAN    Limited food acceptance /swallowing disorder/dementia/weight loss/obesity  -Will have speech therapy consult on patient due to pocketing her food to.  Weight loss is not a concern at this time due to  diuretic use and BMI of 45.7.  Will follow-up and continue to monitor this and she will be weighed weekly.    Nursing encouraged to keep me informed of any acute changes, or any new concerning symptoms.    Nursing to continue supportive care for all ADLs.      [x]  Discussed Patient in detail with nursing/staff, addressed all needs today.     [x]  Plan of Care Reviewed   [x]  PT/OT Reviewed   [x]  Order Changes  []  Discharge Plans Reviewed  [x]  Advance Directive on file with Nursing Home.   [x]  POA on file with Nursing Home.   [x]  Code Status listed: []  Full Code   [x]  DNR         “I confirm accuracy of unchanged data/findings which have been carried forward from previous visit, as well as I have updated appropriately those that have changed.”                                                        Mita Joyce, APRN.

## 2023-04-17 ENCOUNTER — NURSING HOME (OUTPATIENT)
Dept: FAMILY MEDICINE CLINIC | Facility: CLINIC | Age: 83
End: 2023-04-17
Payer: MEDICARE

## 2023-04-17 VITALS
TEMPERATURE: 97.3 F | DIASTOLIC BLOOD PRESSURE: 72 MMHG | HEART RATE: 78 BPM | WEIGHT: 241 LBS | RESPIRATION RATE: 20 BRPM | SYSTOLIC BLOOD PRESSURE: 134 MMHG | BODY MASS INDEX: 45.54 KG/M2

## 2023-04-17 DIAGNOSIS — F01.50 VASCULAR DEMENTIA WITHOUT BEHAVIORAL DISTURBANCE: ICD-10-CM

## 2023-04-17 DIAGNOSIS — Z74.09 IMPAIRED MOBILITY AND ADLS: ICD-10-CM

## 2023-04-17 DIAGNOSIS — Z78.9 IMPAIRED MOBILITY AND ADLS: ICD-10-CM

## 2023-04-17 DIAGNOSIS — R41.82 ACUTE ON CHRONIC ALTERATION IN MENTAL STATUS: ICD-10-CM

## 2023-04-17 DIAGNOSIS — Z91.14 NON COMPLIANCE W MEDICATION REGIMEN: ICD-10-CM

## 2023-04-17 NOTE — LETTER
Nursing Home Follow Up Note      Layo Arango DO []   WILLIAM Alfaro [x]  [address] [place] [address]  Phone: [phone]  Fax: [phone] Dorcas Duncan MD []    Dc Fernandez DO []   [address] [place] [address]  Phone: [phone]  Fax: [phone]     PATIENT NAME: Danae Harmon                                                                          YOB: 1940           DATE OF SERVICE: 4/17/2023  FACILITY:  [][place]   [] [place]   [] [facility]   [x] [facility]   [] Other ____________________________________________________________________      CHIEF COMPLAINT:      Patient refusing medications for the past several days.     HISTORY OF PRESENT ILLNESS:     Nursing reports that for the past several days patient has been refusing medications. She has been having increased confusion and behaviors the past week or so, refusing meals and medications. Labs ordered to be obtained last week but unable to obtain. UA C&S results are pending. She is resting in her Sun chair today in [place]. She has no complaints or signs and symptoms of any distress but does have increased confusion.       PAST MEDICAL & SURGICAL HISTORY:   Past Medical History:   Diagnosis Date   • Colon polyp    • Osteoporosis    • Pain in thoracic spine    • Pneumonia    • UTI (urinary tract infection)       Past Surgical History:   Procedure Laterality Date   • BLADDER SURGERY  2000    bladder suspension   • CERVICAL LAMINECTOMY     • COLONOSCOPY     • COLOSTOMY  1979    temporary sigmoid   • HYSTERECTOMY           MEDICATIONS:  I have reviewed and reconciled the patients medication list in the patients chart at the skilled nursing facility today.      ALLERGIES:    Allergies   Allergen Reactions   • Lexapro [Escitalopram Oxalate] Nausea Only         SOCIAL HISTORY:    Social History     Socioeconomic History   • Marital status:    Tobacco Use   • Smoking status: Never   •  Smokeless tobacco: Never   Substance and Sexual Activity   • Alcohol use: No   • Drug use: No   • Sexual activity: Defer       FAMILY HISTORY:    Family History   Problem Relation Age of Onset   • Blindness Mother    • Other Mother         arteriosclerotic cardiovascular disease    • Diabetes Mother    • Osteoporosis Mother    • Stroke Mother    • Cancer Brother    • Lung cancer Sister    • Osteoporosis Sister    • Stroke Sister    • Stroke Other        REVIEW OF SYSTEMS:    Review of Systems   Reason unable to perform ROS: ROS per nursing and patient.   Constitutional: Positive for activity change, appetite change and fatigue. Negative for chills, diaphoresis and fever.   HENT: Negative for congestion, mouth sores, nosebleeds, sore throat and trouble swallowing.    Respiratory: Negative for cough, choking, shortness of breath and wheezing.    Cardiovascular: Negative for palpitations. Leg swelling: increased.   Gastrointestinal: Negative for abdominal distention, abdominal pain, constipation, diarrhea, nausea, vomiting and indigestion.   Genitourinary: Positive for urinary incontinence. Negative for decreased urine volume, difficulty urinating, dysuria, frequency, hematuria and urgency.   Musculoskeletal: Positive for arthralgias (chronic). Negative for joint swelling and myalgias.   Skin: Negative for color change, dry skin, rash and skin lesions.   Neurological: Positive for weakness, memory problem and confusion (increased). Negative for speech difficulty.   Psychiatric/Behavioral: Positive for behavioral problems. Negative for agitation, dysphoric mood, hallucinations, self-injury, sleep disturbance, suicidal ideas, negative for hyperactivity, depressed mood and stress. The patient is not nervous/anxious.          PHYSICAL EXAMINATION:   VITAL SIGNS:   Vitals:    04/17/23 1336   BP: 134/72   Pulse: 78   Resp: 20   Temp: 97.3 °F (36.3 °C)   Weight: 109 kg (241 lb)       Physical Exam   Constitutional: Vital  signs are normal. She appears well-developed and well-nourished.   HENT:   Head: Normocephalic.   Right Ear: External ear normal.   Left Ear: External ear normal.   Nose: Nose normal.   Eyes: Conjunctivae are normal.   Cardiovascular: Normal rate, regular rhythm and normal heart sounds.   BLE edema   Pulmonary/Chest: Effort normal. No respiratory distress. She has no wheezes. She has no rhonchi. She has no rales.   Abdominal: Soft. Bowel sounds are normal. She exhibits no distension. There is no abdominal tenderness.   Musculoskeletal:      Right ankle: She exhibits decreased range of motion (right ankle contracture).      Right lower le+ Edema present.      Left lower le+ Edema present.      Comments: Weakness BLE   Neurological: She is alert. She is disoriented.   Skin: Skin is warm and dry. Turgor is normal. No rash noted.   No rashes noted today   Psychiatric: Her speech is normal. She is slowed. Cognition and memory are impaired. She has a flat affect.   Nursing note and vitals reviewed.      RECORDS REVIEW:   I have reviewed and interpreted the records in [facility] and [facility].    ASSESSMENT     Diagnoses and all orders for this visit:    1. Non compliance w medication regimen    2. Acute on chronic alteration in mental status    3. Vascular dementia without behavioral disturbance (HCC)    4. Impaired mobility and ADLs        PLAN    Non compliance with medication/acute on chronic mental status change  -UA C&S sent this am and pending. Will give Rocephin 1 gm IM x 1 now while awaiting results. Will get CBC and CMP. Will follow up and continue to monitor.     Nursing encouraged to keep me informed of any acute changes, or any new concerning symptoms.    Nursing to continue supportive care for all ADLs.      [x]  Discussed Patient in detail with nursing/staff, addressed all needs today.     [x]  Plan of Care Reviewed   [x]  PT/OT Reviewed   [x]  Order Changes  []  Discharge Plans Reviewed  [x]  Advance Directive  on file with Nursing Home.   [x]  POA on file with Nursing Home.   [x]  Code Status listed: []  Full Code   [x]  DNR         “I confirm accuracy of unchanged data/findings which have been carried forward from previous visit, as well as I have updated appropriately those that have changed.”                                                        Mita Joyce, APRN.

## 2023-04-17 NOTE — PROGRESS NOTES
Nursing Home Follow Up Note      Layo Arango DO []   WILLIAM Alfaro [x]  [address] [place] [address]  Phone: [phone]  Fax: [phone] Dorcas Duncan MD []    Dc Fernandez DO []   [address] [place] [address]  Phone: [phone]  Fax: [phone]     PATIENT NAME: Danae Harmon                                                                          YOB: 1940           DATE OF SERVICE: 4/17/2023  FACILITY:  [][place]   [] [place]   [] [facility]   [x] [facility]   [] Other ____________________________________________________________________      CHIEF COMPLAINT:      Patient refusing medications for the past several days.     HISTORY OF PRESENT ILLNESS:     Nursing reports that for the past several days patient has been refusing medications. She has been having increased confusion and behaviors the past week or so, refusing meals and medications. Labs ordered to be obtained last week but unable to obtain. UA C&S results are pending. She is resting in her Sun chair today in [place]. She has no complaints or signs and symptoms of any distress but does have increased confusion.       PAST MEDICAL & SURGICAL HISTORY:   Past Medical History:   Diagnosis Date   • Colon polyp    • Osteoporosis    • Pain in thoracic spine    • Pneumonia    • UTI (urinary tract infection)       Past Surgical History:   Procedure Laterality Date   • BLADDER SURGERY  2000    bladder suspension   • CERVICAL LAMINECTOMY     • COLONOSCOPY     • COLOSTOMY  1979    temporary sigmoid   • HYSTERECTOMY           MEDICATIONS:  I have reviewed and reconciled the patients medication list in the patients chart at the skilled nursing facility today.      ALLERGIES:    Allergies   Allergen Reactions   • Lexapro [Escitalopram Oxalate] Nausea Only         SOCIAL HISTORY:    Social History     Socioeconomic History   • Marital status:    Tobacco Use   • Smoking status: Never   •  Smokeless tobacco: Never   Substance and Sexual Activity   • Alcohol use: No   • Drug use: No   • Sexual activity: Defer       FAMILY HISTORY:    Family History   Problem Relation Age of Onset   • Blindness Mother    • Other Mother         arteriosclerotic cardiovascular disease    • Diabetes Mother    • Osteoporosis Mother    • Stroke Mother    • Cancer Brother    • Lung cancer Sister    • Osteoporosis Sister    • Stroke Sister    • Stroke Other        REVIEW OF SYSTEMS:    Review of Systems   Reason unable to perform ROS: ROS per nursing and patient.   Constitutional: Positive for activity change, appetite change and fatigue. Negative for chills, diaphoresis and fever.   HENT: Negative for congestion, mouth sores, nosebleeds, sore throat and trouble swallowing.    Respiratory: Negative for cough, choking, shortness of breath and wheezing.    Cardiovascular: Negative for palpitations. Leg swelling: increased.   Gastrointestinal: Negative for abdominal distention, abdominal pain, constipation, diarrhea, nausea, vomiting and indigestion.   Genitourinary: Positive for urinary incontinence. Negative for decreased urine volume, difficulty urinating, dysuria, frequency, hematuria and urgency.   Musculoskeletal: Positive for arthralgias (chronic). Negative for joint swelling and myalgias.   Skin: Negative for color change, dry skin, rash and skin lesions.   Neurological: Positive for weakness, memory problem and confusion (increased). Negative for speech difficulty.   Psychiatric/Behavioral: Positive for behavioral problems. Negative for agitation, dysphoric mood, hallucinations, self-injury, sleep disturbance, suicidal ideas, negative for hyperactivity, depressed mood and stress. The patient is not nervous/anxious.          PHYSICAL EXAMINATION:   VITAL SIGNS:   Vitals:    04/17/23 1336   BP: 134/72   Pulse: 78   Resp: 20   Temp: 97.3 °F (36.3 °C)   Weight: 109 kg (241 lb)       Physical Exam   Constitutional: Vital  signs are normal. She appears well-developed and well-nourished.   HENT:   Head: Normocephalic.   Right Ear: External ear normal.   Left Ear: External ear normal.   Nose: Nose normal.   Eyes: Conjunctivae are normal.   Cardiovascular: Normal rate, regular rhythm and normal heart sounds.   BLE edema   Pulmonary/Chest: Effort normal. No respiratory distress. She has no wheezes. She has no rhonchi. She has no rales.   Abdominal: Soft. Bowel sounds are normal. She exhibits no distension. There is no abdominal tenderness.   Musculoskeletal:      Right ankle: She exhibits decreased range of motion (right ankle contracture).      Right lower le+ Edema present.      Left lower le+ Edema present.      Comments: Weakness BLE   Neurological: She is alert. She is disoriented.   Skin: Skin is warm and dry. Turgor is normal. No rash noted.   No rashes noted today   Psychiatric: Her speech is normal. She is slowed. Cognition and memory are impaired. She has a flat affect.   Nursing note and vitals reviewed.      RECORDS REVIEW:   I have reviewed and interpreted the records in [facility] and [facility].    ASSESSMENT     Diagnoses and all orders for this visit:    1. Non compliance w medication regimen    2. Acute on chronic alteration in mental status    3. Vascular dementia without behavioral disturbance (HCC)    4. Impaired mobility and ADLs        PLAN    Non compliance with medication/acute on chronic mental status change  -UA C&S sent this am and pending. Will give Rocephin 1 gm IM x 1 now while awaiting results. Will get CBC and CMP. Will follow up and continue to monitor.     Nursing encouraged to keep me informed of any acute changes, or any new concerning symptoms.    Nursing to continue supportive care for all ADLs.      [x]  Discussed Patient in detail with nursing/staff, addressed all needs today.     [x]  Plan of Care Reviewed   [x]  PT/OT Reviewed   [x]  Order Changes  []  Discharge Plans Reviewed  [x]  Advance Directive  on file with Nursing Home.   [x]  POA on file with Nursing Home.   [x]  Code Status listed: []  Full Code   [x]  DNR         “I confirm accuracy of unchanged data/findings which have been carried forward from previous visit, as well as I have updated appropriately those that have changed.”                                                        Mita Joyce, APRN.

## 2023-05-16 ENCOUNTER — NURSING HOME (OUTPATIENT)
Dept: INTERNAL MEDICINE | Facility: CLINIC | Age: 83
End: 2023-05-16
Payer: MEDICARE

## 2023-05-16 VITALS
HEART RATE: 79 BPM | DIASTOLIC BLOOD PRESSURE: 82 MMHG | SYSTOLIC BLOOD PRESSURE: 126 MMHG | BODY MASS INDEX: 45.73 KG/M2 | RESPIRATION RATE: 18 BRPM | TEMPERATURE: 97.6 F | OXYGEN SATURATION: 95 % | WEIGHT: 242 LBS

## 2023-05-16 DIAGNOSIS — R26.81 GAIT INSTABILITY: ICD-10-CM

## 2023-05-16 DIAGNOSIS — I10 BENIGN ESSENTIAL HYPERTENSION: ICD-10-CM

## 2023-05-16 DIAGNOSIS — F33.1 MODERATE EPISODE OF RECURRENT MAJOR DEPRESSIVE DISORDER: ICD-10-CM

## 2023-05-16 DIAGNOSIS — F02.818 LATE ONSET ALZHEIMER'S DISEASE WITH BEHAVIORAL DISTURBANCE: Primary | ICD-10-CM

## 2023-05-16 DIAGNOSIS — G30.1 LATE ONSET ALZHEIMER'S DISEASE WITH BEHAVIORAL DISTURBANCE: Primary | ICD-10-CM

## 2023-05-16 DIAGNOSIS — R60.0 LOWER EXTREMITY EDEMA: ICD-10-CM

## 2023-05-16 DIAGNOSIS — G89.29 CHRONIC LOW BACK PAIN WITH SCIATICA, SCIATICA LATERALITY UNSPECIFIED, UNSPECIFIED BACK PAIN LATERALITY: ICD-10-CM

## 2023-05-16 DIAGNOSIS — M54.40 CHRONIC LOW BACK PAIN WITH SCIATICA, SCIATICA LATERALITY UNSPECIFIED, UNSPECIFIED BACK PAIN LATERALITY: ICD-10-CM

## 2023-05-16 DIAGNOSIS — E78.2 MIXED HYPERLIPIDEMIA: ICD-10-CM

## 2023-05-16 DIAGNOSIS — E11.9 TYPE 2 DIABETES MELLITUS WITHOUT COMPLICATION, WITHOUT LONG-TERM CURRENT USE OF INSULIN: ICD-10-CM

## 2023-05-16 NOTE — PROGRESS NOTES
Nursing Home Progress Note        Dc Fernandez DO   [address] [place] [address] Phone: [phone]  Fax: [phone]     PATIENT NAME: Danae Harmon                                                                          YOB: 1940           DATE OF SERVICE: 05/16/2023  FACILITY:  [facility]   ______________________________________________________________________     CHIEF COMPLAINT:  Chronic Medical Management      HISTORY OF PRESENT ILLNESS:   Patient was resting comfortably in her bed with no specific complaints or concerns.  She remains pleasantly confused and unable to provide much of any history.  She stated that she felt well.  Nurses report stable mood and behaviors.  Edema in her lower extremities persists despite aggressive treatment with diuretics.  She did suffer with a UTI late last month and was treated with Macrobid.    PAST MEDICAL & SURGICAL HISTORY:   Past Medical History:   Diagnosis Date   • Colon polyp    • Osteoporosis    • Pain in thoracic spine    • Pneumonia    • UTI (urinary tract infection)       Past Surgical History:   Procedure Laterality Date   • BLADDER SURGERY  2000    bladder suspension   • CERVICAL LAMINECTOMY     • COLONOSCOPY     • COLOSTOMY  1979    temporary sigmoid   • HYSTERECTOMY           MEDICATIONS:  I have reviewed and reconciled the patients medication list in the patients chart at the skilled nursing facility today, 05/16/2023.      ALLERGIES:  Allergies   Allergen Reactions   • Lexapro [Escitalopram Oxalate] Nausea Only         SOCIAL HISTORY:  Social History     Socioeconomic History   • Marital status:    Tobacco Use   • Smoking status: Never   • Smokeless tobacco: Never   Substance and Sexual Activity   • Alcohol use: No   • Drug use: No   • Sexual activity: Defer       FAMILY HISTORY:  Family History   Problem Relation Age of Onset   • Blindness Mother    • Other Mother         arteriosclerotic  cardiovascular disease    • Diabetes Mother    • Osteoporosis Mother    • Stroke Mother    • Cancer Brother    • Lung cancer Sister    • Osteoporosis Sister    • Stroke Sister    • Stroke Other        REVIEW OF SYSTEMS:  Review of Systems   Unable to perform ROS: Dementia          PHYSICAL EXAMINATION:     VITAL SIGNS:  /82   Pulse 79   Temp 97.6 °F (36.4 °C)   Resp 18   Wt 110 kg (242 lb)   SpO2 95%   BMI 45.73 kg/m²     Physical Exam  Vitals and nursing note reviewed.   Constitutional:       Appearance: Normal appearance. She is well-developed. She is obese.      Comments: Frail elderly female   HENT:      Head: Normocephalic and atraumatic.   Eyes:      Extraocular Movements: Extraocular movements intact.      Conjunctiva/sclera: Conjunctivae normal.   Cardiovascular:      Rate and Rhythm: Normal rate and regular rhythm.   Pulmonary:      Effort: Pulmonary effort is normal.      Breath sounds: No wheezing.   Abdominal:      General: Abdomen is flat.      Palpations: Abdomen is soft.   Musculoskeletal:      Cervical back: Normal range of motion and neck supple.      Right lower leg: Edema (Minimal) present.      Left lower leg: Edema (Minimal) present.      Comments: Bedridden   Skin:     General: Skin is warm and dry.      Findings: No rash.   Neurological:      General: No focal deficit present.      Mental Status: She is alert and oriented to person, place, and time. Mental status is at baseline.   Psychiatric:         Mood and Affect: Mood normal.         Behavior: Behavior normal.      Comments: Confused         RECORDS REVIEW:   Labs reviewed 4/18/2023 CBC, CMP in normal range, VPA 38, A1c 5.3.    ASSESSMENT     Diagnoses and all orders for this visit:    1. Late onset Alzheimer's disease with behavioral disturbance (Primary)    2. Lower extremity edema    3. Benign essential hypertension    4. Chronic low back pain with sciatica, sciatica laterality unspecified, unspecified back pain  laterality    5. Mixed hyperlipidemia    6. Type 2 diabetes mellitus without complication, without long-term current use of insulin    7. Moderate episode of recurrent major depressive disorder    8. Gait instability        PLAN  Alzheimer's disease with behavioral disturbances  -Mental status remains at baseline.  Continue nursing care for all ADLs.     Right ankle contracture  -  Continue supportive care.  Patient remains become functionally paraplegic.  Feet placed in boots to prevent wounds.     Constipation  -Good control with current bowel regimen.     Major depressive disorder  -Stable on Seroquel and Depakote     Lower Extremity Edema  -Continue diuretics as needed.        Bladder spasms/urinary frequency  -Cont Myrbetriq. Stable control.      Mixed hyperlipidemia   -Continue statin.     Essential hypertension  -Controlled on Coreg     Vitamin D and vitamin B12 deficiencies  -Stable on supplements.     Osteoporosis  -Stable on vitamins.  Remains off of bisphosphonates.     Type 2 diabetes mellitus  -Fair glucose control. Remains diet controlled.      Chronic pain  -Continues to benefit from Norco which is being filled when needed.            [x]  Discussed Patient in detail with nursing/staff, addressed all needs today.     [x]  Plan of Care Reviewed   []  PT/OT Reviewed   []  Order Changes  []  Discharge Plans Reviewed  [x]  Advance Directive on file with Nursing Home.   [x]  POA on file with Nursing Home.    [x]  Code Status listed and reviewed.     I confirm accuracy of unchanged data/findings including physical exam and plan which have been carried forward from previous visit, as well as I have updated appropriately those that have changed        Dc Fernandez DO.  5/22/2023

## 2023-05-16 NOTE — LETTER
Nursing Home Progress Note        Dc Fernandez DO   [address] [place] [address] Phone: [phone]  Fax: [phone]     PATIENT NAME: Danae Harmon                                                                          YOB: 1940           DATE OF SERVICE: 05/16/2023  FACILITY:  [facility]   ______________________________________________________________________     CHIEF COMPLAINT:  Chronic Medical Management      HISTORY OF PRESENT ILLNESS:   Patient was resting comfortably in her bed with no specific complaints or concerns.  She remains pleasantly confused and unable to provide much of any history.  She stated that she felt well.  Nurses report stable mood and behaviors.  Edema in her lower extremities persists despite aggressive treatment with diuretics.  She did suffer with a UTI late last month and was treated with Macrobid.    PAST MEDICAL & SURGICAL HISTORY:   Past Medical History:   Diagnosis Date   • Colon polyp    • Osteoporosis    • Pain in thoracic spine    • Pneumonia    • UTI (urinary tract infection)       Past Surgical History:   Procedure Laterality Date   • BLADDER SURGERY  2000    bladder suspension   • CERVICAL LAMINECTOMY     • COLONOSCOPY     • COLOSTOMY  1979    temporary sigmoid   • HYSTERECTOMY           MEDICATIONS:  I have reviewed and reconciled the patients medication list in the patients chart at the skilled nursing facility today, 05/16/2023.      ALLERGIES:  Allergies   Allergen Reactions   • Lexapro [Escitalopram Oxalate] Nausea Only         SOCIAL HISTORY:  Social History     Socioeconomic History   • Marital status:    Tobacco Use   • Smoking status: Never   • Smokeless tobacco: Never   Substance and Sexual Activity   • Alcohol use: No   • Drug use: No   • Sexual activity: Defer       FAMILY HISTORY:  Family History   Problem Relation Age of Onset   • Blindness Mother    • Other Mother         arteriosclerotic  cardiovascular disease    • Diabetes Mother    • Osteoporosis Mother    • Stroke Mother    • Cancer Brother    • Lung cancer Sister    • Osteoporosis Sister    • Stroke Sister    • Stroke Other        REVIEW OF SYSTEMS:  Review of Systems   Unable to perform ROS: Dementia          PHYSICAL EXAMINATION:     VITAL SIGNS:  /82   Pulse 79   Temp 97.6 °F (36.4 °C)   Resp 18   Wt 110 kg (242 lb)   SpO2 95%   BMI 45.73 kg/m²     Physical Exam  Vitals and nursing note reviewed.   Constitutional:       Appearance: Normal appearance. She is well-developed. She is obese.      Comments: Frail elderly female   HENT:      Head: Normocephalic and atraumatic.   Eyes:      Extraocular Movements: Extraocular movements intact.      Conjunctiva/sclera: Conjunctivae normal.   Cardiovascular:      Rate and Rhythm: Normal rate and regular rhythm.   Pulmonary:      Effort: Pulmonary effort is normal.      Breath sounds: No wheezing.   Abdominal:      General: Abdomen is flat.      Palpations: Abdomen is soft.   Musculoskeletal:      Cervical back: Normal range of motion and neck supple.      Right lower leg: Edema (Minimal) present.      Left lower leg: Edema (Minimal) present.      Comments: Bedridden   Skin:     General: Skin is warm and dry.      Findings: No rash.   Neurological:      General: No focal deficit present.      Mental Status: She is alert and oriented to person, place, and time. Mental status is at baseline.   Psychiatric:         Mood and Affect: Mood normal.         Behavior: Behavior normal.      Comments: Confused         RECORDS REVIEW:   Labs reviewed 4/18/2023 CBC, CMP in normal range, VPA 38, A1c 5.3.    ASSESSMENT     Diagnoses and all orders for this visit:    1. Late onset Alzheimer's disease with behavioral disturbance (Primary)    2. Lower extremity edema    3. Benign essential hypertension    4. Chronic low back pain with sciatica, sciatica laterality unspecified, unspecified back pain  laterality    5. Mixed hyperlipidemia    6. Type 2 diabetes mellitus without complication, without long-term current use of insulin    7. Moderate episode of recurrent major depressive disorder    8. Gait instability        PLAN  Alzheimer's disease with behavioral disturbances  -Mental status remains at baseline.  Continue nursing care for all ADLs.     Right ankle contracture  -  Continue supportive care.  Patient remains become functionally paraplegic.  Feet placed in boots to prevent wounds.     Constipation  -Good control with current bowel regimen.     Major depressive disorder  -Stable on Seroquel and Depakote     Lower Extremity Edema  -Continue diuretics as needed.        Bladder spasms/urinary frequency  -Cont Myrbetriq. Stable control.      Mixed hyperlipidemia   -Continue statin.     Essential hypertension  -Controlled on Coreg     Vitamin D and vitamin B12 deficiencies  -Stable on supplements.     Osteoporosis  -Stable on vitamins.  Remains off of bisphosphonates.     Type 2 diabetes mellitus  -Fair glucose control. Remains diet controlled.      Chronic pain  -Continues to benefit from Norco which is being filled when needed.            [x]  Discussed Patient in detail with nursing/staff, addressed all needs today.     [x]  Plan of Care Reviewed   []  PT/OT Reviewed   []  Order Changes  []  Discharge Plans Reviewed  [x]  Advance Directive on file with Nursing Home.   [x]  POA on file with Nursing Home.    [x]  Code Status listed and reviewed.     I confirm accuracy of unchanged data/findings including physical exam and plan which have been carried forward from previous visit, as well as I have updated appropriately those that have changed        Dc Fernandez DO.  5/22/2023

## 2023-06-12 ENCOUNTER — NURSING HOME (OUTPATIENT)
Dept: FAMILY MEDICINE CLINIC | Facility: CLINIC | Age: 83
End: 2023-06-12
Payer: MEDICARE

## 2023-06-12 VITALS
TEMPERATURE: 97.6 F | WEIGHT: 238 LBS | BODY MASS INDEX: 44.97 KG/M2 | SYSTOLIC BLOOD PRESSURE: 120 MMHG | RESPIRATION RATE: 18 BRPM | DIASTOLIC BLOOD PRESSURE: 84 MMHG | OXYGEN SATURATION: 96 % | HEART RATE: 72 BPM

## 2023-06-12 DIAGNOSIS — Z74.09 IMPAIRED MOBILITY AND ADLS: ICD-10-CM

## 2023-06-12 DIAGNOSIS — Z78.9 IMPAIRED MOBILITY AND ADLS: ICD-10-CM

## 2023-06-12 DIAGNOSIS — F01.50 VASCULAR DEMENTIA WITHOUT BEHAVIORAL DISTURBANCE: ICD-10-CM

## 2023-06-12 DIAGNOSIS — F33.1 MODERATE EPISODE OF RECURRENT MAJOR DEPRESSIVE DISORDER: ICD-10-CM

## 2023-06-12 NOTE — LETTER
"Nursing Home Follow Up Note      Layo Arango DO []   WILLIAM Alfaro [x]  [address] [place] [address]  Phone: [phone]  Fax: [phone] Dorcas Duncan MD []    Dc Fernandez DO []   [address] [place] [address]  Phone: [phone]  Fax: [phone]     PATIENT NAME: Danae Harmon                                                                          YOB: 1940           DATE OF SERVICE: 6/12/2023  FACILITY:  [][facility]   [] [facility]   [] [facility]   [x] [facility]   [] Other ____________________________________________________________________      CHIEF COMPLAINT:      Patient reporting thoughts of wishing to die.    HISTORY OF PRESENT ILLNESS:     Nursing reports that yesterday patient reported that, \"she wished she could just die\". She did not report any thoughts or plans of harming herself but nursing staff are having her use plastic wear and have push call light to eliminate risks. Today she reports that she has just been really sad the past couple days, wanting to be home. She was more sad yesterday because it was her birthday and she would have like to be at home. Did discuss with patient the reasons she was unable to go home and she voiced understanding but also reported she would still like to be at home. She denied thoughts or plans or hurting herself. She was laying in bed brushing her hair, with no signs and symptoms of any distress.    PAST MEDICAL & SURGICAL HISTORY:   Past Medical History:   Diagnosis Date    Colon polyp     Osteoporosis     Pain in thoracic spine     Pneumonia     UTI (urinary tract infection)       Past Surgical History:   Procedure Laterality Date    BLADDER SURGERY  2000    bladder suspension    CERVICAL LAMINECTOMY      COLONOSCOPY      COLOSTOMY  1979    temporary sigmoid    HYSTERECTOMY           MEDICATIONS:  I have reviewed and reconciled the patients medication list in the patients chart at the skilled nursing " facility today.      ALLERGIES:    Allergies   Allergen Reactions    Lexapro [Escitalopram Oxalate] Nausea Only         SOCIAL HISTORY:    Social History     Socioeconomic History    Marital status:    Tobacco Use    Smoking status: Never    Smokeless tobacco: Never   Substance and Sexual Activity    Alcohol use: No    Drug use: No    Sexual activity: Defer       FAMILY HISTORY:    Family History   Problem Relation Age of Onset    Blindness Mother     Other Mother         arteriosclerotic cardiovascular disease     Diabetes Mother     Osteoporosis Mother     Stroke Mother     Cancer Brother     Lung cancer Sister     Osteoporosis Sister     Stroke Sister     Stroke Other        REVIEW OF SYSTEMS:    Review of Systems   Reason unable to perform ROS: ROS per nursing and patient.   Constitutional:  Negative for activity change, appetite change, chills, diaphoresis, fatigue and fever.   HENT:  Negative for congestion, mouth sores, nosebleeds, sore throat and trouble swallowing.    Respiratory:  Negative for cough, choking, shortness of breath and wheezing.    Cardiovascular:  Negative for palpitations. Leg swelling: increased.  Gastrointestinal:  Negative for abdominal distention, abdominal pain, constipation, diarrhea, nausea, vomiting and indigestion.   Genitourinary:  Positive for urinary incontinence. Negative for decreased urine volume, difficulty urinating, dysuria, frequency, hematuria and urgency.   Musculoskeletal:  Positive for arthralgias (chronic). Negative for joint swelling and myalgias.   Skin:  Negative for color change, dry skin, rash and skin lesions.   Neurological:  Positive for weakness, memory problem and confusion. Negative for speech difficulty.   Psychiatric/Behavioral:  Positive for behavioral problems, dysphoric mood and depressed mood. Negative for agitation, hallucinations, self-injury, sleep disturbance, suicidal ideas, negative for hyperactivity and stress. The patient is not  nervous/anxious.        PHYSICAL EXAMINATION:   VITAL SIGNS:   Vitals:    23 1214   BP: 120/84   Pulse: 72   Resp: 18   Temp: 97.6 °F (36.4 °C)   SpO2: 96%   Weight: 108 kg (238 lb)       Physical Exam   Constitutional: Vital signs are normal. She appears well-developed and well-nourished.   HENT:   Head: Normocephalic.   Right Ear: External ear normal.   Left Ear: External ear normal.   Nose: Nose normal.   Eyes: Conjunctivae are normal.   Cardiovascular: Normal rate, regular rhythm and normal heart sounds.   BLE edema   Pulmonary/Chest: Effort normal. No respiratory distress. She has no wheezes. She has no rhonchi. She has no rales.   Abdominal: Soft. Bowel sounds are normal. She exhibits no distension. There is no abdominal tenderness.   Musculoskeletal:      Right lower le+ Edema present.      Left lower le+ Edema present.      Comments: Weakness BLE   Neurological: She is alert. She is disoriented.   Skin: Skin is warm and dry. Turgor is normal. No rash noted.   Psychiatric: Her speech is normal. She is withdrawn. Cognition and memory are impaired. She exhibits a depressed mood. She has a flat affect.   Nursing note and vitals reviewed.    RECORDS REVIEW:   I have reviewed and interpreted the records in [facility].    ASSESSMENT     Diagnoses and all orders for this visit:    1. Moderate episode of recurrent major depressive disorder    2. Vascular dementia without behavioral disturbance    3. Impaired mobility and ADLs        PLAN    MDD/dementia  -Will increase Seroquel at 0800 and 1400 to 25 mg. Will have psych follow. She is using plastic wear and modified call light. She is on q 15 minute watches. Will follow up and continue to monitor.     Nursing encouraged to keep me informed of any acute changes, or any new concerning symptoms.    Nursing to continue supportive care for all ADLs.      [x]  Discussed Patient in detail with nursing/staff, addressed all needs today.     [x]  Plan of Care  Reviewed   [x]  PT/OT Reviewed   [x]  Order Changes  []  Discharge Plans Reviewed  [x]  Advance Directive on file with Nursing Home.   [x]  POA on file with Nursing Home.   [x]  Code Status listed: []  Full Code   [x]  DNR       “I confirm accuracy of unchanged data/findings which have been carried forward from previous visit, as well as I have updated appropriately those that have changed.”     I spent 30 minutes caring for Danae on this date of service. This time includes time spent by me in the following activities:preparing for the visit, reviewing tests, obtaining and/or reviewing a separately obtained history, performing a medically appropriate examination and/or evaluation , counseling and educating the patient/family/caregiver, ordering medications, tests, or procedures, referring and communicating with other health care professionals , documenting information in the medical record, independently interpreting results and communicating that information with the patient/family/caregiver, and care coordination                                                         Mita Joyce, APRN.

## 2023-06-13 NOTE — PROGRESS NOTES
"Nursing Home Follow Up Note      Layo Arango DO []   WILLIAM Alfaro [x]  [address] [place] [address]  Phone: [phone]  Fax: [phone] Dorcas Duncan MD []    Dc Fernandez DO []   [address] [place] [address]  Phone: [phone]  Fax: [phone]     PATIENT NAME: Danae Harmon                                                                          YOB: 1940           DATE OF SERVICE: 6/12/2023  FACILITY:  [][facility]   [] [facility]   [] [facility]   [x] [facility]   [] Other ____________________________________________________________________      CHIEF COMPLAINT:      Patient reporting thoughts of wishing to die.    HISTORY OF PRESENT ILLNESS:     Nursing reports that yesterday patient reported that, \"she wished she could just die\". She did not report any thoughts or plans of harming herself but nursing staff are having her use plastic wear and have push call light to eliminate risks. Today she reports that she has just been really sad the past couple days, wanting to be home. She was more sad yesterday because it was her birthday and she would have like to be at home. Did discuss with patient the reasons she was unable to go home and she voiced understanding but also reported she would still like to be at home. She denied thoughts or plans or hurting herself. She was laying in bed brushing her hair, with no signs and symptoms of any distress.    PAST MEDICAL & SURGICAL HISTORY:   Past Medical History:   Diagnosis Date    Colon polyp     Osteoporosis     Pain in thoracic spine     Pneumonia     UTI (urinary tract infection)       Past Surgical History:   Procedure Laterality Date    BLADDER SURGERY  2000    bladder suspension    CERVICAL LAMINECTOMY      COLONOSCOPY      COLOSTOMY  1979    temporary sigmoid    HYSTERECTOMY           MEDICATIONS:  I have reviewed and reconciled the patients medication list in the patients chart at the skilled nursing " facility today.      ALLERGIES:    Allergies   Allergen Reactions    Lexapro [Escitalopram Oxalate] Nausea Only         SOCIAL HISTORY:    Social History     Socioeconomic History    Marital status:    Tobacco Use    Smoking status: Never    Smokeless tobacco: Never   Substance and Sexual Activity    Alcohol use: No    Drug use: No    Sexual activity: Defer       FAMILY HISTORY:    Family History   Problem Relation Age of Onset    Blindness Mother     Other Mother         arteriosclerotic cardiovascular disease     Diabetes Mother     Osteoporosis Mother     Stroke Mother     Cancer Brother     Lung cancer Sister     Osteoporosis Sister     Stroke Sister     Stroke Other        REVIEW OF SYSTEMS:    Review of Systems   Reason unable to perform ROS: ROS per nursing and patient.   Constitutional:  Negative for activity change, appetite change, chills, diaphoresis, fatigue and fever.   HENT:  Negative for congestion, mouth sores, nosebleeds, sore throat and trouble swallowing.    Respiratory:  Negative for cough, choking, shortness of breath and wheezing.    Cardiovascular:  Negative for palpitations. Leg swelling: increased.  Gastrointestinal:  Negative for abdominal distention, abdominal pain, constipation, diarrhea, nausea, vomiting and indigestion.   Genitourinary:  Positive for urinary incontinence. Negative for decreased urine volume, difficulty urinating, dysuria, frequency, hematuria and urgency.   Musculoskeletal:  Positive for arthralgias (chronic). Negative for joint swelling and myalgias.   Skin:  Negative for color change, dry skin, rash and skin lesions.   Neurological:  Positive for weakness, memory problem and confusion. Negative for speech difficulty.   Psychiatric/Behavioral:  Positive for behavioral problems, dysphoric mood and depressed mood. Negative for agitation, hallucinations, self-injury, sleep disturbance, suicidal ideas, negative for hyperactivity and stress. The patient is not  nervous/anxious.        PHYSICAL EXAMINATION:   VITAL SIGNS:   Vitals:    23 1214   BP: 120/84   Pulse: 72   Resp: 18   Temp: 97.6 °F (36.4 °C)   SpO2: 96%   Weight: 108 kg (238 lb)       Physical Exam   Constitutional: Vital signs are normal. She appears well-developed and well-nourished.   HENT:   Head: Normocephalic.   Right Ear: External ear normal.   Left Ear: External ear normal.   Nose: Nose normal.   Eyes: Conjunctivae are normal.   Cardiovascular: Normal rate, regular rhythm and normal heart sounds.   BLE edema   Pulmonary/Chest: Effort normal. No respiratory distress. She has no wheezes. She has no rhonchi. She has no rales.   Abdominal: Soft. Bowel sounds are normal. She exhibits no distension. There is no abdominal tenderness.   Musculoskeletal:      Right lower le+ Edema present.      Left lower le+ Edema present.      Comments: Weakness BLE   Neurological: She is alert. She is disoriented.   Skin: Skin is warm and dry. Turgor is normal. No rash noted.   Psychiatric: Her speech is normal. She is withdrawn. Cognition and memory are impaired. She exhibits a depressed mood. She has a flat affect.   Nursing note and vitals reviewed.    RECORDS REVIEW:   I have reviewed and interpreted the records in [facility] and [facility].    ASSESSMENT     Diagnoses and all orders for this visit:    1. Moderate episode of recurrent major depressive disorder    2. Vascular dementia without behavioral disturbance    3. Impaired mobility and ADLs        PLAN    MDD/dementia  -Will increase Seroquel at 0800 and 1400 to 25 mg. Will have psych follow. She is using plastic wear and modified call light. She is on q 15 minute watches. Will follow up and continue to monitor.     Nursing encouraged to keep me informed of any acute changes, or any new concerning symptoms.    Nursing to continue supportive care for all ADLs.      [x]  Discussed Patient in detail with nursing/staff, addressed all needs today.     [x]  Plan of Care  Reviewed   [x]  PT/OT Reviewed   [x]  Order Changes  []  Discharge Plans Reviewed  [x]  Advance Directive on file with Nursing Home.   [x]  POA on file with Nursing Home.   [x]  Code Status listed: []  Full Code   [x]  DNR       “I confirm accuracy of unchanged data/findings which have been carried forward from previous visit, as well as I have updated appropriately those that have changed.”     I spent 30 minutes caring for Danae on this date of service. This time includes time spent by me in the following activities:preparing for the visit, reviewing tests, obtaining and/or reviewing a separately obtained history, performing a medically appropriate examination and/or evaluation , counseling and educating the patient/family/caregiver, ordering medications, tests, or procedures, referring and communicating with other health care professionals , documenting information in the medical record, independently interpreting results and communicating that information with the patient/family/caregiver, and care coordination                                                         Mita Joyce, APRN.

## 2023-07-18 ENCOUNTER — APPOINTMENT (OUTPATIENT)
Dept: GENERAL RADIOLOGY | Facility: HOSPITAL | Age: 83
End: 2023-07-18
Payer: MEDICARE

## 2023-07-18 ENCOUNTER — APPOINTMENT (OUTPATIENT)
Dept: CT IMAGING | Facility: HOSPITAL | Age: 83
End: 2023-07-18
Payer: MEDICARE

## 2023-07-18 ENCOUNTER — HOSPITAL ENCOUNTER (EMERGENCY)
Facility: HOSPITAL | Age: 83
Discharge: NURSING, REHAB OR LONG-TERM CARE | End: 2023-07-18
Attending: EMERGENCY MEDICINE | Admitting: EMERGENCY MEDICINE
Payer: MEDICARE

## 2023-07-18 VITALS
OXYGEN SATURATION: 96 % | RESPIRATION RATE: 14 BRPM | WEIGHT: 235 LBS | HEART RATE: 71 BPM | DIASTOLIC BLOOD PRESSURE: 70 MMHG | TEMPERATURE: 98.6 F | HEIGHT: 61 IN | BODY MASS INDEX: 44.37 KG/M2 | SYSTOLIC BLOOD PRESSURE: 121 MMHG

## 2023-07-18 DIAGNOSIS — R62.7 FAILURE TO THRIVE IN ADULT: ICD-10-CM

## 2023-07-18 DIAGNOSIS — F03.90 DEMENTIA, UNSPECIFIED DEMENTIA SEVERITY, UNSPECIFIED DEMENTIA TYPE, UNSPECIFIED WHETHER BEHAVIORAL, PSYCHOTIC, OR MOOD DISTURBANCE OR ANXIETY: Primary | ICD-10-CM

## 2023-07-18 PROCEDURE — 99283 EMERGENCY DEPT VISIT LOW MDM: CPT

## 2023-07-18 PROCEDURE — 71045 X-RAY EXAM CHEST 1 VIEW: CPT

## 2023-07-18 NOTE — PLAN OF CARE
Pt presented to the ER from [facility] with c/o chest pain.  The pt had been seen three days ago after falling out of bed at the nursing home.    Clinical Impression:  Dementia, unspecified dementia severity; Unspecified dementia type, unspecified whether behavorial, psychotic or mood disturbance or anxiety; Failure to thrive    PMH:  Dementia; Osteoporosis; Pneumonia; CKD    Code status:  DNR DNI    Pt does not have advanced directives.    Received notification that pt's  was interested in hospice services.    Visited pt's room; her  was at bedside.  The pt was awake resting in bed.  She looked when her name was called but did not verbally communicate.    Conversation with  was held in the ER conference room.   explained that the [facility] 2-1/2 years ago when she was diagnosed with Dementia.  He reported that time, the pt was no longer able to walk and has been declining since.  He shared that the pt was seen in the ER Saturday due to falling out of bed.  He stated that he was told that she swung her leg while they were providing care and fell out.  He additionally, reported poor po intake for some time.  He said that she did not eat enough to keep a bird alive.  He reported that he believes the pt is declining.  He does not feel coming back/forth to the hospital is helping and does not wish to pursue any aggressive treatments.    Palliative vs hospice services, as well as, care at the [facility] discussed in detail.  He was informed that palliative services would be a 1x month resource service where a nurse could visit to follow pt's progress.  Hospice services explained as a nurse visits weekly and nursing techs 2 to 3x week.  Care at the [facility] explained as symptom management for symptoms that could not be managed in any other setting.  He was further informed that a referral could be made but services could not begin until  the facility initiated a referral and was encouraged to speak with administration at the facility.   would like to proceed with referral being initiated.  Dr. Montanez and Alycia, RN updated.    Code status clarified by  as DNR DNI.  EMS DNR form explained and  signed.  Original copy given to JENNIFER freeman.    Called [facility]; spoke to Tatyana.  Hospice referral initiated.  She will pull need info from Epic.  She reported that if [facility] initiates a referral today that the on-call nurse may be able to admit this evening, if not would plan to admit tomorrow morning.   updated and is in agreement with plan.

## 2023-07-18 NOTE — ED PROVIDER NOTES
TRIAGE CHIEF COMPLAINT:     Nursing and triage notes reviewed    Chief Complaint   Patient presents with    Chest Pain     EMS called for chest pain, pt had no pain complaint. Staff said pt was altered, pt is normally altered. Pt had a recent chest xray that was clear but has swallowing difficulty dx.       HPI: Danae Harmon is a 83 y.o. female who presents to the emergency department complaining of chest discomfort.  EMS was called to the nursing facility for reported complaint of chest pain.  They state patient has not complained of any chest discomfort while they have been caring for her.  Nursing home also states patient has had some confusion, although does have a history of confusion at baseline.  Patient has no complaint upon arrival but is an extremely poor historian.    REVIEW OF SYSTEMS: All other systems reviewed and are negative     PAST MEDICAL HISTORY:   Past Medical History:   Diagnosis Date    Colon polyp     Osteoporosis     Pain in thoracic spine     Pneumonia     UTI (urinary tract infection)         FAMILY HISTORY:   Family History   Problem Relation Age of Onset    Blindness Mother     Other Mother         arteriosclerotic cardiovascular disease     Diabetes Mother     Osteoporosis Mother     Stroke Mother     Cancer Brother     Lung cancer Sister     Osteoporosis Sister     Stroke Sister     Stroke Other         SOCIAL HISTORY:   Social History     Socioeconomic History    Marital status:    Tobacco Use    Smoking status: Never    Smokeless tobacco: Never   Substance and Sexual Activity    Alcohol use: No    Drug use: No    Sexual activity: Defer        SURGICAL HISTORY:   Past Surgical History:   Procedure Laterality Date    BLADDER SURGERY  2000    bladder suspension    CERVICAL LAMINECTOMY      COLONOSCOPY      COLOSTOMY  1979    temporary sigmoid    HYSTERECTOMY          CURRENT MEDICATIONS:      Medication List        ASK your doctor about these medications      amLODIPine 5  MG tablet  Commonly known as: NORVASC  TAKE 1 TABLET BY MOUTH DAILY     aspirin 325 MG tablet     atorvastatin 20 MG tablet  Commonly known as: LIPITOR  TAKE 1 TABLET BY MOUTH EVERY NIGHT AT BEDTIME     carvedilol 25 MG tablet  Commonly known as: COREG  TAKE 1 TABLET BY MOUTH TWICE DAILY     * DULoxetine 60 MG capsule  Commonly known as: CYMBALTA     * DULoxetine 60 MG capsule  Commonly known as: CYMBALTA  TAKE 1 CAPSULE BY MOUTH DAILY     HYDROcodone-acetaminophen 5-325 MG per tablet  Commonly known as: NORCO  Take 1 tablet by mouth Every 8 (Eight) Hours As Needed for Severe Pain .     lisinopril-hydrochlorothiazide 20-12.5 MG per tablet  Commonly known as: PRINZIDE,ZESTORETIC  Take 1 tablet by mouth Every Morning.     LORazepam 0.5 MG tablet  Commonly known as: Ativan  Give 0.25 mg qhs scheduled, and daily prn anxiety     mirtazapine 15 MG tablet  Commonly known as: REMERON  TAKE 1 TABLET BY MOUTH EVERY NIGHT     nebivolol 10 MG tablet  Commonly known as: Bystolic  Take 1 tablet by mouth Daily.     phenazopyridine 100 MG tablet  Commonly known as: Pyridium  Take 1 tablet by mouth 3 (Three) Times a Day.     Vitamin D3 Complete tablet tablet  Generic drug: multivitamin with minerals           * This list has 2 medication(s) that are the same as other medications prescribed for you. Read the directions carefully, and ask your doctor or other care provider to review them with you.                   ALLERGIES: Lexapro [escitalopram oxalate]     PHYSICAL EXAM:   VITAL SIGNS:   Vitals:    07/18/23 0838   BP: 119/72   Pulse: 77   Resp: 20   Temp: 98.7 °F (37.1 °C)   SpO2: 92%      CONSTITUTIONAL: Awake, appears nontoxic   HENT: Atraumatic, normocephalic, oral mucosa pink and moist, airway patent. Nares patent without drainage. External ears normal.   EYES: Conjunctivae clear   NECK: Trachea midline, nontender, supple   CARDIOVASCULAR: Normal heart rate, Normal rhythm, No murmurs, rubs, gallops   PULMONARY/CHEST: No  respiratory distress, there are scattered coarse lung sounds throughout.  ABDOMINAL: Nondistended, soft, nontender - no rebound or guarding.   NEUROLOGIC: Nonfocal, moving all four extremities, no gross sensory or motor deficits.   EXTREMITIES: No clubbing, cyanosis, or edema   SKIN: Warm, Dry, No erythema, No rash     ED COURSE / MEDICAL DECISION MAKING:   Danae Harmon is a 83 y.o. female who presents to the emergency department for evaluation of chest discomfort.  Patient nondistressed on arrival in the emergency department.  Vital signs are stable.  And patient has no complaint upon arrival.    Differential diagnosis includes ACS, infection, GERD among other etiologies.    A chest x-ray, CT scan of the head, EKG, CBC, CMP, lipase, procalcitonin, cardiac enzymes was ordered for further evaluation of the patient's presentation.    Diagnostic information from other sources: Family, EMS, chart review    Interventions: none    EKG interpreted by me reveals sinus rhythm with rate of 76 bpm.  There is low QRS voltage in chest leads.  This is an atypical appearing EKG.    Narrative: Patient presents with chest discomfort.  After family arrived I was able to obtain more history.  Patient has advanced dementia and is now having issues with eating and is aspirating every time she attempts to swallow according to the .  He states that they were scheduled to speak with hospice today and is requesting no work-up but does wish to speak with hospice, if possible to have them come to the emergency department to evaluate the patient.  Given this we will cancel further studies such as blood work or CT scan of the head.  We contacted social work who has contacted hospice who will evaluate patient at the nursing facility.  Family comfortable with this plan.  Patient discharged in good condition.      DECISION TO DISCHARGE/ADMIT: see ED care timeline     FINAL IMPRESSION:   1 --dementia  2 --failure to thrive  3 --      Electronically signed by: Lulu Montanez MD, 7/18/2023 08:45 Lulu Reardon MD  07/18/23 1139

## 2023-07-18 NOTE — ED NOTES
Called [facility] at this time requesting non emergent transport back to [facility].

## 2023-08-09 DIAGNOSIS — Z51.5 HOSPICE CARE: ICD-10-CM

## 2023-08-09 RX ORDER — MORPHINE SULFATE 100 MG/5ML
5 SOLUTION ORAL
Qty: 30 ML | Refills: 0 | Status: SHIPPED | OUTPATIENT
Start: 2023-08-09

## 2023-08-17 RX ORDER — FENTANYL 12.5 UG/1
1 PATCH TRANSDERMAL
Qty: 10 PATCH | Refills: 0 | Status: SHIPPED | OUTPATIENT
Start: 2023-08-17

## 2023-08-17 NOTE — TELEPHONE ENCOUNTER
(NEW SCRIPT)    JUSTIN REQUESTING MED REFILL FOR FENTANYL 12 MCG/HR.    DIRECTIONS: FENTANYL 12 MCG/HR, APPLY 1 PATCH TO SKIN Q 72 HRS.

## 2023-08-22 ENCOUNTER — NURSING HOME (OUTPATIENT)
Dept: FAMILY MEDICINE CLINIC | Facility: CLINIC | Age: 83
End: 2023-08-22
Payer: OTHER MISCELLANEOUS

## 2023-08-22 VITALS
RESPIRATION RATE: 17 BRPM | BODY MASS INDEX: 44.4 KG/M2 | TEMPERATURE: 98.5 F | HEART RATE: 86 BPM | DIASTOLIC BLOOD PRESSURE: 65 MMHG | SYSTOLIC BLOOD PRESSURE: 137 MMHG | WEIGHT: 235 LBS

## 2023-08-22 DIAGNOSIS — Z51.5 HOSPICE CARE PATIENT: Primary | ICD-10-CM

## 2023-08-22 DIAGNOSIS — Z78.9 IMPAIRED MOBILITY AND ADLS: ICD-10-CM

## 2023-08-22 DIAGNOSIS — R45.1 ANXIETY WITH RESTLESSNESS: ICD-10-CM

## 2023-08-22 DIAGNOSIS — Z74.09 IMPAIRED MOBILITY AND ADLS: ICD-10-CM

## 2023-08-22 DIAGNOSIS — F01.50 VASCULAR DEMENTIA WITHOUT BEHAVIORAL DISTURBANCE: ICD-10-CM

## 2023-08-22 DIAGNOSIS — F41.9 ANXIETY WITH RESTLESSNESS: ICD-10-CM

## 2023-08-22 PROCEDURE — 99308 SBSQ NF CARE LOW MDM 20: CPT | Performed by: NURSE PRACTITIONER

## 2023-08-22 NOTE — LETTER
Nursing Home Follow Up Note      Layo Arango DO []   WILLIAM Alfaro [x]  [address] [place] [address]  Phone: [phone]  Fax: [phone] Dorcas Duncan MD []    Dc Fernandez DO []   [address] [place] [address]  Phone: [phone]  Fax: [phone]     PATIENT NAME: Danae Harmon                                                                          YOB: 1940           DATE OF SERVICE: 8/22/2023  FACILITY:  [][facility]   [] [facility]   [] [facility]   [x] [facility]   [] Other ____________________________________________________________________      CHIEF COMPLAINT:      Increased anxiety and restlessness.     HISTORY OF PRESENT ILLNESS:     Nursing reports the patient has had increased restlessness and anxiety for the past several days and has been receiving lorazepam frequently.  Family is requesting that she receive the lorazepam scheduled so that anxiety and restlessness will stay controlled, and that will not have to give her medication to have to get it controlled after the fact.  She was resting in bed during visit today with no restlessness or other signs and symptoms of distress.  She is on a Duragesic patch, 12.5 mg for any pain or discomfort. She also has prn Roxanol. She is Hospice care. Nursing reports that she has been comfortable with no complaints of pain.    PAST MEDICAL & SURGICAL HISTORY:   Past Medical History:   Diagnosis Date    Colon polyp     Osteoporosis     Pain in thoracic spine     Pneumonia     UTI (urinary tract infection)       Past Surgical History:   Procedure Laterality Date    BLADDER SURGERY  2000    bladder suspension    CERVICAL LAMINECTOMY      COLONOSCOPY      COLOSTOMY  1979    temporary sigmoid    HYSTERECTOMY           MEDICATIONS:  I have reviewed and reconciled the patients medication list in the patients chart at the skilled nursing facility today.      ALLERGIES:    Allergies   Allergen Reactions     Lexapro [Escitalopram Oxalate] Nausea Only         SOCIAL HISTORY:    Social History     Socioeconomic History    Marital status:    Tobacco Use    Smoking status: Never    Smokeless tobacco: Never   Substance and Sexual Activity    Alcohol use: No    Drug use: No    Sexual activity: Defer       FAMILY HISTORY:    Family History   Problem Relation Age of Onset    Blindness Mother     Other Mother         arteriosclerotic cardiovascular disease     Diabetes Mother     Osteoporosis Mother     Stroke Mother     Cancer Brother     Lung cancer Sister     Osteoporosis Sister     Stroke Sister     Stroke Other        REVIEW OF SYSTEMS:    Patient was alert but unable to answer any questions      PHYSICAL EXAMINATION:   VITAL SIGNS:   Vitals:    23 1236   BP: 137/65   Pulse: 86   Resp: 17   Temp: 98.5 øF (36.9 øC)   Weight: 107 kg (235 lb)       Physical Exam   Constitutional: Vital signs are normal. She appears ill. No distress.   Eyes: Conjunctivae are normal.   Cardiovascular: Normal rate, regular rhythm and normal heart sounds.   BLE edema   Pulmonary/Chest: No respiratory distress. She has no wheezes. She has no rhonchi.   Abdominal: Soft. Bowel sounds are normal. She exhibits no distension. There is no abdominal tenderness.   Musculoskeletal:      Right lower le+ Edema present.      Left lower le+ Edema present.      Comments: Weakness BLE   Neurological: She is alert. She is disoriented.   Would not answer questions   Skin: Skin is warm and dry. Turgor is normal. No rash noted.   Psychiatric: Mood normal. She is slowed. Cognition and memory are impaired.   Would not converse today.   Nursing note and vitals reviewed.    RECORDS REVIEW:   I have reviewed and interpreted the records in [facility].    ASSESSMENT     Diagnoses and all orders for this visit:    1. Hospice care patient (Primary)    2. Anxiety with restlessness    3. Vascular dementia without behavioral disturbance    4. Impaired  "mobility and ADLs        PLAN    Hospice care/comfort measures/anxiety/restlessness  -Will schedule Lorazepam every 8 hours and nursing to give q 3 hours prn as well. Will continue to collaborate with Hospice care. Will follow up and continue to monitor.     Nursing encouraged to keep me informed of any acute changes, or any new concerning symptoms.    Nursing to continue supportive care for all ADLs.      [x]  Discussed Patient in detail with nursing/staff, addressed all needs today.     [x]  Plan of Care Reviewed   [x]  PT/OT Reviewed   [x]  Order Changes  []  Discharge Plans Reviewed  [x]  Advance Directive on file with Nursing Home.   [x]  POA on file with Nursing Home.   [x]  Code Status listed: []  Full Code   [x]  DNR       "I confirm accuracy of unchanged data/findings which have been carried forward from previous visit, as well as I have updated appropriately those that have changed."                                                       Mita Joyce, APRN.       "

## 2023-08-23 NOTE — PROGRESS NOTES
Nursing Home Follow Up Note      Layo Arango DO []   WILLIAM Alfaro [x]  [address] [place] [address]  Phone: [phone]  Fax: [phone] Dorcas Duncan MD []    Dc Fernandez DO []   [address] [place] [address]  Phone: [phone]  Fax: [phone]     PATIENT NAME: Danae Harmon                                                                          YOB: 1940           DATE OF SERVICE: 8/22/2023  FACILITY:  [][place]   [] [facility]   [] [facility]   [x] [facility]   [] Other ____________________________________________________________________      CHIEF COMPLAINT:      Increased anxiety and restlessness.     HISTORY OF PRESENT ILLNESS:     Nursing reports the patient has had increased restlessness and anxiety for the past several days and has been receiving lorazepam frequently.  Family is requesting that she receive the lorazepam scheduled so that anxiety and restlessness will stay controlled, and that will not have to give her medication to have to get it controlled after the fact.  She was resting in bed during visit today with no restlessness or other signs and symptoms of distress.  She is on a Duragesic patch, 12.5 mg for any pain or discomfort. She also has prn Roxanol. She is Hospice care. Nursing reports that she has been comfortable with no complaints of pain.    PAST MEDICAL & SURGICAL HISTORY:   Past Medical History:   Diagnosis Date    Colon polyp     Osteoporosis     Pain in thoracic spine     Pneumonia     UTI (urinary tract infection)       Past Surgical History:   Procedure Laterality Date    BLADDER SURGERY  2000    bladder suspension    CERVICAL LAMINECTOMY      COLONOSCOPY      COLOSTOMY  1979    temporary sigmoid    HYSTERECTOMY           MEDICATIONS:  I have reviewed and reconciled the patients medication list in the patients chart at the skilled nursing facility today.      ALLERGIES:    Allergies   Allergen Reactions     Lexapro [Escitalopram Oxalate] Nausea Only         SOCIAL HISTORY:    Social History     Socioeconomic History    Marital status:    Tobacco Use    Smoking status: Never    Smokeless tobacco: Never   Substance and Sexual Activity    Alcohol use: No    Drug use: No    Sexual activity: Defer       FAMILY HISTORY:    Family History   Problem Relation Age of Onset    Blindness Mother     Other Mother         arteriosclerotic cardiovascular disease     Diabetes Mother     Osteoporosis Mother     Stroke Mother     Cancer Brother     Lung cancer Sister     Osteoporosis Sister     Stroke Sister     Stroke Other        REVIEW OF SYSTEMS:    Patient was alert but unable to answer any questions      PHYSICAL EXAMINATION:   VITAL SIGNS:   Vitals:    23 1236   BP: 137/65   Pulse: 86   Resp: 17   Temp: 98.5 øF (36.9 øC)   Weight: 107 kg (235 lb)       Physical Exam   Constitutional: Vital signs are normal. She appears ill. No distress.   Eyes: Conjunctivae are normal.   Cardiovascular: Normal rate, regular rhythm and normal heart sounds.   BLE edema   Pulmonary/Chest: No respiratory distress. She has no wheezes. She has no rhonchi.   Abdominal: Soft. Bowel sounds are normal. She exhibits no distension. There is no abdominal tenderness.   Musculoskeletal:      Right lower le+ Edema present.      Left lower le+ Edema present.      Comments: Weakness BLE   Neurological: She is alert. She is disoriented.   Would not answer questions   Skin: Skin is warm and dry. Turgor is normal. No rash noted.   Psychiatric: Mood normal. She is slowed. Cognition and memory are impaired.   Would not converse today.   Nursing note and vitals reviewed.    RECORDS REVIEW:   I have reviewed and interpreted the records in [facility] and [facility].    ASSESSMENT     Diagnoses and all orders for this visit:    1. Hospice care patient (Primary)    2. Anxiety with restlessness    3. Vascular dementia without behavioral disturbance    4. Impaired  "mobility and ADLs        PLAN    Hospice care/comfort measures/anxiety/restlessness  -Will schedule Lorazepam every 8 hours and nursing to give q 3 hours prn as well. Will continue to collaborate with Hospice care. Will follow up and continue to monitor.     Nursing encouraged to keep me informed of any acute changes, or any new concerning symptoms.    Nursing to continue supportive care for all ADLs.      [x]  Discussed Patient in detail with nursing/staff, addressed all needs today.     [x]  Plan of Care Reviewed   [x]  PT/OT Reviewed   [x]  Order Changes  []  Discharge Plans Reviewed  [x]  Advance Directive on file with Nursing Home.   [x]  POA on file with Nursing Home.   [x]  Code Status listed: []  Full Code   [x]  DNR       "I confirm accuracy of unchanged data/findings which have been carried forward from previous visit, as well as I have updated appropriately those that have changed."                                                       Mita Joyce, APRN.     "

## 2023-08-30 DIAGNOSIS — Z51.5 HOSPICE CARE: ICD-10-CM

## 2023-08-30 RX ORDER — FENTANYL 12.5 UG/1
1 PATCH TRANSDERMAL
Qty: 10 PATCH | Refills: 0 | Status: SHIPPED | OUTPATIENT
Start: 2023-08-30

## 2023-08-30 RX ORDER — MORPHINE SULFATE 100 MG/5ML
SOLUTION ORAL
Qty: 30 ML | Refills: 0 | Status: SHIPPED | OUTPATIENT
Start: 2023-08-30

## 2023-08-30 NOTE — TELEPHONE ENCOUNTER
(NEW SCRIPT)    [facility] REQUESTING MED REFILL FOR MORPHINE SULFATE 20 MG/ML AND FENTANYL 12 MCG/HR.    DIRECTIONS: MORPHINE SULFATE 20 MG/ML GIVE 0.5 ML PO Q 3 HRS PRN.    FENTANYL PATCH 12 MCG/HR  APPLY TRANSDERMAL TO SKIN Q 72 HRS.

## 2023-09-20 ENCOUNTER — NURSING HOME (OUTPATIENT)
Dept: FAMILY MEDICINE CLINIC | Facility: CLINIC | Age: 83
End: 2023-09-20
Payer: OTHER MISCELLANEOUS

## 2023-09-20 VITALS
OXYGEN SATURATION: 94 % | TEMPERATURE: 97.9 F | HEART RATE: 90 BPM | BODY MASS INDEX: 44.4 KG/M2 | WEIGHT: 235 LBS | DIASTOLIC BLOOD PRESSURE: 60 MMHG | SYSTOLIC BLOOD PRESSURE: 120 MMHG | RESPIRATION RATE: 18 BRPM

## 2023-09-20 DIAGNOSIS — Z51.5 HOSPICE CARE: ICD-10-CM

## 2023-09-20 DIAGNOSIS — Z74.09 IMPAIRED MOBILITY AND ADLS: ICD-10-CM

## 2023-09-20 DIAGNOSIS — K08.89 TOOTH ACHE: ICD-10-CM

## 2023-09-20 DIAGNOSIS — Z78.9 IMPAIRED MOBILITY AND ADLS: ICD-10-CM

## 2023-09-20 DIAGNOSIS — F41.9 ANXIETY: Primary | ICD-10-CM

## 2023-09-20 DIAGNOSIS — F01.50 VASCULAR DEMENTIA WITHOUT BEHAVIORAL DISTURBANCE: ICD-10-CM

## 2023-09-20 DIAGNOSIS — R45.1 RESTLESSNESS: ICD-10-CM

## 2023-09-20 NOTE — LETTER
Nursing Home Follow Up Note      Layo Arango DO []   WILLIAM Aflaro [x]  [address] [place] [address]  Phone: [phone]  Fax: [phone] Dorcas Duncan MD []    Dc Fernandez DO []   [address] [place] [address]  Phone: [phone]  Fax: [phone]     PATIENT NAME: Danae Harmon                                                                          YOB: 1940           DATE OF SERVICE: 9/20/2023  FACILITY:  [][place]   [] [place]   [] [facility]   [x] [facility]   [] Other ____________________________________________________________________      CHIEF COMPLAINT:      Increased anxiety and restlessness for the past couple days.     HISTORY OF PRESENT ILLNESS:     Nursing reports the patient has had increased restlessness and anxiety for the past couple days and has been receiving her prn lorazepam frequently. She has it scheduled tid but is still requiring prn.  Family is requesting that her medications be adjusted for better control. They report that patient does not report what she is anxious or restless about but denies pain and has no signs and symptoms of distress. She was not restless or anxious during visit but had lorazepam just prior to visit. She did arouse and converse. She had no signs and symptoms of distress and denied any pain or discomfort besides and bottom from tooth.       PAST MEDICAL & SURGICAL HISTORY:   Past Medical History:   Diagnosis Date    Colon polyp     Osteoporosis     Pain in thoracic spine     Pneumonia     UTI (urinary tract infection)       Past Surgical History:   Procedure Laterality Date    BLADDER SURGERY  2000    bladder suspension    CERVICAL LAMINECTOMY      COLONOSCOPY      COLOSTOMY  1979    temporary sigmoid    HYSTERECTOMY           MEDICATIONS:  I have reviewed and reconciled the patients medication list in the patients chart at the skilled nursing facility today.      ALLERGIES:    Allergies   Allergen  Reactions    Lexapro [Escitalopram Oxalate] Nausea Only         SOCIAL HISTORY:    Social History     Socioeconomic History    Marital status:    Tobacco Use    Smoking status: Never    Smokeless tobacco: Never   Substance and Sexual Activity    Alcohol use: No    Drug use: No    Sexual activity: Defer       FAMILY HISTORY:    Family History   Problem Relation Age of Onset    Blindness Mother     Other Mother         arteriosclerotic cardiovascular disease     Diabetes Mother     Osteoporosis Mother     Stroke Mother     Cancer Brother     Lung cancer Sister     Osteoporosis Sister     Stroke Sister     Stroke Other        REVIEW OF SYSTEMS:    Negative distress  Negative anxiety and behaviors  Negative respiratory distress  Positive dental pain      PHYSICAL EXAMINATION:   VITAL SIGNS:   Vitals:    23 1256   BP: 120/60   Pulse: 90   Resp: 18   Temp: 97.9 °F (36.6 °C)   SpO2: 94%   Weight: 107 kg (235 lb)       Physical Exam   Constitutional: Vital signs are normal. She appears ill. No distress.   HENT:   Mouth/Throat:       Eyes: Conjunctivae are normal.   Cardiovascular: Normal rate, regular rhythm and normal heart sounds.   BLE edema   Pulmonary/Chest: No respiratory distress. She has no wheezes. She has no rhonchi.   Abdominal: Soft. Bowel sounds are normal. She exhibits no distension. There is no abdominal tenderness.   Musculoskeletal:      Right lower le+ Edema present.      Left lower le+ Edema present.      Comments: Weakness BLE   Neurological: She is alert. She is disoriented.   Would not answer questions   Skin: Skin is warm and dry. Turgor is normal. No rash noted.   Psychiatric: Her behavior is normal. Mood and affect normal. Cognition and memory are impaired.   Nursing note and vitals reviewed.    RECORDS REVIEW:   I have reviewed and interpreted the records in [facility] and [facility].    ASSESSMENT     Diagnoses and all orders for this visit:    1. Anxiety (Primary)    2. Restlessness    3.  Vascular dementia without behavioral disturbance    4. Hospice care    5. Tooth ache    6. Impaired mobility and ADLs        PLAN    Hospice care/comfort measures/anxiety/restlessness  -Will increase Risperdal to 1 mg daily. Continue scheduled Lorazepam every 8 hours. Will increase prn Lorazepam dose to 0.5 mg. Will continue to collaborate with Hospice care. Will follow up and continue to monitor.     Toothache   -Orajel to bottom tooth qid prn. Will monitor.     Nursing encouraged to keep me informed of any acute changes, or any new concerning symptoms.    Nursing to continue supportive care for all ADLs.      [x]  Discussed Patient in detail with nursing/staff, addressed all needs today.     [x]  Plan of Care Reviewed   [x]  PT/OT Reviewed   [x]  Order Changes  []  Discharge Plans Reviewed  [x]  Advance Directive on file with Nursing Home.   [x]  POA on file with Nursing Home.   [x]  Code Status listed: []  Full Code   [x]  DNR       “I confirm accuracy of unchanged data/findings which have been carried forward from previous visit, as well as I have updated appropriately those that have changed.”     I spent 30 minutes caring for Danae on this date of service. This time includes time spent by me in the following activities:preparing for the visit, reviewing tests, obtaining and/or reviewing a separately obtained history, performing a medically appropriate examination and/or evaluation , counseling and educating the patient/family/caregiver, ordering medications, tests, or procedures, referring and communicating with other health care professionals , documenting information in the medical record, and care coordination                                                     WILLIAM Delgado.

## 2023-09-22 NOTE — PROGRESS NOTES
Nursing Home Follow Up Note      Layo Arango DO []   WILLIAM Alfaro [x]  [address] [place] [address]  Phone: [phone]  Fax: [phone] Dorcas Duncan MD []    Dc Fernandez DO []   [address] [place] [address]  Phone: [phone]  Fax: [phone]     PATIENT NAME: Danae Harmon                                                                          YOB: 1940           DATE OF SERVICE: 9/20/2023  FACILITY:  [][place]   [] [place]   [] [facility]   [x] [facility]   [] Other ____________________________________________________________________      CHIEF COMPLAINT:      Increased anxiety and restlessness for the past couple days.     HISTORY OF PRESENT ILLNESS:     Nursing reports the patient has had increased restlessness and anxiety for the past couple days and has been receiving her prn lorazepam frequently. She has it scheduled tid but is still requiring prn.  Family is requesting that her medications be adjusted for better control. They report that patient does not report what she is anxious or restless about but denies pain and has no signs and symptoms of distress. She was not restless or anxious during visit but had lorazepam just prior to visit. She did arouse and converse. She had no signs and symptoms of distress and denied any pain or discomfort besides and bottom from tooth.       PAST MEDICAL & SURGICAL HISTORY:   Past Medical History:   Diagnosis Date    Colon polyp     Osteoporosis     Pain in thoracic spine     Pneumonia     UTI (urinary tract infection)       Past Surgical History:   Procedure Laterality Date    BLADDER SURGERY  2000    bladder suspension    CERVICAL LAMINECTOMY      COLONOSCOPY      COLOSTOMY  1979    temporary sigmoid    HYSTERECTOMY           MEDICATIONS:  I have reviewed and reconciled the patients medication list in the patients chart at the skilled nursing facility today.      ALLERGIES:    Allergies   Allergen  Reactions    Lexapro [Escitalopram Oxalate] Nausea Only         SOCIAL HISTORY:    Social History     Socioeconomic History    Marital status:    Tobacco Use    Smoking status: Never    Smokeless tobacco: Never   Substance and Sexual Activity    Alcohol use: No    Drug use: No    Sexual activity: Defer       FAMILY HISTORY:    Family History   Problem Relation Age of Onset    Blindness Mother     Other Mother         arteriosclerotic cardiovascular disease     Diabetes Mother     Osteoporosis Mother     Stroke Mother     Cancer Brother     Lung cancer Sister     Osteoporosis Sister     Stroke Sister     Stroke Other        REVIEW OF SYSTEMS:    Negative distress  Negative anxiety and behaviors  Negative respiratory distress  Positive dental pain      PHYSICAL EXAMINATION:   VITAL SIGNS:   Vitals:    23 1256   BP: 120/60   Pulse: 90   Resp: 18   Temp: 97.9 °F (36.6 °C)   SpO2: 94%   Weight: 107 kg (235 lb)       Physical Exam   Constitutional: Vital signs are normal. She appears ill. No distress.   HENT:   Mouth/Throat:       Eyes: Conjunctivae are normal.   Cardiovascular: Normal rate, regular rhythm and normal heart sounds.   BLE edema   Pulmonary/Chest: No respiratory distress. She has no wheezes. She has no rhonchi.   Abdominal: Soft. Bowel sounds are normal. She exhibits no distension. There is no abdominal tenderness.   Musculoskeletal:      Right lower le+ Edema present.      Left lower le+ Edema present.      Comments: Weakness BLE   Neurological: She is alert. She is disoriented.   Would not answer questions   Skin: Skin is warm and dry. Turgor is normal. No rash noted.   Psychiatric: Her behavior is normal. Mood and affect normal. Cognition and memory are impaired.   Nursing note and vitals reviewed.    RECORDS REVIEW:   I have reviewed and interpreted the records in [facility] and [facility].    ASSESSMENT     Diagnoses and all orders for this visit:    1. Anxiety (Primary)    2. Restlessness    3.  Vascular dementia without behavioral disturbance    4. Hospice care    5. Tooth ache    6. Impaired mobility and ADLs        PLAN    Hospice care/comfort measures/anxiety/restlessness  -Will increase Risperdal to 1 mg daily. Continue scheduled Lorazepam every 8 hours. Will increase prn Lorazepam dose to 0.5 mg. Will continue to collaborate with Hospice care. Will follow up and continue to monitor.     Toothache   -Orajel to bottom tooth qid prn. Will monitor.     Nursing encouraged to keep me informed of any acute changes, or any new concerning symptoms.    Nursing to continue supportive care for all ADLs.      [x]  Discussed Patient in detail with nursing/staff, addressed all needs today.     [x]  Plan of Care Reviewed   [x]  PT/OT Reviewed   [x]  Order Changes  []  Discharge Plans Reviewed  [x]  Advance Directive on file with Nursing Home.   [x]  POA on file with Nursing Home.   [x]  Code Status listed: []  Full Code   [x]  DNR       “I confirm accuracy of unchanged data/findings which have been carried forward from previous visit, as well as I have updated appropriately those that have changed.”     I spent 30 minutes caring for Danae on this date of service. This time includes time spent by me in the following activities:preparing for the visit, reviewing tests, obtaining and/or reviewing a separately obtained history, performing a medically appropriate examination and/or evaluation , counseling and educating the patient/family/caregiver, ordering medications, tests, or procedures, referring and communicating with other health care professionals , documenting information in the medical record, and care coordination                                                     WILLIAM Delgado.

## 2023-09-25 DIAGNOSIS — Z51.5 HOSPICE CARE: ICD-10-CM

## 2023-09-25 RX ORDER — FENTANYL 12.5 UG/1
1 PATCH TRANSDERMAL
Qty: 10 PATCH | Refills: 0 | Status: SHIPPED | OUTPATIENT
Start: 2023-09-25

## 2023-09-25 NOTE — TELEPHONE ENCOUNTER
[facility] REQUESTING MED REFILL FOR FENTANYL PATCH 12 MCG/HR.    DIRECTIONS: FENTANYL 12 MCG/HR, APPLY 1 PATCH TO SKIN Q 72 HRS SCHEDULED.

## 2023-09-25 NOTE — PROGRESS NOTES
Nursing Home History and Physical       Dc Fernandez DO  [address] [place] [address] Phone: [phone]  Fax: [phone]     PATIENT NAME: Danae Harmon                                                                          YOB: 1940           DATE OF SERVICE: 09/26/2023  FACILITY: [facility]    CHIEF COMPLAINT:  Nursing facility admission      HISTORY OF PRESENT ILLNESS:   Patient was laying comfortably in her bed no specific complaints or concerns.  She remains very confused but content.  Pain seems to be in good control since she was started on fentanyl.  It is noted that family wanted the patient to increase her Ativan however it does not seem to be necessary at this point.  Patient's lower extremity edema does persist and intermittently Lasix has been given to control symptoms.    PAST MEDICAL & SURGICAL HISTORY:   Past Medical History:   Diagnosis Date    Colon polyp     Osteoporosis     Pain in thoracic spine     Pneumonia     UTI (urinary tract infection)       Past Surgical History:   Procedure Laterality Date    BLADDER SURGERY  2000    bladder suspension    CERVICAL LAMINECTOMY      COLONOSCOPY      COLOSTOMY  1979    temporary sigmoid    HYSTERECTOMY           MEDICATIONS:  I have reviewed and reconciled the patients medication list in the patients chart at the skilled nursing facility on 09/26/2023.      ALLERGIES:  Allergies   Allergen Reactions    Lexapro [Escitalopram Oxalate] Nausea Only         SOCIAL HISTORY:  Social History     Socioeconomic History    Marital status:    Tobacco Use    Smoking status: Never    Smokeless tobacco: Never   Substance and Sexual Activity    Alcohol use: No    Drug use: No    Sexual activity: Defer       FAMILY HISTORY:  Family History   Problem Relation Age of Onset    Blindness Mother     Other Mother         arteriosclerotic cardiovascular disease     Diabetes Mother     Osteoporosis Mother      Stroke Mother     Cancer Brother     Lung cancer Sister     Osteoporosis Sister     Stroke Sister     Stroke Other         REVIEW OF SYSTEMS:  Review of Systems   Unable to perform ROS: Dementia       PHYSICAL EXAMINATION:   VITAL SIGNS: /70   Pulse 101   Temp 97.9 °F (36.6 °C)   Resp 18   Wt 107 kg (235 lb)   SpO2 94%   BMI 44.40 kg/m²     Physical Exam  Vitals and nursing note reviewed.   Constitutional:       Appearance: Normal appearance. She is well-developed. She is obese.      Comments: Frail elderly female   HENT:      Head: Normocephalic and atraumatic.   Eyes:      Extraocular Movements: Extraocular movements intact.      Conjunctiva/sclera: Conjunctivae normal.   Cardiovascular:      Rate and Rhythm: Normal rate and regular rhythm.   Pulmonary:      Effort: Pulmonary effort is normal.      Breath sounds: No wheezing.   Abdominal:      General: Abdomen is flat.      Palpations: Abdomen is soft.   Musculoskeletal:      Cervical back: Normal range of motion and neck supple.      Right lower leg: Edema (Minimal) present.      Left lower leg: Edema (Minimal) present.      Comments: Bedridden   Skin:     General: Skin is warm and dry.      Findings: No rash.   Neurological:      General: No focal deficit present.      Mental Status: She is alert and oriented to person, place, and time. Mental status is at baseline.   Psychiatric:         Mood and Affect: Mood normal.         Behavior: Behavior normal.      Comments: Confused     RECORDS REVIEW:       ASSESSMENT   Diagnoses and all orders for this visit:    1. Late onset Alzheimer's disease with behavioral disturbance (Primary)    2. Benign essential hypertension    3. Lower extremity edema    4. Hospice care    5. Type 2 diabetes mellitus without complication, without long-term current use of insulin    6. Chronic low back pain with sciatica, sciatica laterality unspecified, unspecified back pain laterality    7. Mixed hyperlipidemia    8. Moderate  episode of recurrent major depressive disorder        PLAN  Alzheimer's disease with behavioral disturbances  -Mental status remains at baseline.  Continue nursing care for all ADLs including feeding  - no medication changes needed at this time.      Right ankle contracture  -  Continue supportive care.  Patient remains functionally paraplegic.  Feet placed in boots to prevent wounds.     Constipation  -Good control with current bowel regimen.     Major depressive disorder  -Stable on Seroquel and Depakote     Lower Extremity Edema  -Continue diuretics as needed.        Bladder spasms/urinary frequency  -Cont Myrbetriq. Stable control.      Mixed hyperlipidemia   -Continue statin.     Essential hypertension  -Controlled on Coreg     Vitamin D and vitamin B12 deficiencies  -Stable on supplements.     Osteoporosis  -Stable on vitamins.  Remains off of bisphosphonates.     Type 2 diabetes mellitus  -Fair glucose control. Remains diet controlled.      Chronic pain  -Continues to benefit from Norco and fentanyl.      [x]  Discussed Patient in detail with nursing/staff, addressed all needs today.     [x]  Plan of Care Reviewed   [x]  PT/OT Reviewed   [x]  Order Changes  []  Discharge Plans Reviewed  [x]  Advance Directive on file with Nursing Home.   [x]  POA on file with Nursing Home.    [x]  Code Status listed and reviewed.       Dc Fernandez DO.  10/2/2023      **Part of this note may be an electronic transcription/translation of spoken language to printed text using the Dragon Dictation System.**

## 2023-09-26 ENCOUNTER — NURSING HOME (OUTPATIENT)
Dept: INTERNAL MEDICINE | Facility: CLINIC | Age: 83
End: 2023-09-26
Payer: OTHER MISCELLANEOUS

## 2023-09-26 VITALS
WEIGHT: 235 LBS | RESPIRATION RATE: 18 BRPM | HEART RATE: 101 BPM | SYSTOLIC BLOOD PRESSURE: 138 MMHG | BODY MASS INDEX: 44.4 KG/M2 | OXYGEN SATURATION: 94 % | DIASTOLIC BLOOD PRESSURE: 70 MMHG | TEMPERATURE: 97.9 F

## 2023-09-26 DIAGNOSIS — R60.0 LOWER EXTREMITY EDEMA: ICD-10-CM

## 2023-09-26 DIAGNOSIS — F02.818 LATE ONSET ALZHEIMER'S DISEASE WITH BEHAVIORAL DISTURBANCE: Primary | ICD-10-CM

## 2023-09-26 DIAGNOSIS — E11.9 TYPE 2 DIABETES MELLITUS WITHOUT COMPLICATION, WITHOUT LONG-TERM CURRENT USE OF INSULIN: ICD-10-CM

## 2023-09-26 DIAGNOSIS — Z51.5 HOSPICE CARE: ICD-10-CM

## 2023-09-26 DIAGNOSIS — M54.40 CHRONIC LOW BACK PAIN WITH SCIATICA, SCIATICA LATERALITY UNSPECIFIED, UNSPECIFIED BACK PAIN LATERALITY: ICD-10-CM

## 2023-09-26 DIAGNOSIS — F33.1 MODERATE EPISODE OF RECURRENT MAJOR DEPRESSIVE DISORDER: ICD-10-CM

## 2023-09-26 DIAGNOSIS — G30.1 LATE ONSET ALZHEIMER'S DISEASE WITH BEHAVIORAL DISTURBANCE: Primary | ICD-10-CM

## 2023-09-26 DIAGNOSIS — E78.2 MIXED HYPERLIPIDEMIA: ICD-10-CM

## 2023-09-26 DIAGNOSIS — I10 BENIGN ESSENTIAL HYPERTENSION: ICD-10-CM

## 2023-09-26 DIAGNOSIS — G89.29 CHRONIC LOW BACK PAIN WITH SCIATICA, SCIATICA LATERALITY UNSPECIFIED, UNSPECIFIED BACK PAIN LATERALITY: ICD-10-CM

## 2023-09-26 NOTE — LETTER
Nursing Home History and Physical       Dc Fernandez DO  [address] [place] [address] Phone: [phone]  Fax: [phone]     PATIENT NAME: Danae Harmon                                                                          YOB: 1940           DATE OF SERVICE: 09/26/2023  FACILITY: [facility]    CHIEF COMPLAINT:  Nursing facility admission      HISTORY OF PRESENT ILLNESS:   Patient was laying comfortably in her bed no specific complaints or concerns.  She remains very confused but content.  Pain seems to be in good control since she was started on fentanyl.  It is noted that family wanted the patient to increase her Ativan however it does not seem to be necessary at this point.  Patient's lower extremity edema does persist and intermittently Lasix has been given to control symptoms.    PAST MEDICAL & SURGICAL HISTORY:   Past Medical History:   Diagnosis Date    Colon polyp     Osteoporosis     Pain in thoracic spine     Pneumonia     UTI (urinary tract infection)       Past Surgical History:   Procedure Laterality Date    BLADDER SURGERY  2000    bladder suspension    CERVICAL LAMINECTOMY      COLONOSCOPY      COLOSTOMY  1979    temporary sigmoid    HYSTERECTOMY           MEDICATIONS:  I have reviewed and reconciled the patients medication list in the patients chart at the skilled nursing facility on 09/26/2023.      ALLERGIES:  Allergies   Allergen Reactions    Lexapro [Escitalopram Oxalate] Nausea Only         SOCIAL HISTORY:  Social History     Socioeconomic History    Marital status:    Tobacco Use    Smoking status: Never    Smokeless tobacco: Never   Substance and Sexual Activity    Alcohol use: No    Drug use: No    Sexual activity: Defer       FAMILY HISTORY:  Family History   Problem Relation Age of Onset    Blindness Mother     Other Mother         arteriosclerotic cardiovascular disease     Diabetes Mother     Osteoporosis Mother      Stroke Mother     Cancer Brother     Lung cancer Sister     Osteoporosis Sister     Stroke Sister     Stroke Other         REVIEW OF SYSTEMS:  Review of Systems   Unable to perform ROS: Dementia       PHYSICAL EXAMINATION:   VITAL SIGNS: /70   Pulse 101   Temp 97.9 °F (36.6 °C)   Resp 18   Wt 107 kg (235 lb)   SpO2 94%   BMI 44.40 kg/m²     Physical Exam  Vitals and nursing note reviewed.   Constitutional:       Appearance: Normal appearance. She is well-developed. She is obese.      Comments: Frail elderly female   HENT:      Head: Normocephalic and atraumatic.   Eyes:      Extraocular Movements: Extraocular movements intact.      Conjunctiva/sclera: Conjunctivae normal.   Cardiovascular:      Rate and Rhythm: Normal rate and regular rhythm.   Pulmonary:      Effort: Pulmonary effort is normal.      Breath sounds: No wheezing.   Abdominal:      General: Abdomen is flat.      Palpations: Abdomen is soft.   Musculoskeletal:      Cervical back: Normal range of motion and neck supple.      Right lower leg: Edema (Minimal) present.      Left lower leg: Edema (Minimal) present.      Comments: Bedridden   Skin:     General: Skin is warm and dry.      Findings: No rash.   Neurological:      General: No focal deficit present.      Mental Status: She is alert and oriented to person, place, and time. Mental status is at baseline.   Psychiatric:         Mood and Affect: Mood normal.         Behavior: Behavior normal.      Comments: Confused     RECORDS REVIEW:       ASSESSMENT   Diagnoses and all orders for this visit:    1. Late onset Alzheimer's disease with behavioral disturbance (Primary)    2. Benign essential hypertension    3. Lower extremity edema    4. Hospice care    5. Type 2 diabetes mellitus without complication, without long-term current use of insulin    6. Chronic low back pain with sciatica, sciatica laterality unspecified, unspecified back pain laterality    7. Mixed hyperlipidemia    8. Moderate  episode of recurrent major depressive disorder        PLAN  Alzheimer's disease with behavioral disturbances  -Mental status remains at baseline.  Continue nursing care for all ADLs including feeding  - no medication changes needed at this time.      Right ankle contracture  -  Continue supportive care.  Patient remains functionally paraplegic.  Feet placed in boots to prevent wounds.     Constipation  -Good control with current bowel regimen.     Major depressive disorder  -Stable on Seroquel and Depakote     Lower Extremity Edema  -Continue diuretics as needed.        Bladder spasms/urinary frequency  -Cont Myrbetriq. Stable control.      Mixed hyperlipidemia   -Continue statin.     Essential hypertension  -Controlled on Coreg     Vitamin D and vitamin B12 deficiencies  -Stable on supplements.     Osteoporosis  -Stable on vitamins.  Remains off of bisphosphonates.     Type 2 diabetes mellitus  -Fair glucose control. Remains diet controlled.      Chronic pain  -Continues to benefit from Norco and fentanyl.      [x]  Discussed Patient in detail with nursing/staff, addressed all needs today.     [x]  Plan of Care Reviewed   [x]  PT/OT Reviewed   [x]  Order Changes  []  Discharge Plans Reviewed  [x]  Advance Directive on file with Nursing Home.   [x]  POA on file with Nursing Home.    [x]  Code Status listed and reviewed.       Dc Fernandez DO.  10/2/2023      **Part of this note may be an electronic transcription/translation of spoken language to printed text using the Dragon Dictation System.**

## 2023-10-11 ENCOUNTER — NURSING HOME (OUTPATIENT)
Dept: FAMILY MEDICINE CLINIC | Facility: CLINIC | Age: 83
End: 2023-10-11
Payer: OTHER MISCELLANEOUS

## 2023-10-11 VITALS
RESPIRATION RATE: 18 BRPM | WEIGHT: 235 LBS | HEART RATE: 81 BPM | BODY MASS INDEX: 44.4 KG/M2 | SYSTOLIC BLOOD PRESSURE: 115 MMHG | OXYGEN SATURATION: 96 % | TEMPERATURE: 96.8 F | DIASTOLIC BLOOD PRESSURE: 71 MMHG

## 2023-10-11 DIAGNOSIS — Z78.9 IMPAIRED MOBILITY AND ADLS: ICD-10-CM

## 2023-10-11 DIAGNOSIS — Z51.5 HOSPICE CARE: ICD-10-CM

## 2023-10-11 DIAGNOSIS — F03.94 ANXIETY DUE TO DEMENTIA: ICD-10-CM

## 2023-10-11 DIAGNOSIS — Z79.899 MEDICATION DOSE CHANGED: ICD-10-CM

## 2023-10-11 DIAGNOSIS — Z74.09 IMPAIRED MOBILITY AND ADLS: ICD-10-CM

## 2023-10-11 DIAGNOSIS — R45.1 RESTLESSNESS AND AGITATION: ICD-10-CM

## 2023-10-11 DIAGNOSIS — F01.518 VASCULAR DEMENTIA WITH BEHAVIORAL DISTURBANCE: ICD-10-CM

## 2023-10-11 NOTE — LETTER
Nursing Home Follow Up Note      Layo Arango DO []   WILLIAM Alfaro [x]  [address] [place] [address]  Phone: [phone]  Fax: [phone] Dorcas Duncan MD []    Dc Fernandez DO []   [address] [place] [address]  Phone: [phone]  Fax: [phone]     PATIENT NAME: Danae Harmon                                                                          YOB: 1940           DATE OF SERVICE: 10/11/2023  FACILITY:  [][place]   [] [place]   [] [facility]   [x] [facility]   [] Other ____________________________________________________________________      CHIEF COMPLAINT:      Increased anxiety and restlessness for the past couple days.     HISTORY OF PRESENT ILLNESS:     Nursing reports the patient has had increased restlessness and anxiety for the past couple days and has been receiving her prn lorazepam frequently.  Nursing reports that for the past several days anytime her  is not present she is yelling very often.  She has it scheduled tid but is still requiring prn, so they feel it needs increased.  Family and nursing requesting that her medications be adjusted for better control. Patient does not report what she is anxious or restless about but denies pain and has no signs and symptoms of distress. She was restless and yelling out during visit today.  She had no signs and symptoms of distress and denied any pain or discomfort. She is Hospice care.       PAST MEDICAL & SURGICAL HISTORY:   Past Medical History:   Diagnosis Date    Colon polyp     Osteoporosis     Pain in thoracic spine     Pneumonia     UTI (urinary tract infection)       Past Surgical History:   Procedure Laterality Date    BLADDER SURGERY  2000    bladder suspension    CERVICAL LAMINECTOMY      COLONOSCOPY      COLOSTOMY  1979    temporary sigmoid    HYSTERECTOMY           MEDICATIONS:  I have reviewed and reconciled the patients medication list in the patients chart at the  skilled nursing facility today.      ALLERGIES:    Allergies   Allergen Reactions    Lexapro [Escitalopram Oxalate] Nausea Only         SOCIAL HISTORY:    Social History     Socioeconomic History    Marital status:    Tobacco Use    Smoking status: Never    Smokeless tobacco: Never   Substance and Sexual Activity    Alcohol use: No    Drug use: No    Sexual activity: Defer       FAMILY HISTORY:    Family History   Problem Relation Age of Onset    Blindness Mother     Other Mother         arteriosclerotic cardiovascular disease     Diabetes Mother     Osteoporosis Mother     Stroke Mother     Cancer Brother     Lung cancer Sister     Osteoporosis Sister     Stroke Sister     Stroke Other        REVIEW OF SYSTEMS:    Negative distress  Negative anxiety and behaviors  Negative respiratory distress  Positive dental pain      PHYSICAL EXAMINATION:   VITAL SIGNS:   Vitals:    10/11/23 1150   BP: 115/71   Pulse: 81   Resp: 18   Temp: 96.8 øF (36 øC)   SpO2: 96%   Weight: 107 kg (235 lb)       Physical Exam   Constitutional: Vital signs are normal. She does not appear ill. No distress.   Eyes: Conjunctivae are normal.   Cardiovascular: Normal rate, regular rhythm and normal heart sounds.   BLE edema   Pulmonary/Chest: No respiratory distress. She has no wheezes. She has no rhonchi.   Abdominal: Soft. Bowel sounds are normal. She exhibits no distension. There is no abdominal tenderness.   Musculoskeletal:      Right lower le+ Edema present.      Left lower le+ Edema present.      Comments: Weakness BLE   Neurological: She is alert. She is disoriented.   Skin: Skin is warm and dry. Turgor is normal. No rash noted.   Psychiatric: Affect normal. Her mood appears anxious. She is agitated. Cognition and memory are impaired.   Nursing note and vitals reviewed.      RECORDS REVIEW:   I have reviewed and interpreted the records in [facility] and [facility].    ASSESSMENT     Diagnoses and all orders for this visit:    1.  "Medication dose changed    2. Restlessness and agitation    3. Hospice care    4. 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 Vascular dementia with behavioral disturbance    6. Impaired mobility and ADLs          PLAN    Hospice care/comfort measures/anxiety/restlessness/dementia  -Will increase Risperdal to 1 mg bid.  Will increase scheduled Lorazepam to 0.5 ml tid. Continue prn Lorazepam. Will continue to collaborate with Hospice care. Will follow up and continue to monitor.     Nursing encouraged to keep me informed of any acute changes, or any new concerning symptoms.    Nursing to continue supportive care for all ADLs.      [x]  Discussed Patient in detail with nursing/staff, addressed all needs today.     [x]  Plan of Care Reviewed   [x]  PT/OT Reviewed   [x]  Order Changes  []  Discharge Plans Reviewed  [x]  Advance Directive on file with Nursing Home.   [x]  POA on file with Nursing Home.   [x]  Code Status listed: []  Full Code   [x]  DNR       "I confirm accuracy of unchanged data/findings which have been carried forward from previous visit, as well as I have updated appropriately those that have changed."     I spent 30 minutes caring for Danae on this date of service. This time includes time spent by me in the following activities:preparing for the visit, obtaining and/or reviewing a separately obtained history, performing a medically appropriate examination and/or evaluation , counseling and educating the patient/family/caregiver, ordering medications, tests, or procedures, referring and communicating with other health care professionals , documenting information in the medical record, independently interpreting results and communicating that information with the patient/family/caregiver, and care coordination                                                     Mita Joyce, APRN.     "

## 2023-10-11 NOTE — PROGRESS NOTES
Nursing Home Follow Up Note      Layo Arango DO []   WILLIAM Alfaro [x]  [address] [place] [address]  Phone: [phone]  Fax: [phone] Dorcas Duncan MD []    Dc Fernandez DO []   [address] [place] [address]  Phone: [phone]  Fax: [phone]     PATIENT NAME: Danae Harmon                                                                          YOB: 1940           DATE OF SERVICE: 10/11/2023  FACILITY:  [][place]   [] [place]   [] [facility]   [x] [facility]   [] Other ____________________________________________________________________      CHIEF COMPLAINT:      Increased anxiety and restlessness for the past couple days.     HISTORY OF PRESENT ILLNESS:     Nursing reports the patient has had increased restlessness and anxiety for the past couple days and has been receiving her prn lorazepam frequently.  Nursing reports that for the past several days anytime her  is not present she is yelling very often.  She has it scheduled tid but is still requiring prn, so they feel it needs increased.  Family and nursing requesting that her medications be adjusted for better control. Patient does not report what she is anxious or restless about but denies pain and has no signs and symptoms of distress. She was restless and yelling out during visit today.  She had no signs and symptoms of distress and denied any pain or discomfort. She is Hospice care.       PAST MEDICAL & SURGICAL HISTORY:   Past Medical History:   Diagnosis Date    Colon polyp     Osteoporosis     Pain in thoracic spine     Pneumonia     UTI (urinary tract infection)       Past Surgical History:   Procedure Laterality Date    BLADDER SURGERY  2000    bladder suspension    CERVICAL LAMINECTOMY      COLONOSCOPY      COLOSTOMY  1979    temporary sigmoid    HYSTERECTOMY           MEDICATIONS:  I have reviewed and reconciled the patients medication list in the patients chart at the  skilled nursing facility today.      ALLERGIES:    Allergies   Allergen Reactions    Lexapro [Escitalopram Oxalate] Nausea Only         SOCIAL HISTORY:    Social History     Socioeconomic History    Marital status:    Tobacco Use    Smoking status: Never    Smokeless tobacco: Never   Substance and Sexual Activity    Alcohol use: No    Drug use: No    Sexual activity: Defer       FAMILY HISTORY:    Family History   Problem Relation Age of Onset    Blindness Mother     Other Mother         arteriosclerotic cardiovascular disease     Diabetes Mother     Osteoporosis Mother     Stroke Mother     Cancer Brother     Lung cancer Sister     Osteoporosis Sister     Stroke Sister     Stroke Other        REVIEW OF SYSTEMS:    Negative distress  Negative anxiety and behaviors  Negative respiratory distress  Positive dental pain      PHYSICAL EXAMINATION:   VITAL SIGNS:   Vitals:    10/11/23 1150   BP: 115/71   Pulse: 81   Resp: 18   Temp: 96.8 øF (36 øC)   SpO2: 96%   Weight: 107 kg (235 lb)       Physical Exam   Constitutional: Vital signs are normal. She does not appear ill. No distress.   Eyes: Conjunctivae are normal.   Cardiovascular: Normal rate, regular rhythm and normal heart sounds.   BLE edema   Pulmonary/Chest: No respiratory distress. She has no wheezes. She has no rhonchi.   Abdominal: Soft. Bowel sounds are normal. She exhibits no distension. There is no abdominal tenderness.   Musculoskeletal:      Right lower le+ Edema present.      Left lower le+ Edema present.      Comments: Weakness BLE   Neurological: She is alert. She is disoriented.   Skin: Skin is warm and dry. Turgor is normal. No rash noted.   Psychiatric: Affect normal. Her mood appears anxious. She is agitated. Cognition and memory are impaired.   Nursing note and vitals reviewed.      RECORDS REVIEW:   I have reviewed and interpreted the records in [facility] and [facility].    ASSESSMENT     Diagnoses and all orders for this visit:    1.  "Medication dose changed    2. Restlessness and agitation    3. Hospice care    4. 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 Vascular dementia with behavioral disturbance    6. Impaired mobility and ADLs          PLAN    Hospice care/comfort measures/anxiety/restlessness/dementia  -Will increase Risperdal to 1 mg bid.  Will increase scheduled Lorazepam to 0.5 ml tid. Continue prn Lorazepam. Will continue to collaborate with Hospice care. Will follow up and continue to monitor.     Nursing encouraged to keep me informed of any acute changes, or any new concerning symptoms.    Nursing to continue supportive care for all ADLs.      [x]  Discussed Patient in detail with nursing/staff, addressed all needs today.     [x]  Plan of Care Reviewed   [x]  PT/OT Reviewed   [x]  Order Changes  []  Discharge Plans Reviewed  [x]  Advance Directive on file with Nursing Home.   [x]  POA on file with Nursing Home.   [x]  Code Status listed: []  Full Code   [x]  DNR       "I confirm accuracy of unchanged data/findings which have been carried forward from previous visit, as well as I have updated appropriately those that have changed."     I spent 30 minutes caring for Danae on this date of service. This time includes time spent by me in the following activities:preparing for the visit, obtaining and/or reviewing a separately obtained history, performing a medically appropriate examination and/or evaluation , counseling and educating the patient/family/caregiver, ordering medications, tests, or procedures, referring and communicating with other health care professionals , documenting information in the medical record, independently interpreting results and communicating that information with the patient/family/caregiver, and care coordination                                                     Mita Joyce, APRN.     "

## 2023-10-20 ENCOUNTER — NURSING HOME (OUTPATIENT)
Dept: FAMILY MEDICINE CLINIC | Facility: CLINIC | Age: 83
End: 2023-10-20
Payer: OTHER MISCELLANEOUS

## 2023-10-20 VITALS
TEMPERATURE: 97.9 F | WEIGHT: 235 LBS | DIASTOLIC BLOOD PRESSURE: 76 MMHG | HEART RATE: 92 BPM | BODY MASS INDEX: 44.4 KG/M2 | SYSTOLIC BLOOD PRESSURE: 138 MMHG | RESPIRATION RATE: 18 BRPM | OXYGEN SATURATION: 96 %

## 2023-10-20 DIAGNOSIS — R60.0 BILATERAL LOWER EXTREMITY EDEMA: Primary | ICD-10-CM

## 2023-10-20 DIAGNOSIS — F01.50 VASCULAR DEMENTIA WITHOUT BEHAVIORAL DISTURBANCE: ICD-10-CM

## 2023-10-20 DIAGNOSIS — Z51.5 HOSPICE CARE: ICD-10-CM

## 2023-10-20 DIAGNOSIS — Z79.899 MEDICATION MANAGEMENT: ICD-10-CM

## 2023-10-20 DIAGNOSIS — Z78.9 IMPAIRED MOBILITY AND ADLS: ICD-10-CM

## 2023-10-20 DIAGNOSIS — Z74.09 IMPAIRED MOBILITY AND ADLS: ICD-10-CM

## 2023-10-20 RX ORDER — MORPHINE SULFATE 100 MG/5ML
SOLUTION ORAL
Qty: 30 ML | Refills: 0 | Status: SHIPPED | OUTPATIENT
Start: 2023-10-20

## 2023-10-20 NOTE — TELEPHONE ENCOUNTER
JUSTIN REQUESTING MED REFILL FOR MORPHINE SULFATE 20 MG/ML.    DIRECTIONS: MORPHINE SULFATE 20 MG/ML, GIVE 0.5 ML Q 3 HRS PRN.

## 2023-10-20 NOTE — LETTER
Nursing Home Follow Up Note      Layo Arango DO []   WILLIAM Alfaro [x]  [address] [place] [address]  Phone: [phone]  Fax: [phone] Dorcas Duncan MD []    Dc Fernandez DO []   [address] [place] [address]  Phone: [phone]  Fax: [phone]     PATIENT NAME: Danae Harmon                                                                          YOB: 1940           DATE OF SERVICE: 10/20/2023  FACILITY:  [][place]   [] [place]   [] [facility]   [x] [facility]   [] Other ____________________________________________________________________      CHIEF COMPLAINT:      Increased edema the past several days.     HISTORY OF PRESENT ILLNESS:      Per nursing, patient has some increased edema to her lower extremities for the past couple days.Daily diuretic stopped when she was admitted to Hospice.  She continues to be on Hospice care and rests in bed all the time. She is resting in bed today with no complaints or signs and symptoms of any distress.  There is some increased edema to lower extremities but no edema noted elsewhere.  She has not had any increased work of breathing or O2 demand.  She is pleasantly confused today at baseline.        PAST MEDICAL & SURGICAL HISTORY:   Past Medical History:   Diagnosis Date    Colon polyp     Osteoporosis     Pain in thoracic spine     Pneumonia     UTI (urinary tract infection)       Past Surgical History:   Procedure Laterality Date    BLADDER SURGERY  2000    bladder suspension    CERVICAL LAMINECTOMY      COLONOSCOPY      COLOSTOMY  1979    temporary sigmoid    HYSTERECTOMY           MEDICATIONS:  I have reviewed and reconciled the patients medication list in the patients chart at the skilled nursing facility today.      ALLERGIES:    Allergies   Allergen Reactions    Lexapro [Escitalopram Oxalate] Nausea Only         SOCIAL HISTORY:    Social History     Socioeconomic History    Marital status:     Tobacco Use    Smoking status: Never    Smokeless tobacco: Never   Substance and Sexual Activity    Alcohol use: No    Drug use: No    Sexual activity: Defer       FAMILY HISTORY:    Family History   Problem Relation Age of Onset    Blindness Mother     Other Mother         arteriosclerotic cardiovascular disease     Diabetes Mother     Osteoporosis Mother     Stroke Mother     Cancer Brother     Lung cancer Sister     Osteoporosis Sister     Stroke Sister     Stroke Other        REVIEW OF SYSTEMS:    Review of Systems   Reason unable to perform ROS: ROS per nursing and patient.   Constitutional:  Negative for activity change, appetite change, chills, diaphoresis, fatigue and fever.   HENT:  Negative for congestion, mouth sores, nosebleeds, rhinorrhea and trouble swallowing.    Respiratory:  Negative for cough, choking, shortness of breath and wheezing.    Cardiovascular:  Positive for leg swelling (increased). Negative for palpitations.   Gastrointestinal:  Negative for abdominal pain, constipation, diarrhea and vomiting.   Genitourinary:  Positive for urinary incontinence. Negative for decreased urine volume, difficulty urinating, dysuria, frequency, hematuria and urgency.   Musculoskeletal:  Positive for arthralgias (chronic). Negative for joint swelling and myalgias.   Skin:  Negative for color change and rash.   Neurological:  Positive for weakness, memory problem and confusion (intermittent). Negative for seizures and speech difficulty.   Psychiatric/Behavioral:  Positive for behavioral problems, sleep disturbance and positive for hyperactivity. The patient is nervous/anxious.         Continues to have intermittent behaviors         PHYSICAL EXAMINATION:   VITAL SIGNS:   Vitals:    10/20/23 1512   BP: 138/76   Pulse: 92   Resp: 18   Temp: 97.9 °F (36.6 °C)   SpO2: 96%   Weight: 107 kg (235 lb)       Physical Exam   Constitutional: Vital signs are normal. She appears well-developed and well-nourished.   HENT:    Head: Normocephalic.   Right Ear: External ear normal.   Left Ear: External ear normal.   Nose: Nose normal.   Eyes: Conjunctivae are normal.   Cardiovascular: Normal rate, regular rhythm and normal heart sounds.   BLE edema   Pulmonary/Chest: Effort normal. No respiratory distress. She has no wheezes. She has no rhonchi. She has no rales.   Abdominal: Soft. Bowel sounds are normal. She exhibits no distension. There is no abdominal tenderness.   Musculoskeletal:      Right lower le+ Edema present.      Left lower le+ Edema present.      Comments: Weakness BLE   Neurological: She is alert.   Skin: Skin is warm and dry. Turgor is normal. No rash noted.   No rashes noted today   Psychiatric: Her speech is normal and behavior is normal. Mood normal. Cognition and memory are impaired.   Nursing note and vitals reviewed.      RECORDS REVIEW:   I have reviewed and interpreted the records in [facility] and [facility].    ASSESSMENT     Diagnoses and all orders for this visit:    1. Bilateral lower extremity edema (Primary)    2. Vascular dementia without behavioral disturbance    3. Hospice care    4. Medication management    5. Impaired mobility and ADLs          PLAN    LE edema/vascular dementia/Hospice/medication management   -Will start Lasix 40 mg daily x 5 days and Potassium 10 meq daily x 5 days. She continues on Hospice care. Will follow up and continue to monitor.     Nursing encouraged to keep me informed of any acute changes, or any new concerning symptoms.    Nursing to continue supportive care for all ADLs.      [x]  Discussed Patient in detail with nursing/staff, addressed all needs today.     [x]  Plan of Care Reviewed   [x]  PT/OT Reviewed   [x]  Order Changes  []  Discharge Plans Reviewed  [x]  Advance Directive on file with Nursing Home.   [x]  POA on file with Nursing Home.   [x]  Code Status listed: []  Full Code   [x]  DNR       “I confirm accuracy of unchanged data/findings which have been carried  forward from previous visit, as well as I have updated appropriately those that have changed.”     I spent 30 minutes caring for Danae on this date of service. This time includes time spent by me in the following activities:preparing for the visit, reviewing tests, obtaining and/or reviewing a separately obtained history, performing a medically appropriate examination and/or evaluation , counseling and educating the patient/family/caregiver, ordering medications, tests, or procedures, referring and communicating with other health care professionals , documenting information in the medical record, independently interpreting results and communicating that information with the patient/family/caregiver, and care coordination                                                             Mita Joyce, APRN.

## 2023-10-22 NOTE — PROGRESS NOTES
Nursing Home Follow Up Note      Layo Arango DO []   WILLIAM Alfaro [x]  [address] [place] [address]  Phone: [phone]  Fax: [phone] Dorcas Duncan MD []    Dc Fernandez DO []   [address] [place] [address]  Phone: [phone]  Fax: [phone]     PATIENT NAME: Danae Harmon                                                                          YOB: 1940           DATE OF SERVICE: 10/20/2023  FACILITY:  [][place]   [] [place]   [] [facility]   [x] [facility]   [] Other ____________________________________________________________________      CHIEF COMPLAINT:      Increased edema the past several days.     HISTORY OF PRESENT ILLNESS:      Per nursing, patient has some increased edema to her lower extremities for the past couple days.Daily diuretic stopped when she was admitted to Hospice.  She continues to be on Hospice care and rests in bed all the time. She is resting in bed today with no complaints or signs and symptoms of any distress.  There is some increased edema to lower extremities but no edema noted elsewhere.  She has not had any increased work of breathing or O2 demand.  She is pleasantly confused today at baseline.        PAST MEDICAL & SURGICAL HISTORY:   Past Medical History:   Diagnosis Date    Colon polyp     Osteoporosis     Pain in thoracic spine     Pneumonia     UTI (urinary tract infection)       Past Surgical History:   Procedure Laterality Date    BLADDER SURGERY  2000    bladder suspension    CERVICAL LAMINECTOMY      COLONOSCOPY      COLOSTOMY  1979    temporary sigmoid    HYSTERECTOMY           MEDICATIONS:  I have reviewed and reconciled the patients medication list in the patients chart at the skilled nursing facility today.      ALLERGIES:    Allergies   Allergen Reactions    Lexapro [Escitalopram Oxalate] Nausea Only         SOCIAL HISTORY:    Social History     Socioeconomic History    Marital status:     Tobacco Use    Smoking status: Never    Smokeless tobacco: Never   Substance and Sexual Activity    Alcohol use: No    Drug use: No    Sexual activity: Defer       FAMILY HISTORY:    Family History   Problem Relation Age of Onset    Blindness Mother     Other Mother         arteriosclerotic cardiovascular disease     Diabetes Mother     Osteoporosis Mother     Stroke Mother     Cancer Brother     Lung cancer Sister     Osteoporosis Sister     Stroke Sister     Stroke Other        REVIEW OF SYSTEMS:    Review of Systems   Reason unable to perform ROS: ROS per nursing and patient.   Constitutional:  Negative for activity change, appetite change, chills, diaphoresis, fatigue and fever.   HENT:  Negative for congestion, mouth sores, nosebleeds, rhinorrhea and trouble swallowing.    Respiratory:  Negative for cough, choking, shortness of breath and wheezing.    Cardiovascular:  Positive for leg swelling (increased). Negative for palpitations.   Gastrointestinal:  Negative for abdominal pain, constipation, diarrhea and vomiting.   Genitourinary:  Positive for urinary incontinence. Negative for decreased urine volume, difficulty urinating, dysuria, frequency, hematuria and urgency.   Musculoskeletal:  Positive for arthralgias (chronic). Negative for joint swelling and myalgias.   Skin:  Negative for color change and rash.   Neurological:  Positive for weakness, memory problem and confusion (intermittent). Negative for seizures and speech difficulty.   Psychiatric/Behavioral:  Positive for behavioral problems, sleep disturbance and positive for hyperactivity. The patient is nervous/anxious.         Continues to have intermittent behaviors         PHYSICAL EXAMINATION:   VITAL SIGNS:   Vitals:    10/20/23 1512   BP: 138/76   Pulse: 92   Resp: 18   Temp: 97.9 °F (36.6 °C)   SpO2: 96%   Weight: 107 kg (235 lb)       Physical Exam   Constitutional: Vital signs are normal. She appears well-developed and well-nourished.   HENT:    Head: Normocephalic.   Right Ear: External ear normal.   Left Ear: External ear normal.   Nose: Nose normal.   Eyes: Conjunctivae are normal.   Cardiovascular: Normal rate, regular rhythm and normal heart sounds.   BLE edema   Pulmonary/Chest: Effort normal. No respiratory distress. She has no wheezes. She has no rhonchi. She has no rales.   Abdominal: Soft. Bowel sounds are normal. She exhibits no distension. There is no abdominal tenderness.   Musculoskeletal:      Right lower le+ Edema present.      Left lower le+ Edema present.      Comments: Weakness BLE   Neurological: She is alert.   Skin: Skin is warm and dry. Turgor is normal. No rash noted.   No rashes noted today   Psychiatric: Her speech is normal and behavior is normal. Mood normal. Cognition and memory are impaired.   Nursing note and vitals reviewed.      RECORDS REVIEW:   I have reviewed and interpreted the records in [facility] and [facility].    ASSESSMENT     Diagnoses and all orders for this visit:    1. Bilateral lower extremity edema (Primary)    2. Vascular dementia without behavioral disturbance    3. Hospice care    4. Medication management    5. Impaired mobility and ADLs          PLAN    LE edema/vascular dementia/Hospice/medication management   -Will start Lasix 40 mg daily x 5 days and Potassium 10 meq daily x 5 days. She continues on Hospice care. Will follow up and continue to monitor.     Nursing encouraged to keep me informed of any acute changes, or any new concerning symptoms.    Nursing to continue supportive care for all ADLs.      [x]  Discussed Patient in detail with nursing/staff, addressed all needs today.     [x]  Plan of Care Reviewed   [x]  PT/OT Reviewed   [x]  Order Changes  []  Discharge Plans Reviewed  [x]  Advance Directive on file with Nursing Home.   [x]  POA on file with Nursing Home.   [x]  Code Status listed: []  Full Code   [x]  DNR       “I confirm accuracy of unchanged data/findings which have been carried  forward from previous visit, as well as I have updated appropriately those that have changed.”     I spent 30 minutes caring for Danae on this date of service. This time includes time spent by me in the following activities:preparing for the visit, reviewing tests, obtaining and/or reviewing a separately obtained history, performing a medically appropriate examination and/or evaluation , counseling and educating the patient/family/caregiver, ordering medications, tests, or procedures, referring and communicating with other health care professionals , documenting information in the medical record, independently interpreting results and communicating that information with the patient/family/caregiver, and care coordination                                                             Mita Joyce, APRN.

## 2023-10-25 ENCOUNTER — NURSING HOME (OUTPATIENT)
Dept: FAMILY MEDICINE CLINIC | Facility: CLINIC | Age: 83
End: 2023-10-25
Payer: OTHER MISCELLANEOUS

## 2023-10-25 VITALS
DIASTOLIC BLOOD PRESSURE: 69 MMHG | HEART RATE: 63 BPM | OXYGEN SATURATION: 96 % | TEMPERATURE: 97.9 F | RESPIRATION RATE: 18 BRPM | SYSTOLIC BLOOD PRESSURE: 138 MMHG

## 2023-10-25 DIAGNOSIS — F01.50 VASCULAR DEMENTIA WITHOUT BEHAVIORAL DISTURBANCE: ICD-10-CM

## 2023-10-25 DIAGNOSIS — Z78.9 IMPAIRED MOBILITY AND ADLS: ICD-10-CM

## 2023-10-25 DIAGNOSIS — Z74.09 IMPAIRED MOBILITY AND ADLS: ICD-10-CM

## 2023-10-25 DIAGNOSIS — Z79.899 MEDICATION MANAGEMENT: ICD-10-CM

## 2023-10-25 DIAGNOSIS — Z51.5 HOSPICE CARE PATIENT: ICD-10-CM

## 2023-10-25 DIAGNOSIS — R60.0 BILATERAL LOWER EXTREMITY EDEMA: Primary | ICD-10-CM

## 2023-10-25 NOTE — LETTER
Nursing Home Follow Up Note      Layo Arango DO []   WILLIAM Alfaro [x]  [address] [place] [address]  Phone: [phone]  Fax: [phone] Dorcas Duncan MD []    Dc Fernandez DO []   [address] [place] [address]  Phone: [phone]  Fax: [phone]     PATIENT NAME: Danae Harmon                                                                          YOB: 1940           DATE OF SERVICE: 10/25/2023  FACILITY:  [][place]   [] [place]   [] [facility]   [x] [facility]   [] Other ____________________________________________________________________      CHIEF COMPLAINT:      Follow-up bilateral lower extremity edema.    HISTORY OF PRESENT ILLNESS:     Patient has been on Lasix for 4 days and this will complete tomorrow.  Her edema has not improved.  No increased work of breathing or O2 demand but family concerned about discomfort in her lower extremities related to the increased edema.  Resting in bed during visit.  She has no complaints but is a poor historian related to her dementia.  No signs and symptoms of any distress.  She has not had any abnormal vital signs. She continues to be on Hospice care.     PAST MEDICAL & SURGICAL HISTORY:   Past Medical History:   Diagnosis Date    Colon polyp     Osteoporosis     Pain in thoracic spine     Pneumonia     UTI (urinary tract infection)       Past Surgical History:   Procedure Laterality Date    BLADDER SURGERY  2000    bladder suspension    CERVICAL LAMINECTOMY      COLONOSCOPY      COLOSTOMY  1979    temporary sigmoid    HYSTERECTOMY           MEDICATIONS:  I have reviewed and reconciled the patients medication list in the patients chart at the skilled nursing facility today.      ALLERGIES:    Allergies   Allergen Reactions    Lexapro [Escitalopram Oxalate] Nausea Only         SOCIAL HISTORY:    Social History     Socioeconomic History    Marital status:    Tobacco Use    Smoking status: Never     Smokeless tobacco: Never   Substance and Sexual Activity    Alcohol use: No    Drug use: No    Sexual activity: Defer       FAMILY HISTORY:    Family History   Problem Relation Age of Onset    Blindness Mother     Other Mother         arteriosclerotic cardiovascular disease     Diabetes Mother     Osteoporosis Mother     Stroke Mother     Cancer Brother     Lung cancer Sister     Osteoporosis Sister     Stroke Sister     Stroke Other        REVIEW OF SYSTEMS:    Review of Systems   Reason unable to perform ROS: ROS per nursing and patient.   Constitutional:  Negative for activity change, appetite change, chills, diaphoresis, fatigue and fever.   HENT:  Negative for congestion, mouth sores, nosebleeds, rhinorrhea and trouble swallowing.    Respiratory:  Negative for cough, choking, shortness of breath and wheezing.    Cardiovascular:  Positive for leg swelling (increased). Negative for palpitations.   Gastrointestinal:  Negative for abdominal pain, constipation, diarrhea and vomiting.   Genitourinary:  Positive for urinary incontinence. Negative for decreased urine volume, difficulty urinating, dysuria, frequency, hematuria and urgency.   Musculoskeletal:  Positive for arthralgias (chronic). Negative for joint swelling and myalgias.   Skin:  Negative for color change and rash.   Neurological:  Positive for weakness, memory problem and confusion (intermittent). Negative for seizures and speech difficulty.   Psychiatric/Behavioral:  Positive for behavioral problems, sleep disturbance and positive for hyperactivity. The patient is nervous/anxious.         Continues to have intermittent behaviors         PHYSICAL EXAMINATION:   VITAL SIGNS:   Vitals:    10/25/23 1455   BP: 138/69   Pulse: 63   Resp: 18   Temp: 97.9 °F (36.6 °C)   SpO2: 96%       Physical Exam   Constitutional: Vital signs are normal. She appears well-developed and well-nourished.   Cardiovascular: Normal rate, regular rhythm and normal heart sounds.    BLE edema   Pulmonary/Chest: Effort normal. No respiratory distress. She has no wheezes. She has no rales.   Abdominal: Soft. Bowel sounds are normal. She exhibits no distension.   Musculoskeletal:      Right lower le+ Edema present.      Left lower le+ Edema present.      Comments: Weakness BLE   Neurological: She is alert.   Skin: Skin is warm and dry. Turgor is normal. No rash noted.   No rashes noted today   Psychiatric: Her speech is normal and behavior is normal. Mood normal. Cognition and memory are impaired.   Nursing note and vitals reviewed.      RECORDS REVIEW:   I have reviewed and interpreted the records in [facility] and [facility].    ASSESSMENT     Diagnoses and all orders for this visit:    1. Bilateral lower extremity edema (Primary)    2. Medication management    3. Vascular dementia without behavioral disturbance    4. Hospice care patient    5. Impaired mobility and ADLs            PLAN    LE edema/vascular dementia/Hospice/medication management   -Give Bumex 2 mg daily starting 10/27.  Continue potassium 10 mEq daily.  MIR hose or compression stockings daily, off at bedtime.  Keep foot of the bed elevated and legs elevated on 2 pillows.  She continues on Hospice care. Will follow up and continue to monitor.     Nursing encouraged to keep me informed of any acute changes, or any new concerning symptoms.    Nursing to continue supportive care for all ADLs.      [x]  Discussed Patient in detail with nursing/staff, addressed all needs today.     [x]  Plan of Care Reviewed   [x]  PT/OT Reviewed   [x]  Order Changes  []  Discharge Plans Reviewed  [x]  Advance Directive on file with Nursing Home.   [x]  POA on file with Nursing Home.   [x]  Code Status listed: []  Full Code   [x]  DNR       “I confirm accuracy of unchanged data/findings which have been carried forward from previous visit, as well as I have updated appropriately those that have changed.”     I spent 30 minutes caring for Danae on this  date of service. This time includes time spent by me in the following activities:preparing for the visit, reviewing tests, obtaining and/or reviewing a separately obtained history, performing a medically appropriate examination and/or evaluation , counseling and educating the patient/family/caregiver, ordering medications, tests, or procedures, referring and communicating with other health care professionals , documenting information in the medical record, independently interpreting results and communicating that information with the patient/family/caregiver, and care coordination                                                             Mita Joyce, APRN.

## 2023-10-25 NOTE — PROGRESS NOTES
Nursing Home Follow Up Note      Layo Arango DO []   WILLIAM Alfaro [x]  [address] [place] [address]  Phone: [phone]  Fax: [phone] Dorcas Duncan MD []    Dc Fernandez DO []   [address] [place] [address]  Phone: [phone]  Fax: [phone]     PATIENT NAME: Danae Harmon                                                                          YOB: 1940           DATE OF SERVICE: 10/25/2023  FACILITY:  [][place]   [] [place]   [] [facility]   [x] [facility]   [] Other ____________________________________________________________________      CHIEF COMPLAINT:      Follow-up bilateral lower extremity edema.    HISTORY OF PRESENT ILLNESS:     Patient has been on Lasix for 4 days and this will complete tomorrow.  Her edema has not improved.  No increased work of breathing or O2 demand but family concerned about discomfort in her lower extremities related to the increased edema.  Resting in bed during visit.  She has no complaints but is a poor historian related to her dementia.  No signs and symptoms of any distress.  She has not had any abnormal vital signs. She continues to be on Hospice care.     PAST MEDICAL & SURGICAL HISTORY:   Past Medical History:   Diagnosis Date    Colon polyp     Osteoporosis     Pain in thoracic spine     Pneumonia     UTI (urinary tract infection)       Past Surgical History:   Procedure Laterality Date    BLADDER SURGERY  2000    bladder suspension    CERVICAL LAMINECTOMY      COLONOSCOPY      COLOSTOMY  1979    temporary sigmoid    HYSTERECTOMY           MEDICATIONS:  I have reviewed and reconciled the patients medication list in the patients chart at the skilled nursing facility today.      ALLERGIES:    Allergies   Allergen Reactions    Lexapro [Escitalopram Oxalate] Nausea Only         SOCIAL HISTORY:    Social History     Socioeconomic History    Marital status:    Tobacco Use    Smoking status: Never     Smokeless tobacco: Never   Substance and Sexual Activity    Alcohol use: No    Drug use: No    Sexual activity: Defer       FAMILY HISTORY:    Family History   Problem Relation Age of Onset    Blindness Mother     Other Mother         arteriosclerotic cardiovascular disease     Diabetes Mother     Osteoporosis Mother     Stroke Mother     Cancer Brother     Lung cancer Sister     Osteoporosis Sister     Stroke Sister     Stroke Other        REVIEW OF SYSTEMS:    Review of Systems   Reason unable to perform ROS: ROS per nursing and patient.   Constitutional:  Negative for activity change, appetite change, chills, diaphoresis, fatigue and fever.   HENT:  Negative for congestion, mouth sores, nosebleeds, rhinorrhea and trouble swallowing.    Respiratory:  Negative for cough, choking, shortness of breath and wheezing.    Cardiovascular:  Positive for leg swelling (increased). Negative for palpitations.   Gastrointestinal:  Negative for abdominal pain, constipation, diarrhea and vomiting.   Genitourinary:  Positive for urinary incontinence. Negative for decreased urine volume, difficulty urinating, dysuria, frequency, hematuria and urgency.   Musculoskeletal:  Positive for arthralgias (chronic). Negative for joint swelling and myalgias.   Skin:  Negative for color change and rash.   Neurological:  Positive for weakness, memory problem and confusion (intermittent). Negative for seizures and speech difficulty.   Psychiatric/Behavioral:  Positive for behavioral problems, sleep disturbance and positive for hyperactivity. The patient is nervous/anxious.         Continues to have intermittent behaviors         PHYSICAL EXAMINATION:   VITAL SIGNS:   Vitals:    10/25/23 1455   BP: 138/69   Pulse: 63   Resp: 18   Temp: 97.9 °F (36.6 °C)   SpO2: 96%       Physical Exam   Constitutional: Vital signs are normal. She appears well-developed and well-nourished.   Cardiovascular: Normal rate, regular rhythm and normal heart sounds.    BLE edema   Pulmonary/Chest: Effort normal. No respiratory distress. She has no wheezes. She has no rales.   Abdominal: Soft. Bowel sounds are normal. She exhibits no distension.   Musculoskeletal:      Right lower le+ Edema present.      Left lower le+ Edema present.      Comments: Weakness BLE   Neurological: She is alert.   Skin: Skin is warm and dry. Turgor is normal. No rash noted.   No rashes noted today   Psychiatric: Her speech is normal and behavior is normal. Mood normal. Cognition and memory are impaired.   Nursing note and vitals reviewed.      RECORDS REVIEW:   I have reviewed and interpreted the records in [facility].    ASSESSMENT     Diagnoses and all orders for this visit:    1. Bilateral lower extremity edema (Primary)    2. Medication management    3. Vascular dementia without behavioral disturbance    4. Hospice care patient    5. Impaired mobility and ADLs            PLAN    LE edema/vascular dementia/Hospice/medication management   -Give Bumex 2 mg daily starting 10/27.  Continue potassium 10 mEq daily.  MIR hose or compression stockings daily, off at bedtime.  Keep foot of the bed elevated and legs elevated on 2 pillows.  She continues on Hospice care. Will follow up and continue to monitor.     Nursing encouraged to keep me informed of any acute changes, or any new concerning symptoms.    Nursing to continue supportive care for all ADLs.      [x]  Discussed Patient in detail with nursing/staff, addressed all needs today.     [x]  Plan of Care Reviewed   [x]  PT/OT Reviewed   [x]  Order Changes  []  Discharge Plans Reviewed  [x]  Advance Directive on file with Nursing Home.   [x]  POA on file with Nursing Home.   [x]  Code Status listed: []  Full Code   [x]  DNR       “I confirm accuracy of unchanged data/findings which have been carried forward from previous visit, as well as I have updated appropriately those that have changed.”     I spent 30 minutes caring for Danae on this  date of service. This time includes time spent by me in the following activities:preparing for the visit, reviewing tests, obtaining and/or reviewing a separately obtained history, performing a medically appropriate examination and/or evaluation , counseling and educating the patient/family/caregiver, ordering medications, tests, or procedures, referring and communicating with other health care professionals , documenting information in the medical record, independently interpreting results and communicating that information with the patient/family/caregiver, and care coordination                                                             Mita Joyce, APRN.

## 2023-10-26 DIAGNOSIS — Z51.5 HOSPICE CARE: ICD-10-CM

## 2023-10-26 RX ORDER — FENTANYL 12.5 UG/1
1 PATCH TRANSDERMAL
Qty: 10 PATCH | Refills: 0 | Status: SHIPPED | OUTPATIENT
Start: 2023-10-26

## 2023-10-26 NOTE — TELEPHONE ENCOUNTER
JUSTIN REQUESTING MED REFILL FOR FENTANYL 12 MCG.    DIRECTIONS: FENTANYL 12 MCG, APPLY PATCH TOPICALLY Q 72 HRS.

## 2023-11-06 NOTE — PROGRESS NOTES
Nursing Home Progress Note        Dc Fernandez DO   [address] [place] [address] Phone: [phone]  Fax: [phone]     PATIENT NAME: Danae Harmon                                                                          YOB: 1940           DATE OF SERVICE: 11/07/2023  FACILITY:  [facility]   ______________________________________________________________________     CHIEF COMPLAINT:  Chronic Medical Management      HISTORY OF PRESENT ILLNESS:   Patient was seen for routine compliance visit.  She has transition to hospice care and has been fairly stable.  Pain appears to be well controlled at this point.  No reported issues with behaviors.  Patient was nonverbal with me on exam today but appeared comfortable.  Lower extremity edema persists.    PAST MEDICAL & SURGICAL HISTORY:   Past Medical History:   Diagnosis Date    Colon polyp     Osteoporosis     Pain in thoracic spine     Pneumonia     UTI (urinary tract infection)       Past Surgical History:   Procedure Laterality Date    BLADDER SURGERY  2000    bladder suspension    CERVICAL LAMINECTOMY      COLONOSCOPY      COLOSTOMY  1979    temporary sigmoid    HYSTERECTOMY           MEDICATIONS:  I have reviewed and reconciled the patients medication list in the patients chart at the skilled nursing facility today, 11/07/2023.      ALLERGIES:  Allergies   Allergen Reactions    Lexapro [Escitalopram Oxalate] Nausea Only         SOCIAL HISTORY:  Social History     Socioeconomic History    Marital status:    Tobacco Use    Smoking status: Never    Smokeless tobacco: Never   Substance and Sexual Activity    Alcohol use: No    Drug use: No    Sexual activity: Defer       FAMILY HISTORY:  Family History   Problem Relation Age of Onset    Blindness Mother     Other Mother         arteriosclerotic cardiovascular disease     Diabetes Mother     Osteoporosis Mother     Stroke Mother     Cancer Brother     Lung  cancer Sister     Osteoporosis Sister     Stroke Sister     Stroke Other        REVIEW OF SYSTEMS:  Review of Systems   Unable to perform ROS: Dementia          PHYSICAL EXAMINATION:     VITAL SIGNS:  /71   Pulse 109   Temp 97.8 °F (36.6 °C)   Resp 20   SpO2 93%     Physical Exam  Vitals and nursing note reviewed.   Constitutional:       Appearance: Normal appearance. She is well-developed. She is obese.      Comments: Frail elderly female   HENT:      Head: Normocephalic and atraumatic.   Eyes:      Extraocular Movements: Extraocular movements intact.      Conjunctiva/sclera: Conjunctivae normal.   Cardiovascular:      Rate and Rhythm: Normal rate and regular rhythm.   Pulmonary:      Effort: Pulmonary effort is normal.      Breath sounds: No wheezing.   Abdominal:      General: Abdomen is flat.      Palpations: Abdomen is soft.   Musculoskeletal:      Cervical back: Normal range of motion and neck supple.      Right lower leg: Edema (Minimal) present.      Left lower leg: Edema (Minimal) present.      Comments: Bedridden   Skin:     General: Skin is warm and dry.      Findings: No rash.   Neurological:      General: No focal deficit present.      Mental Status: She is alert and oriented to person, place, and time. Mental status is at baseline.   Psychiatric:         Mood and Affect: Mood normal.         Behavior: Behavior normal.      Comments: Confused       RECORDS REVIEW:        ASSESSMENT     Diagnoses and all orders for this visit:    1. Hospice care (Primary)    2. Late onset Alzheimer's disease with behavioral disturbance    3. Type 2 diabetes mellitus without complication, without long-term current use of insulin    4. Lower extremity edema    5. Benign essential hypertension    6. Chronic low back pain with sciatica, sciatica laterality unspecified, unspecified back pain laterality    7. Gait instability    8. Other depression    9. Moderate episode of recurrent major depressive disorder    10.  Mixed hyperlipidemia        PLAN  Hospice care  - Hospice services is following.  Pain control with minimal touch and Norco.    Alzheimer's disease with behavioral disturbances  -Mental status remains at baseline.  Continue nursing care for all ADLs including feeding  - no medication changes needed at this time.      Right ankle contracture  -  Continue supportive care.  Patient remains functionally paraplegic.  Feet placed in boots to prevent wounds.  Diuretics for edema when needed.     Constipation  -Good control with current bowel regimen.     Major depressive disorder  -Stable on Seroquel and Depakote     Lower Extremity Edema  -Continue diuretics as needed.        Bladder spasms/urinary frequency  -Cont Myrbetriq. Stable control.      Mixed hyperlipidemia   -Continue statin.     Essential hypertension  -Controlled on Coreg     Vitamin D and vitamin B12 deficiencies  -No longer is to continue supplements.     Osteoporosis  -Stable on vitamins.  Remains off of bisphosphonates.     Type 2 diabetes mellitus  -Fair glucose control. Remains diet controlled.  No longer checking labs.     Chronic pain  -Continues to benefit from Norco and fentanyl.        [x]  Discussed Patient in detail with nursing/staff, addressed all needs today.     [x]  Plan of Care Reviewed   []  PT/OT Reviewed   []  Order Changes  []  Discharge Plans Reviewed  [x]  Advance Directive on file with Nursing Home.   [x]  POA on file with Nursing Home.    [x]  Code Status listed and reviewed.     I confirm accuracy of unchanged data/findings including physical exam and plan which have been carried forward from previous visit, as well as I have updated appropriately those that have changed        Dc Fernandez DO.  11/15/2023

## 2023-11-07 ENCOUNTER — NURSING HOME (OUTPATIENT)
Dept: INTERNAL MEDICINE | Facility: CLINIC | Age: 83
End: 2023-11-07
Payer: OTHER MISCELLANEOUS

## 2023-11-07 VITALS
DIASTOLIC BLOOD PRESSURE: 71 MMHG | SYSTOLIC BLOOD PRESSURE: 138 MMHG | OXYGEN SATURATION: 93 % | HEART RATE: 109 BPM | RESPIRATION RATE: 20 BRPM | TEMPERATURE: 97.8 F

## 2023-11-07 DIAGNOSIS — R26.81 GAIT INSTABILITY: ICD-10-CM

## 2023-11-07 DIAGNOSIS — G89.29 CHRONIC LOW BACK PAIN WITH SCIATICA, SCIATICA LATERALITY UNSPECIFIED, UNSPECIFIED BACK PAIN LATERALITY: ICD-10-CM

## 2023-11-07 DIAGNOSIS — R60.0 LOWER EXTREMITY EDEMA: ICD-10-CM

## 2023-11-07 DIAGNOSIS — I10 BENIGN ESSENTIAL HYPERTENSION: ICD-10-CM

## 2023-11-07 DIAGNOSIS — Z51.5 HOSPICE CARE: Primary | ICD-10-CM

## 2023-11-07 DIAGNOSIS — F32.89 OTHER DEPRESSION: ICD-10-CM

## 2023-11-07 DIAGNOSIS — F33.1 MODERATE EPISODE OF RECURRENT MAJOR DEPRESSIVE DISORDER: ICD-10-CM

## 2023-11-07 DIAGNOSIS — M54.40 CHRONIC LOW BACK PAIN WITH SCIATICA, SCIATICA LATERALITY UNSPECIFIED, UNSPECIFIED BACK PAIN LATERALITY: ICD-10-CM

## 2023-11-07 DIAGNOSIS — G30.1 LATE ONSET ALZHEIMER'S DISEASE WITH BEHAVIORAL DISTURBANCE: ICD-10-CM

## 2023-11-07 DIAGNOSIS — E11.9 TYPE 2 DIABETES MELLITUS WITHOUT COMPLICATION, WITHOUT LONG-TERM CURRENT USE OF INSULIN: ICD-10-CM

## 2023-11-07 DIAGNOSIS — E78.2 MIXED HYPERLIPIDEMIA: ICD-10-CM

## 2023-11-07 DIAGNOSIS — F02.818 LATE ONSET ALZHEIMER'S DISEASE WITH BEHAVIORAL DISTURBANCE: ICD-10-CM

## 2023-11-07 NOTE — LETTER
Nursing Home Progress Note        Dc Fernandez DO   [address] [place] [address] Phone: [phone]  Fax: [phone]     PATIENT NAME: Danae Harmon                                                                          YOB: 1940           DATE OF SERVICE: 11/07/2023  FACILITY:  [facility]   ______________________________________________________________________     CHIEF COMPLAINT:  Chronic Medical Management      HISTORY OF PRESENT ILLNESS:   Patient was seen for routine compliance visit.  She has transition to hospice care and has been fairly stable.  Pain appears to be well controlled at this point.  No reported issues with behaviors.  Patient was nonverbal with me on exam today but appeared comfortable.  Lower extremity edema persists.    PAST MEDICAL & SURGICAL HISTORY:   Past Medical History:   Diagnosis Date    Colon polyp     Osteoporosis     Pain in thoracic spine     Pneumonia     UTI (urinary tract infection)       Past Surgical History:   Procedure Laterality Date    BLADDER SURGERY  2000    bladder suspension    CERVICAL LAMINECTOMY      COLONOSCOPY      COLOSTOMY  1979    temporary sigmoid    HYSTERECTOMY           MEDICATIONS:  I have reviewed and reconciled the patients medication list in the patients chart at the skilled nursing facility today, 11/07/2023.      ALLERGIES:  Allergies   Allergen Reactions    Lexapro [Escitalopram Oxalate] Nausea Only         SOCIAL HISTORY:  Social History     Socioeconomic History    Marital status:    Tobacco Use    Smoking status: Never    Smokeless tobacco: Never   Substance and Sexual Activity    Alcohol use: No    Drug use: No    Sexual activity: Defer       FAMILY HISTORY:  Family History   Problem Relation Age of Onset    Blindness Mother     Other Mother         arteriosclerotic cardiovascular disease     Diabetes Mother     Osteoporosis Mother     Stroke Mother     Cancer Brother     Lung  cancer Sister     Osteoporosis Sister     Stroke Sister     Stroke Other        REVIEW OF SYSTEMS:  Review of Systems   Unable to perform ROS: Dementia          PHYSICAL EXAMINATION:     VITAL SIGNS:  /71   Pulse 109   Temp 97.8 °F (36.6 °C)   Resp 20   SpO2 93%     Physical Exam  Vitals and nursing note reviewed.   Constitutional:       Appearance: Normal appearance. She is well-developed. She is obese.      Comments: Frail elderly female   HENT:      Head: Normocephalic and atraumatic.   Eyes:      Extraocular Movements: Extraocular movements intact.      Conjunctiva/sclera: Conjunctivae normal.   Cardiovascular:      Rate and Rhythm: Normal rate and regular rhythm.   Pulmonary:      Effort: Pulmonary effort is normal.      Breath sounds: No wheezing.   Abdominal:      General: Abdomen is flat.      Palpations: Abdomen is soft.   Musculoskeletal:      Cervical back: Normal range of motion and neck supple.      Right lower leg: Edema (Minimal) present.      Left lower leg: Edema (Minimal) present.      Comments: Bedridden   Skin:     General: Skin is warm and dry.      Findings: No rash.   Neurological:      General: No focal deficit present.      Mental Status: She is alert and oriented to person, place, and time. Mental status is at baseline.   Psychiatric:         Mood and Affect: Mood normal.         Behavior: Behavior normal.      Comments: Confused       RECORDS REVIEW:        ASSESSMENT     Diagnoses and all orders for this visit:    1. Hospice care (Primary)    2. Late onset Alzheimer's disease with behavioral disturbance    3. Type 2 diabetes mellitus without complication, without long-term current use of insulin    4. Lower extremity edema    5. Benign essential hypertension    6. Chronic low back pain with sciatica, sciatica laterality unspecified, unspecified back pain laterality    7. Gait instability    8. Other depression    9. Moderate episode of recurrent major depressive disorder    10.  Mixed hyperlipidemia        PLAN  Hospice care  - Hospice services is following.  Pain control with minimal touch and Norco.    Alzheimer's disease with behavioral disturbances  -Mental status remains at baseline.  Continue nursing care for all ADLs including feeding  - no medication changes needed at this time.      Right ankle contracture  -  Continue supportive care.  Patient remains functionally paraplegic.  Feet placed in boots to prevent wounds.  Diuretics for edema when needed.     Constipation  -Good control with current bowel regimen.     Major depressive disorder  -Stable on Seroquel and Depakote     Lower Extremity Edema  -Continue diuretics as needed.        Bladder spasms/urinary frequency  -Cont Myrbetriq. Stable control.      Mixed hyperlipidemia   -Continue statin.     Essential hypertension  -Controlled on Coreg     Vitamin D and vitamin B12 deficiencies  -No longer is to continue supplements.     Osteoporosis  -Stable on vitamins.  Remains off of bisphosphonates.     Type 2 diabetes mellitus  -Fair glucose control. Remains diet controlled.  No longer checking labs.     Chronic pain  -Continues to benefit from Norco and fentanyl.        [x]  Discussed Patient in detail with nursing/staff, addressed all needs today.     [x]  Plan of Care Reviewed   []  PT/OT Reviewed   []  Order Changes  []  Discharge Plans Reviewed  [x]  Advance Directive on file with Nursing Home.   [x]  POA on file with Nursing Home.    [x]  Code Status listed and reviewed.     I confirm accuracy of unchanged data/findings including physical exam and plan which have been carried forward from previous visit, as well as I have updated appropriately those that have changed        Dc Fernandez DO.  11/15/2023

## 2023-11-20 DIAGNOSIS — Z51.5 HOSPICE CARE: ICD-10-CM

## 2023-11-20 RX ORDER — FENTANYL 12.5 UG/1
1 PATCH TRANSDERMAL
Qty: 10 PATCH | Refills: 0 | Status: SHIPPED | OUTPATIENT
Start: 2023-11-20

## 2023-11-20 NOTE — TELEPHONE ENCOUNTER
[facility] REQUESTING MED REFILL FOR FENTANYL 12.5 MCG.    DIRECTIONS: FENTANYL 12.5 MCG, APPLY PATCH TO SKIN Q 72 HRS SCHEDULED.

## 2023-11-22 ENCOUNTER — NURSING HOME (OUTPATIENT)
Dept: FAMILY MEDICINE CLINIC | Facility: CLINIC | Age: 83
End: 2023-11-22
Payer: OTHER MISCELLANEOUS

## 2023-11-22 VITALS
DIASTOLIC BLOOD PRESSURE: 64 MMHG | TEMPERATURE: 97.9 F | SYSTOLIC BLOOD PRESSURE: 116 MMHG | HEART RATE: 88 BPM | BODY MASS INDEX: 44.4 KG/M2 | WEIGHT: 235 LBS | RESPIRATION RATE: 16 BRPM | OXYGEN SATURATION: 95 %

## 2023-11-22 DIAGNOSIS — F01.50 VASCULAR DEMENTIA WITHOUT BEHAVIORAL DISTURBANCE: ICD-10-CM

## 2023-11-22 DIAGNOSIS — Z51.5 HOSPICE CARE PATIENT: ICD-10-CM

## 2023-11-22 DIAGNOSIS — Z78.9 IMPAIRED MOBILITY AND ADLS: ICD-10-CM

## 2023-11-22 DIAGNOSIS — Z74.09 IMPAIRED MOBILITY AND ADLS: ICD-10-CM

## 2023-11-22 DIAGNOSIS — R60.0 BILATERAL LOWER EXTREMITY EDEMA: Primary | ICD-10-CM

## 2023-11-22 NOTE — LETTER
Nursing Home Follow Up Note      Layo Arango DO []   WILLIAM Alfaro [x]  [address] [place] [address]  Phone: [phone]  Fax: [phone] Dorcas Duncan MD []    Dc Fernandez DO []   [address] [place] [address]  Phone: [phone]  Fax: [phone]     PATIENT NAME: Danae Harmon                                                                          YOB: 1940           DATE OF SERVICE: 11/22/2023  FACILITY:  [][facility]   [] [place]   [] [facility]   [x] [facility]   [] Other ____________________________________________________________________      CHIEF COMPLAINT:      Increased bilateral lower extremity edema.    HISTORY OF PRESENT ILLNESS:     Patient has chronic lower extremity edema but it has been increased for the past couple days.   No increased work of breathing or O2 demand. She is on Bumex 2 mg daily.d Resting in bed during visit.  She has no complaints but is a poor historian related to her dementia.  No signs and symptoms of any distress.  She has not had any abnormal vital signs. She continues to be on Hospice care.     PAST MEDICAL & SURGICAL HISTORY:   Past Medical History:   Diagnosis Date    Colon polyp     Osteoporosis     Pain in thoracic spine     Pneumonia     UTI (urinary tract infection)       Past Surgical History:   Procedure Laterality Date    BLADDER SURGERY  2000    bladder suspension    CERVICAL LAMINECTOMY      COLONOSCOPY      COLOSTOMY  1979    temporary sigmoid    HYSTERECTOMY           MEDICATIONS:  I have reviewed and reconciled the patients medication list in the patients chart at the skilled nursing facility today.      ALLERGIES:    Allergies   Allergen Reactions    Lexapro [Escitalopram Oxalate] Nausea Only         SOCIAL HISTORY:    Social History     Socioeconomic History    Marital status:    Tobacco Use    Smoking status: Never    Smokeless tobacco: Never   Substance and Sexual Activity    Alcohol  use: No    Drug use: No    Sexual activity: Defer       FAMILY HISTORY:    Family History   Problem Relation Age of Onset    Blindness Mother     Other Mother         arteriosclerotic cardiovascular disease     Diabetes Mother     Osteoporosis Mother     Stroke Mother     Cancer Brother     Lung cancer Sister     Osteoporosis Sister     Stroke Sister     Stroke Other        REVIEW OF SYSTEMS:    Review of Systems   Reason unable to perform ROS: ROS per nursing and patient.   Constitutional:  Negative for activity change, appetite change, chills, diaphoresis, fatigue and fever.   HENT:  Negative for congestion, mouth sores, nosebleeds, rhinorrhea and trouble swallowing.    Respiratory:  Negative for cough, choking, shortness of breath and wheezing.    Cardiovascular:  Positive for leg swelling (increased). Negative for palpitations.   Gastrointestinal:  Negative for abdominal pain, constipation, diarrhea and vomiting.   Genitourinary:  Positive for urinary incontinence. Negative for decreased urine volume, difficulty urinating, dysuria, frequency, hematuria and urgency.   Musculoskeletal:  Positive for arthralgias (chronic). Negative for joint swelling and myalgias.   Skin:  Negative for color change and rash.   Neurological:  Positive for weakness, memory problem and confusion (intermittent). Negative for seizures and speech difficulty.   Psychiatric/Behavioral:  Positive for behavioral problems, sleep disturbance and positive for hyperactivity. The patient is nervous/anxious.         Continues to have intermittent behaviors         PHYSICAL EXAMINATION:   VITAL SIGNS:   Vitals:    11/22/23 1307   BP: 116/64   Pulse: 88   Resp: 16   Temp: 97.9 °F (36.6 °C)   SpO2: 95%   Weight: 107 kg (235 lb)       Physical Exam   Constitutional: Vital signs are normal. She appears well-developed and well-nourished.   Cardiovascular: Normal rate, regular rhythm and normal heart sounds.   BLE edema   Pulmonary/Chest: Effort normal.  No respiratory distress. She has no wheezes. She has no rales.   Abdominal: Soft. Bowel sounds are normal. She exhibits no distension.   Musculoskeletal:      Right lower le+ Edema present.      Left lower le+ Edema present.      Comments: Weakness BLE   Neurological: She is alert.   Skin: Skin is warm and dry. Turgor is normal. No rash noted.   No rashes noted today   Psychiatric: Her speech is normal and behavior is normal. Mood normal. Cognition and memory are impaired.   Nursing note and vitals reviewed.      RECORDS REVIEW:   I have reviewed and interpreted the records in [facility]    ASSESSMENT     Diagnoses and all orders for this visit:    1. Bilateral lower extremity edema (Primary)    2. Hospice care patient    3. Vascular dementia without behavioral disturbance    4. Impaired mobility and ADLs              PLAN    LE edema/vascular dementia/Hospice/medication management   -Will increase Bumex 2 mg po bid.  Continue potassium 10 mEq daily.  MIR hose or compression stockings daily, off at bedtime.  Keep foot of the bed elevated and legs elevated on 2 pillows.  She continues on Hospice care. Will follow up and continue to monitor.     Nursing encouraged to keep me informed of any acute changes, or any new concerning symptoms.    Nursing to continue supportive care for all ADLs.      [x]  Discussed Patient in detail with nursing/staff, addressed all needs today.     [x]  Plan of Care Reviewed   [x]  PT/OT Reviewed   [x]  Order Changes  []  Discharge Plans Reviewed  [x]  Advance Directive on file with Nursing Home.   [x]  POA on file with Nursing Home.   [x]  Code Status listed: []  Full Code   [x]  DNR       “I confirm accuracy of unchanged data/findings which have been carried forward from previous visit, as well as I have updated appropriately those that have changed.”                                                           Mita Joyce, APRN.

## 2023-11-23 NOTE — PROGRESS NOTES
Nursing Home Follow Up Note      Layo Arango DO []   WILLIAM Alfaro [x]  [address] [place] [address]  Phone: [phone]  Fax: [phone] Dorcas Duncan MD []    Dc Fernandez DO []   [address] [place] [address]  Phone: [phone]  Fax: [phone]     PATIENT NAME: Danae Harmon                                                                          YOB: 1940           DATE OF SERVICE: 11/22/2023  FACILITY:  [][facility]   [] [place]   [] [facility]   [x] [facility]   [] Other ____________________________________________________________________      CHIEF COMPLAINT:      Increased bilateral lower extremity edema.    HISTORY OF PRESENT ILLNESS:     Patient has chronic lower extremity edema but it has been increased for the past couple days.   No increased work of breathing or O2 demand. She is on Bumex 2 mg daily.d Resting in bed during visit.  She has no complaints but is a poor historian related to her dementia.  No signs and symptoms of any distress.  She has not had any abnormal vital signs. She continues to be on Hospice care.     PAST MEDICAL & SURGICAL HISTORY:   Past Medical History:   Diagnosis Date    Colon polyp     Osteoporosis     Pain in thoracic spine     Pneumonia     UTI (urinary tract infection)       Past Surgical History:   Procedure Laterality Date    BLADDER SURGERY  2000    bladder suspension    CERVICAL LAMINECTOMY      COLONOSCOPY      COLOSTOMY  1979    temporary sigmoid    HYSTERECTOMY           MEDICATIONS:  I have reviewed and reconciled the patients medication list in the patients chart at the skilled nursing facility today.      ALLERGIES:    Allergies   Allergen Reactions    Lexapro [Escitalopram Oxalate] Nausea Only         SOCIAL HISTORY:    Social History     Socioeconomic History    Marital status:    Tobacco Use    Smoking status: Never    Smokeless tobacco: Never   Substance and Sexual Activity    Alcohol  use: No    Drug use: No    Sexual activity: Defer       FAMILY HISTORY:    Family History   Problem Relation Age of Onset    Blindness Mother     Other Mother         arteriosclerotic cardiovascular disease     Diabetes Mother     Osteoporosis Mother     Stroke Mother     Cancer Brother     Lung cancer Sister     Osteoporosis Sister     Stroke Sister     Stroke Other        REVIEW OF SYSTEMS:    Review of Systems   Reason unable to perform ROS: ROS per nursing and patient.   Constitutional:  Negative for activity change, appetite change, chills, diaphoresis, fatigue and fever.   HENT:  Negative for congestion, mouth sores, nosebleeds, rhinorrhea and trouble swallowing.    Respiratory:  Negative for cough, choking, shortness of breath and wheezing.    Cardiovascular:  Positive for leg swelling (increased). Negative for palpitations.   Gastrointestinal:  Negative for abdominal pain, constipation, diarrhea and vomiting.   Genitourinary:  Positive for urinary incontinence. Negative for decreased urine volume, difficulty urinating, dysuria, frequency, hematuria and urgency.   Musculoskeletal:  Positive for arthralgias (chronic). Negative for joint swelling and myalgias.   Skin:  Negative for color change and rash.   Neurological:  Positive for weakness, memory problem and confusion (intermittent). Negative for seizures and speech difficulty.   Psychiatric/Behavioral:  Positive for behavioral problems, sleep disturbance and positive for hyperactivity. The patient is nervous/anxious.         Continues to have intermittent behaviors         PHYSICAL EXAMINATION:   VITAL SIGNS:   Vitals:    11/22/23 1307   BP: 116/64   Pulse: 88   Resp: 16   Temp: 97.9 °F (36.6 °C)   SpO2: 95%   Weight: 107 kg (235 lb)       Physical Exam   Constitutional: Vital signs are normal. She appears well-developed and well-nourished.   Cardiovascular: Normal rate, regular rhythm and normal heart sounds.   BLE edema   Pulmonary/Chest: Effort normal.  No respiratory distress. She has no wheezes. She has no rales.   Abdominal: Soft. Bowel sounds are normal. She exhibits no distension.   Musculoskeletal:      Right lower le+ Edema present.      Left lower le+ Edema present.      Comments: Weakness BLE   Neurological: She is alert.   Skin: Skin is warm and dry. Turgor is normal. No rash noted.   No rashes noted today   Psychiatric: Her speech is normal and behavior is normal. Mood normal. Cognition and memory are impaired.   Nursing note and vitals reviewed.      RECORDS REVIEW:   I have reviewed and interpreted the records in [facility]    ASSESSMENT     Diagnoses and all orders for this visit:    1. Bilateral lower extremity edema (Primary)    2. Hospice care patient    3. Vascular dementia without behavioral disturbance    4. Impaired mobility and ADLs              PLAN    LE edema/vascular dementia/Hospice/medication management   -Will increase Bumex 2 mg po bid.  Continue potassium 10 mEq daily.  MIR hose or compression stockings daily, off at bedtime.  Keep foot of the bed elevated and legs elevated on 2 pillows.  She continues on Hospice care. Will follow up and continue to monitor.     Nursing encouraged to keep me informed of any acute changes, or any new concerning symptoms.    Nursing to continue supportive care for all ADLs.      [x]  Discussed Patient in detail with nursing/staff, addressed all needs today.     [x]  Plan of Care Reviewed   [x]  PT/OT Reviewed   [x]  Order Changes  []  Discharge Plans Reviewed  [x]  Advance Directive on file with Nursing Home.   [x]  POA on file with Nursing Home.   [x]  Code Status listed: []  Full Code   [x]  DNR       “I confirm accuracy of unchanged data/findings which have been carried forward from previous visit, as well as I have updated appropriately those that have changed.”                                                           Mita Joyce, APRN.

## 2023-12-08 ENCOUNTER — NURSING HOME (OUTPATIENT)
Dept: FAMILY MEDICINE CLINIC | Facility: CLINIC | Age: 83
End: 2023-12-08
Payer: OTHER MISCELLANEOUS

## 2023-12-08 VITALS
OXYGEN SATURATION: 95 % | WEIGHT: 235 LBS | TEMPERATURE: 97.9 F | BODY MASS INDEX: 44.4 KG/M2 | RESPIRATION RATE: 18 BRPM | DIASTOLIC BLOOD PRESSURE: 68 MMHG | HEART RATE: 78 BPM | SYSTOLIC BLOOD PRESSURE: 137 MMHG

## 2023-12-08 DIAGNOSIS — Z74.09 IMPAIRED MOBILITY AND ADLS: ICD-10-CM

## 2023-12-08 DIAGNOSIS — Z78.9 IMPAIRED MOBILITY AND ADLS: ICD-10-CM

## 2023-12-08 DIAGNOSIS — R44.3 HALLUCINATIONS: Primary | ICD-10-CM

## 2023-12-08 DIAGNOSIS — R41.82 ACUTE ON CHRONIC ALTERATION IN MENTAL STATUS: ICD-10-CM

## 2023-12-08 DIAGNOSIS — Z87.440 HISTORY OF UTI: ICD-10-CM

## 2023-12-08 DIAGNOSIS — F01.50 VASCULAR DEMENTIA WITHOUT BEHAVIORAL DISTURBANCE: ICD-10-CM

## 2023-12-08 NOTE — LETTER
Nursing Home Follow Up Note      Layo Arango DO []   WILLIAM Alfaro [x]  [address] [place] [address]  Phone: [phone]  Fax: [phone] Dorcas Duncan MD []    Dc Fernandez DO []   [address] [place] [address]  Phone: [phone]  Fax: [phone]     PATIENT NAME: Danae Harmon                                                                          YOB: 1940           DATE OF SERVICE: 12/8/2023  FACILITY:  [][facility]   [] [facility]   [] [facility]   [x] [facility]   [] Other ____________________________________________________________________      CHIEF COMPLAINT:      Increased behaviors, with hallucinations for the past couple days.      HISTORY OF PRESENT ILLNESS:     Nursing reports the patient has had increased behaviors for the past couple days, and having some hallucinations.  and nursing concerned she has a UTI, as she has had these symptoms in the past with UTI. Resting in bed today with  no complaints. Does have some altered mental status.       PAST MEDICAL & SURGICAL HISTORY:   Past Medical History:   Diagnosis Date    Colon polyp     Osteoporosis     Pain in thoracic spine     Pneumonia     UTI (urinary tract infection)       Past Surgical History:   Procedure Laterality Date    BLADDER SURGERY  2000    bladder suspension    CERVICAL LAMINECTOMY      COLONOSCOPY      COLOSTOMY  1979    temporary sigmoid    HYSTERECTOMY           MEDICATIONS:  I have reviewed and reconciled the patients medication list in the patients chart at the skilled nursing facility today.      ALLERGIES:    Allergies   Allergen Reactions    Lexapro [Escitalopram Oxalate] Nausea Only         SOCIAL HISTORY:    Social History     Socioeconomic History    Marital status:    Tobacco Use    Smoking status: Never    Smokeless tobacco: Never   Substance and Sexual Activity    Alcohol use: No    Drug use: No    Sexual activity: Defer       FAMILY  HISTORY:    Family History   Problem Relation Age of Onset    Blindness Mother     Other Mother         arteriosclerotic cardiovascular disease     Diabetes Mother     Osteoporosis Mother     Stroke Mother     Cancer Brother     Lung cancer Sister     Osteoporosis Sister     Stroke Sister     Stroke Other        REVIEW OF SYSTEMS:    Negative distress,fever  Negative respiratory distress, cough, congestion  Positive hallucinations and behaviors  Negative nausea, vomiting, diarrhea  Negative urinary frequency, hematuria. Positive urinary incontinence.      PHYSICAL EXAMINATION:   VITAL SIGNS:   Vitals:    23 1531   BP: 137/68   Pulse: 78   Resp: 18   Temp: 97.9 °F (36.6 °C)   SpO2: 95%   Weight: 107 kg (235 lb)       Physical Exam   Constitutional: Vital signs are normal. She does not appear ill. No distress.   Eyes: Conjunctivae are normal.   Cardiovascular: Normal rate, regular rhythm and normal heart sounds.   BLE edema   Pulmonary/Chest: No respiratory distress. She has no wheezes. She has no rhonchi.   Abdominal: Soft. Bowel sounds are normal. She exhibits no distension. There is no abdominal tenderness.   Musculoskeletal:      Right lower le+ Edema present.      Left lower le+ Edema present.      Comments: Weakness BLE   Neurological: She is alert. She is disoriented.   Would not answer questions   Skin: Skin is warm and dry. Turgor is normal. No rash noted.   Psychiatric: Her mood appears anxious. Thought content is delusional. Cognition and memory are impaired.   Nursing note and vitals reviewed.      RECORDS REVIEW:   I have reviewed and interpreted the records in [facility] and [facility].    ASSESSMENT     Diagnoses and all orders for this visit:    1. Hallucinations (Primary)    2. History of UTI    3. Acute on chronic alteration in mental status    4. Vascular dementia without behavioral disturbance    5. Impaired mobility and ADLs          PLAN    Hallucinations/History UTI/AMS/dementia  -Will obtain  UA C&S for further evaluation. Will follow up with results.     Nursing encouraged to keep me informed of any acute changes, or any new concerning symptoms.    Nursing to continue supportive care for all ADLs.      [x]  Discussed Patient in detail with nursing/staff, addressed all needs today.     [x]  Plan of Care Reviewed   [x]  PT/OT Reviewed   [x]  Order Changes  []  Discharge Plans Reviewed  [x]  Advance Directive on file with Nursing Home.   [x]  POA on file with Nursing Home.   [x]  Code Status listed: []  Full Code   [x]  DNR       “I confirm accuracy of unchanged data/findings which have been carried forward from previous visit, as well as I have updated appropriately those that have changed.”                                                     Mita Joyce, APRN.

## 2023-12-09 NOTE — PROGRESS NOTES
Nursing Home Follow Up Note      Layo Aarngo DO []   WILLIAM Alfaro [x]  [address] [place] [address]  Phone: [phone]  Fax: [phone] Dorcas Duncan MD []    Dc Fernandez DO []   [address] [place] [address]  Phone: [phone]  Fax: [phone]     PATIENT NAME: Danae Harmon                                                                          YOB: 1940           DATE OF SERVICE: 12/8/2023  FACILITY:  [][facility]   [] [facility]   [] [facility]   [x] [facility]   [] Other ____________________________________________________________________      CHIEF COMPLAINT:      Increased behaviors, with hallucinations for the past couple days.      HISTORY OF PRESENT ILLNESS:     Nursing reports the patient has had increased behaviors for the past couple days, and having some hallucinations.  and nursing concerned she has a UTI, as she has had these symptoms in the past with UTI. Resting in bed today with  no complaints. Does have some altered mental status.       PAST MEDICAL & SURGICAL HISTORY:   Past Medical History:   Diagnosis Date    Colon polyp     Osteoporosis     Pain in thoracic spine     Pneumonia     UTI (urinary tract infection)       Past Surgical History:   Procedure Laterality Date    BLADDER SURGERY  2000    bladder suspension    CERVICAL LAMINECTOMY      COLONOSCOPY      COLOSTOMY  1979    temporary sigmoid    HYSTERECTOMY           MEDICATIONS:  I have reviewed and reconciled the patients medication list in the patients chart at the skilled nursing facility today.      ALLERGIES:    Allergies   Allergen Reactions    Lexapro [Escitalopram Oxalate] Nausea Only         SOCIAL HISTORY:    Social History     Socioeconomic History    Marital status:    Tobacco Use    Smoking status: Never    Smokeless tobacco: Never   Substance and Sexual Activity    Alcohol use: No    Drug use: No    Sexual activity: Defer       FAMILY  HISTORY:    Family History   Problem Relation Age of Onset    Blindness Mother     Other Mother         arteriosclerotic cardiovascular disease     Diabetes Mother     Osteoporosis Mother     Stroke Mother     Cancer Brother     Lung cancer Sister     Osteoporosis Sister     Stroke Sister     Stroke Other        REVIEW OF SYSTEMS:    Negative distress,fever  Negative respiratory distress, cough, congestion  Positive hallucinations and behaviors  Negative nausea, vomiting, diarrhea  Negative urinary frequency, hematuria. Positive urinary incontinence.      PHYSICAL EXAMINATION:   VITAL SIGNS:   Vitals:    23 1531   BP: 137/68   Pulse: 78   Resp: 18   Temp: 97.9 °F (36.6 °C)   SpO2: 95%   Weight: 107 kg (235 lb)       Physical Exam   Constitutional: Vital signs are normal. She does not appear ill. No distress.   Eyes: Conjunctivae are normal.   Cardiovascular: Normal rate, regular rhythm and normal heart sounds.   BLE edema   Pulmonary/Chest: No respiratory distress. She has no wheezes. She has no rhonchi.   Abdominal: Soft. Bowel sounds are normal. She exhibits no distension. There is no abdominal tenderness.   Musculoskeletal:      Right lower le+ Edema present.      Left lower le+ Edema present.      Comments: Weakness BLE   Neurological: She is alert. She is disoriented.   Would not answer questions   Skin: Skin is warm and dry. Turgor is normal. No rash noted.   Psychiatric: Her mood appears anxious. Thought content is delusional. Cognition and memory are impaired.   Nursing note and vitals reviewed.      RECORDS REVIEW:   I have reviewed and interpreted [facility].    ASSESSMENT     Diagnoses and all orders for this visit:    1. Hallucinations (Primary)    2. History of UTI    3. Acute on chronic alteration in mental status    4. Vascular dementia without behavioral disturbance    5. Impaired mobility and ADLs          PLAN    Hallucinations/History UTI/AMS/dementia  -Will obtain  UA C&S for further evaluation. Will follow up with results.     Nursing encouraged to keep me informed of any acute changes, or any new concerning symptoms.    Nursing to continue supportive care for all ADLs.      [x]  Discussed Patient in detail with nursing/staff, addressed all needs today.     [x]  Plan of Care Reviewed   [x]  PT/OT Reviewed   [x]  Order Changes  []  Discharge Plans Reviewed  [x]  Advance Directive on file with Nursing Home.   [x]  POA on file with Nursing Home.   [x]  Code Status listed: []  Full Code   [x]  DNR       “I confirm accuracy of unchanged data/findings which have been carried forward from previous visit, as well as I have updated appropriately those that have changed.”                                                     Mita Joyce, APRN.

## 2023-12-15 ENCOUNTER — NURSING HOME (OUTPATIENT)
Dept: FAMILY MEDICINE CLINIC | Facility: CLINIC | Age: 83
End: 2023-12-15
Payer: OTHER MISCELLANEOUS

## 2023-12-15 DIAGNOSIS — Z51.5 HOSPICE CARE: ICD-10-CM

## 2023-12-15 DIAGNOSIS — Z78.9 IMPAIRED MOBILITY AND ADLS: ICD-10-CM

## 2023-12-15 DIAGNOSIS — F01.50 VASCULAR DEMENTIA WITHOUT BEHAVIORAL DISTURBANCE: ICD-10-CM

## 2023-12-15 DIAGNOSIS — L03.032 INFECTED NAILBED OF TOE, LEFT: Primary | ICD-10-CM

## 2023-12-15 DIAGNOSIS — Z74.09 IMPAIRED MOBILITY AND ADLS: ICD-10-CM

## 2023-12-15 RX ORDER — MORPHINE SULFATE 100 MG/5ML
SOLUTION ORAL
Qty: 30 ML | Refills: 0 | Status: SHIPPED | OUTPATIENT
Start: 2023-12-15

## 2023-12-15 NOTE — TELEPHONE ENCOUNTER
JUSTIN REQUESTING MED REFILL FOR MORPHINE SULFATE SOLUTION 100 MG/5 ML      DIRECTIONS:MORPHINE SULFATE 20 MG/ML GIVE 0.5 ML PO Q 3 HRS PRN.

## 2023-12-15 NOTE — LETTER
Nursing Home Follow Up Note      Layo Arango DO []   WILLIAM Alfaro [x]  [address] [place] [address]  Phone: [phone]  Fax: [phone] Dorcas Duncan MD []    Dc Fernandez DO []   [address] [place] [address]  Phone: [phone]  Fax: [phone]     PATIENT NAME: Danae Harmon                                                                          YOB: 1940           DATE OF SERVICE: 12/15/2023  FACILITY:  [][facility]   [] [facility]   [] [facility]   [x] [facility]   [] Other ____________________________________________________________________      CHIEF COMPLAINT:      Infected toenail left great toe.    HISTORY OF PRESENT ILLNESS:     Nursing reports that patient has redness and yellow drainage, to left great toe for the past couple days. They have been applying antibiotic ointment but is has not improved any. She is resting in bed today without any complaints or signs and symptoms of any distress. She did voice discomfort with palpation of left great toe.       PAST MEDICAL & SURGICAL HISTORY:   Past Medical History:   Diagnosis Date    Colon polyp     Osteoporosis     Pain in thoracic spine     Pneumonia     UTI (urinary tract infection)       Past Surgical History:   Procedure Laterality Date    BLADDER SURGERY  2000    bladder suspension    CERVICAL LAMINECTOMY      COLONOSCOPY      COLOSTOMY  1979    temporary sigmoid    HYSTERECTOMY           MEDICATIONS:  I have reviewed and reconciled the patients medication list in the patients chart at the skilled nursing facility today.      ALLERGIES:    Allergies   Allergen Reactions    Lexapro [Escitalopram Oxalate] Nausea Only         SOCIAL HISTORY:    Social History     Socioeconomic History    Marital status:    Tobacco Use    Smoking status: Never    Smokeless tobacco: Never   Substance and Sexual Activity    Alcohol use: No    Drug use: No    Sexual activity: Defer       FAMILY  HISTORY:    Family History   Problem Relation Age of Onset    Blindness Mother     Other Mother         arteriosclerotic cardiovascular disease     Diabetes Mother     Osteoporosis Mother     Stroke Mother     Cancer Brother     Lung cancer Sister     Osteoporosis Sister     Stroke Sister     Stroke Other        REVIEW OF SYSTEMS:    Negative distress,fever  Negative respiratory distress, cough, congestion  Positive hallucinations and behaviors  Negative nausea, vomiting, diarrhea  Negative urinary frequency, hematuria. Positive urinary incontinence.      PHYSICAL EXAMINATION:   VITAL SIGNS:   Vitals:    12/15/23 0816   BP: 131/75   Pulse: 104   Resp: 18   Temp: 97.5 °F (36.4 °C)   SpO2: 95%   Weight: 107 kg (235 lb)       Physical Exam   Constitutional: Vital signs are normal. She does not appear ill. No distress.   Eyes: Conjunctivae are normal.   Cardiovascular: Normal rate, regular rhythm and normal heart sounds.   BLE edema   Pulmonary/Chest: No respiratory distress. She has no wheezes. She has no rhonchi.   Abdominal: Soft. Bowel sounds are normal. She exhibits no distension. There is no abdominal tenderness.   Musculoskeletal:      Right lower le+ Edema present.      Left lower le+ Edema present.      Comments: Weakness BLE        Neurological: She is alert. She is disoriented.   Would not answer questions   Skin: No rash noted.   Psychiatric: Mood normal. Cognition and memory are impaired. She has a flat affect.   Nursing note and vitals reviewed.      RECORDS REVIEW:   I have reviewed and interpreted the records in [facility] and [facility].    ASSESSMENT     Diagnoses and all orders for this visit:    1. Infected nailbed of toe, left (Primary)    2. Vascular dementia without behavioral disturbance    3. Impaired mobility and ADLs            PLAN    Infected nailbed left toe  -Keflex 500 mg po bid x 10 days.Bactroban ointment bid x 10 days. Will follow up and continue to monitor.     Nursing encouraged to keep  me informed of any acute changes, or any new concerning symptoms.    Nursing to continue supportive care for all ADLs.      [x]  Discussed Patient in detail with nursing/staff, addressed all needs today.     [x]  Plan of Care Reviewed   [x]  PT/OT Reviewed   [x]  Order Changes  []  Discharge Plans Reviewed  [x]  Advance Directive on file with Nursing Home.   [x]  POA on file with Nursing Home.   [x]  Code Status listed: []  Full Code   [x]  DNR       “I confirm accuracy of unchanged data/findings which have been carried forward from previous visit, as well as I have updated appropriately those that have changed.”                                                     Mita Joyce, APRN.

## 2023-12-18 VITALS
OXYGEN SATURATION: 95 % | BODY MASS INDEX: 44.4 KG/M2 | SYSTOLIC BLOOD PRESSURE: 131 MMHG | RESPIRATION RATE: 18 BRPM | DIASTOLIC BLOOD PRESSURE: 75 MMHG | HEART RATE: 104 BPM | TEMPERATURE: 97.5 F | WEIGHT: 235 LBS

## 2023-12-19 NOTE — PROGRESS NOTES
Nursing Home Follow Up Note      Layo Arango DO []   WILLIAM Alfaro [x]  [address] [place] [address]  Phone: [phone]  Fax: [phone] Dorcas Duncan MD []    Dc Fernandez DO []   [address] [place] [address]  Phone: [phone]  Fax: [phone]     PATIENT NAME: Danae Harmon                                                                          YOB: 1940           DATE OF SERVICE: 12/15/2023  FACILITY:  [][facility]   [] [facility]   [] [facility]   [x] [facility]   [] Other ____________________________________________________________________      CHIEF COMPLAINT:      Infected toenail left great toe.    HISTORY OF PRESENT ILLNESS:     Nursing reports that patient has redness and yellow drainage, to left great toe for the past couple days. They have been applying antibiotic ointment but is has not improved any. She is resting in bed today without any complaints or signs and symptoms of any distress. She did voice discomfort with palpation of left great toe.       PAST MEDICAL & SURGICAL HISTORY:   Past Medical History:   Diagnosis Date    Colon polyp     Osteoporosis     Pain in thoracic spine     Pneumonia     UTI (urinary tract infection)       Past Surgical History:   Procedure Laterality Date    BLADDER SURGERY  2000    bladder suspension    CERVICAL LAMINECTOMY      COLONOSCOPY      COLOSTOMY  1979    temporary sigmoid    HYSTERECTOMY           MEDICATIONS:  I have reviewed and reconciled the patients medication list in the patients chart at the skilled nursing facility today.      ALLERGIES:    Allergies   Allergen Reactions    Lexapro [Escitalopram Oxalate] Nausea Only         SOCIAL HISTORY:    Social History     Socioeconomic History    Marital status:    Tobacco Use    Smoking status: Never    Smokeless tobacco: Never   Substance and Sexual Activity    Alcohol use: No    Drug use: No    Sexual activity: Defer       FAMILY  HISTORY:    Family History   Problem Relation Age of Onset    Blindness Mother     Other Mother         arteriosclerotic cardiovascular disease     Diabetes Mother     Osteoporosis Mother     Stroke Mother     Cancer Brother     Lung cancer Sister     Osteoporosis Sister     Stroke Sister     Stroke Other        REVIEW OF SYSTEMS:    Negative distress,fever  Negative respiratory distress, cough, congestion  Positive hallucinations and behaviors  Negative nausea, vomiting, diarrhea  Negative urinary frequency, hematuria. Positive urinary incontinence.      PHYSICAL EXAMINATION:   VITAL SIGNS:   Vitals:    12/15/23 0816   BP: 131/75   Pulse: 104   Resp: 18   Temp: 97.5 °F (36.4 °C)   SpO2: 95%   Weight: 107 kg (235 lb)       Physical Exam   Constitutional: Vital signs are normal. She does not appear ill. No distress.   Eyes: Conjunctivae are normal.   Cardiovascular: Normal rate, regular rhythm and normal heart sounds.   BLE edema   Pulmonary/Chest: No respiratory distress. She has no wheezes. She has no rhonchi.   Abdominal: Soft. Bowel sounds are normal. She exhibits no distension. There is no abdominal tenderness.   Musculoskeletal:      Right lower le+ Edema present.      Left lower le+ Edema present.      Comments: Weakness BLE        Neurological: She is alert. She is disoriented.   Would not answer questions   Skin: No rash noted.   Psychiatric: Mood normal. Cognition and memory are impaired. She has a flat affect.   Nursing note and vitals reviewed.      RECORDS REVIEW:   I have reviewed and interpreted the records in [facility] and [facility].    ASSESSMENT     Diagnoses and all orders for this visit:    1. Infected nailbed of toe, left (Primary)    2. Vascular dementia without behavioral disturbance    3. Impaired mobility and ADLs            PLAN    Infected nailbed left toe  -Keflex 500 mg po bid x 10 days.Bactroban ointment bid x 10 days. Will follow up and continue to monitor.     Nursing encouraged to keep  me informed of any acute changes, or any new concerning symptoms.    Nursing to continue supportive care for all ADLs.      [x]  Discussed Patient in detail with nursing/staff, addressed all needs today.     [x]  Plan of Care Reviewed   [x]  PT/OT Reviewed   [x]  Order Changes  []  Discharge Plans Reviewed  [x]  Advance Directive on file with Nursing Home.   [x]  POA on file with Nursing Home.   [x]  Code Status listed: []  Full Code   [x]  DNR       “I confirm accuracy of unchanged data/findings which have been carried forward from previous visit, as well as I have updated appropriately those that have changed.”                                                     Mita Joyce, APRN.

## 2023-12-24 DIAGNOSIS — Z51.5 HOSPICE CARE: ICD-10-CM

## 2023-12-24 RX ORDER — FENTANYL 12.5 UG/1
1 PATCH TRANSDERMAL
Qty: 10 PATCH | Refills: 0 | Status: SHIPPED | OUTPATIENT
Start: 2023-12-24

## 2024-01-02 ENCOUNTER — TELEPHONE (OUTPATIENT)
Dept: INTERNAL MEDICINE | Facility: CLINIC | Age: 84
End: 2024-01-02
Payer: OTHER MISCELLANEOUS

## 2024-01-02 NOTE — TELEPHONE ENCOUNTER
“Please be informed that patient has passed. Patient has been marked  in the system. The date of death is: 25    Caller: [facility]    Relationship: Other    Best call back number: [phone]    Did the patient have surgery within 30 days of their passing (Y/N): UNKNOWN